# Patient Record
Sex: FEMALE | Race: WHITE | Employment: OTHER
[De-identification: names, ages, dates, MRNs, and addresses within clinical notes are randomized per-mention and may not be internally consistent; named-entity substitution may affect disease eponyms.]

---

## 2017-01-10 ENCOUNTER — TELEPHONE (OUTPATIENT)
Dept: PODIATRY | Facility: CLINIC | Age: 72
End: 2017-01-10

## 2017-01-10 DIAGNOSIS — M79.671 PAIN IN BOTH FEET: ICD-10-CM

## 2017-01-10 DIAGNOSIS — M79.672 PAIN IN BOTH FEET: ICD-10-CM

## 2017-01-10 DIAGNOSIS — M19.072 PRIMARY OSTEOARTHRITIS OF LEFT FOOT: ICD-10-CM

## 2017-01-10 DIAGNOSIS — M77.8 ENTHESOPATHY OF FOOT: ICD-10-CM

## 2017-01-10 DIAGNOSIS — M72.2 PLANTAR FASCIITIS, LEFT: Primary | ICD-10-CM

## 2017-04-27 ENCOUNTER — HOSPITAL ENCOUNTER (OUTPATIENT)
Dept: WOMENS IMAGING | Age: 72
Discharge: HOME | End: 2017-04-27
Payer: MEDICARE

## 2017-04-27 DIAGNOSIS — Z12.31 VISIT FOR SCREENING MAMMOGRAM: ICD-10-CM

## 2017-04-27 PROCEDURE — G0202 SCR MAMMO BI INCL CAD: HCPCS

## 2017-09-07 PROBLEM — E11.9 TYPE 2 DIABETES MELLITUS WITHOUT COMPLICATION, WITH LONG-TERM CURRENT USE OF INSULIN (HCC): Chronic | Status: ACTIVE | Noted: 2017-09-07

## 2017-09-07 PROBLEM — Z79.4 TYPE 2 DIABETES MELLITUS WITHOUT COMPLICATION, WITH LONG-TERM CURRENT USE OF INSULIN (HCC): Chronic | Status: ACTIVE | Noted: 2017-09-07

## 2017-09-09 ENCOUNTER — HOSPITAL ENCOUNTER (EMERGENCY)
Age: 72
Discharge: HOME | End: 2017-09-09
Attending: EMERGENCY MEDICINE
Payer: MEDICARE

## 2017-09-09 VITALS
WEIGHT: 144 LBS | SYSTOLIC BLOOD PRESSURE: 153 MMHG | RESPIRATION RATE: 16 BRPM | OXYGEN SATURATION: 100 % | TEMPERATURE: 97.7 F | DIASTOLIC BLOOD PRESSURE: 60 MMHG | HEIGHT: 63 IN | HEART RATE: 72 BPM | BODY MASS INDEX: 25.52 KG/M2

## 2017-09-09 DIAGNOSIS — R21 RASH AND OTHER NONSPECIFIC SKIN ERUPTION: Primary | ICD-10-CM

## 2017-09-09 PROCEDURE — 6360000002 HC RX W HCPCS: Performed by: EMERGENCY MEDICINE

## 2017-09-09 PROCEDURE — 6370000000 HC RX 637 (ALT 250 FOR IP): Performed by: EMERGENCY MEDICINE

## 2017-09-09 PROCEDURE — 99282 EMERGENCY DEPT VISIT SF MDM: CPT

## 2017-09-09 RX ORDER — FAMOTIDINE 20 MG/1
20 TABLET, FILM COATED ORAL ONCE
Status: COMPLETED | OUTPATIENT
Start: 2017-09-09 | End: 2017-09-09

## 2017-09-09 RX ORDER — HYDROXYZINE HYDROCHLORIDE 25 MG/1
25 TABLET, FILM COATED ORAL ONCE
Status: COMPLETED | OUTPATIENT
Start: 2017-09-09 | End: 2017-09-09

## 2017-09-09 RX ORDER — DEXAMETHASONE 4 MG/1
10 TABLET ORAL EVERY 12 HOURS SCHEDULED
Status: DISCONTINUED | OUTPATIENT
Start: 2017-09-09 | End: 2017-09-09 | Stop reason: HOSPADM

## 2017-09-09 RX ORDER — PREDNISONE 10 MG/1
TABLET ORAL
Qty: 12 TABLET | Refills: 0 | Status: SHIPPED | OUTPATIENT
Start: 2017-09-09 | End: 2017-09-19

## 2017-09-09 RX ORDER — HYDROXYZINE HYDROCHLORIDE 25 MG/1
25 TABLET, FILM COATED ORAL 3 TIMES DAILY PRN
Qty: 9 TABLET | Refills: 0 | Status: SHIPPED | OUTPATIENT
Start: 2017-09-09 | End: 2017-09-12

## 2017-09-09 RX ADMIN — FAMOTIDINE 20 MG: 20 TABLET ORAL at 03:57

## 2017-09-09 RX ADMIN — DEXAMETHASONE 10 MG: 4 TABLET ORAL at 03:57

## 2017-09-09 RX ADMIN — HYDROXYZINE HYDROCHLORIDE 25 MG: 25 TABLET, FILM COATED ORAL at 03:57

## 2018-04-30 ENCOUNTER — HOSPITAL ENCOUNTER (OUTPATIENT)
Dept: WOMENS IMAGING | Age: 73
Discharge: HOME | End: 2018-05-02
Payer: MEDICARE

## 2018-04-30 DIAGNOSIS — Z12.31 VISIT FOR SCREENING MAMMOGRAM: ICD-10-CM

## 2018-04-30 PROCEDURE — 77063 BREAST TOMOSYNTHESIS BI: CPT

## 2018-12-05 ENCOUNTER — TELEPHONE (OUTPATIENT)
Dept: PRIMARY CARE CLINIC | Age: 73
End: 2018-12-05

## 2018-12-06 LAB
AVERAGE GLUCOSE: 137
HBA1C MFR BLD: 6.4 %

## 2019-03-05 ENCOUNTER — OFFICE VISIT (OUTPATIENT)
Dept: PRIMARY CARE CLINIC | Age: 74
End: 2019-03-05
Payer: MEDICARE

## 2019-03-05 VITALS
DIASTOLIC BLOOD PRESSURE: 70 MMHG | OXYGEN SATURATION: 98 % | WEIGHT: 137.4 LBS | HEART RATE: 61 BPM | BODY MASS INDEX: 24.34 KG/M2 | SYSTOLIC BLOOD PRESSURE: 144 MMHG

## 2019-03-05 DIAGNOSIS — R51.9 ACUTE NONINTRACTABLE HEADACHE, UNSPECIFIED HEADACHE TYPE: ICD-10-CM

## 2019-03-05 DIAGNOSIS — J01.10 ACUTE NON-RECURRENT FRONTAL SINUSITIS: Primary | ICD-10-CM

## 2019-03-05 PROCEDURE — 99214 OFFICE O/P EST MOD 30 MIN: CPT | Performed by: PHYSICIAN ASSISTANT

## 2019-03-05 RX ORDER — FLUTICASONE PROPIONATE 50 MCG
2 SPRAY, SUSPENSION (ML) NASAL DAILY
Qty: 1 BOTTLE | Refills: 0 | Status: SHIPPED | OUTPATIENT
Start: 2019-03-05 | End: 2019-06-03

## 2019-03-05 RX ORDER — AMOXICILLIN 500 MG/1
500 CAPSULE ORAL 3 TIMES DAILY
Qty: 21 CAPSULE | Refills: 0 | Status: SHIPPED | OUTPATIENT
Start: 2019-03-05 | End: 2019-06-03 | Stop reason: ALTCHOICE

## 2019-03-05 ASSESSMENT — ENCOUNTER SYMPTOMS
SWOLLEN GLANDS: 0
SINUS PRESSURE: 1
BLURRED VISION: 0
PHOTOPHOBIA: 0
NAUSEA: 0
VISUAL CHANGE: 0
SORE THROAT: 0
RHINORRHEA: 1
VOMITING: 0
COUGH: 0

## 2019-03-05 ASSESSMENT — PATIENT HEALTH QUESTIONNAIRE - PHQ9
2. FEELING DOWN, DEPRESSED OR HOPELESS: 0
SUM OF ALL RESPONSES TO PHQ QUESTIONS 1-9: 0
1. LITTLE INTEREST OR PLEASURE IN DOING THINGS: 0
SUM OF ALL RESPONSES TO PHQ QUESTIONS 1-9: 0
SUM OF ALL RESPONSES TO PHQ9 QUESTIONS 1 & 2: 0

## 2019-03-21 ENCOUNTER — TELEPHONE (OUTPATIENT)
Dept: PRIMARY CARE CLINIC | Age: 74
End: 2019-03-21

## 2019-03-21 RX ORDER — VALSARTAN 80 MG/1
80 TABLET ORAL DAILY
Qty: 90 TABLET | Refills: 3 | Status: SHIPPED | OUTPATIENT
Start: 2019-03-21 | End: 2019-06-03 | Stop reason: ALTCHOICE

## 2019-05-02 ENCOUNTER — HOSPITAL ENCOUNTER (OUTPATIENT)
Dept: WOMENS IMAGING | Age: 74
Discharge: HOME | End: 2019-05-04
Payer: MEDICARE

## 2019-05-02 DIAGNOSIS — Z12.39 BREAST SCREENING: ICD-10-CM

## 2019-05-02 PROCEDURE — 77067 SCR MAMMO BI INCL CAD: CPT

## 2019-06-03 ENCOUNTER — OFFICE VISIT (OUTPATIENT)
Dept: PRIMARY CARE CLINIC | Age: 74
End: 2019-06-03
Payer: MEDICARE

## 2019-06-03 VITALS
HEART RATE: 59 BPM | WEIGHT: 135.8 LBS | DIASTOLIC BLOOD PRESSURE: 62 MMHG | OXYGEN SATURATION: 99 % | SYSTOLIC BLOOD PRESSURE: 128 MMHG | HEIGHT: 62 IN | BODY MASS INDEX: 24.99 KG/M2

## 2019-06-03 DIAGNOSIS — Z00.00 MEDICARE ANNUAL WELLNESS VISIT, SUBSEQUENT: ICD-10-CM

## 2019-06-03 DIAGNOSIS — Z00.00 ROUTINE GENERAL MEDICAL EXAMINATION AT A HEALTH CARE FACILITY: Primary | ICD-10-CM

## 2019-06-03 PROCEDURE — G0438 PPPS, INITIAL VISIT: HCPCS | Performed by: FAMILY MEDICINE

## 2019-06-03 ASSESSMENT — LIFESTYLE VARIABLES: HOW OFTEN DO YOU HAVE A DRINK CONTAINING ALCOHOL: 0

## 2019-06-03 ASSESSMENT — PATIENT HEALTH QUESTIONNAIRE - PHQ9
SUM OF ALL RESPONSES TO PHQ QUESTIONS 1-9: 0
2. FEELING DOWN, DEPRESSED OR HOPELESS: 0
SUM OF ALL RESPONSES TO PHQ QUESTIONS 1-9: 0
1. LITTLE INTEREST OR PLEASURE IN DOING THINGS: 0
SUM OF ALL RESPONSES TO PHQ9 QUESTIONS 1 & 2: 0

## 2019-06-03 NOTE — PATIENT INSTRUCTIONS
Check with endocrinology about last bone density test and if due yet   Personalized Preventive Plan for Georgian Roof - 6/3/2019  Medicare offers a range of preventive health benefits. Some of the tests and screenings are paid in full while other may be subject to a deductible, co-insurance, and/or copay. Some of these benefits include a comprehensive review of your medical history including lifestyle, illnesses that may run in your family, and various assessments and screenings as appropriate. After reviewing your medical record and screening and assessments performed today your provider may have ordered immunizations, labs, imaging, and/or referrals for you. A list of these orders (if applicable) as well as your Preventive Care list are included within your After Visit Summary for your review. Other Preventive Recommendations:    · A preventive eye exam performed by an eye specialist is recommended every 1-2 years to screen for glaucoma; cataracts, macular degeneration, and other eye disorders. · A preventive dental visit is recommended every 6 months. · Try to get at least 150 minutes of exercise per week or 10,000 steps per day on a pedometer . · Order or download the FREE \"Exercise & Physical Activity: Your Everyday Guide\" from [employer]. Call 5-370.603.9010 or search [employer] online. · You need 2935-6342 mg of calcium and 1817-4614 IU of vitamin D per day. It is possible to meet your calcium requirement with diet alone, but a vitamin D supplement is usually necessary to meet this goal.  · When exposed to the sun, use a sunscreen that protects against both UVA and UVB radiation with an SPF of 30 or greater. Reapply every 2 to 3 hours or after sweating, drying off with a towel, or swimming. · Always wear a seat belt when traveling in a car. Always wear a helmet when riding a bicycle or motorcycle.

## 2019-06-03 NOTE — PROGRESS NOTES
Medicare Annual Wellness Visit  Name: Isaiah Parisi Date: 6/3/2019   MRN: [id number] Sex: Female   Age: 76 y.o. Ethnicity: Non-/Non    : 1945 Race: Delmi Bowles is here for Welcome To Medicare Visit (Pt here for medicare wellness. )    Screenings for behavioral, psychosocial and functional/safety risks, and cognitive dysfunction are all negative except as indicated below. These results, as well as other patient data from the [address] form, are documented in Flowsheets linked to this Encounter. No Known Allergies    Prior to Visit Medications    Medication Sig Taking?  Authorizing Provider   insulin glargine (TOUJEO SOLOSTAR) 300 UNIT/ML injection pen Inject 13 Units into the skin every morning  Yes Historical Provider, MD RICE UF III MINI PEN NEEDLES 31G X 5 MM MISC  Yes Historical Provider, MD   pravastatin (PRAVACHOL) 40 MG tablet Take 1 tablet by mouth daily Yes Joselyn Ceballos PA-C   aspirin 81 MG chewable tablet Take 81 mg by mouth daily Yes Suzie Davis MD   losartan (COZAAR) 50 MG tablet Take 50 mg by mouth daily Yes Suzie Davis MD   Multiple Vitamins TABS Take 1 tablet by mouth daily Yes Suzie Davis MD   insulin lispro (HUMALOG) 100 UNIT/ML injection cartridge Inject into the skin 3 times daily (before meals) Takes her insulin according to scale Yes Suzie Davis MD       Past Medical History:   Diagnosis Date    Cramp of limb     Frequent bowel movements     History of chest pain     History of pneumonia     History of restless legs syndrome     History of shortness of breath     History of synovitis     Plantar fasciitis, left     Trochanteric bursitis      Past Surgical History:   Procedure Laterality Date    CHOLECYSTECTOMY      COLONOSCOPY      HYSTERECTOMY, VAGINAL      left both ovaries in       Family History   Problem Relation Age of Onset    Heart Disease Mother     Heart Disease Brother     Diabetes Maternal Aunt     Diabetes Maternal Uncle     Diabetes Maternal Grandmother        CareTeam (Including outside providers/suppliers regularly involved in providing care):   Patient Care Team:  Nellie Foote MD as PCP - General  Nellie Foote MD as PCP [facility]    Wt Readings from Last 3 Encounters:   06/03/19 135 lb 12.8 oz (61.6 kg)   03/05/19 137 lb 6.4 oz (62.3 kg)   11/14/18 137 lb 6.4 oz (62.3 kg)     Vitals:    06/03/19 1341   BP: 128/62   Pulse: 59   SpO2: 99%   Weight: 135 lb 12.8 oz (61.6 kg)   Height: 5' 2\" (1.575 m)     Body mass index is 24.84 kg/m². Based upon direct observation of the patient, evaluation of cognition reveals recent and remote memory intact. Physical Exam   Constitutional: She is oriented to person, place, and time. She appears well-developed and well-nourished. No distress. HENT:   Head: Normocephalic and atraumatic. Mouth/Throat: Oropharynx is clear and moist.   Eyes: Pupils are equal, round, and reactive to light. Conjunctivae are normal. Right eye exhibits no discharge. Left eye exhibits no discharge. No scleral icterus. Neck: No tracheal deviation present. No thyromegaly present. Cardiovascular: Normal rate, regular rhythm and normal heart sounds. No carotid bruits   Pulmonary/Chest: Effort normal and breath sounds normal. No respiratory distress. She has no wheezes. Musculoskeletal: She exhibits no edema. Lymphadenopathy:     She has no cervical adenopathy. Neurological: She is alert and oriented to person, place, and time. Skin: Skin is warm. No rash noted. Psychiatric: She has a normal mood and affect. Her behavior is normal. Thought content normal.   Nursing note and vitals reviewed. Patient's complete Health Risk Assessment and screening values have been reviewed and are found in Flowsheets.  The following problems were reviewed today and where indicated follow up appointments were made and/or referrals ordered. Positive Risk Factor Screenings with Interventions:     No Positive Risk Factors identified today. Personalized Preventive Plan   Current Health Maintenance Status  Immunization History   Administered Date(s) Administered    Influenza Whole 10/20/2015    Influenza, High Dose (Fluzone 65 yrs and older) 10/01/2016    Influenza, Triv, inactivated, subunit, adjuvanted, IM (Fluad 65 yrs and older) 10/19/2017, 09/20/2018    Pneumococcal 13-valent Conjugate ([id number]) 12/13/2016    Pneumococcal Polysaccharide ([id number]) 06/19/2018    Zoster Live (Zostavax) 09/17/2015    Zoster Subunit (Shingrix) 09/07/2018, 11/09/2018        Health Maintenance   Topic Date Due    DTaP/Tdap/Td vaccine (1 - Tdap) 04/06/1964    DEXA (modify frequency per FRAX score)  04/06/2010    Diabetic microalbuminuria test  03/21/2016    Lipid screen  03/21/2016    Potassium monitoring  03/07/2017    Creatinine monitoring  03/07/2017    Diabetic foot exam  12/06/2019    A1C test (Diabetic or Prediabetic)  12/06/2019    Diabetic retinal exam  12/06/2019    Breast cancer screen  05/02/2021    Colon cancer screen colonoscopy  08/15/2021    Flu vaccine  Completed    Shingles Vaccine  Completed    Pneumococcal 65+ years Vaccine  Completed    Hepatitis C screen  Completed     Recommendations for Preventive Services Due: see orders and patient instructions/AVS.  .   Recommended screening schedule for the next 5-10 years is provided to the patient in written form: see Patient Instructions/AVS.

## 2019-06-05 LAB
ALBUMIN SERPL-MCNC: NORMAL G/DL
ALP BLD-CCNC: NORMAL U/L
ALT SERPL-CCNC: NORMAL U/L
AST SERPL-CCNC: NORMAL U/L
AVERAGE GLUCOSE: 146
BILIRUB SERPL-MCNC: NORMAL MG/DL (ref 0.1–1.4)
BUN BLDV-MCNC: NORMAL MG/DL
CALCIUM SERPL-MCNC: NORMAL MG/DL
CHLORIDE BLD-SCNC: NORMAL MMOL/L
CHOLESTEROL, TOTAL: 150 MG/DL
CHOLESTEROL/HDL RATIO: 1.9
CO2: NORMAL MMOL/L
CREAT SERPL-MCNC: NORMAL MG/DL
CREATININE, URINE: 151.6
GLUCOSE FASTING: 89 MG/DL
HBA1C MFR BLD: 6.5 %
HDLC SERPL-MCNC: 77 MG/DL (ref 35–70)
LDL CHOLESTEROL CALCULATED: 55 MG/DL (ref 0–160)
MICROALBUMIN/CREAT 24H UR: 20.7 MG/G{CREAT}
MICROALBUMIN/CREAT UR-RTO: 13.7
POTASSIUM SERPL-SCNC: NORMAL MMOL/L
SODIUM BLD-SCNC: NORMAL MMOL/L
TOTAL PROTEIN: NORMAL G/DL (ref 6.4–8.2)
TRIGL SERPL-MCNC: 86 MG/DL
VLDLC SERPL CALC-MCNC: 17 MG/DL

## 2019-07-03 PROBLEM — Z00.00 MEDICARE ANNUAL WELLNESS VISIT, SUBSEQUENT: Status: RESOLVED | Noted: 2019-06-03 | Resolved: 2019-07-03

## 2019-09-17 ENCOUNTER — TELEPHONE (OUTPATIENT)
Dept: PRIMARY CARE CLINIC | Age: 74
End: 2019-09-17

## 2019-09-19 ENCOUNTER — OFFICE VISIT (OUTPATIENT)
Dept: PRIMARY CARE CLINIC | Age: 74
End: 2019-09-19
Payer: MEDICARE

## 2019-09-19 VITALS
HEART RATE: 59 BPM | SYSTOLIC BLOOD PRESSURE: 122 MMHG | WEIGHT: 134 LBS | DIASTOLIC BLOOD PRESSURE: 60 MMHG | HEIGHT: 62 IN | OXYGEN SATURATION: 95 % | RESPIRATION RATE: 15 BRPM | BODY MASS INDEX: 24.66 KG/M2

## 2019-09-19 DIAGNOSIS — L23.7 POISON IVY DERMATITIS: ICD-10-CM

## 2019-09-19 PROCEDURE — 96372 THER/PROPH/DIAG INJ SC/IM: CPT | Performed by: PHYSICIAN ASSISTANT

## 2019-09-19 PROCEDURE — 99213 OFFICE O/P EST LOW 20 MIN: CPT | Performed by: PHYSICIAN ASSISTANT

## 2019-09-19 RX ORDER — METHYLPREDNISOLONE ACETATE 80 MG/ML
80 INJECTION, SUSPENSION INTRA-ARTICULAR; INTRALESIONAL; INTRAMUSCULAR; SOFT TISSUE ONCE
Status: COMPLETED | OUTPATIENT
Start: 2019-09-19 | End: 2019-09-19

## 2019-09-19 RX ADMIN — METHYLPREDNISOLONE ACETATE 80 MG: 80 INJECTION, SUSPENSION INTRA-ARTICULAR; INTRALESIONAL; INTRAMUSCULAR; SOFT TISSUE at 12:13

## 2019-09-19 ASSESSMENT — ENCOUNTER SYMPTOMS
COLOR CHANGE: 0
RESPIRATORY NEGATIVE: 1
EYES NEGATIVE: 1
ABDOMINAL PAIN: 0

## 2019-10-07 ENCOUNTER — TELEPHONE (OUTPATIENT)
Dept: PRIMARY CARE CLINIC | Age: 74
End: 2019-10-07

## 2019-10-07 DIAGNOSIS — H93.19 TINNITUS, UNSPECIFIED LATERALITY: Primary | ICD-10-CM

## 2020-03-09 ENCOUNTER — HOSPITAL ENCOUNTER (OUTPATIENT)
Dept: GENERAL RADIOLOGY | Age: 75
Discharge: HOME | End: 2020-03-11
Payer: MEDICARE

## 2020-03-09 ENCOUNTER — OFFICE VISIT (OUTPATIENT)
Dept: PRIMARY CARE CLINIC | Age: 75
End: 2020-03-09
Payer: MEDICARE

## 2020-03-09 ENCOUNTER — HOSPITAL ENCOUNTER (OUTPATIENT)
Age: 75
Discharge: HOME | End: 2020-03-11
Payer: MEDICARE

## 2020-03-09 ENCOUNTER — NURSE TRIAGE (OUTPATIENT)
Dept: OTHER | Facility: CLINIC | Age: 75
End: 2020-03-09

## 2020-03-09 VITALS
SYSTOLIC BLOOD PRESSURE: 140 MMHG | DIASTOLIC BLOOD PRESSURE: 60 MMHG | WEIGHT: 133.2 LBS | HEART RATE: 68 BPM | BODY MASS INDEX: 24.36 KG/M2 | OXYGEN SATURATION: 96 %

## 2020-03-09 PROCEDURE — 71046 X-RAY EXAM CHEST 2 VIEWS: CPT

## 2020-03-09 PROCEDURE — 99213 OFFICE O/P EST LOW 20 MIN: CPT | Performed by: FAMILY MEDICINE

## 2020-03-09 RX ORDER — BENZONATATE 100 MG/1
100 CAPSULE ORAL 3 TIMES DAILY PRN
Qty: 30 CAPSULE | Refills: 0 | Status: SHIPPED | OUTPATIENT
Start: 2020-03-09 | End: 2020-03-19

## 2020-03-09 RX ORDER — AZITHROMYCIN 250 MG/1
TABLET, FILM COATED ORAL
Qty: 1 PACKET | Refills: 0 | Status: SHIPPED | OUTPATIENT
Start: 2020-03-09 | End: 2020-03-13 | Stop reason: SDUPTHER

## 2020-03-09 ASSESSMENT — ENCOUNTER SYMPTOMS
ABDOMINAL PAIN: 0
WHEEZING: 0
EYE REDNESS: 0
DIARRHEA: 1
COUGH: 1
NAUSEA: 0
RHINORRHEA: 1
SORE THROAT: 0
VOMITING: 0
SHORTNESS OF BREATH: 0
EYE DISCHARGE: 0

## 2020-03-09 NOTE — TELEPHONE ENCOUNTER
Reason for Disposition   Coughed up > 1 tablespoon (15 ml) blood (Exception: blood-tinged sputum)    Protocols used: COUGH-ADULT-OH    Patient called [facility]) to schedule appointment, with red flag complaint, transferred to RN access for triage. Reports having productive cough for 8-10 days. States sputum is white and bloody at times. Patient informed of disposition. Care advice as documented. Instructed patient to call back with worsening symptoms. Soft transfer to pre-service center to schedule appointment as recommended. Please do not respond to the triage nurse through this encounter. Any subsequent communication should be directly with the patient.

## 2020-03-09 NOTE — PROGRESS NOTES
(ZITHROMAX) 250 MG tablet Take 2 tabs the first day then1 days daily for 4 days 1 packet 0    benzonatate (TESSALON PERLES) 100 MG capsule Take 1 capsule by mouth 3 times daily as needed for Cough 30 capsule 0    insulin glargine (TOUJEO SOLOSTAR) 300 UNIT/ML injection pen Inject 13 Units into the skin every morning       B-D UF III MINI PEN NEEDLES 31G X 5 MM MISC       pravastatin (PRAVACHOL) 40 MG tablet Take 1 tablet by mouth daily 30 tablet 0    aspirin 81 MG chewable tablet Take 81 mg by mouth daily      losartan (COZAAR) 50 MG tablet Take 50 mg by mouth daily      Multiple Vitamins TABS Take 1 tablet by mouth daily      insulin lispro (HUMALOG) 100 UNIT/ML injection cartridge Inject into the skin 3 times daily (before meals) Takes her insulin according to scale       No current facility-administered medications for this visit. No Known Allergies    Health Maintenance   Topic Date Due    Hepatitis B vaccine (1 of 3 - Risk 3-dose series) 04/06/1964    DTaP/Tdap/Td vaccine (1 - Tdap) 04/06/1964    Annual Wellness Visit (AWV)  06/03/2020    Diabetic foot exam  06/05/2020    A1C test (Diabetic or Prediabetic)  06/05/2020    Diabetic microalbuminuria test  06/05/2020    Lipid screen  06/05/2020    Potassium monitoring  06/05/2020    Creatinine monitoring  06/05/2020    Diabetic retinal exam  06/20/2020    Breast cancer screen  05/02/2021    Colon cancer screen colonoscopy  08/15/2021    DEXA (modify frequency per FRAX score)  Completed    Flu vaccine  Completed    Shingles Vaccine  Completed    Pneumococcal 65+ years Vaccine  Completed    Hepatitis C screen  Completed    Hepatitis A vaccine  Aged Out    Hib vaccine  Aged Out    Meningococcal (ACWY) vaccine  Aged Out       Subjective:      Review of Systems   Constitutional: Negative for chills, fatigue and fever. HENT: Positive for rhinorrhea. Negative for ear pain, postnasal drip and sore throat.     Eyes: Negative for discharge and redness. Respiratory: Positive for cough. Negative for shortness of breath and wheezing. Cardiovascular: Negative for chest pain and palpitations. Gastrointestinal: Positive for diarrhea (once). Negative for abdominal pain, nausea and vomiting. Genitourinary: Negative for dysuria and frequency. Musculoskeletal: Negative for arthralgias and myalgias. Neurological: Positive for light-headedness (sometimes). Negative for dizziness and headaches. Psychiatric/Behavioral: Positive for sleep disturbance (a little better last night). Objective:     BP (!) 140/60   Pulse 68   Wt 133 lb 3.2 oz (60.4 kg)   SpO2 96%   BMI 24.36 kg/m²   Physical Exam  Vitals signs and nursing note reviewed. Constitutional:       General: She is not in acute distress. Appearance: She is well-developed. HENT:      Head: Normocephalic and atraumatic. Eyes:      General: No scleral icterus. Right eye: No discharge. Left eye: No discharge. Conjunctiva/sclera: Conjunctivae normal.      Pupils: Pupils are equal, round, and reactive to light. Neck:      Thyroid: No thyromegaly. Trachea: No tracheal deviation. Cardiovascular:      Rate and Rhythm: Normal rate and regular rhythm. Heart sounds: Normal heart sounds. Comments: No carotid bruits  Pulmonary:      Effort: Pulmonary effort is normal. No respiratory distress. Breath sounds: Normal breath sounds. No wheezing. Lymphadenopathy:      Cervical: No cervical adenopathy. Skin:     General: Skin is warm. Findings: No rash. Neurological:      Mental Status: She is alert and oriented to person, place, and time. Psychiatric:         Behavior: Behavior normal.         Thought Content: Thought content normal.         Assessment:       Diagnosis Orders   1. Proliferative diabetic retinopathy associated with type 1 diabetes mellitus, macular edema presence unspecified, unspecified laterality ([facility] [address]     2.  Cough  XR CHEST STANDARD (2 VW)   3. Cough with hemoptysis  XR CHEST STANDARD (2 VW)        Plan:    follows with eye doctor and endocrine  Return if symptoms worsen or fail to improve. Orders Placed This Encounter   Procedures    XR CHEST STANDARD (2 VW)     Standing Status:   Future     Standing Expiration Date:   3/9/2021     Orders Placed This Encounter   Medications    azithromycin (ZITHROMAX) 250 MG tablet     Sig: Take 2 tabs the first day then1 days daily for 4 days     Dispense:  1 packet     Refill:  0    benzonatate (TESSALON PERLES) 100 MG capsule     Sig: Take 1 capsule by mouth 3 times daily as needed for Cough     Dispense:  30 capsule     Refill:  0       Patient given educationalmaterials - see patient instructions. Discussed use, benefit, and side effectsof prescribed medications. All patient questions answered. Pt voiced understanding. Reviewed health maintenance. Instructed to continue current medications, diet andexercise. Patient agreed with treatment plan. Follow up as directed.      Electronicallysigned by Lorna Jeronimo MD on 3/9/2020 at 2:13 PM

## 2020-03-13 ENCOUNTER — TELEPHONE (OUTPATIENT)
Dept: PRIMARY CARE CLINIC | Age: 75
End: 2020-03-13

## 2020-03-13 RX ORDER — AZITHROMYCIN 250 MG/1
TABLET, FILM COATED ORAL
Qty: 1 PACKET | Refills: 0 | Status: SHIPPED | OUTPATIENT
Start: 2020-03-13 | End: 2020-06-05 | Stop reason: SDUPTHER

## 2020-03-18 NOTE — TELEPHONE ENCOUNTER
Probably a viral bronchitis then so just needs to ride it out. Take Mucinex DM and saline nasal spray.   If she gets really SOB then go to ER

## 2020-04-03 ENCOUNTER — APPOINTMENT (OUTPATIENT)
Dept: GENERAL RADIOLOGY | Age: 75
End: 2020-04-03
Payer: MEDICARE

## 2020-04-03 ENCOUNTER — HOSPITAL ENCOUNTER (EMERGENCY)
Age: 75
Discharge: HOME | End: 2020-04-03
Attending: EMERGENCY MEDICINE
Payer: MEDICARE

## 2020-04-03 ENCOUNTER — TELEPHONE (OUTPATIENT)
Dept: PRIMARY CARE CLINIC | Age: 75
End: 2020-04-03

## 2020-04-03 VITALS
OXYGEN SATURATION: 100 % | RESPIRATION RATE: 16 BRPM | TEMPERATURE: 97.8 F | DIASTOLIC BLOOD PRESSURE: 62 MMHG | BODY MASS INDEX: 23 KG/M2 | HEIGHT: 62 IN | WEIGHT: 125 LBS | HEART RATE: 68 BPM | SYSTOLIC BLOOD PRESSURE: 157 MMHG

## 2020-04-03 LAB
TROPONIN INTERP: NORMAL
TROPONIN T: NORMAL NG/ML
TROPONIN, HIGH SENSITIVITY: 10 NG/L (ref 0–14)

## 2020-04-03 PROCEDURE — 36415 COLL VENOUS BLD VENIPUNCTURE: CPT

## 2020-04-03 PROCEDURE — 99285 EMERGENCY DEPT VISIT HI MDM: CPT

## 2020-04-03 PROCEDURE — 6370000000 HC RX 637 (ALT 250 FOR IP): Performed by: EMERGENCY MEDICINE

## 2020-04-03 PROCEDURE — 71045 X-RAY EXAM CHEST 1 VIEW: CPT

## 2020-04-03 PROCEDURE — 84484 ASSAY OF TROPONIN QUANT: CPT

## 2020-04-03 PROCEDURE — 93005 ELECTROCARDIOGRAM TRACING: CPT | Performed by: EMERGENCY MEDICINE

## 2020-04-03 RX ORDER — DICYCLOMINE HYDROCHLORIDE 10 MG/1
10 CAPSULE ORAL
Qty: 20 CAPSULE | Refills: 0 | Status: SHIPPED | OUTPATIENT
Start: 2020-04-03 | End: 2020-04-09 | Stop reason: SDUPTHER

## 2020-04-03 RX ORDER — FAMOTIDINE 20 MG/1
20 TABLET, FILM COATED ORAL ONCE
Status: COMPLETED | OUTPATIENT
Start: 2020-04-03 | End: 2020-04-03

## 2020-04-03 RX ORDER — FAMOTIDINE 20 MG/1
20 TABLET, FILM COATED ORAL 2 TIMES DAILY
Qty: 14 TABLET | Refills: 0 | Status: SHIPPED | OUTPATIENT
Start: 2020-04-03 | End: 2020-04-09 | Stop reason: SDUPTHER

## 2020-04-03 RX ORDER — DICYCLOMINE HYDROCHLORIDE 10 MG/1
10 CAPSULE ORAL ONCE
Status: COMPLETED | OUTPATIENT
Start: 2020-04-03 | End: 2020-04-03

## 2020-04-03 RX ADMIN — FAMOTIDINE 20 MG: 20 TABLET ORAL at 15:26

## 2020-04-03 RX ADMIN — DICYCLOMINE HYDROCHLORIDE 10 MG: 10 CAPSULE ORAL at 15:26

## 2020-04-03 ASSESSMENT — PAIN SCALES - GENERAL: PAINLEVEL_OUTOF10: 5

## 2020-04-03 ASSESSMENT — HEART SCORE: ECG: 0

## 2020-04-03 NOTE — ED PROVIDER NOTES
capsule     Refill:  0    famotidine (PEPCID) 20 MG tablet     Sig: Take 1 tablet by mouth 2 times daily for 7 days     Dispense:  14 tablet     Refill:  0     CONSULTS:  None    FINAL IMPRESSION      1.  Atypical chest pain          DISPOSITION/PLAN   DISPOSITION        PATIENT REFERRED TO:  Lindsay Eli MD  [address], [address]  [place] [facility] [address]  [phone]    In 2 days      DISCHARGE MEDICATIONS:  New Prescriptions    DICYCLOMINE (BENTYL) 10 MG CAPSULE    Take 1 capsule by mouth 4 times daily (before meals and nightly) for 5 days    FAMOTIDINE (PEPCID) 20 MG TABLET    Take 1 tablet by mouth 2 times daily for 7 days     Thang De Oliveira MD  Attending Emergency Physician                    Abhilash Hernandez MD  04/03/20 [id number]

## 2020-04-03 NOTE — TELEPHONE ENCOUNTER
I called patient. She never went to ER. I advised that she goes. Patient understood and stated she would.

## 2020-04-04 ENCOUNTER — CARE COORDINATION (OUTPATIENT)
Dept: CARE COORDINATION | Age: 75
End: 2020-04-04

## 2020-04-05 LAB
EKG ATRIAL RATE: 65 BPM
EKG P AXIS: 86 DEGREES
EKG P-R INTERVAL: 174 MS
EKG Q-T INTERVAL: 414 MS
EKG QRS DURATION: 78 MS
EKG QTC CALCULATION (BAZETT): 430 MS
EKG R AXIS: 67 DEGREES
EKG T AXIS: 76 DEGREES
EKG VENTRICULAR RATE: 65 BPM

## 2020-04-05 PROCEDURE — 93010 ELECTROCARDIOGRAM REPORT: CPT | Performed by: INTERNAL MEDICINE

## 2020-04-09 RX ORDER — FAMOTIDINE 20 MG/1
20 TABLET, FILM COATED ORAL 2 TIMES DAILY
Qty: 14 TABLET | Refills: 0 | Status: SHIPPED | OUTPATIENT
Start: 2020-04-09 | End: 2020-06-19 | Stop reason: ALTCHOICE

## 2020-04-09 RX ORDER — DICYCLOMINE HYDROCHLORIDE 10 MG/1
10 CAPSULE ORAL
Qty: 20 CAPSULE | Refills: 0 | Status: SHIPPED | OUTPATIENT
Start: 2020-04-09 | End: 2020-06-19 | Stop reason: ALTCHOICE

## 2020-04-09 NOTE — TELEPHONE ENCOUNTER
Last seen 3/9/20  Hospital ER prescribed these for her, went to ER on 4/3/20 for chest pain, she will call back to schedule f/u if she is not feeling better         Please approve or deny

## 2020-04-10 ENCOUNTER — OFFICE VISIT (OUTPATIENT)
Dept: PRIMARY CARE CLINIC | Age: 75
End: 2020-04-10
Payer: MEDICARE

## 2020-04-10 VITALS
TEMPERATURE: 97.9 F | HEART RATE: 76 BPM | SYSTOLIC BLOOD PRESSURE: 128 MMHG | DIASTOLIC BLOOD PRESSURE: 70 MMHG | OXYGEN SATURATION: 98 % | BODY MASS INDEX: 23.12 KG/M2 | WEIGHT: 126.4 LBS

## 2020-04-10 PROBLEM — J30.9 ALLERGIC RHINITIS: Status: ACTIVE | Noted: 2020-04-10

## 2020-04-10 LAB — HBA1C MFR BLD: 7.1 %

## 2020-04-10 PROCEDURE — 99213 OFFICE O/P EST LOW 20 MIN: CPT | Performed by: FAMILY MEDICINE

## 2020-04-10 PROCEDURE — 83036 HEMOGLOBIN GLYCOSYLATED A1C: CPT | Performed by: FAMILY MEDICINE

## 2020-04-10 PROCEDURE — 3051F HG A1C>EQUAL 7.0%<8.0%: CPT | Performed by: FAMILY MEDICINE

## 2020-04-10 RX ORDER — LORATADINE 10 MG/1
10 TABLET ORAL DAILY
Qty: 10 TABLET | Refills: 0 | COMMUNITY
Start: 2020-04-10 | End: 2020-04-22

## 2020-04-10 RX ORDER — GUAIFENESIN 600 MG/1
600 TABLET, EXTENDED RELEASE ORAL 2 TIMES DAILY
Qty: 20 TABLET | Refills: 0 | COMMUNITY
Start: 2020-04-10 | End: 2020-04-20

## 2020-04-10 ASSESSMENT — ENCOUNTER SYMPTOMS
CHEST TIGHTNESS: 1
RHINORRHEA: 0
SORE THROAT: 0
SINUS PAIN: 0
SHORTNESS OF BREATH: 0
VOMITING: 0
DIARRHEA: 0
EYE PAIN: 0
SINUS PRESSURE: 0
TROUBLE SWALLOWING: 0
EYE ITCHING: 1
COUGH: 0
NAUSEA: 0

## 2020-04-10 NOTE — PROGRESS NOTES
[address] [place] [address] PRIMARY CARE  [address] [facility] [address]  Dept: [phone]    Sherley Brasher is a 76 y.o. female who presents today for her medical conditions/complaintsas noted below. Chief Complaint   Patient presents with    Chest Congestion     Patient complains of chest congestion x 1 month. No cough, fever or other sx    Follow-Up from Hospital     Patient went to Er on 4/3/20       HPI:     HPI-chest congestion comes and goes- bothering her again this morning. Was having chest tightness and pain so was sent to ER to make sure not her heart. That comes and goes. Has phlegm and coughs that up at times, but not really having a cough. Has taken Mucinex DM generic- when it first started, but doesn't remember if it helped as it was a month ago. Just had telephone visit with abigail, but was unable to go in. Needs HbA1c.       LDL Cholesterol (mg/dL)   Date Value   03/21/2015 57     LDL Calculated (mg/dL)   Date Value   06/05/2019 55       (goal LDL is <100)   AST (U/L)   Date Value   03/21/2015 23     ALT (U/L)   Date Value   03/21/2015 16     BUN (mg/dL)   Date Value   02/25/2016 17     BP Readings from Last 3 Encounters:   04/10/20 128/70   04/03/20 (!) 157/62   03/09/20 (!) 140/60          (goal 120/80)    Past Medical History:   Diagnosis Date    Cramp of limb     Frequent bowel movements     History of chest pain     History of pneumonia     History of restless legs syndrome     History of shortness of breath     History of synovitis     Plantar fasciitis, left     Trochanteric bursitis       Past Surgical History:   Procedure Laterality Date    CHOLECYSTECTOMY      COLONOSCOPY      HYSTERECTOMY, VAGINAL      left both ovaries in       Family History   Problem Relation Age of Onset    Heart Disease Mother     Heart Disease Brother     Diabetes Maternal Aunt     Diabetes Maternal Uncle     Diabetes Maternal Grandmother        Social frequency per FRAX score)  Completed    Flu vaccine  Completed    Shingles Vaccine  Completed    Pneumococcal 65+ years Vaccine  Completed    Hepatitis C screen  Completed    Hepatitis A vaccine  Aged Out    Hib vaccine  Aged Out    Meningococcal (ACWY) vaccine  Aged Out       Subjective:      Review of Systems   Constitutional: Negative for chills (although feels cold- that is normal for her) and fever. HENT: Positive for congestion (chest). Negative for ear pain, postnasal drip, rhinorrhea, sinus pressure, sinus pain, sneezing, sore throat and trouble swallowing. Eyes: Positive for itching. Negative for pain. Respiratory: Positive for chest tightness. Negative for cough and shortness of breath. Cardiovascular: Negative for palpitations. Gastrointestinal: Negative for diarrhea, nausea and vomiting. Skin: Negative for rash. Neurological: Positive for headaches (minimal). Negative for dizziness and light-headedness. Objective:     /70   Pulse 76   Temp 97.9 °F (36.6 °C)   Wt 126 lb 6.4 oz (57.3 kg)   SpO2 98%   BMI 23.12 kg/m²   Physical Exam  Vitals signs and nursing note reviewed. Constitutional:       General: She is not in acute distress. Appearance: She is well-developed. HENT:      Head: Normocephalic and atraumatic. Eyes:      General: No scleral icterus. Right eye: No discharge. Left eye: No discharge. Conjunctiva/sclera: Conjunctivae normal.      Pupils: Pupils are equal, round, and reactive to light. Neck:      Thyroid: No thyromegaly. Trachea: No tracheal deviation. Cardiovascular:      Rate and Rhythm: Normal rate and regular rhythm. Heart sounds: Normal heart sounds. Comments: No carotid bruits  Pulmonary:      Effort: Pulmonary effort is normal. No respiratory distress. Breath sounds: Normal breath sounds. No wheezing. Lymphadenopathy:      Cervical: No cervical adenopathy. Skin:     General: Skin is warm.

## 2020-04-13 ENCOUNTER — TELEPHONE (OUTPATIENT)
Dept: PRIMARY CARE CLINIC | Age: 75
End: 2020-04-13

## 2020-04-13 RX ORDER — ALBUTEROL SULFATE 90 UG/1
2 AEROSOL, METERED RESPIRATORY (INHALATION) EVERY 6 HOURS PRN
Qty: 1 INHALER | Refills: 3 | Status: SHIPPED | OUTPATIENT
Start: 2020-04-13 | End: 2020-04-22

## 2020-04-17 ENCOUNTER — CARE COORDINATION (OUTPATIENT)
Dept: CARE COORDINATION | Age: 75
End: 2020-04-17

## 2020-04-22 ENCOUNTER — OFFICE VISIT (OUTPATIENT)
Dept: PRIMARY CARE CLINIC | Age: 75
End: 2020-04-22
Payer: MEDICARE

## 2020-04-22 ENCOUNTER — HOSPITAL ENCOUNTER (OUTPATIENT)
Age: 75
Discharge: HOME | End: 2020-04-22
Payer: MEDICARE

## 2020-04-22 ENCOUNTER — HOSPITAL ENCOUNTER (OUTPATIENT)
Age: 75
Discharge: HOME | End: 2020-04-24
Payer: MEDICARE

## 2020-04-22 ENCOUNTER — HOSPITAL ENCOUNTER (OUTPATIENT)
Dept: GENERAL RADIOLOGY | Age: 75
Discharge: HOME | End: 2020-04-24
Payer: MEDICARE

## 2020-04-22 VITALS
OXYGEN SATURATION: 94 % | SYSTOLIC BLOOD PRESSURE: 132 MMHG | BODY MASS INDEX: 23.55 KG/M2 | DIASTOLIC BLOOD PRESSURE: 68 MMHG | HEIGHT: 62 IN | WEIGHT: 128 LBS | HEART RATE: 68 BPM | TEMPERATURE: 98 F

## 2020-04-22 LAB
ABSOLUTE EOS #: 0 K/UL (ref 0–0.4)
ABSOLUTE IMMATURE GRANULOCYTE: ABNORMAL K/UL (ref 0–0.3)
ABSOLUTE LYMPH #: 1.1 K/UL (ref 1–4.8)
ABSOLUTE MONO #: 0.3 K/UL (ref 0.1–1.3)
ANION GAP SERPL CALCULATED.3IONS-SCNC: 10 MMOL/L (ref 9–17)
BASOPHILS # BLD: 1 % (ref 0–2)
BASOPHILS ABSOLUTE: 0 K/UL (ref 0–0.2)
BUN BLDV-MCNC: 17 MG/DL (ref 8–23)
BUN/CREAT BLD: ABNORMAL (ref 9–20)
CALCIUM SERPL-MCNC: 10 MG/DL (ref 8.6–10.4)
CHLORIDE BLD-SCNC: 108 MMOL/L (ref 98–107)
CO2: 26 MMOL/L (ref 20–31)
CREAT SERPL-MCNC: 1 MG/DL (ref 0.5–0.9)
D-DIMER QUANTITATIVE: 0.57 MG/L FEU (ref 0–0.59)
DIFFERENTIAL TYPE: ABNORMAL
EOSINOPHILS RELATIVE PERCENT: 1 % (ref 0–4)
GFR AFRICAN AMERICAN: >60 ML/MIN
GFR NON-AFRICAN AMERICAN: 54 ML/MIN
GFR SERPL CREATININE-BSD FRML MDRD: ABNORMAL ML/MIN/{1.73_M2}
GFR SERPL CREATININE-BSD FRML MDRD: ABNORMAL ML/MIN/{1.73_M2}
GLUCOSE BLD-MCNC: 152 MG/DL (ref 70–99)
HCT VFR BLD CALC: 40.9 % (ref 36–46)
HEMOGLOBIN: 13.7 G/DL (ref 12–16)
IMMATURE GRANULOCYTES: ABNORMAL %
LYMPHOCYTES # BLD: 24 % (ref 24–44)
MCH RBC QN AUTO: 30.8 PG (ref 26–34)
MCHC RBC AUTO-ENTMCNC: 33.5 G/DL (ref 31–37)
MCV RBC AUTO: 92 FL (ref 80–100)
MONOCYTES # BLD: 6 % (ref 1–7)
NRBC AUTOMATED: ABNORMAL PER 100 WBC
PDW BLD-RTO: 13.8 % (ref 11.5–14.9)
PLATELET # BLD: 184 K/UL (ref 150–450)
PLATELET ESTIMATE: ABNORMAL
PMV BLD AUTO: 7 FL (ref 6–12)
POTASSIUM SERPL-SCNC: 4.8 MMOL/L (ref 3.7–5.3)
RBC # BLD: 4.44 M/UL (ref 4–5.2)
RBC # BLD: ABNORMAL 10*6/UL
SEG NEUTROPHILS: 68 % (ref 36–66)
SEGMENTED NEUTROPHILS ABSOLUTE COUNT: 3 K/UL (ref 1.3–9.1)
SODIUM BLD-SCNC: 144 MMOL/L (ref 135–144)
WBC # BLD: 4.4 K/UL (ref 3.5–11)
WBC # BLD: ABNORMAL 10*3/UL

## 2020-04-22 PROCEDURE — 99213 OFFICE O/P EST LOW 20 MIN: CPT | Performed by: FAMILY MEDICINE

## 2020-04-22 PROCEDURE — 36415 COLL VENOUS BLD VENIPUNCTURE: CPT

## 2020-04-22 PROCEDURE — 71046 X-RAY EXAM CHEST 2 VIEWS: CPT

## 2020-04-22 PROCEDURE — 85025 COMPLETE CBC W/AUTO DIFF WBC: CPT

## 2020-04-22 PROCEDURE — 86738 MYCOPLASMA ANTIBODY: CPT

## 2020-04-22 PROCEDURE — 80048 BASIC METABOLIC PNL TOTAL CA: CPT

## 2020-04-22 PROCEDURE — 85379 FIBRIN DEGRADATION QUANT: CPT

## 2020-04-22 RX ORDER — LEVOFLOXACIN 500 MG/1
500 TABLET, FILM COATED ORAL DAILY
Qty: 7 TABLET | Refills: 0 | Status: SHIPPED | OUTPATIENT
Start: 2020-04-22 | End: 2020-04-29

## 2020-04-22 RX ORDER — LEVALBUTEROL TARTRATE 45 UG/1
1-2 AEROSOL, METERED ORAL EVERY 4 HOURS PRN
Qty: 1 INHALER | Refills: 3 | Status: SHIPPED | OUTPATIENT
Start: 2020-04-22 | End: 2020-04-25 | Stop reason: SDUPTHER

## 2020-04-22 ASSESSMENT — ENCOUNTER SYMPTOMS
ABDOMINAL PAIN: 0
COUGH: 0
NAUSEA: 0
DIARRHEA: 0
VOMITING: 0
SHORTNESS OF BREATH: 1
SORE THROAT: 0
EYE REDNESS: 0
EYE DISCHARGE: 0
WHEEZING: 0
RHINORRHEA: 0

## 2020-04-22 NOTE — PROGRESS NOTES
Current Outpatient Medications   Medication Sig Dispense Refill    levalbuterol (XOPENEX HFA) 45 MCG/ACT inhaler Inhale 1-2 puffs into the lungs every 4 hours as needed for Wheezing 1 Inhaler 3    levoFLOXacin (LEVAQUIN) 500 MG tablet Take 1 tablet by mouth daily for 7 days 7 tablet 0    azithromycin (ZITHROMAX) 250 MG tablet Take 2 tabs the first day then1 days daily for 4 days 1 packet 0    insulin glargine (TOUJEO SOLOSTAR) 300 UNIT/ML injection pen Inject 13 Units into the skin every morning       B-D UF III MINI PEN NEEDLES 31G X 5 MM MISC       pravastatin (PRAVACHOL) 40 MG tablet Take 1 tablet by mouth daily 30 tablet 0    aspirin 81 MG chewable tablet Take 81 mg by mouth daily      losartan (COZAAR) 50 MG tablet Take 50 mg by mouth daily      Multiple Vitamins TABS Take 1 tablet by mouth daily      insulin lispro (HUMALOG) 100 UNIT/ML injection cartridge Inject into the skin 3 times daily (before meals) Takes her insulin according to scale      dicyclomine (BENTYL) 10 MG capsule Take 1 capsule by mouth 4 times daily (before meals and nightly) for 5 days (Patient not taking: Reported on 4/10/2020) 20 capsule 0    famotidine (PEPCID) 20 MG tablet Take 1 tablet by mouth 2 times daily for 7 days 14 tablet 0     No current facility-administered medications for this visit.       No Known Allergies    Health Maintenance   Topic Date Due    DTaP/Tdap/Td vaccine (1 - Tdap) 04/06/1964    Annual Wellness Visit (AWV)  06/03/2020    Diabetic foot exam  06/05/2020    Lipid screen  06/05/2020    Potassium monitoring  06/05/2020    Creatinine monitoring  06/05/2020    Diabetic retinal exam  06/20/2020    A1C test (Diabetic or Prediabetic)  04/10/2021    Colon cancer screen colonoscopy  08/15/2021    DEXA (modify frequency per FRAX score)  Completed    Flu vaccine  Completed    Shingles Vaccine  Completed    Pneumococcal 65+ years Vaccine  Completed    Hepatitis C screen  Completed    Hepatitis A content normal.         Assessment:       Diagnosis Orders   1. Bronchitis  CBC With Auto Differential    D-Dimer, Quantitative    Mycoplasma Pneumoniae Antibody, IgM    Basic Metabolic Panel    XR CHEST STANDARD (2 VW)        Plan:    Blood work including d-dimer and mycoplasma IgM titers ordered. Levaquin for 7 days. Change albuterol to Xopenex. repeat chest x-ray. D-dimer elevated, will need CTA chest    Return if symptoms worsen or fail to improve. Orders Placed This Encounter   Procedures    XR CHEST STANDARD (2 VW)     Standing Status:   Future     Standing Expiration Date:   4/22/2021     Order Specific Question:   Reason for exam:     Answer:   shortness of breath    CBC With Auto Differential     Standing Status:   Future     Standing Expiration Date:   4/22/2021    D-Dimer, Quantitative     Standing Status:   Future     Standing Expiration Date:   4/22/2021    Mycoplasma Pneumoniae Antibody, IgM     Standing Status:   Future     Standing Expiration Date:   4/22/2021    Basic Metabolic Panel     Standing Status:   Future     Standing Expiration Date:   4/22/2021     Orders Placed This Encounter   Medications    levalbuterol (XOPENEX HFA) 45 MCG/ACT inhaler     Sig: Inhale 1-2 puffs into the lungs every 4 hours as needed for Wheezing     Dispense:  1 Inhaler     Refill:  3    levoFLOXacin (LEVAQUIN) 500 MG tablet     Sig: Take 1 tablet by mouth daily for 7 days     Dispense:  7 tablet     Refill:  0       Patient given educationalmaterials - see patient instructions. Discussed use, benefit, and side effectsof prescribed medications. All patient questions answered. Pt voiced understanding. Reviewed health maintenance. Instructed to continue current medications, diet andexercise. Patient agreed with treatment plan. Follow up as directed.      Electronicallysigned by Arben Yang MD on 4/22/2020 at 1:18 PM

## 2020-04-24 ENCOUNTER — TELEPHONE (OUTPATIENT)
Dept: PRIMARY CARE CLINIC | Age: 75
End: 2020-04-24

## 2020-04-24 LAB — MYCOPLASMA PNEUMONIAE IGM: 1.11

## 2020-04-24 RX ORDER — LEVALBUTEROL INHALATION SOLUTION 1.25 MG/3ML
1.25 SOLUTION RESPIRATORY (INHALATION) EVERY 4 HOURS PRN
Qty: 75 ML | Refills: 1 | Status: SHIPPED | OUTPATIENT
Start: 2020-04-24 | End: 2020-04-24 | Stop reason: SDUPTHER

## 2020-04-24 RX ORDER — LEVALBUTEROL INHALATION SOLUTION 1.25 MG/3ML
1.25 SOLUTION RESPIRATORY (INHALATION) EVERY 4 HOURS PRN
Qty: 75 ML | Refills: 1 | Status: SHIPPED | OUTPATIENT
Start: 2020-04-24 | End: 2020-06-19

## 2020-04-24 NOTE — TELEPHONE ENCOUNTER
Pt called stating on 4/13/20 albuterol inhaler was sent to pharmacy for her and on 4/22/20 she was seen in office with Dr. Jarred Noonan for sx's not improving. He advised her to stop the albuterol inhaler and he sent in [address] Pharm advised her Xopenex is not covered by insurance. I called pharm to see what is going on as we have not received a PA or any info from them, he stated xopenex is not even on her formulary list to use so a PA wouldn't even be able to be done. They simply advise to use the albuterol inhaler per her list. She did  albuterol on 4/13/20. She is not getting any better. She states she now feels weak with other sx's still going on.        Please advise       Pharm- [facility] [address]

## 2020-04-28 ENCOUNTER — OFFICE VISIT (OUTPATIENT)
Dept: PRIMARY CARE CLINIC | Age: 75
End: 2020-04-28
Payer: MEDICARE

## 2020-04-28 VITALS
WEIGHT: 126.8 LBS | TEMPERATURE: 97.8 F | SYSTOLIC BLOOD PRESSURE: 118 MMHG | DIASTOLIC BLOOD PRESSURE: 60 MMHG | HEART RATE: 68 BPM | OXYGEN SATURATION: 98 % | BODY MASS INDEX: 23.16 KG/M2

## 2020-04-28 PROBLEM — J15.7 PNEUMONIA OF RIGHT MIDDLE LOBE DUE TO MYCOPLASMA PNEUMONIAE: Status: ACTIVE | Noted: 2020-04-28

## 2020-04-28 PROCEDURE — 99213 OFFICE O/P EST LOW 20 MIN: CPT | Performed by: FAMILY MEDICINE

## 2020-04-28 ASSESSMENT — ENCOUNTER SYMPTOMS
VOMITING: 0
WHEEZING: 0
DIARRHEA: 0
SHORTNESS OF BREATH: 1
COUGH: 0
CONSTIPATION: 1
CHEST TIGHTNESS: 1
NAUSEA: 0

## 2020-04-28 NOTE — PROGRESS NOTES
NEEDLES 31G X 5 MM MISC       pravastatin (PRAVACHOL) 40 MG tablet Take 1 tablet by mouth daily 30 tablet 0    aspirin 81 MG chewable tablet Take 81 mg by mouth daily      losartan (COZAAR) 50 MG tablet Take 50 mg by mouth daily      Multiple Vitamins TABS Take 1 tablet by mouth daily      insulin lispro (HUMALOG) 100 UNIT/ML injection cartridge Inject into the skin 3 times daily (before meals) Takes her insulin according to scale      levalbuterol (XOPENEX HFA) 45 MCG/ACT inhaler Inhale 1-2 puffs into the lungs every 4 hours as needed for Wheezing or Shortness of Breath (Patient not taking: Reported on 4/28/2020) 1 Inhaler 3    levalbuterol (XOPENEX) 1.25 MG/3ML nebulizer solution Take 3 mLs by nebulization every 4 hours as needed for Wheezing (Patient not taking: Reported on 4/28/2020) 75 mL 1    levoFLOXacin (LEVAQUIN) 500 MG tablet Take 1 tablet by mouth daily for 7 days (Patient not taking: Reported on 4/28/2020) 7 tablet 0    dicyclomine (BENTYL) 10 MG capsule Take 1 capsule by mouth 4 times daily (before meals and nightly) for 5 days (Patient not taking: Reported on 4/10/2020) 20 capsule 0    famotidine (PEPCID) 20 MG tablet Take 1 tablet by mouth 2 times daily for 7 days (Patient not taking: Reported on 4/28/2020) 14 tablet 0    azithromycin (ZITHROMAX) 250 MG tablet Take 2 tabs the first day then1 days daily for 4 days (Patient not taking: Reported on 4/28/2020) 1 packet 0     No current facility-administered medications for this visit.       No Known Allergies    Health Maintenance   Topic Date Due    DTaP/Tdap/Td vaccine (1 - Tdap) 04/06/1964    Annual Wellness Visit (AWV)  06/03/2020    Diabetic foot exam  06/05/2020    Lipid screen  06/05/2020    Diabetic retinal exam  06/20/2020    A1C test (Diabetic or Prediabetic)  04/10/2021    Potassium monitoring  04/22/2021    Creatinine monitoring  04/22/2021    Colon cancer screen colonoscopy  08/15/2021    DEXA (modify frequency per [facility] Assessment:       Diagnosis Orders   1. Pneumonia of right middle lobe due to Mycoplasma pneumoniae          Plan:    Use inhaler if needed for SOB. Finish abx and use prune juice or stool softener for constipation. Return if symptoms worsen or fail to improve. No orders of the defined types were placed in this encounter. No orders of the defined types were placed in this encounter. Patient given educationalmaterials - see patient instructions. Discussed use, benefit, and side effectsof prescribed medications. All patient questions answered. Pt voiced understanding. Reviewed health maintenance. Instructed to continue current medications, diet andexercise. Patient agreed with treatment plan. Follow up as directed.      Electronicallysigned by Ngoc Kessler MD on 4/28/2020 at 2:41 PM

## 2020-05-14 ENCOUNTER — HOSPITAL ENCOUNTER (OUTPATIENT)
Age: 75
Discharge: HOME | End: 2020-05-14
Payer: MEDICARE

## 2020-05-14 ENCOUNTER — OFFICE VISIT (OUTPATIENT)
Dept: PRIMARY CARE CLINIC | Age: 75
End: 2020-05-14
Payer: MEDICARE

## 2020-05-14 ENCOUNTER — HOSPITAL ENCOUNTER (OUTPATIENT)
Age: 75
Setting detail: SPECIMEN
Discharge: HOME | End: 2020-05-14
Payer: MEDICARE

## 2020-05-14 ENCOUNTER — OFFICE VISIT (OUTPATIENT)
Dept: PRIMARY CARE CLINIC | Age: 75
End: 2020-05-14

## 2020-05-14 VITALS
DIASTOLIC BLOOD PRESSURE: 76 MMHG | OXYGEN SATURATION: 98 % | TEMPERATURE: 98 F | HEART RATE: 66 BPM | BODY MASS INDEX: 22.65 KG/M2 | WEIGHT: 124 LBS | SYSTOLIC BLOOD PRESSURE: 138 MMHG

## 2020-05-14 LAB
ABSOLUTE EOS #: 0 K/UL (ref 0–0.4)
ABSOLUTE IMMATURE GRANULOCYTE: ABNORMAL K/UL (ref 0–0.3)
ABSOLUTE LYMPH #: 1 K/UL (ref 1–4.8)
ABSOLUTE MONO #: 0.3 K/UL (ref 0.1–1.3)
ANION GAP SERPL CALCULATED.3IONS-SCNC: 12 MMOL/L (ref 9–17)
BASOPHILS # BLD: 1 % (ref 0–2)
BASOPHILS ABSOLUTE: 0 K/UL (ref 0–0.2)
BUN BLDV-MCNC: 18 MG/DL (ref 8–23)
BUN/CREAT BLD: ABNORMAL (ref 9–20)
CALCIUM SERPL-MCNC: 9.9 MG/DL (ref 8.6–10.4)
CHLORIDE BLD-SCNC: 105 MMOL/L (ref 98–107)
CO2: 25 MMOL/L (ref 20–31)
CREAT SERPL-MCNC: 0.85 MG/DL (ref 0.5–0.9)
DIFFERENTIAL TYPE: ABNORMAL
EOSINOPHILS RELATIVE PERCENT: 1 % (ref 0–4)
GFR AFRICAN AMERICAN: >60 ML/MIN
GFR NON-AFRICAN AMERICAN: >60 ML/MIN
GFR SERPL CREATININE-BSD FRML MDRD: ABNORMAL ML/MIN/{1.73_M2}
GFR SERPL CREATININE-BSD FRML MDRD: ABNORMAL ML/MIN/{1.73_M2}
GLUCOSE BLD-MCNC: 145 MG/DL (ref 70–99)
HCT VFR BLD CALC: 41.8 % (ref 36–46)
HEMOGLOBIN: 14 G/DL (ref 12–16)
IMMATURE GRANULOCYTES: ABNORMAL %
LYMPHOCYTES # BLD: 26 % (ref 24–44)
MCH RBC QN AUTO: 31 PG (ref 26–34)
MCHC RBC AUTO-ENTMCNC: 33.6 G/DL (ref 31–37)
MCV RBC AUTO: 92.3 FL (ref 80–100)
MONOCYTES # BLD: 8 % (ref 1–7)
NRBC AUTOMATED: ABNORMAL PER 100 WBC
PDW BLD-RTO: 14.1 % (ref 11.5–14.9)
PLATELET # BLD: 161 K/UL (ref 150–450)
PLATELET ESTIMATE: ABNORMAL
PMV BLD AUTO: 7.5 FL (ref 6–12)
POTASSIUM SERPL-SCNC: 3.9 MMOL/L (ref 3.7–5.3)
RBC # BLD: 4.52 M/UL (ref 4–5.2)
RBC # BLD: ABNORMAL 10*6/UL
SEG NEUTROPHILS: 64 % (ref 36–66)
SEGMENTED NEUTROPHILS ABSOLUTE COUNT: 2.4 K/UL (ref 1.3–9.1)
SODIUM BLD-SCNC: 142 MMOL/L (ref 135–144)
WBC # BLD: 3.8 K/UL (ref 3.5–11)
WBC # BLD: ABNORMAL 10*3/UL

## 2020-05-14 PROCEDURE — 80048 BASIC METABOLIC PNL TOTAL CA: CPT

## 2020-05-14 PROCEDURE — 85025 COMPLETE CBC W/AUTO DIFF WBC: CPT

## 2020-05-14 PROCEDURE — 99214 OFFICE O/P EST MOD 30 MIN: CPT | Performed by: PHYSICIAN ASSISTANT

## 2020-05-14 PROCEDURE — 36415 COLL VENOUS BLD VENIPUNCTURE: CPT

## 2020-05-14 ASSESSMENT — ENCOUNTER SYMPTOMS
VOMITING: 0
EYE ITCHING: 1
SHORTNESS OF BREATH: 1
CHOKING: 0
WHEEZING: 0
TROUBLE SWALLOWING: 0
SORE THROAT: 0
SINUS PRESSURE: 0
SINUS PAIN: 0
NAUSEA: 0
RHINORRHEA: 1
DIARRHEA: 1
CHEST TIGHTNESS: 0

## 2020-05-14 NOTE — PROGRESS NOTES
on 4/10/2020) 20 capsule 0    famotidine (PEPCID) 20 MG tablet Take 1 tablet by mouth 2 times daily for 7 days (Patient not taking: Reported on 4/28/2020) 14 tablet 0    azithromycin (ZITHROMAX) 250 MG tablet Take 2 tabs the first day then1 days daily for 4 days (Patient not taking: Reported on 4/28/2020) 1 packet 0     No current facility-administered medications for this visit. No Known Allergies    Subjective:      Review of Systems   Constitutional: Positive for fatigue. Negative for chills, diaphoresis and fever. HENT: Positive for ear pain, rhinorrhea and sneezing. Negative for congestion, sinus pressure, sinus pain, sore throat and trouble swallowing. Eyes: Positive for itching. Respiratory: Positive for shortness of breath. Negative for choking, chest tightness and wheezing. Cardiovascular: Positive for palpitations. Negative for chest pain and leg swelling. Gastrointestinal: Positive for diarrhea (for 1 day). Negative for nausea and vomiting. Allergic/Immunologic: Negative for environmental allergies and immunocompromised state. Neurological: Positive for weakness and headaches (frontally). Objective:     /76   Pulse 66   Temp 98 °F (36.7 °C)   Wt 124 lb (56.2 kg)   SpO2 98%   BMI 22.65 kg/m²   Physical Exam  Vitals signs and nursing note reviewed. Constitutional:       Appearance: Normal appearance. HENT:      Right Ear: Tympanic membrane, ear canal and external ear normal.      Left Ear: Tympanic membrane, ear canal and external ear normal.      Nose: Nose normal.      Mouth/Throat:      Pharynx: No posterior oropharyngeal erythema. Eyes:      General:         Right eye: No discharge. Left eye: No discharge. Conjunctiva/sclera: Conjunctivae normal.   Cardiovascular:      Rate and Rhythm: Normal rate and regular rhythm. Heart sounds: Normal heart sounds.    Pulmonary:      Effort: Pulmonary effort is normal.      Breath sounds: Normal breath sounds. No wheezing, rhonchi or rales. Musculoskeletal:      Right lower leg: No edema. Left lower leg: No edema. Skin:     Comments: Looks tired and worried   Neurological:      Mental Status: She is alert and oriented to person, place, and time. Assessment:       Diagnosis Orders   1. Pneumonia due to Mycoplasma pneumoniae, unspecified laterality, unspecified part of lung     2. Shortness of breath  CBC Auto Differential    Basic Metabolic Panel    UEGHO-15 Ambulatory   3. Weakness  CBC Auto Differential    Basic Metabolic Panel    OQZLM-50 Ambulatory        Plan:    D/W Dr. Jay Quintanilla   BW and Covid 19 test ordered. Continue to hydrate well and eat at least twice a day. Return if symptoms worsen or fail to improve. Orders Placed This Encounter   Procedures    CBC Auto Differential     Standing Status:   Future     Standing Expiration Date:   5/14/2021    Basic Metabolic Panel     Standing Status:   Future     Standing Expiration Date:   5/14/2021    Covid-19 Ambulatory     Standing Status:   Future     Standing Expiration Date:   6/14/2020     Scheduling Instructions:      Saline media preferred given current shortage of viral transport media but both acceptable     No orders of the defined types were placed in this encounter.           Electronically signed by Dennie Schmid, PA-Con 5/14/2020 at 3:18 PM

## 2020-05-14 NOTE — PROGRESS NOTES
Pt presented to clinic with order for COVID-19 testing from PCP. Testing completed. Pt informed results will be back in 3-5 days and will need to obtain results from PCP.

## 2020-05-17 LAB — SARS-COV-2, NAA: NOT DETECTED

## 2020-05-18 ENCOUNTER — TELEPHONE (OUTPATIENT)
Dept: PRIMARY CARE CLINIC | Age: 75
End: 2020-05-18

## 2020-05-18 NOTE — TELEPHONE ENCOUNTER
Is her cough better?   We could check her labs but may just take a long time to recover from the pneumonia

## 2020-05-19 ENCOUNTER — HOSPITAL ENCOUNTER (OUTPATIENT)
Age: 75
Discharge: HOME | End: 2020-05-19
Payer: MEDICARE

## 2020-05-19 LAB
ABSOLUTE EOS #: 0 K/UL (ref 0–0.4)
ABSOLUTE IMMATURE GRANULOCYTE: ABNORMAL K/UL (ref 0–0.3)
ABSOLUTE LYMPH #: 0.8 K/UL (ref 1–4.8)
ABSOLUTE MONO #: 0.2 K/UL (ref 0.1–1.3)
ALBUMIN SERPL-MCNC: 4.3 G/DL (ref 3.5–5.2)
ALBUMIN/GLOBULIN RATIO: ABNORMAL (ref 1–2.5)
ALP BLD-CCNC: 43 U/L (ref 35–104)
ALT SERPL-CCNC: 16 U/L (ref 5–33)
ANION GAP SERPL CALCULATED.3IONS-SCNC: 13 MMOL/L (ref 9–17)
AST SERPL-CCNC: 21 U/L
BASOPHILS # BLD: 1 % (ref 0–2)
BASOPHILS ABSOLUTE: 0 K/UL (ref 0–0.2)
BILIRUB SERPL-MCNC: 0.61 MG/DL (ref 0.3–1.2)
BUN BLDV-MCNC: 20 MG/DL (ref 8–23)
BUN/CREAT BLD: ABNORMAL (ref 9–20)
CALCIUM SERPL-MCNC: 9.8 MG/DL (ref 8.6–10.4)
CHLORIDE BLD-SCNC: 105 MMOL/L (ref 98–107)
CO2: 25 MMOL/L (ref 20–31)
CREAT SERPL-MCNC: 0.97 MG/DL (ref 0.5–0.9)
DIFFERENTIAL TYPE: ABNORMAL
EOSINOPHILS RELATIVE PERCENT: 1 % (ref 0–4)
FERRITIN: 165 UG/L (ref 13–150)
FOLATE: >20 NG/ML
GFR AFRICAN AMERICAN: >60 ML/MIN
GFR NON-AFRICAN AMERICAN: 56 ML/MIN
GFR SERPL CREATININE-BSD FRML MDRD: ABNORMAL ML/MIN/{1.73_M2}
GFR SERPL CREATININE-BSD FRML MDRD: ABNORMAL ML/MIN/{1.73_M2}
GLUCOSE BLD-MCNC: 185 MG/DL (ref 70–99)
HCT VFR BLD CALC: 44.3 % (ref 36–46)
HEMOGLOBIN: 14.8 G/DL (ref 12–16)
IMMATURE GRANULOCYTES: ABNORMAL %
LYMPHOCYTES # BLD: 21 % (ref 24–44)
MAGNESIUM: 1.8 MG/DL (ref 1.6–2.6)
MCH RBC QN AUTO: 31.2 PG (ref 26–34)
MCHC RBC AUTO-ENTMCNC: 33.3 G/DL (ref 31–37)
MCV RBC AUTO: 93.7 FL (ref 80–100)
MONOCYTES # BLD: 5 % (ref 1–7)
NRBC AUTOMATED: ABNORMAL PER 100 WBC
PDW BLD-RTO: 14.2 % (ref 11.5–14.9)
PLATELET # BLD: 156 K/UL (ref 150–450)
PLATELET ESTIMATE: ABNORMAL
PMV BLD AUTO: 7.5 FL (ref 6–12)
POTASSIUM SERPL-SCNC: 3.9 MMOL/L (ref 3.7–5.3)
RBC # BLD: 4.73 M/UL (ref 4–5.2)
RBC # BLD: ABNORMAL 10*6/UL
SEG NEUTROPHILS: 72 % (ref 36–66)
SEGMENTED NEUTROPHILS ABSOLUTE COUNT: 2.8 K/UL (ref 1.3–9.1)
SODIUM BLD-SCNC: 143 MMOL/L (ref 135–144)
TOTAL PROTEIN: 7.3 G/DL (ref 6.4–8.3)
TSH SERPL DL<=0.05 MIU/L-ACNC: 1.63 MIU/L (ref 0.3–5)
VITAMIN B-12: 1294 PG/ML (ref 232–1245)
WBC # BLD: 3.8 K/UL (ref 3.5–11)
WBC # BLD: ABNORMAL 10*3/UL

## 2020-05-19 PROCEDURE — 82746 ASSAY OF FOLIC ACID SERUM: CPT

## 2020-05-19 PROCEDURE — 82728 ASSAY OF FERRITIN: CPT

## 2020-05-19 PROCEDURE — 80053 COMPREHEN METABOLIC PANEL: CPT

## 2020-05-19 PROCEDURE — 36415 COLL VENOUS BLD VENIPUNCTURE: CPT

## 2020-05-19 PROCEDURE — 85025 COMPLETE CBC W/AUTO DIFF WBC: CPT

## 2020-05-19 PROCEDURE — 83735 ASSAY OF MAGNESIUM: CPT

## 2020-05-19 PROCEDURE — 82607 VITAMIN B-12: CPT

## 2020-05-19 PROCEDURE — 84443 ASSAY THYROID STIM HORMONE: CPT

## 2020-06-04 ENCOUNTER — HOSPITAL ENCOUNTER (OUTPATIENT)
Dept: GENERAL RADIOLOGY | Age: 75
Discharge: HOME | End: 2020-06-06
Payer: MEDICARE

## 2020-06-04 ENCOUNTER — HOSPITAL ENCOUNTER (OUTPATIENT)
Age: 75
Discharge: HOME | End: 2020-06-06
Payer: MEDICARE

## 2020-06-04 ENCOUNTER — TELEPHONE (OUTPATIENT)
Dept: PRIMARY CARE CLINIC | Age: 75
End: 2020-06-04

## 2020-06-04 PROCEDURE — 71046 X-RAY EXAM CHEST 2 VIEWS: CPT

## 2020-06-04 NOTE — TELEPHONE ENCOUNTER
Patient called with c/o weakness and chest tightness again. Pt states she was feeling better and now she is starting to feel worse. Pt states she just feels very fatigued again. Per Dr. Cora Rick - repeat a chest xray. Pt notified- ordered entered. Pt states she will go to [facility].

## 2020-06-05 ENCOUNTER — OFFICE VISIT (OUTPATIENT)
Dept: PRIMARY CARE CLINIC | Age: 75
End: 2020-06-05
Payer: MEDICARE

## 2020-06-05 VITALS
HEART RATE: 61 BPM | SYSTOLIC BLOOD PRESSURE: 128 MMHG | WEIGHT: 132.8 LBS | TEMPERATURE: 97.9 F | OXYGEN SATURATION: 98 % | BODY MASS INDEX: 24.26 KG/M2 | DIASTOLIC BLOOD PRESSURE: 60 MMHG

## 2020-06-05 LAB
BILIRUBIN, POC: NORMAL
BLOOD URINE, POC: NORMAL
CLARITY, POC: CLEAR
COLOR, POC: YELLOW
GLUCOSE URINE, POC: NORMAL
KETONES, POC: NORMAL
LEUKOCYTE EST, POC: NORMAL
NITRITE, POC: NORMAL
PH, POC: 5.5
PROTEIN, POC: NORMAL
SPECIFIC GRAVITY, POC: 1.02
UROBILINOGEN, POC: NORMAL

## 2020-06-05 PROCEDURE — 81003 URINALYSIS AUTO W/O SCOPE: CPT | Performed by: FAMILY MEDICINE

## 2020-06-05 PROCEDURE — 99213 OFFICE O/P EST LOW 20 MIN: CPT | Performed by: FAMILY MEDICINE

## 2020-06-05 RX ORDER — AZITHROMYCIN 250 MG/1
TABLET, FILM COATED ORAL
Qty: 1 PACKET | Refills: 0 | Status: SHIPPED | OUTPATIENT
Start: 2020-06-05 | End: 2020-06-19 | Stop reason: ALTCHOICE

## 2020-06-05 ASSESSMENT — ENCOUNTER SYMPTOMS
SORE THROAT: 0
WHEEZING: 0
COUGH: 0
DIARRHEA: 0
EYE DISCHARGE: 0
RHINORRHEA: 0
CONSTIPATION: 1
NAUSEA: 0
VOMITING: 0
EYE REDNESS: 0
SHORTNESS OF BREATH: 1
ABDOMINAL PAIN: 0

## 2020-06-05 NOTE — PROGRESS NOTES
 Smokeless tobacco: Never Used   Substance Use Topics    Alcohol use: No     Alcohol/week: 0.0 standard drinks      Current Outpatient Medications   Medication Sig Dispense Refill    azithromycin (ZITHROMAX) 250 MG tablet Take 2 tabs the first day then1 days daily for 4 days 1 packet 0    insulin glargine (TOUJEO SOLOSTAR) 300 UNIT/ML injection pen Inject 13 Units into the skin every morning       B-D UF III MINI PEN NEEDLES 31G X 5 MM MISC       pravastatin (PRAVACHOL) 40 MG tablet Take 1 tablet by mouth daily 30 tablet 0    aspirin 81 MG chewable tablet Take 81 mg by mouth daily      losartan (COZAAR) 50 MG tablet Take 50 mg by mouth daily      Multiple Vitamins TABS Take 1 tablet by mouth daily      insulin lispro (HUMALOG) 100 UNIT/ML injection cartridge Inject into the skin 3 times daily (before meals) Takes her insulin according to scale      levalbuterol (XOPENEX HFA) 45 MCG/ACT inhaler Inhale 1-2 puffs into the lungs every 4 hours as needed for Wheezing or Shortness of Breath 1 Inhaler 3    levalbuterol (XOPENEX) 1.25 MG/3ML nebulizer solution Take 3 mLs by nebulization every 4 hours as needed for Wheezing 75 mL 1    dicyclomine (BENTYL) 10 MG capsule Take 1 capsule by mouth 4 times daily (before meals and nightly) for 5 days (Patient not taking: Reported on 4/10/2020) 20 capsule 0    famotidine (PEPCID) 20 MG tablet Take 1 tablet by mouth 2 times daily for 7 days (Patient not taking: Reported on 4/28/2020) 14 tablet 0     No current facility-administered medications for this visit.       No Known Allergies    Health Maintenance   Topic Date Due    DTaP/Tdap/Td vaccine (1 - Tdap) 04/06/1964    Annual Wellness Visit (AWV)  05/29/2019    Diabetic foot exam  06/05/2020    Lipid screen  06/05/2020    Diabetic retinal exam  06/20/2020    A1C test (Diabetic or Prediabetic)  04/10/2021    Potassium monitoring  05/19/2021    Creatinine monitoring  05/19/2021    Colon cancer screen colonoscopy 08/15/2021    DEXA (modify frequency per FRAX score)  Completed    Flu vaccine  Completed    Shingles Vaccine  Completed    Pneumococcal 65+ years Vaccine  Completed    Hepatitis C screen  Completed    Hepatitis A vaccine  Aged Out    Hib vaccine  Aged Out    Meningococcal (ACWY) vaccine  Aged Out       Subjective:      Review of Systems   Constitutional: Positive for appetite change and chills. Negative for fever. HENT: Positive for congestion (a little phlegm at night before bed). Negative for ear pain, rhinorrhea and sore throat. Eyes: Negative for discharge and redness. Respiratory: Positive for shortness of breath. Negative for cough and wheezing. Cardiovascular: Positive for chest pain. Negative for palpitations. Gastrointestinal: Positive for constipation. Negative for abdominal pain, diarrhea, nausea and vomiting. Genitourinary: Negative for dysuria and frequency. Musculoskeletal: Negative for arthralgias and myalgias. Neurological: Positive for weakness and light-headedness (occas loss of balance). Negative for dizziness, tremors and headaches. Psychiatric/Behavioral: Positive for sleep disturbance. Objective:     /60   Pulse 61   Temp 97.9 °F (36.6 °C)   Wt 132 lb 12.8 oz (60.2 kg)   SpO2 98%   BMI 24.26 kg/m²   Physical Exam  Vitals signs and nursing note reviewed. Constitutional:       General: She is not in acute distress. Appearance: She is well-developed. HENT:      Head: Normocephalic and atraumatic. Eyes:      General: No scleral icterus. Right eye: No discharge. Left eye: No discharge. Conjunctiva/sclera: Conjunctivae normal.      Pupils: Pupils are equal, round, and reactive to light. Neck:      Thyroid: No thyromegaly. Trachea: No tracheal deviation. Cardiovascular:      Rate and Rhythm: Normal rate and regular rhythm. Heart sounds: Normal heart sounds.       Comments: No carotid bruits  Pulmonary:

## 2020-06-09 ENCOUNTER — TELEPHONE (OUTPATIENT)
Dept: PRIMARY CARE CLINIC | Age: 75
End: 2020-06-09

## 2020-06-09 NOTE — TELEPHONE ENCOUNTER
Pt called back stating that she called her ins co and told she can go see Dr. Donnie Barnhart. Okay to fax referral with dx: pneumonia?

## 2020-06-15 ENCOUNTER — HOSPITAL ENCOUNTER (EMERGENCY)
Age: 75
Discharge: HOME | End: 2020-06-15
Attending: EMERGENCY MEDICINE
Payer: MEDICARE

## 2020-06-15 ENCOUNTER — APPOINTMENT (OUTPATIENT)
Dept: GENERAL RADIOLOGY | Age: 75
End: 2020-06-15
Payer: MEDICARE

## 2020-06-15 VITALS
SYSTOLIC BLOOD PRESSURE: 139 MMHG | OXYGEN SATURATION: 100 % | RESPIRATION RATE: 16 BRPM | HEIGHT: 63 IN | HEART RATE: 59 BPM | DIASTOLIC BLOOD PRESSURE: 58 MMHG | WEIGHT: 120 LBS | TEMPERATURE: 98.8 F | BODY MASS INDEX: 21.26 KG/M2

## 2020-06-15 LAB
ABSOLUTE EOS #: 0 K/UL (ref 0–0.4)
ABSOLUTE IMMATURE GRANULOCYTE: ABNORMAL K/UL (ref 0–0.3)
ABSOLUTE LYMPH #: 0.8 K/UL (ref 1–4.8)
ABSOLUTE MONO #: 0.3 K/UL (ref 0.1–1.3)
ALBUMIN SERPL-MCNC: 3.8 G/DL (ref 3.5–5.2)
ALBUMIN/GLOBULIN RATIO: ABNORMAL (ref 1–2.5)
ALP BLD-CCNC: 41 U/L (ref 35–104)
ALT SERPL-CCNC: 16 U/L (ref 5–33)
ANION GAP SERPL CALCULATED.3IONS-SCNC: 14 MMOL/L (ref 9–17)
AST SERPL-CCNC: 19 U/L
BASOPHILS # BLD: 1 % (ref 0–2)
BASOPHILS ABSOLUTE: 0 K/UL (ref 0–0.2)
BILIRUB SERPL-MCNC: 0.51 MG/DL (ref 0.3–1.2)
BUN BLDV-MCNC: 17 MG/DL (ref 8–23)
BUN/CREAT BLD: ABNORMAL (ref 9–20)
CALCIUM SERPL-MCNC: 9.4 MG/DL (ref 8.6–10.4)
CHLORIDE BLD-SCNC: 103 MMOL/L (ref 98–107)
CO2: 21 MMOL/L (ref 20–31)
CREAT SERPL-MCNC: 0.84 MG/DL (ref 0.5–0.9)
DIFFERENTIAL TYPE: ABNORMAL
EOSINOPHILS RELATIVE PERCENT: 1 % (ref 0–4)
GFR AFRICAN AMERICAN: >60 ML/MIN
GFR NON-AFRICAN AMERICAN: >60 ML/MIN
GFR SERPL CREATININE-BSD FRML MDRD: ABNORMAL ML/MIN/{1.73_M2}
GFR SERPL CREATININE-BSD FRML MDRD: ABNORMAL ML/MIN/{1.73_M2}
GLUCOSE BLD-MCNC: 167 MG/DL (ref 70–99)
HCT VFR BLD CALC: 38.8 % (ref 36–46)
HEMOGLOBIN: 13.4 G/DL (ref 12–16)
IMMATURE GRANULOCYTES: ABNORMAL %
LYMPHOCYTES # BLD: 21 % (ref 24–44)
MCH RBC QN AUTO: 32.1 PG (ref 26–34)
MCHC RBC AUTO-ENTMCNC: 34.5 G/DL (ref 31–37)
MCV RBC AUTO: 93 FL (ref 80–100)
MONOCYTES # BLD: 7 % (ref 1–7)
NRBC AUTOMATED: ABNORMAL PER 100 WBC
PDW BLD-RTO: 13.6 % (ref 11.5–14.9)
PLATELET # BLD: 145 K/UL (ref 150–450)
PLATELET ESTIMATE: ABNORMAL
PMV BLD AUTO: 7.8 FL (ref 6–12)
POTASSIUM SERPL-SCNC: 3.9 MMOL/L (ref 3.7–5.3)
RBC # BLD: 4.17 M/UL (ref 4–5.2)
RBC # BLD: ABNORMAL 10*6/UL
SEG NEUTROPHILS: 70 % (ref 36–66)
SEGMENTED NEUTROPHILS ABSOLUTE COUNT: 2.8 K/UL (ref 1.3–9.1)
SODIUM BLD-SCNC: 138 MMOL/L (ref 135–144)
TOTAL PROTEIN: 6.1 G/DL (ref 6.4–8.3)
TROPONIN INTERP: NORMAL
TROPONIN INTERP: NORMAL
TROPONIN T: NORMAL NG/ML
TROPONIN T: NORMAL NG/ML
TROPONIN, HIGH SENSITIVITY: 11 NG/L (ref 0–14)
TROPONIN, HIGH SENSITIVITY: 12 NG/L (ref 0–14)
WBC # BLD: 3.9 K/UL (ref 3.5–11)
WBC # BLD: ABNORMAL 10*3/UL

## 2020-06-15 PROCEDURE — 80053 COMPREHEN METABOLIC PANEL: CPT

## 2020-06-15 PROCEDURE — 84484 ASSAY OF TROPONIN QUANT: CPT

## 2020-06-15 PROCEDURE — 36415 COLL VENOUS BLD VENIPUNCTURE: CPT

## 2020-06-15 PROCEDURE — 99285 EMERGENCY DEPT VISIT HI MDM: CPT

## 2020-06-15 PROCEDURE — 71045 X-RAY EXAM CHEST 1 VIEW: CPT

## 2020-06-15 PROCEDURE — 85025 COMPLETE CBC W/AUTO DIFF WBC: CPT

## 2020-06-15 PROCEDURE — 93005 ELECTROCARDIOGRAM TRACING: CPT | Performed by: EMERGENCY MEDICINE

## 2020-06-15 ASSESSMENT — ENCOUNTER SYMPTOMS
BACK PAIN: 0
ABDOMINAL PAIN: 0
DIARRHEA: 0
GASTROINTESTINAL NEGATIVE: 1
SHORTNESS OF BREATH: 1
NAUSEA: 0
EYES NEGATIVE: 1
VOMITING: 0

## 2020-06-15 ASSESSMENT — PAIN SCALES - GENERAL: PAINLEVEL_OUTOF10: 5

## 2020-06-15 ASSESSMENT — PAIN DESCRIPTION - LOCATION: LOCATION: CHEST

## 2020-06-15 ASSESSMENT — PAIN DESCRIPTION - PAIN TYPE: TYPE: ACUTE PAIN;CHRONIC PAIN

## 2020-06-15 ASSESSMENT — PAIN DESCRIPTION - DESCRIPTORS: DESCRIPTORS: TIGHTNESS

## 2020-06-15 NOTE — ED PROVIDER NOTES
EMERGENCY DEPARTMENT ENCOUNTER    Pt Name: Bryce Mathew  MRN: [id number]  [place] 1945  Date of evaluation: 6/15/20  CHIEF COMPLAINT       Chief Complaint   Patient presents with    Chest Pain    Shortness of Breath     HISTORY OF PRESENT ILLNESS   The pt presents for evaluation of chest pain and shortness of breath. Ongoing for months. Unchanged today. Pt states that she has it constantly everyday. She has been to see her pcp many times and had multiple workups without answers. Pt denies any cough or fever. No travel, trauma, leg pain/swelling, surgery, history of dvt/pe, estrogens or other risk factors. The history is provided by the patient. Chest Pain   Pain location:  Substernal area  Pain quality: aching    Pain severity:  Mild  Onset quality:  Gradual  Timing:  Constant  Progression:  Unchanged  Chronicity:  Chronic  Relieved by:  Nothing  Worsened by:  Nothing  Ineffective treatments:  None tried  Associated symptoms: shortness of breath    Associated symptoms: no abdominal pain, no back pain, no fever, no headache, no nausea and no vomiting        REVIEW OF SYSTEMS     Review of Systems   Constitutional: Negative. Negative for chills and fever. HENT: Negative. Negative for congestion. Eyes: Negative. Respiratory: Positive for shortness of breath. Cardiovascular: Positive for chest pain. Gastrointestinal: Negative. Negative for abdominal pain, diarrhea, nausea and vomiting. Genitourinary: Negative. Musculoskeletal: Negative. Negative for back pain. Skin: Negative. Negative for rash. Neurological: Negative. Negative for headaches. All other systems reviewed and are negative.     PASTMEDICAL HISTORY     Past Medical History:   Diagnosis Date    Cramp of limb     Frequent bowel movements     History of chest pain     History of pneumonia     History of restless legs syndrome     History of shortness of breath     History of synovitis     Plantar fasciitis, left     Trochanteric bursitis      Past Problem List  Patient Active Problem List   Diagnosis Code    Abnormal mammogram R92.8    Abnormal ultrasound of breast R92.8    Bursitis M71.9    Cerumen impaction H61.20    Contact dermatitis L25.9    De Quervain's tenosynovitis M65.4    Diabetic retinopathy ([facility] [address] E11.319    Edema R60.9    Headache R51    Hyperlipidemia E78.5    Labyrinthitis H83.09    Lateral epicondylitis M77.10    Limb pain M79.609    Lump or mass in breast N63.0    Muscle pain, cervical M54.2    Nocturnal leg cramps G47.62    Osteoporosis M81.0    Pain in joint, hand M25.549    Pain of right shoulder joint on movement M25.511    Scabies B86    Sciatica M54.30    Plantar fasciitis, left M72.2    Enthesopathy of foot M77.9    Type 2 diabetes mellitus without complication, with long-term current use of insulin ([facility]) E11.9, Z79.4    Poison ivy dermatitis L23.7    Allergic rhinitis J30.9    Pneumonia of right middle lobe due to Mycoplasma pneumoniae J15.7     SURGICAL HISTORY       Past Surgical History:   Procedure Laterality Date    CHOLECYSTECTOMY      COLONOSCOPY      HYSTERECTOMY, VAGINAL      left both ovaries in     CURRENT MEDICATIONS       Discharge Medication List as of 6/15/2020  2:59 PM      CONTINUE these medications which have NOT CHANGED    Details   azithromycin (ZITHROMAX) 250 MG tablet Take 2 tabs the first day then1 days daily for 4 days, Disp-1 packet, R-0Normal      levalbuterol (XOPENEX HFA) 45 MCG/ACT inhaler Inhale 1-2 puffs into the lungs every 4 hours as needed for Wheezing or Shortness of Breath, Disp-1 Inhaler, R-3Normal      levalbuterol (XOPENEX) 1.25 MG/3ML nebulizer solution Take 3 mLs by nebulization every 4 hours as needed for Wheezing, Disp-75 mL, R-1Normal      dicyclomine (BENTYL) 10 MG capsule Take 1 capsule by mouth 4 times daily (before meals and nightly) for 5 days, Disp-20 capsule, R-0Normal      famotidine (PEPCID) 20 MG tablet Take 1 tablet by mouth 2 times daily for 7 days, Disp-14 tablet, R-0Normal      insulin glargine (TOUJEO SOLOSTAR) 300 UNIT/ML injection pen Inject 13 Units into the skin every morning Historical Med      B-D UF III MINI PEN NEEDLES 31G X 5 MM MISC Starting 7/3/2016, Until Discontinued, DENISSE, Historical Med      pravastatin (PRAVACHOL) 40 MG tablet Take 1 tablet by mouth daily, Disp-30 tablet, R-0      aspirin 81 MG chewable tablet Take 81 mg by mouth daily      losartan (COZAAR) 50 MG tablet Take 50 mg by mouth daily      Multiple Vitamins TABS Take 1 tablet by mouth daily      insulin lispro (HUMALOG) 100 UNIT/ML injection cartridge Inject into the skin 3 times daily (before meals) Takes her insulin according to scale           ALLERGIES     has No Known Allergies. FAMILY HISTORY     She indicated that the status of her mother is unknown. She indicated that the status of her brother is unknown. She indicated that the status of her maternal grandmother is unknown. She indicated that the status of her maternal aunt is unknown. She indicated that the status of her maternal uncle is unknown. SOCIAL HISTORY       Social History     Tobacco Use    Smoking status: Never Smoker    Smokeless tobacco: Never Used   Substance Use Topics    Alcohol use: No     Alcohol/week: 0.0 standard drinks    Drug use: No     PHYSICAL EXAM     INITIAL VITALS: BP (!) 139/58   Pulse 59   Temp 98.8 °F (37.1 °C) (Oral)   Resp 16   Ht 5' 3\" (1.6 m)   Wt 120 lb (54.4 kg)   SpO2 100%   BMI 21.26 kg/m²    Physical Exam  Vitals signs and nursing note reviewed. Constitutional:       Appearance: Normal appearance. HENT:      Head: Normocephalic and atraumatic. Nose: No congestion. Mouth/Throat:      Mouth: Mucous membranes are moist.   Eyes:      Conjunctiva/sclera: Conjunctivae normal.   Neck:      Musculoskeletal: Normal range of motion and neck supple.    Cardiovascular:      Rate and Rhythm: Normal rate and

## 2020-06-15 NOTE — ED NOTES
Mode of arrival (squad #, walk in, police, etc) : walk in         Chief complaint(s): Chest pain, SOB         Arrival Note (brief scenario, treatment PTA, etc). : patient states she has had chest pain and SOB since that last time she was diagnosed with pneumonia. Patient denies her symptoms worsening. Patient states she has been seen by her PCP for her symptoms and test results have been negative. Patient is alert and oriented x4.         C= \"Have you ever felt that you should Cut down on your drinking? \"  No  A= \"Have people Annoyed you by criticizing your drinking? \"  No  G= \"Have you ever felt bad or Guilty about your drinking? \"  No  E= \"Have you ever had a drink as an Eye-opener first thing in the morning to steady your nerves or to help a hangover? \"  No      Deferred []      Reason for deferring: N/A    *If yes to two or more: probable alcohol abuse. Jonnie Rivas RN  06/15/20 [id number]

## 2020-06-16 ENCOUNTER — CARE COORDINATION (OUTPATIENT)
Dept: CARE COORDINATION | Age: 75
End: 2020-06-16

## 2020-06-16 LAB
EKG ATRIAL RATE: 59 BPM
EKG P AXIS: 79 DEGREES
EKG P-R INTERVAL: 170 MS
EKG Q-T INTERVAL: 422 MS
EKG QRS DURATION: 80 MS
EKG QTC CALCULATION (BAZETT): 417 MS
EKG R AXIS: 65 DEGREES
EKG T AXIS: 72 DEGREES
EKG VENTRICULAR RATE: 59 BPM

## 2020-06-16 PROCEDURE — 93010 ELECTROCARDIOGRAM REPORT: CPT | Performed by: INTERNAL MEDICINE

## 2020-06-16 NOTE — CARE COORDINATION
Patient contacted regarding recent visit for viral symptoms. This Dain Eduardo contacted the patient by telephone to perform post discharge call. Verified name and  with patient as identifiers. Provided introduction to self, and reason for call due to viral symptoms of infection and/or exposure to COVID-19. Patient presented to emergency department/flu clinic with complaints of viral symptoms/exposure to COVID. Patient reports symptoms are improving. Due to no new or worsening symptoms the RN CTN/ACM was not notified for escalation. Discussed exposure protocols and quarantine with CDC Guidelines What To Do If You Are Sick    Patient was given an opportunity for questions and concerns. Stay home except to get medical care    Separate yourself from other people and animals in your home    Call ahead before visiting your doctor    Wear a facemask    Cover your coughs and sneezes    Clean your hands often    Avoid sharing personal household items    Clean all high-touch surfaces everyday    Monitor your symptoms  Seek prompt medical attention if your illness is worsening (e.g., difficulty breathing). Before seeking care, call your healthcare provider and tell them that you have, or are being evaluated for, COVID-19. Put on a facemask before you enter the facility. These steps will help the healthcare provider's office to keep other people in the office or waiting room from getting infected or exposed. Ask your healthcare provider to call the local or [facility] health department. Persons who are placed under If you have a medical emergency and need to call 911, notify the dispatch personnel that you have, or are being evaluated for COVID-19. If possible, put on a facemask before emergency medical services arrive. The patient agrees to contact the [facility] exposure line [phone], local health department [place] [facility]: ([phone]) and PCP office for questions related to their healthcare.

## 2020-06-19 ENCOUNTER — OFFICE VISIT (OUTPATIENT)
Dept: FAMILY MEDICINE CLINIC | Age: 75
End: 2020-06-19
Payer: MEDICARE

## 2020-06-19 VITALS
OXYGEN SATURATION: 96 % | TEMPERATURE: 98 F | BODY MASS INDEX: 22.47 KG/M2 | HEART RATE: 60 BPM | WEIGHT: 119 LBS | RESPIRATION RATE: 16 BRPM | HEIGHT: 61 IN | DIASTOLIC BLOOD PRESSURE: 54 MMHG | SYSTOLIC BLOOD PRESSURE: 126 MMHG

## 2020-06-19 PROBLEM — J15.7 PNEUMONIA OF RIGHT MIDDLE LOBE DUE TO MYCOPLASMA PNEUMONIAE: Status: RESOLVED | Noted: 2020-04-28 | Resolved: 2020-06-19

## 2020-06-19 PROBLEM — L23.7 POISON IVY DERMATITIS: Status: RESOLVED | Noted: 2019-09-19 | Resolved: 2020-06-19

## 2020-06-19 PROCEDURE — 99205 OFFICE O/P NEW HI 60 MIN: CPT | Performed by: FAMILY MEDICINE

## 2020-06-19 PROCEDURE — 3051F HG A1C>EQUAL 7.0%<8.0%: CPT | Performed by: FAMILY MEDICINE

## 2020-06-19 RX ORDER — INSULIN LISPRO 100 [IU]/ML
INJECTION, SOLUTION INTRAVENOUS; SUBCUTANEOUS 3 TIMES DAILY
COMMUNITY

## 2020-06-19 ASSESSMENT — ENCOUNTER SYMPTOMS
NAUSEA: 0
RHINORRHEA: 0
DIARRHEA: 0
BACK PAIN: 0
SHORTNESS OF BREATH: 1
CONSTIPATION: 0
ABDOMINAL PAIN: 0
COUGH: 0
ABDOMINAL DISTENTION: 0
VOMITING: 0
CHEST TIGHTNESS: 0
SORE THROAT: 0

## 2020-06-19 NOTE — PROGRESS NOTES
Billie Diop MD  [address] [place] [address] [facility]  [phone]  [place] , [address]  [address] [address]  Dept: [phone]  Dept Fax: [phone]     Patient ID: Lyla Meyer is a 76 y.o. female. HPI    Lyla Meyer is a 76 y.o. female who presents to the office today for a first visit and to establish a relationship with a new primary care physician. Today, the patient complains of CP and SOB for several months. She was treated for pneumonia back in March with ATB's. She states the CP has been since her pneumonia. She was seen in the ER 2 days ago with the same complaints and the work up in the ER was negative. Pt denies any N/V/D/C or abdominal pain. Pt denies any fever or chills. Pt has not felt well these past 3 months and her appetite has been decreased and she has lost 20# over these past 3 months. The patient's past medical, surgical, social, and family history as well as her current medications and allergies were reviewed as documented in today's encounter. Current Outpatient Medications on File Prior to Visit   Medication Sig Dispense Refill    insulin lispro, 1 Unit Dial, (HUMALOG KWIKPEN) 100 UNIT/ML SOPN Inject into the skin 3 times daily PER SLIDING SCALE      insulin glargine (TOUJEO SOLOSTAR) 300 UNIT/ML injection pen Inject 11 Units into the skin every morning       pravastatin (PRAVACHOL) 40 MG tablet Take 1 tablet by mouth daily 30 tablet 0    aspirin 81 MG chewable tablet Take 81 mg by mouth daily      losartan (COZAAR) 50 MG tablet Take 50 mg by mouth daily      Multiple Vitamins TABS Take 1 tablet by mouth daily       No current facility-administered medications on file prior to visit. Subjective:     Review of Systems   Constitutional: Positive for fatigue and unexpected weight change (weight loss). Negative for appetite change and fever. HENT: Negative for congestion, ear pain, rhinorrhea and sore throat. Respiratory: Positive for shortness of breath. Negative for cough and chest tightness. Cardiovascular: Positive for chest pain. Negative for palpitations. Gastrointestinal: Negative for abdominal distention, abdominal pain, constipation, diarrhea, nausea and vomiting. Genitourinary: Negative for difficulty urinating and dysuria. Musculoskeletal: Negative for arthralgias, back pain and myalgias. Skin: Negative for rash. Neurological: Negative for dizziness, weakness, light-headedness and headaches. Hematological: Negative for adenopathy. Psychiatric/Behavioral: Negative for behavioral problems and sleep disturbance. The patient is not nervous/anxious. Objective:     Physical Exam  Vitals signs reviewed. Constitutional:       General: She is not in acute distress. Appearance: She is well-developed. HENT:      Head: Normocephalic and atraumatic. Right Ear: External ear normal.      Left Ear: External ear normal.      Nose: Nose normal.      Mouth/Throat:      Pharynx: No oropharyngeal exudate. Eyes:      Conjunctiva/sclera: Conjunctivae normal.      Pupils: Pupils are equal, round, and reactive to light. Neck:      Musculoskeletal: Normal range of motion. Cardiovascular:      Rate and Rhythm: Normal rate and regular rhythm. Heart sounds: Normal heart sounds. No murmur. Pulmonary:      Effort: Pulmonary effort is normal. No respiratory distress. Breath sounds: Normal breath sounds. No wheezing. Chest:      Chest wall: No tenderness. Abdominal:      General: Bowel sounds are normal. There is no distension. Palpations: Abdomen is soft. There is no mass. Tenderness: There is no abdominal tenderness. Musculoskeletal: Normal range of motion. General: No tenderness. Lymphadenopathy:      Cervical: No cervical adenopathy. Skin:     Findings: No rash. Neurological:      Mental Status: She is alert and oriented to person, place, and time.

## 2020-06-19 NOTE — PROGRESS NOTES
Visit Information    Have you changed or started any medications since your last visit including any over-the-counter medicines, vitamins, or herbal medicines? no   Have you stopped taking any of your medications? Is so, why? -  no  Are you having any side effects from any of your medications? - no    Have you seen any other physician or provider since your last visit?  no   Have you had any other diagnostic tests since your last visit?  no   Have you been seen in the emergency room and/or had an admission in a hospital since we last saw you?  yes - [facility]   Have you had your routine dental cleaning in the past 6 months?  yes -      Do you have an active MyChart account? If no, what is the barrier? Yes    Patient Care Team:  Cheryl Tarango MD as PCP - General (Family Medicine)  Prashanth Fitch MD as PCP - [facility]  Dank Daugherty as  (Care Coordinator)  Ten Estes as Consulting Physician (Endocrinology)    Medical History Review  Past Medical, Family, and Social History reviewed and does not contribute to the patient presenting condition    Health Maintenance   Topic Date Due    Annual Wellness Visit (AWV)  05/29/2019    Lipid screen  06/05/2020    Diabetic retinal exam  06/20/2020    DTaP/Tdap/Td vaccine (1 - Tdap) 07/10/2020 (Originally 4[birth year]6/1964)    Diabetic foot exam  06/19/2021 (Originally 6/5/2020)    A1C test (Diabetic or Prediabetic)  04/10/2021    Potassium monitoring  06/15/2021    Creatinine monitoring  06/15/2021    Colon cancer screen colonoscopy  08/15/2021    DEXA (modify frequency per FRAX score)  Completed    Flu vaccine  Completed    Shingles Vaccine  Completed    Pneumococcal 65+ years Vaccine  Completed    Hepatitis C screen  Completed    Hepatitis A vaccine  Aged Out    Hib vaccine  Aged Out    Meningococcal (ACWY) vaccine  Aged Out     Patient/Caregiver verbalize understanding of medications.   Yes

## 2020-06-22 ENCOUNTER — HOSPITAL ENCOUNTER (OUTPATIENT)
Age: 75
Discharge: HOME | End: 2020-06-22
Payer: MEDICARE

## 2020-06-22 LAB
ALBUMIN SERPL-MCNC: 3.8 G/DL (ref 3.5–5.2)
ALBUMIN/GLOBULIN RATIO: ABNORMAL (ref 1–2.5)
ALP BLD-CCNC: 40 U/L (ref 35–104)
ALT SERPL-CCNC: 11 U/L (ref 5–33)
ANION GAP SERPL CALCULATED.3IONS-SCNC: 11 MMOL/L (ref 9–17)
AST SERPL-CCNC: 15 U/L
BILIRUB SERPL-MCNC: 0.53 MG/DL (ref 0.3–1.2)
BUN BLDV-MCNC: 22 MG/DL (ref 8–23)
BUN/CREAT BLD: ABNORMAL (ref 9–20)
CALCIUM SERPL-MCNC: 9.5 MG/DL (ref 8.6–10.4)
CHLORIDE BLD-SCNC: 103 MMOL/L (ref 98–107)
CHOLESTEROL/HDL RATIO: 2
CHOLESTEROL: 143 MG/DL
CO2: 25 MMOL/L (ref 20–31)
CREAT SERPL-MCNC: 0.94 MG/DL (ref 0.5–0.9)
ESTIMATED AVERAGE GLUCOSE: 148 MG/DL
GFR AFRICAN AMERICAN: >60 ML/MIN
GFR NON-AFRICAN AMERICAN: 58 ML/MIN
GFR SERPL CREATININE-BSD FRML MDRD: ABNORMAL ML/MIN/{1.73_M2}
GFR SERPL CREATININE-BSD FRML MDRD: ABNORMAL ML/MIN/{1.73_M2}
GLUCOSE BLD-MCNC: 254 MG/DL (ref 70–99)
HBA1C MFR BLD: 6.8 % (ref 4–6)
HCT VFR BLD CALC: 42.7 % (ref 36–46)
HDLC SERPL-MCNC: 72 MG/DL
HEMOGLOBIN: 14.4 G/DL (ref 12–16)
LDL CHOLESTEROL: 51 MG/DL (ref 0–130)
MCH RBC QN AUTO: 31.1 PG (ref 26–34)
MCHC RBC AUTO-ENTMCNC: 33.6 G/DL (ref 31–37)
MCV RBC AUTO: 92.5 FL (ref 80–100)
NRBC AUTOMATED: NORMAL PER 100 WBC
PDW BLD-RTO: 13.5 % (ref 11.5–14.9)
PLATELET # BLD: 158 K/UL (ref 150–450)
PMV BLD AUTO: 8.2 FL (ref 6–12)
POTASSIUM SERPL-SCNC: 4.5 MMOL/L (ref 3.7–5.3)
RBC # BLD: 4.62 M/UL (ref 4–5.2)
SODIUM BLD-SCNC: 139 MMOL/L (ref 135–144)
THYROXINE, FREE: 1.33 NG/DL (ref 0.93–1.7)
TOTAL PROTEIN: 6.4 G/DL (ref 6.4–8.3)
TRIGL SERPL-MCNC: 98 MG/DL
TSH SERPL DL<=0.05 MIU/L-ACNC: 1.91 MIU/L (ref 0.3–5)
VITAMIN B-12: 908 PG/ML (ref 232–1245)
VITAMIN D 25-HYDROXY: 37.1 NG/ML (ref 30–100)
VLDLC SERPL CALC-MCNC: NORMAL MG/DL (ref 1–30)
WBC # BLD: 4.1 K/UL (ref 3.5–11)

## 2020-06-22 PROCEDURE — 84443 ASSAY THYROID STIM HORMONE: CPT

## 2020-06-22 PROCEDURE — 80053 COMPREHEN METABOLIC PANEL: CPT

## 2020-06-22 PROCEDURE — 82306 VITAMIN D 25 HYDROXY: CPT

## 2020-06-22 PROCEDURE — 83036 HEMOGLOBIN GLYCOSYLATED A1C: CPT

## 2020-06-22 PROCEDURE — 82607 VITAMIN B-12: CPT

## 2020-06-22 PROCEDURE — 80061 LIPID PANEL: CPT

## 2020-06-22 PROCEDURE — 84439 ASSAY OF FREE THYROXINE: CPT

## 2020-06-22 PROCEDURE — 85027 COMPLETE CBC AUTOMATED: CPT

## 2020-06-22 PROCEDURE — 36415 COLL VENOUS BLD VENIPUNCTURE: CPT

## 2020-06-26 ENCOUNTER — HOSPITAL ENCOUNTER (OUTPATIENT)
Dept: NUCLEAR MEDICINE | Age: 75
Discharge: HOME | End: 2020-06-28
Payer: MEDICARE

## 2020-06-26 ENCOUNTER — HOSPITAL ENCOUNTER (OUTPATIENT)
Dept: NON INVASIVE DIAGNOSTICS | Age: 75
Discharge: HOME | End: 2020-06-26
Payer: MEDICARE

## 2020-06-26 VITALS
HEIGHT: 63 IN | BODY MASS INDEX: 20.73 KG/M2 | OXYGEN SATURATION: 98 % | DIASTOLIC BLOOD PRESSURE: 66 MMHG | WEIGHT: 117 LBS | HEART RATE: 63 BPM | RESPIRATION RATE: 16 BRPM | SYSTOLIC BLOOD PRESSURE: 144 MMHG

## 2020-06-26 LAB
LV EF: 63 %
LV EF: 70 %
LVEF MODALITY: NORMAL
LVEF MODALITY: NORMAL

## 2020-06-26 PROCEDURE — 3430000000 HC RX DIAGNOSTIC RADIOPHARMACEUTICAL: Performed by: FAMILY MEDICINE

## 2020-06-26 PROCEDURE — 78452 HT MUSCLE IMAGE SPECT MULT: CPT

## 2020-06-26 PROCEDURE — 93306 TTE W/DOPPLER COMPLETE: CPT

## 2020-06-26 PROCEDURE — A9500 TC99M SESTAMIBI: HCPCS | Performed by: FAMILY MEDICINE

## 2020-06-26 PROCEDURE — 93017 CV STRESS TEST TRACING ONLY: CPT

## 2020-06-26 PROCEDURE — 2580000003 HC RX 258: Performed by: FAMILY MEDICINE

## 2020-06-26 PROCEDURE — 6360000002 HC RX W HCPCS: Performed by: FAMILY MEDICINE

## 2020-06-26 RX ORDER — NITROGLYCERIN 0.4 MG/1
0.4 TABLET SUBLINGUAL EVERY 5 MIN PRN
Status: ACTIVE | OUTPATIENT
Start: 2020-06-26 | End: 2020-06-26

## 2020-06-26 RX ORDER — SODIUM CHLORIDE 9 MG/ML
500 INJECTION, SOLUTION INTRAVENOUS CONTINUOUS PRN
Status: ACTIVE | OUTPATIENT
Start: 2020-06-26 | End: 2020-06-26

## 2020-06-26 RX ORDER — ATROPINE SULFATE 0.1 MG/ML
0.5 INJECTION INTRAVENOUS EVERY 5 MIN PRN
Status: ACTIVE | OUTPATIENT
Start: 2020-06-26 | End: 2020-06-26

## 2020-06-26 RX ORDER — AMINOPHYLLINE DIHYDRATE 25 MG/ML
50 INJECTION, SOLUTION INTRAVENOUS PRN
Status: ACTIVE | OUTPATIENT
Start: 2020-06-26 | End: 2020-06-26

## 2020-06-26 RX ORDER — METOPROLOL TARTRATE 5 MG/5ML
5 INJECTION INTRAVENOUS EVERY 5 MIN PRN
Status: ACTIVE | OUTPATIENT
Start: 2020-06-26 | End: 2020-06-26

## 2020-06-26 RX ORDER — SODIUM CHLORIDE 0.9 % (FLUSH) 0.9 %
10 SYRINGE (ML) INJECTION PRN
Status: ACTIVE | OUTPATIENT
Start: 2020-06-26 | End: 2020-06-26

## 2020-06-26 RX ORDER — ALBUTEROL SULFATE 90 UG/1
2 AEROSOL, METERED RESPIRATORY (INHALATION) PRN
Status: ACTIVE | OUTPATIENT
Start: 2020-06-26 | End: 2020-06-26

## 2020-06-26 RX ORDER — SODIUM CHLORIDE 0.9 % (FLUSH) 0.9 %
10 SYRINGE (ML) INJECTION PRN
Status: DISCONTINUED | OUTPATIENT
Start: 2020-06-26 | End: 2020-06-29 | Stop reason: HOSPADM

## 2020-06-26 RX ADMIN — TETRAKIS(2-METHOXYISOBUTYLISOCYANIDE)COPPER(I) TETRAFLUOROBORATE 12.4 MILLICURIE: 1 INJECTION, POWDER, LYOPHILIZED, FOR SOLUTION INTRAVENOUS at 07:30

## 2020-06-26 RX ADMIN — REGADENOSON 0.4 MG: 0.08 INJECTION, SOLUTION INTRAVENOUS at 09:08

## 2020-06-26 RX ADMIN — Medication 10 ML: at 09:08

## 2020-06-26 RX ADMIN — TETRAKIS(2-METHOXYISOBUTYLISOCYANIDE)COPPER(I) TETRAFLUOROBORATE 35.1 MILLICURIE: 1 INJECTION, POWDER, LYOPHILIZED, FOR SOLUTION INTRAVENOUS at 09:09

## 2020-06-26 RX ADMIN — Medication 10 ML: at 09:01

## 2020-06-26 RX ADMIN — Medication 10 ML: at 07:30

## 2020-06-26 ASSESSMENT — PAIN SCALES - GENERAL: PAINLEVEL_OUTOF10: 0

## 2020-06-26 NOTE — PROGRESS NOTES
LEXISCAN COMPLETED, PATIENT STATES SHE FEELS WEAK AND HAS SHORTNESS OF BREATH, /52, HR 86, COFFEE GIVEN AFTER ONE MINUTE.

## 2020-06-29 NOTE — PROCEDURES
[address] [facility]                    [address] [facility]. [address]                              CARDIAC STRESS TEST    PATIENT NAME: Jimbo Nash                    :        1945  MED REC NO:   [id number]                              ROOM:  ACCOUNT NO:   [de-identified]                           ADMIT DATE: 2020  PROVIDER:     Autumn Rodriguez MD    DATE OF STUDY:  2020    TEST TYPE: LEXISCAN CARDIOLYTE STRESS TEST  INDICATION: SHORTNESS OF BREATH, CHEST PAIN  REFERRING PHYSICIAN: Guera Parkinson    RESTING HEART RATE: 63 BEATS PER MINUTE  RESTING BLOOD PRESSURE: 144/66    MEDICATION(S) GIVEN: 0.4MG IV LEXISCAN  REASON FOR TERMINATION: MEDICATION INFUSION COMPLETE    RESTING EKG: NORMAL  STRESS HEART RESPONSE: NORMAL RESPONSE  BLOOD PRESSURE RESPONSE: APPROPRIATE  STRESS EKGs: NORMAL  ISCHEMIC EKG CHANGES: NONE    EKG IMPRESSION: ELECTROCARDIOGRAPHICALLY NEGATIVE LEXISCAN STRESS TEST. RADIOISOTOPE RESULTS TO FOLLOW FROM THE DEPARTMENT OF NUCLEAR MEDICINE.     Hemalatha Prieto MD    D: 2020 12:47:20       T: 2020 13:31:10     /FERNANDA  Job#: [id number]     Doc#: Unknown    CC:    (Retain this field even if not dictated or not decipherable)

## 2020-06-30 ENCOUNTER — CARE COORDINATION (OUTPATIENT)
Dept: CARE COORDINATION | Age: 75
End: 2020-06-30

## 2020-07-09 ENCOUNTER — TELEPHONE (OUTPATIENT)
Dept: FAMILY MEDICINE CLINIC | Age: 75
End: 2020-07-09

## 2020-07-09 NOTE — TELEPHONE ENCOUNTER
I am going to put in a referral to Cardiology for the ongoing chest pain and slightly abnormal stress test. Does she have a family member who sees a cardiologist or does she have a preference?

## 2020-07-20 ENCOUNTER — TELEPHONE (OUTPATIENT)
Dept: FAMILY MEDICINE CLINIC | Age: 75
End: 2020-07-20

## 2020-08-04 ENCOUNTER — OFFICE VISIT (OUTPATIENT)
Dept: FAMILY MEDICINE CLINIC | Age: 75
End: 2020-08-04
Payer: MEDICARE

## 2020-08-04 VITALS
OXYGEN SATURATION: 97 % | WEIGHT: 120 LBS | RESPIRATION RATE: 16 BRPM | TEMPERATURE: 98.4 F | BODY MASS INDEX: 21.26 KG/M2 | DIASTOLIC BLOOD PRESSURE: 54 MMHG | SYSTOLIC BLOOD PRESSURE: 102 MMHG | HEART RATE: 60 BPM

## 2020-08-04 PROCEDURE — 99214 OFFICE O/P EST MOD 30 MIN: CPT | Performed by: FAMILY MEDICINE

## 2020-08-04 RX ORDER — GABAPENTIN 100 MG/1
1 CAPSULE ORAL NIGHTLY
COMMUNITY
Start: 2020-07-30 | End: 2022-05-11

## 2020-08-04 ASSESSMENT — ENCOUNTER SYMPTOMS
RHINORRHEA: 0
VOMITING: 0
CHEST TIGHTNESS: 0
DIARRHEA: 0
BACK PAIN: 0
NAUSEA: 0
ABDOMINAL DISTENTION: 0
ABDOMINAL PAIN: 0
CONSTIPATION: 1
SORE THROAT: 0
SHORTNESS OF BREATH: 1
COUGH: 0

## 2020-08-04 NOTE — PROGRESS NOTES
HYPERTENSION visit     BP Readings from Last 3 Encounters:   06/26/20 (!) 144/66   06/19/20 (!) 126/54   06/15/20 (!) 139/58       LDL Calculated (mg/dL)   Date Value   06/05/2019 55     LDL Cholesterol (mg/dL)   Date Value   06/22/2020 51     HDL (mg/dL)   Date Value   06/22/2020 72     BUN (mg/dL)   Date Value   06/22/2020 22     CREATININE (mg/dL)   Date Value   06/22/2020 0.94 (H)     Glucose (mg/dL)   Date Value   06/22/2020 254 (H)              Have you changed or started any medications since your last visit including any over-the-counter medicines, vitamins, or herbal medicines? no   Have you stopped taking any of your medications? Is so, why? -  no  Are you having any side effects from any of your medications? - no  How often do you miss doses of your medication? rare      Have you seen any other physician or provider since your last visit? yes - Dr. Shady Krishnan   Have you had any other diagnostic tests since your last visit? yes - labs, echo, stress test   Have you been seen in the emergency room and/or had an admission in a hospital since we last saw you?  no   Have you had your routine dental cleaning in the past 6 months?  yes -      Do you have an active MyChart account? If no, what is the barrier?   Yes    Patient Care Team:  Isak Cobos MD as PCP - General (Family Medicine)  Isak Cobos MD as PCP - [facility]  Otoniel Child as Consulting Physician (Endocrinology)  Chang Huynh MD as Consulting Physician (Ophthalmology)    Medical History Review  Past Medical, Family, and Social History reviewed and does contribute to the patient presenting condition    Health Maintenance   Topic Date Due    DTaP/Tdap/Td vaccine (1 - Tdap) 04/06/1964    Annual Wellness Visit (AWV)  05/29/2019    Diabetic retinal exam  06/20/2020    Diabetic foot exam  06/19/2021 (Originally 6/5/2020)    Flu vaccine (1) 09/01/2020    A1C test (Diabetic or Prediabetic)  06/22/2021    Lipid screen 06/22/2021    Potassium monitoring  06/22/2021    Creatinine monitoring  06/22/2021    Colon cancer screen colonoscopy  08/15/2021    DEXA (modify frequency per FRAX score)  Completed    Shingles Vaccine  Completed    Pneumococcal 65+ years Vaccine  Completed    Hepatitis C screen  Completed    Hepatitis A vaccine  Aged Out    Hib vaccine  Aged Out    Meningococcal (ACWY) vaccine  Aged Out   Patient/Caregiver verbalize understanding of medications.   Yes

## 2020-08-04 NOTE — PROGRESS NOTES
Billie Cerda MD    [facility]  [address] [address], [address]  [address] [address]  Dept: [phone]  Dept Fax: [phone]     Patient ID: Nida Downs is a 76 y.o. female. HPI    Pt here today for f/u on chronic medical problems DM, HLD, Vit D Def, ,go over labs and/or diagnostic studies, and medication refills. Pt denies any fever or chills  Pt denies any N/V/D/C or abdominal pain. Pt still is c/o intermittent CP and SOB. She did see Dr. Bertha Curiel for the slightly abnormal stress test and he did recommend a cardiac cath, but she has not heard from them to schedule. Her constipation has improved and she has gained a couple of pounds. She did see Dr. Raysa Shin and he made no changes to her meds. Otherwise pt doing well on current tx and no other concerns today. The patient's past medical, surgical, social, and family history as well as his current medications and allergies were reviewed as documented in today's encounter. Current Outpatient Medications on File Prior to Visit   Medication Sig Dispense Refill    gabapentin (NEURONTIN) 100 MG capsule Take 1 capsule by mouth daily.  insulin lispro, 1 Unit Dial, (HUMALOG KWIKPEN) 100 UNIT/ML SOPN Inject into the skin 3 times daily PER SLIDING SCALE      insulin glargine (TOUJEO SOLOSTAR) 300 UNIT/ML injection pen Inject 11 Units into the skin every morning       pravastatin (PRAVACHOL) 40 MG tablet Take 1 tablet by mouth daily 30 tablet 0    aspirin 81 MG chewable tablet Take 81 mg by mouth daily      losartan (COZAAR) 50 MG tablet Take 50 mg by mouth daily      Multiple Vitamins TABS Take 1 tablet by mouth daily       No current facility-administered medications on file prior to visit. Subjective:     Review of Systems   Constitutional: Positive for fatigue and unexpected weight change (weight loss - stable). Negative for appetite change and fever.    HENT: Negative for congestion, ear pain, rhinorrhea and sore throat. Respiratory: Positive for shortness of breath. Negative for cough and chest tightness. Cardiovascular: Positive for chest pain. Negative for palpitations. Gastrointestinal: Positive for constipation (improved). Negative for abdominal distention, abdominal pain, diarrhea, nausea and vomiting. Genitourinary: Negative for difficulty urinating and dysuria. Musculoskeletal: Negative for arthralgias, back pain and myalgias. Skin: Negative for rash. Neurological: Negative for dizziness, weakness, light-headedness and headaches. Hematological: Negative for adenopathy. Psychiatric/Behavioral: Negative for behavioral problems and sleep disturbance. The patient is not nervous/anxious. Objective:     Physical Exam  Vitals signs reviewed. Constitutional:       General: She is not in acute distress. Appearance: She is well-developed. HENT:      Head: Normocephalic and atraumatic. Right Ear: External ear normal.      Left Ear: External ear normal.      Nose: Nose normal.      Mouth/Throat:      Pharynx: No oropharyngeal exudate. Eyes:      Conjunctiva/sclera: Conjunctivae normal.      Pupils: Pupils are equal, round, and reactive to light. Neck:      Musculoskeletal: Normal range of motion. Cardiovascular:      Rate and Rhythm: Normal rate and regular rhythm. Heart sounds: Normal heart sounds. No murmur. Pulmonary:      Effort: Pulmonary effort is normal. No respiratory distress. Breath sounds: Normal breath sounds. No wheezing. Chest:      Chest wall: No tenderness. Abdominal:      General: Bowel sounds are normal. There is no distension. Palpations: Abdomen is soft. There is no mass. Tenderness: There is no abdominal tenderness. Musculoskeletal: Normal range of motion. General: No tenderness. Lymphadenopathy:      Cervical: No cervical adenopathy. Skin:     Findings: No rash.    Neurological: Mental Status: She is alert and oriented to person, place, and time. Deep Tendon Reflexes: Reflexes are normal and symmetric. Psychiatric:         Behavior: Behavior normal.     Labs, stress test, and ECHO reviewed with pt today. Assessment:      Diagnosis Orders   1. Type 2 diabetes mellitus without complication, with long-term current use of insulin ([address]     2. Pure hypercholesterolemia     3. Vitamin D deficiency     4. Constipation, unspecified constipation type      improved   5. Weight loss      stable   6. Fatigue, unspecified type     7. Abnormal stress test      septal hypokinesis   8. Chest pain, unspecified type           Plan: Will cont with current treatment as pt is currently stable on current treatment    Continue with ADA diet, current diabetic meds, and monitoring BS's as instructed and will cont to follow with Dr. Almita Espinal with daily foot exams and yearly eye exams    Will cont with Cozaar for renal protection    Will cont with low fat/chol diet and Pravachol as ordered    With her ongoing CP and SOB, slightly abnormal stress test showing septal hypokinesis, and strong family history of HD, I do recommend she go through with the cardiac cath Dr. Danielle Palumbo recommended. We will call his office to have them call her to schedule and I also told the pt to call and also get scheduled    Will cont with monitor her weight, but she is up 4# in a month    Her BP is slightly decreased today, but she is asymptomatic. Will cont with the Cozaar as prescribed and stay well hydrated and will monitor. Rest of systems unchanged, continue current treatments. Medications, labs, diagnostic studies, consultations and follow-up as documented in this encounter. Rest of systems unchanged, continue current treatments    Billie Garland MD

## 2020-08-14 ENCOUNTER — HOSPITAL ENCOUNTER (OUTPATIENT)
Dept: CARDIAC CATH/INVASIVE PROCEDURES | Age: 75
Discharge: HOME | End: 2020-08-14
Payer: MEDICARE

## 2020-08-14 VITALS
DIASTOLIC BLOOD PRESSURE: 50 MMHG | TEMPERATURE: 97.7 F | BODY MASS INDEX: 21.26 KG/M2 | HEIGHT: 63 IN | OXYGEN SATURATION: 99 % | WEIGHT: 120 LBS | RESPIRATION RATE: 19 BRPM | SYSTOLIC BLOOD PRESSURE: 119 MMHG | HEART RATE: 77 BPM

## 2020-08-14 LAB
GFR NON-AFRICAN AMERICAN: 52 ML/MIN
GFR SERPL CREATININE-BSD FRML MDRD: >60 ML/MIN
GFR SERPL CREATININE-BSD FRML MDRD: ABNORMAL ML/MIN/{1.73_M2}
GLUCOSE BLD-MCNC: 242 MG/DL (ref 65–105)
GLUCOSE BLD-MCNC: 285 MG/DL (ref 74–100)
PLATELET # BLD: 143 K/UL (ref 138–453)
POC CHLORIDE: 102 MMOL/L (ref 98–107)
POC CREATININE: 1.03 MG/DL (ref 0.51–1.19)
POC HEMATOCRIT: 39 % (ref 36–46)
POC HEMOGLOBIN: 13.1 G/DL (ref 12–16)
POC POTASSIUM: 4.9 MMOL/L (ref 3.5–4.5)
POC SODIUM: 136 MMOL/L (ref 138–146)

## 2020-08-14 PROCEDURE — 85049 AUTOMATED PLATELET COUNT: CPT

## 2020-08-14 PROCEDURE — 82565 ASSAY OF CREATININE: CPT

## 2020-08-14 PROCEDURE — 6360000002 HC RX W HCPCS

## 2020-08-14 PROCEDURE — C9600 PERC DRUG-EL COR STENT SING: HCPCS | Performed by: INTERNAL MEDICINE

## 2020-08-14 PROCEDURE — 6360000004 HC RX CONTRAST MEDICATION

## 2020-08-14 PROCEDURE — C1874 STENT, COATED/COV W/DEL SYS: HCPCS

## 2020-08-14 PROCEDURE — 2500000003 HC RX 250 WO HCPCS

## 2020-08-14 PROCEDURE — 84132 ASSAY OF SERUM POTASSIUM: CPT

## 2020-08-14 PROCEDURE — C1769 GUIDE WIRE: HCPCS

## 2020-08-14 PROCEDURE — C1887 CATHETER, GUIDING: HCPCS

## 2020-08-14 PROCEDURE — 6370000000 HC RX 637 (ALT 250 FOR IP)

## 2020-08-14 PROCEDURE — 7100000001 HC PACU RECOVERY - ADDTL 15 MIN

## 2020-08-14 PROCEDURE — C1725 CATH, TRANSLUMIN NON-LASER: HCPCS

## 2020-08-14 PROCEDURE — 82947 ASSAY GLUCOSE BLOOD QUANT: CPT

## 2020-08-14 PROCEDURE — 82435 ASSAY OF BLOOD CHLORIDE: CPT

## 2020-08-14 PROCEDURE — 2709999900 HC NON-CHARGEABLE SUPPLY

## 2020-08-14 PROCEDURE — 93458 L HRT ARTERY/VENTRICLE ANGIO: CPT | Performed by: INTERNAL MEDICINE

## 2020-08-14 PROCEDURE — C1894 INTRO/SHEATH, NON-LASER: HCPCS

## 2020-08-14 PROCEDURE — 7100000000 HC PACU RECOVERY - FIRST 15 MIN

## 2020-08-14 PROCEDURE — 84295 ASSAY OF SERUM SODIUM: CPT

## 2020-08-14 PROCEDURE — 85014 HEMATOCRIT: CPT

## 2020-08-14 RX ORDER — ATORVASTATIN CALCIUM 40 MG/1
40 TABLET, FILM COATED ORAL DAILY
Qty: 90 TABLET | Refills: 1 | Status: SHIPPED | OUTPATIENT
Start: 2020-08-14 | End: 2022-08-18

## 2020-08-14 RX ORDER — SODIUM CHLORIDE 9 MG/ML
INJECTION, SOLUTION INTRAVENOUS CONTINUOUS
Status: DISCONTINUED | OUTPATIENT
Start: 2020-08-14 | End: 2020-08-15 | Stop reason: HOSPADM

## 2020-08-14 RX ORDER — CLOPIDOGREL BISULFATE 75 MG/1
75 TABLET ORAL DAILY
Status: DISCONTINUED | OUTPATIENT
Start: 2020-08-15 | End: 2020-08-15 | Stop reason: HOSPADM

## 2020-08-14 RX ORDER — SODIUM CHLORIDE 0.9 % (FLUSH) 0.9 %
10 SYRINGE (ML) INJECTION PRN
Status: DISCONTINUED | OUTPATIENT
Start: 2020-08-14 | End: 2020-08-15 | Stop reason: HOSPADM

## 2020-08-14 RX ORDER — ACETAMINOPHEN 325 MG/1
650 TABLET ORAL EVERY 4 HOURS PRN
Status: DISCONTINUED | OUTPATIENT
Start: 2020-08-14 | End: 2020-08-15 | Stop reason: HOSPADM

## 2020-08-14 RX ORDER — METOPROLOL SUCCINATE 25 MG/1
25 TABLET, EXTENDED RELEASE ORAL DAILY
Status: DISCONTINUED | OUTPATIENT
Start: 2020-08-14 | End: 2020-08-15 | Stop reason: HOSPADM

## 2020-08-14 RX ORDER — SODIUM CHLORIDE 0.9 % (FLUSH) 0.9 %
10 SYRINGE (ML) INJECTION EVERY 12 HOURS SCHEDULED
Status: DISCONTINUED | OUTPATIENT
Start: 2020-08-14 | End: 2020-08-15 | Stop reason: HOSPADM

## 2020-08-14 RX ORDER — METOPROLOL SUCCINATE 25 MG/1
25 TABLET, EXTENDED RELEASE ORAL DAILY
Qty: 90 TABLET | Refills: 1 | Status: SHIPPED | OUTPATIENT
Start: 2020-08-14

## 2020-08-14 RX ORDER — ATORVASTATIN CALCIUM 40 MG/1
40 TABLET, FILM COATED ORAL NIGHTLY
Status: DISCONTINUED | OUTPATIENT
Start: 2020-08-14 | End: 2020-08-15 | Stop reason: HOSPADM

## 2020-08-14 RX ORDER — CLOPIDOGREL BISULFATE 75 MG/1
75 TABLET ORAL DAILY
Qty: 30 TABLET | Refills: 5 | Status: SHIPPED | OUTPATIENT
Start: 2020-08-14

## 2020-08-14 RX ADMIN — SODIUM CHLORIDE: 9 INJECTION, SOLUTION INTRAVENOUS at 09:19

## 2020-08-14 NOTE — PROGRESS NOTES
[x] DISCHARGE INSTRUCTIONS GIVEN WITH 2 WEEK APPT. MADE AND . DOCUMENTED patient to make own appointment  [x] STENT/PCI GUIDELINES GIVEN    [x] ASA  PRESCRIBED POST PROCEDURE    [x] WAS A BETA BLOCKER ORDERED IF YES WAS SCRIPT GIVEN TO PT.    [] IF EF < 45 % WAS AN ACE INHIBITOR ORDERED    [x] WAS PLAVIX,EFFIENT,BRILINTA, ORDERED IF YES WAS SCRIPT GIVEN TO       PT,AND INSTRUCTIONS ON IMPORTANCE OF MED    [] DOES PT. NEED 30 & 90 DAY SCRIPTS denies    [] CONCERNS ON PURCHASE OF ANTIPLATELETS IF YES. SEE PROCEDURE ON      PROCESS FOR PT.S TO OBTAIN THESE MEDS denies    [x] IS PT. ON STATINS IF NO WERE STATINS ORDERED AND SCRIPT GIVEN    [x] WERE MEDS RECONCILED, WAS [employer] INFORMATION ON MEDS GIVEN     TO PT.    [x] PRINT DISCHARGE MED LIST CHECK AVS FOR CURRENT MEDS    [x] WAS STENT CARD GIVEN TO PT.           Report given to [place] RN

## 2020-08-14 NOTE — PLAN OF CARE
Problem: Anxiety:  Goal: Level of anxiety will decrease  Description: Level of anxiety will decrease  8/14/2020 1544 by Alex Torres RN  Outcome: Completed  8/14/2020 0846 by Alex Torres, RN  Outcome: Met This Shift     Problem: KNOWLEDGE DEFICIT  Goal: Patient/S.O. demonstrates understanding of disease process, treatment plan, medications, and discharge instructions.   Outcome: Completed

## 2020-08-14 NOTE — OP NOTE
[facility]    CARDIAC CATHETERIZATION    Date:   8/14/2020  Patient name:  Rocío Jones  Date of admission:  8/14/2020  8:26 AM  MRN:   [id number]  YOB: 1945    Operators:  Primary:  Margaux Syed MD (Attending Physician)    Assistant/CV fellow: Linda Sawyer MD      Procedure performed:   1. Left Heart Catheterization  2. LV Gram  3. Coronary Angiography  4. PCI to proximal RCA    Pre Procedure Conscious Sedation Data:  ASA Class:    [] I [x] II [] III [] IV    Mallampati Class:  [] I [x] II [] III [] IV    Indication:  [] STEMI      [x] + Stress test  [] ACS      [] + EKG Changes  [] Non Q MI       [] Significant Risk Factors  [x] Recurrent Angina             [] Diabetes Mellitus    [] New LBBB      [] Other.  [] CHF / Low EF changes     [] Abnormal CTA / Ca Score      Procedure:  Access:  [] Femoral  [x] Radial  artery       [x] Right  [] Left    Procedure: After informed consent was obtained with explanation of the risks and benefits, patient was brought to the cath lab. The access area was prepped and draped in sterile fashion. 1% lidocaine was used for local block. The artery was cannulated with 6  Fr sheath with brisk arterial blood return. The side port was frequently flushed and aspirated with normal saline. Estimated Blood Loss:  [x] Minimal < 25 cc [] Moderate 25-50 cc  [] >50 cc    Findings:  LMCA: Normal 0% stenosis. LAD: Mild irregularities 20-30%. Slow flow noted without any significant stenosis suggestive of Microvascular dysfunction  LCx: Mild irregularities 10-20%. RCA: Proximal 75% stenosis reduced to 0% with PTCA/DELIA RPDA/RPL are patent with mild disease   Lesion on Prox RCA: Proximal subsection. 75% stenosis reduced to 0%. Pre  procedure JAYDON III flow was noted. Post Procedure JAYDON III flow was present. Good  runoff was present. The lesion was diagnosed as Moderate Risk (B). Devices used:  High Torque BMW Wire 190cm. Number of passes: 1.  Trek  Balloon 2.5mm x 12mm. 2 inflation(s) to a max pressure of: 13 felecia. Xience  Bianca 3.25 x 12 DELIA. 1 inflation(s) to a max pressure of: 12 felecia. Coronary Tree: Dominance: Right  The LV gram was performed in the ESTRADA 30 position. LVEF:  60%. Conclusions:  1. Single vessel CAD  2. High grade stenosis in Proximal RCA s/p successful PCI with DELIA (3.25 X 12 mm)  3. Normal LV systolic function. Recommendations:  1. Post-cath protocol  2. Continue optimal medical therapy  3. Risk factor modification  4. DAPT (ASA & Plavix)    ____________________________________________________________________    History and Risk Factors    [x] Hypertension     [] Family history of CAD  [x] Hyperlipidemia     [] Cerebrovascular Disease   [] Prior MI       [] Peripheral Vascular disease   [] Prior PCI              [x] Diabetes Mellitus    [] Left Main PCI. [] Currently on Dialysis. [] Prior CABG. [] Currently smoker. [] Cardiac Arrest outside of healthcare facility. [] Yes    [x] No        Witnessed     [] Yes   [] No     Arrest after arrival of EMS  [] Yes   [] No     [] Cardiac Arrest at other Facility. [] Yes   [x] No    Pre-Procedure Information. Heart Failure       [] Yes    [x] No        Class  [] I      [] II  [] III    [] IV. New Diagnosis    [] Yes  [] No    HF Type      [] Systolic   [] Diastolic          [] Unknown. Diagnostic Test:   EKG       [x] Normal   [] Abnormal    New antiarrhythmia medications:    [] Yes   [] No   New onset atrial fibrillation / Flutter     [] Yes   [] No   ECG Abnormalities:      [] V. Fib   [] Jamee V. Tach           [] NS V. T   [] New LBBB           [] T.  Inv  []  ST dev > 0.5 mm         [] PVC's freq  [] PVC's infrequent    Stress Test Performed:      [x] Yes    [] No     Type:     [] Stress Echo   [x] Exercise Stress Test (no imaging)      [] Stress Nuclear  [] Stress Imaging     Results   [] Negative   [x] Positive        [] Indeterminate  [] Unavailable     If Positive/

## 2020-08-14 NOTE — PROGRESS NOTES
Patient admitted, consent signed and questions answered. Patient ready for procedure. Call light to reach with side rails up 2 of 2. Bilat groin hairs clipped. Family at bedside with patient. History and physical completed.

## 2020-08-14 NOTE — PROGRESS NOTES
Pre call made. Informed of when to arrive where to enter hospital and mask and visitor policy. Reviewed meds, history and NPO  Status. ASA PS 2: Mild systemic disease ASA PS 1: A normal healthy patient

## 2020-08-17 ENCOUNTER — HOSPITAL ENCOUNTER (EMERGENCY)
Age: 75
Discharge: HOME | End: 2020-08-17
Attending: EMERGENCY MEDICINE
Payer: MEDICARE

## 2020-08-17 ENCOUNTER — APPOINTMENT (OUTPATIENT)
Dept: GENERAL RADIOLOGY | Age: 75
End: 2020-08-17
Payer: MEDICARE

## 2020-08-17 VITALS
TEMPERATURE: 97.9 F | RESPIRATION RATE: 17 BRPM | WEIGHT: 120 LBS | HEART RATE: 52 BPM | OXYGEN SATURATION: 100 % | HEIGHT: 63 IN | SYSTOLIC BLOOD PRESSURE: 141 MMHG | BODY MASS INDEX: 21.26 KG/M2 | DIASTOLIC BLOOD PRESSURE: 57 MMHG

## 2020-08-17 LAB
ABSOLUTE EOS #: 0 K/UL (ref 0–0.4)
ABSOLUTE IMMATURE GRANULOCYTE: ABNORMAL K/UL (ref 0–0.3)
ABSOLUTE LYMPH #: 0.9 K/UL (ref 1–4.8)
ABSOLUTE MONO #: 0.3 K/UL (ref 0.1–1.3)
ANION GAP SERPL CALCULATED.3IONS-SCNC: 10 MMOL/L (ref 9–17)
BASOPHILS # BLD: 1 % (ref 0–2)
BASOPHILS ABSOLUTE: 0 K/UL (ref 0–0.2)
BUN BLDV-MCNC: 15 MG/DL (ref 8–23)
BUN/CREAT BLD: ABNORMAL (ref 9–20)
CALCIUM SERPL-MCNC: 9.5 MG/DL (ref 8.6–10.4)
CHLORIDE BLD-SCNC: 102 MMOL/L (ref 98–107)
CO2: 25 MMOL/L (ref 20–31)
CREAT SERPL-MCNC: 1.03 MG/DL (ref 0.5–0.9)
DIFFERENTIAL TYPE: ABNORMAL
EOSINOPHILS RELATIVE PERCENT: 1 % (ref 0–4)
GFR AFRICAN AMERICAN: >60 ML/MIN
GFR NON-AFRICAN AMERICAN: 52 ML/MIN
GFR SERPL CREATININE-BSD FRML MDRD: ABNORMAL ML/MIN/{1.73_M2}
GFR SERPL CREATININE-BSD FRML MDRD: ABNORMAL ML/MIN/{1.73_M2}
GLUCOSE BLD-MCNC: 221 MG/DL (ref 70–99)
HCT VFR BLD CALC: 39 % (ref 36–46)
HEMOGLOBIN: 13.5 G/DL (ref 12–16)
IMMATURE GRANULOCYTES: ABNORMAL %
LYMPHOCYTES # BLD: 22 % (ref 24–44)
MCH RBC QN AUTO: 32.5 PG (ref 26–34)
MCHC RBC AUTO-ENTMCNC: 34.6 G/DL (ref 31–37)
MCV RBC AUTO: 93.9 FL (ref 80–100)
MONOCYTES # BLD: 7 % (ref 1–7)
NRBC AUTOMATED: ABNORMAL PER 100 WBC
PDW BLD-RTO: 13.3 % (ref 11.5–14.9)
PLATELET # BLD: 170 K/UL (ref 150–450)
PLATELET ESTIMATE: ABNORMAL
PMV BLD AUTO: 8 FL (ref 6–12)
POTASSIUM SERPL-SCNC: 4.2 MMOL/L (ref 3.7–5.3)
RBC # BLD: 4.15 M/UL (ref 4–5.2)
RBC # BLD: ABNORMAL 10*6/UL
SEG NEUTROPHILS: 69 % (ref 36–66)
SEGMENTED NEUTROPHILS ABSOLUTE COUNT: 2.8 K/UL (ref 1.3–9.1)
SODIUM BLD-SCNC: 137 MMOL/L (ref 135–144)
TROPONIN INTERP: ABNORMAL
TROPONIN INTERP: ABNORMAL
TROPONIN T: ABNORMAL NG/ML
TROPONIN T: ABNORMAL NG/ML
TROPONIN, HIGH SENSITIVITY: 18 NG/L (ref 0–14)
TROPONIN, HIGH SENSITIVITY: 18 NG/L (ref 0–14)
WBC # BLD: 4.1 K/UL (ref 3.5–11)
WBC # BLD: ABNORMAL 10*3/UL

## 2020-08-17 PROCEDURE — 84484 ASSAY OF TROPONIN QUANT: CPT

## 2020-08-17 PROCEDURE — 80048 BASIC METABOLIC PNL TOTAL CA: CPT

## 2020-08-17 PROCEDURE — 71046 X-RAY EXAM CHEST 2 VIEWS: CPT

## 2020-08-17 PROCEDURE — 93005 ELECTROCARDIOGRAM TRACING: CPT | Performed by: EMERGENCY MEDICINE

## 2020-08-17 PROCEDURE — 36415 COLL VENOUS BLD VENIPUNCTURE: CPT

## 2020-08-17 PROCEDURE — 99285 EMERGENCY DEPT VISIT HI MDM: CPT

## 2020-08-17 PROCEDURE — 85025 COMPLETE CBC W/AUTO DIFF WBC: CPT

## 2020-08-17 ASSESSMENT — PAIN DESCRIPTION - DESCRIPTORS: DESCRIPTORS: PRESSURE

## 2020-08-17 ASSESSMENT — PAIN SCALES - GENERAL: PAINLEVEL_OUTOF10: 4

## 2020-08-17 ASSESSMENT — ENCOUNTER SYMPTOMS
SHORTNESS OF BREATH: 0
CONSTIPATION: 0
COUGH: 0
COLOR CHANGE: 0
NAUSEA: 0
BLOOD IN STOOL: 0
ABDOMINAL PAIN: 0
SORE THROAT: 0
BACK PAIN: 0
VOMITING: 0
TROUBLE SWALLOWING: 0
DIARRHEA: 0

## 2020-08-17 ASSESSMENT — PAIN DESCRIPTION - LOCATION: LOCATION: CHEST

## 2020-08-17 ASSESSMENT — PAIN DESCRIPTION - PAIN TYPE: TYPE: ACUTE PAIN

## 2020-08-17 ASSESSMENT — PAIN DESCRIPTION - FREQUENCY: FREQUENCY: CONTINUOUS

## 2020-08-17 NOTE — ED PROVIDER NOTES
History:   Diagnosis Date    Abnormal cardiovascular stress test 2020    no ischemia / infarction   there is septal hypokinesis   /  EF 70%    CAD (coronary artery disease)     History of pneumonia 2020    HTN (hypertension)     Hyperlipidemia     Type 2 diabetes mellitus without complication ([facility] [address]          SURGICAL HISTORY      has a past surgical history that includes Cholecystectomy; Colonoscopy; Hysterectomy, vaginal; and Coronary angioplasty with stent (2020). CURRENT MEDICATIONS       Discharge Medication List as of 2020  4:32 PM      CONTINUE these medications which have NOT CHANGED    Details   atorvastatin (LIPITOR) 40 MG tablet Take 1 tablet by mouth daily, Disp-90 tablet,R-1Normal      clopidogrel (PLAVIX) 75 MG tablet Take 1 tablet by mouth daily, Disp-30 tablet,R-5Normal      metoprolol succinate (TOPROL XL) 25 MG extended release tablet Take 1 tablet by mouth daily, Disp-90 tablet,R-1Normal      gabapentin (NEURONTIN) 100 MG capsule Take 1 capsule by mouth daily. Historical Med      insulin lispro, 1 Unit Dial, (HUMALOG KWIKPEN) 100 UNIT/ML SOPN Inject into the skin 3 times daily PER SLIDING SCALEHistorical Med      insulin glargine (TOUJEO SOLOSTAR) 300 UNIT/ML injection pen Inject 11 Units into the skin every morning Historical Med      aspirin 81 MG chewable tablet Take 81 mg by mouth daily      losartan (COZAAR) 50 MG tablet Take 50 mg by mouth daily      Multiple Vitamins TABS Take 1 tablet by mouth daily             ALLERGIES     has No Known Allergies. FAMILY HISTORY     She indicated that her mother is . She indicated that the status of her brother is unknown. She indicated that the status of her maternal grandmother is unknown. She indicated that the status of her maternal aunt is unknown.  She indicated that the status of her maternal uncle is unknown.     family history includes Diabetes in her maternal aunt, maternal grandmother, maternal uncle, and mother; Esophageal Cancer in her brother; Heart Disease in her brother and mother. SOCIAL HISTORY      reports that she has never smoked. She has never used smokeless tobacco. She reports that she does not drink alcohol or use drugs. PHYSICAL EXAM     INITIAL VITALS:  height is 5' 3\" (1.6 m) and weight is 120 lb (54.4 kg). Her oral temperature is 97.9 °F (36.6 °C). Her blood pressure is 141/57 (abnormal) and her pulse is 52. Her respiration is 17 and oxygen saturation is 100%. Physical Exam  Vitals signs and nursing note reviewed. Constitutional:       General: She is not in acute distress. HENT:      Head: Normocephalic and atraumatic. Eyes:      Conjunctiva/sclera: Conjunctivae normal.      Pupils: Pupils are equal, round, and reactive to light. Neck:      Musculoskeletal: Neck supple. Cardiovascular:      Rate and Rhythm: Normal rate and regular rhythm. Heart sounds: Normal heart sounds. No murmur. Pulmonary:      Effort: Pulmonary effort is normal. No respiratory distress. Breath sounds: Normal breath sounds. Abdominal:      General: Bowel sounds are normal. There is no distension. Palpations: Abdomen is soft. Tenderness: There is no abdominal tenderness. Musculoskeletal:         General: No tenderness. Lymphadenopathy:      Cervical: No cervical adenopathy. Skin:     General: Skin is warm and dry. Findings: No rash. Neurological:      Mental Status: She is alert and oriented to person, place, and time. Psychiatric:         Judgment: Judgment normal.           DIFFERENTIAL DIAGNOSIS/MDM:   66-year-old female presents 3 days status post heart cath and stent placement with continued chest pain. She is afebrile, nontoxic, normal vital signs. No acute distress. We will get cardiac work-up.   Differential includes ACS, stable versus unstable angina, ventricular aneurysm, PE, pneumonia, dissection, pneumothorax        DIAGNOSTIC RESULTS     EKG: All EKG's are interpreted by the Emergency Department Physician who either signs or Co-signs this chart in the absence of a cardiologist.    EKG Interpretation    Interpreted by emergency department physician at 1:56 PM EDT. Rhythm: sinus bradycardia  Rate: 54  Axis: normal  Ectopy: none  Conduction: normal  ST Segments: no acute change  T Waves: no acute change  Q Waves: none    EKG  Impression: non-specific EKG and sinus bradycardia      RADIOLOGY:   I directly visualized the following  images and reviewed the radiologist interpretations:  XR CHEST (2 VW)   Final Result   No acute process. ED BEDSIDE ULTRASOUND:      LABS:  Labs Reviewed   TROPONIN - Abnormal; Notable for the following components:       Result Value    Troponin, High Sensitivity 18 (*)     All other components within normal limits   TROPONIN - Abnormal; Notable for the following components:    Troponin, High Sensitivity 18 (*)     All other components within normal limits   CBC WITH AUTO DIFFERENTIAL - Abnormal; Notable for the following components:    Seg Neutrophils 69 (*)     Lymphocytes 22 (*)     Absolute Lymph # 0.90 (*)     All other components within normal limits   BASIC METABOLIC PANEL - Abnormal; Notable for the following components:    Glucose 221 (*)     CREATININE 1.03 (*)     GFR Non- 52 (*)     All other components within normal limits         EMERGENCY DEPARTMENT COURSE:   Vitals:    Vitals:    08/17/20 1400 08/17/20 1500 08/17/20 1630 08/17/20 1645   BP: (!) 142/57 (!) 134/52 138/81 (!) 141/57   Pulse:  51 51 52   Resp:  15 19 17   Temp:       TempSrc:       SpO2: 99% 100% 100% 100%   Weight:       Height:         6:18 PM EDT  Work-up was unremarkable here. 2 cardiac enzymes are negative. EKG shows no signs of ischemia. Patient still with mild chest pain.   I do not think this is anything acute since she has had these symptoms ever since her procedure and her symptoms have actually improved considerably. I doubt ACS, stent thrombosis, ventricular aneurysm. Strongly advise close follow-up with her PCP and her cardiologist.  She has follow-up scheduled in 2 weeks. I advised her to call the office and see if she can have her appointment moved up. Advised her to return at any time if her symptoms change or worsen at all. She is agreeable plan will be discharged home today. CRITICALCARE:      CONSULTS:  None      PROCEDURES:      FINAL IMPRESSION      1.  Chest pain, unspecified type            DISPOSITION/PLAN   DISPOSITION Decision To Discharge 08/17/2020 04:32:36 PM        PATIENT REFERRED TO:  Fabricio Mcgrath MD  [facility] [employer] [facility] Cherie [phone]    Schedule an appointment as soon as possible for a visit       [facility] ED  [facility] [address] [place] [id number] [phone]    As needed, If symptoms worsen      DISCHARGE MEDICATIONS:  Discharge Medication List as of 8/17/2020  4:32 PM          (Please note that portions ofthis note were completed with a voice recognition program.  Efforts were made to edit the dictations but occasionally words are mis-transcribed.)    Nellie Carbajal DO  Attending Emergency Physician          Nellie Carbajal DO  08/17/20 [id number]

## 2020-08-18 LAB
EKG ATRIAL RATE: 54 BPM
EKG P AXIS: 78 DEGREES
EKG P-R INTERVAL: 188 MS
EKG Q-T INTERVAL: 416 MS
EKG QRS DURATION: 78 MS
EKG QTC CALCULATION (BAZETT): 394 MS
EKG R AXIS: 69 DEGREES
EKG T AXIS: 71 DEGREES
EKG VENTRICULAR RATE: 54 BPM

## 2020-08-18 PROCEDURE — 93010 ELECTROCARDIOGRAM REPORT: CPT | Performed by: INTERNAL MEDICINE

## 2020-09-18 ENCOUNTER — NURSE TRIAGE (OUTPATIENT)
Dept: OTHER | Facility: CLINIC | Age: 75
End: 2020-09-18

## 2020-09-30 ENCOUNTER — OFFICE VISIT (OUTPATIENT)
Dept: PODIATRY | Age: 75
End: 2020-09-30
Payer: MEDICARE

## 2020-09-30 VITALS — WEIGHT: 120 LBS | BODY MASS INDEX: 21.26 KG/M2 | TEMPERATURE: 97.2 F | HEIGHT: 63 IN

## 2020-09-30 PROCEDURE — 99203 OFFICE O/P NEW LOW 30 MIN: CPT | Performed by: PODIATRIST

## 2020-09-30 RX ORDER — PEN NEEDLE, DIABETIC 31 GX5/16"
NEEDLE, DISPOSABLE MISCELLANEOUS
COMMUNITY
Start: 2020-09-09

## 2020-09-30 NOTE — PROGRESS NOTES
[address] [place] [address] PODIATRY  [address] [facility] [address]  Dept: [phone]    NEW PATIENT PROGRESS NOTE  Date of patient's visit: 9/30/2020  Patient's Name:  Nydia Butler YOB: 1945            Patient Care Team:  Rudi Rizo MD as PCP - General (Family Medicine)  Rudi R[facility]CP [facility]  Al Del Angel as Consulting Physician (Endocrinology)  Noe Gerber MD as Consulting Physician (Ophthalmology)        Chief Complaint   Patient presents with    New Patient    Nail Problem         HPI:   Nydia Butler is a 76 y.o. female who presents to the office today complaining of possible fungus left great toe and some discoloration. Symptoms began 2 month(s) ago. Patient relates pain is Absent . Pain is rated 0 out of 10 and is described as none. Treatments prior to today's visit include: pt states her dr told her to apply vicks vapor rub to her toenail. She states it does not seem to help. Currently denies F/C/N/V. Pt's primary care physician is Rudi Rizo MD last seen august 4 2020. Patient states she sometime has pain with her left foot while walking. She states it has been present for 1 year. She states she attended physical therapy but she feels it did not help. No Known Allergies    Past Medical History:   Diagnosis Date    Abnormal cardiovascular stress test 06/2020    no ischemia / infarction   there is septal hypokinesis   /  EF 70%    CAD (coronary artery disease)     History of pneumonia 03/2020    HTN (hypertension)     Hyperlipidemia     Type 2 diabetes mellitus without complication ([facility] [address]        Prior to Admission medications    Medication Sig Start Date End Date Taking?  Authorizing Provider   B-D UF III MINI PEN NEEDLES 31G X 5 MM MISC  9/9/20  Yes Historical Provider, MD   atorvastatin (LIPITOR) 40 MG tablet Take 1 tablet by mouth daily 8/14/20  Yes Priscila Zamora MD   clopidogrel (PLAVIX) 75 MG tablet Take 1 tablet by mouth daily 8/14/20  Yes Maria Luisa Cheatham MD   metoprolol succinate (TOPROL XL) 25 MG extended release tablet Take 1 tablet by mouth daily 8/14/20  Yes Maria Luisa Cheatham MD   gabapentin (NEURONTIN) 100 MG capsule Take 1 capsule by mouth daily. 7/30/20  Yes Historical Provider, MD   insulin lispro, 1 Unit Dial, (HUMALOG KWIKPEN) 100 UNIT/ML SOPN Inject into the skin 3 times daily PER SLIDING SCALE   Yes Historical Provider, MD   insulin glargine (TOUJEO SOLOSTAR) 300 UNIT/ML injection pen Inject 11 Units into the skin every morning    Yes Historical Provider, MD   aspirin 81 MG chewable tablet Take 81 mg by mouth daily   Yes Vianey Colon MD   losartan (COZAAR) 50 MG tablet Take 50 mg by mouth daily   Yes Vianey Colon MD   Multiple Vitamins TABS Take 1 tablet by mouth daily   Yes Vianey Colon MD       Past Surgical History:   Procedure Laterality Date    CHOLECYSTECTOMY      COLONOSCOPY      CORONARY ANGIOPLASTY WITH STENT PLACEMENT  08/14/2020    High grade stenosis in Proximal RCA s/p successful PCI with DELIA (3.25 X 12 mm)    HYSTERECTOMY, VAGINAL      left both ovaries in       Family History   Problem Relation Age of Onset    Heart Disease Mother     Diabetes Mother     Heart Disease Brother     Esophageal Cancer Brother     Diabetes Maternal Aunt     Diabetes Maternal Uncle     Diabetes Maternal Grandmother        Social History     Tobacco Use    Smoking status: Never Smoker    Smokeless tobacco: Never Used   Substance Use Topics    Alcohol use: No     Alcohol/week: 0.0 standard drinks       Review of Systems    Review of Systems:   History obtained from chart review and the patient  General ROS: negative for - chills, fatigue, fever, night sweats or weight gain  Constitutional: Negative for chills, diaphoresis, fatigue, fever and unexpected weight change. Musculoskeletal: Positive for arthralgias, gait problem and joint swelling.   Neurological ROS: negative for - behavioral changes, confusion, headaches or seizures. Negative for weakness and numbness. Dermatological ROS: negative for - mole changes, rash  Cardiovascular: Negative for leg swelling. Gastrointestinal: Negative for constipation, diarrhea, nausea and vomiting. Lower Extremity Physical Examination:   Vitals: There were no vitals filed for this visit. General: AAO x 3 in NAD. Dermatologic Exam:  Skin lesion/ulceration Absent . Skin No rashes or nodules noted. .   Skin is thin, with flaky sloughing skin as well as decreased hair growth to the lower leg  Small red hemosiderin deposits seen dorsal foot   Musculoskeletal:     1st MPJ ROM decreased, Bilateral.  Muscle strength 5/5, Bilateral.  Pain present upon palpation of toenails 1-5, Bilateral. decreased medial longitudinal arch, Bilateral.  Ankle ROM decreased,Bilateral.    Dorsally contracted digits present digits 2, Bilateral.     Vascular: DP pulses 1/4 bilateral.  PT pulses 0/4 bilateral.   CFT <5 seconds, Bilateral.  Hair growth absent to the level of the digits, Bilateral.  Edema present, Bilateral.  Varicosities absent, Bilateral. Erythema absent, Bilateral    Neurological: Sensation diminshed to light touch to level of digits, Bilateral.  Protective sensation intact 6/10 sites via 5.07/10g Lyons-Sherwin Monofilament, Bilateral.  negative Tinel's, Bilateral.  negative Valleix sign, Bilateral.      Integument: Warm, dry, supple, Bilateral.  Open lesion absent, Bilateral.  Interdigital maceration absent to web spaces 4, Bilateral.  Nails 1-5 left and 1-5 right thickened > 3.0 mm, dystrophic and crumbly, discolored with subungual debris. Fissures absent, Bilateral.     Radiographs:  3 views left foot: no fracture or dislocation seen. Slight djd of the midfoot    Asessment: Patient is a 76 y.o. female with:    Diagnosis Orders   1.  Left foot pain  XR FOOT LEFT (MIN 3 VIEWS)    VL LOWER EXTREMITY ARTERIAL SEGMENTAL PRESSURES W PPG    ciclopirox (PENLAC) 8 % solution   2. Intermittent claudication (HCC)  VL LOWER EXTREMITY ARTERIAL SEGMENTAL PRESSURES W PPG   3. Dermatophytosis of nail  VL LOWER EXTREMITY ARTERIAL SEGMENTAL PRESSURES W PPG    ciclopirox (PENLAC) 8 % solution   4. Type II diabetes mellitus with peripheral circulatory disorder (HCC)  VL LOWER EXTREMITY ARTERIAL SEGMENTAL PRESSURES W PPG    ciclopirox (PENLAC) 8 % solution   5. PVD (peripheral vascular disease) (HCC)  VL LOWER EXTREMITY ARTERIAL SEGMENTAL PRESSURES W PPG    ciclopirox (PENLAC) 8 % solution         Plan: Patient examined and evaluated. Current condition and treatment options discussed in detail. Discussed conservative and surgical options with the patient. Advised pt to her ocnditon. rx provided for penlac to help cure the fungal reaction to her toes. Left foot x rays show the above finidngs. I am ordering non invasive vasuclar studies the patients lower legs are extremely cold and discolored with cyanosis. The patient has decreased hair growth to the lower extremites with a delayed capillary refill time. This test is necessary to see if vascular surgery involvement is necessary and would relieve patient of their painful symptoms. .  Verbal and written instructions given to patient. Contact office with any questions/problems/concerns. RTC in 4week(s).     9/30/2020    Electronically signed by Dorota Presley DPM on 9/30/2020 at 10:34 AM  9/30/2020

## 2020-10-07 ENCOUNTER — HOSPITAL ENCOUNTER (OUTPATIENT)
Dept: VASCULAR LAB | Age: 75
Discharge: HOME | End: 2020-10-07
Payer: MEDICARE

## 2020-10-07 PROCEDURE — 93923 UPR/LXTR ART STDY 3+ LVLS: CPT

## 2020-11-02 ENCOUNTER — HOSPITAL ENCOUNTER (OUTPATIENT)
Dept: WOMENS IMAGING | Age: 75
Discharge: HOME | End: 2020-11-04
Payer: MEDICARE

## 2020-11-02 PROCEDURE — 77063 BREAST TOMOSYNTHESIS BI: CPT

## 2020-11-24 ENCOUNTER — OFFICE VISIT (OUTPATIENT)
Dept: FAMILY MEDICINE CLINIC | Age: 75
End: 2020-11-24
Payer: MEDICARE

## 2020-11-24 VITALS
TEMPERATURE: 97.5 F | DIASTOLIC BLOOD PRESSURE: 68 MMHG | WEIGHT: 123 LBS | BODY MASS INDEX: 22.63 KG/M2 | HEIGHT: 62 IN | HEART RATE: 68 BPM | SYSTOLIC BLOOD PRESSURE: 130 MMHG | OXYGEN SATURATION: 96 % | RESPIRATION RATE: 16 BRPM

## 2020-11-24 PROCEDURE — 99214 OFFICE O/P EST MOD 30 MIN: CPT | Performed by: FAMILY MEDICINE

## 2020-11-24 PROCEDURE — G0439 PPPS, SUBSEQ VISIT: HCPCS | Performed by: FAMILY MEDICINE

## 2020-11-24 RX ORDER — ISOSORBIDE MONONITRATE 30 MG/1
30 TABLET, EXTENDED RELEASE ORAL DAILY
Qty: 90 TABLET | Refills: 3 | Status: SHIPPED | OUTPATIENT
Start: 2020-11-24 | End: 2021-10-27

## 2020-11-24 ASSESSMENT — PATIENT HEALTH QUESTIONNAIRE - PHQ9
1. LITTLE INTEREST OR PLEASURE IN DOING THINGS: 0
2. FEELING DOWN, DEPRESSED OR HOPELESS: 0
SUM OF ALL RESPONSES TO PHQ QUESTIONS 1-9: 0
SUM OF ALL RESPONSES TO PHQ9 QUESTIONS 1 & 2: 0
SUM OF ALL RESPONSES TO PHQ QUESTIONS 1-9: 0
SUM OF ALL RESPONSES TO PHQ QUESTIONS 1-9: 0

## 2020-11-24 ASSESSMENT — LIFESTYLE VARIABLES: HOW OFTEN DO YOU HAVE A DRINK CONTAINING ALCOHOL: 0

## 2020-11-24 NOTE — PROGRESS NOTES
Medicare Annual Wellness Visit  Name: Shyann Chance Date: 2020   MRN: [id number] Sex: Female   Age: 76 y.o. Ethnicity: Non-/Non    : 1945 Race: Mynor Bracket is here for Medicare AWV    Pt did have a DELIA in the RCA,b ut she is still having chest pains on and off. No SOB. Pt denies any N/V/D or abdominal pain, but does c/o occasional constipation and blood in her stools. Her BS's have been elevated and she does follow with Dr. Jw Page and her last HGBA1C was 6.4% and he didn't make any changes to her Insulin. Otherwise pt doing well on current tx and no other concerns today. The patient's past medical, surgical, social, and family history as well as his current medications and allergies were reviewed as documented in today's encounter. Screenings for behavioral, psychosocial and functional/safety risks, and cognitive dysfunction are all negative except as indicated below. These results, as well as other patient data from the [address] form, are documented in Flowsheets linked to this Encounter. No Known Allergies      Prior to Visit Medications    Medication Sig Taking? Authorizing Provider   isosorbide mononitrate (IMDUR) 30 MG extended release tablet Take 1 tablet by mouth daily Yes Daiana Chambers MD   B-D UF III MINI PEN NEEDLES 31G X 5 MM MISC  Yes Historical Provider, MD   ciclopirox (PENLAC) 8 % solution Apply topically nightly. Remove once weekly with alcohol or nail polish remover. Yes Moncho Brewster, DPM   atorvastatin (LIPITOR) 40 MG tablet Take 1 tablet by mouth daily Yes Milady Harkins MD   clopidogrel (PLAVIX) 75 MG tablet Take 1 tablet by mouth daily Yes Milady Harkins MD   metoprolol succinate (TOPROL XL) 25 MG extended release tablet Take 1 tablet by mouth daily Yes Milady Harkins MD   gabapentin (NEURONTIN) 100 MG capsule Take 1 capsule by mouth daily.  Yes Historical Provider, MD   insulin lispro, 1 Unit Dial, (HUMALOG KWIKPEN) 100 UNIT/ML SOPN Inject into the skin 3 times daily PER SLIDING SCALE Yes Historical Provider, MD   insulin glargine (TOUJEO SOLOSTAR) 300 UNIT/ML injection pen Inject 11 Units into the skin every morning  Yes Historical Provider, MD   aspirin 81 MG chewable tablet Take 81 mg by mouth daily Yes Ashley Mendiola MD   losartan (COZAAR) 50 MG tablet Take 50 mg by mouth daily Yes Ashley Mendiola MD   Multiple Vitamins TABS Take 1 tablet by mouth daily Yes Ashley Mendiola MD         Past Medical History:   Diagnosis Date    Abnormal cardiovascular stress test 06/2020    no ischemia / infarction   there is septal hypokinesis   /  EF 70%    CAD (coronary artery disease)     History of pneumonia 03/2020    HTN (hypertension)     Hyperlipidemia     Type 2 diabetes mellitus without complication [facility])        Past Surgical History:   Procedure Laterality Date    CHOLECYSTECTOMY      COLONOSCOPY      CORONARY ANGIOPLASTY WITH STENT PLACEMENT  08/14/2020    High grade stenosis in Proximal RCA s/p successful PCI with DELIA (3.25 X 12 mm)    HYSTERECTOMY, VAGINAL      left both ovaries in         Family History   Problem Relation Age of Onset    Heart Disease Mother     Diabetes Mother     Heart Disease Brother     Esophageal Cancer Brother     Diabetes Maternal Aunt     Diabetes Maternal Uncle     Diabetes Maternal Grandmother        CareTeam (Including outside providers/suppliers regularly involved in providing care):   Patient Care Team:  Odalys Larkin MD as PCP - General (Family Medicine)  Odalys Larkin MD as PCP - [facility] Provider  Giselle Keith as Consulting Physician (Endocrinology)  Farrah Valdez MD as Consulting Physician (Ophthalmology)  Baron Queen DO as Consulting Physician (Cardiology)  David Arellano DPM as Consulting Physician (Podiatry)    Wt Readings from Last 3 Encounters:   11/24/20 123 lb (55.8 kg)   09/30/20 120 lb (54.4 kg)   08/17/20 120 lb (54.4 kg)     Vitals:    11/24/20 0724   BP: 130/68   Pulse: 68   Resp: 16   Temp: 97.5 °F (36.4 °C)   SpO2: 96%   Weight: 123 lb (55.8 kg)   Height: 5' 2\" (1.575 m)     Body mass index is 22.5 kg/m². Based upon direct observation of the patient, evaluation of cognition reveals recent and remote memory intact. General Appearance: alert and oriented to person, place and time, well-developed and well-nourished, in no acute distress  Pulmonary/Chest: clear to auscultation bilaterally- no wheezes, rales or rhonchi, normal air movement, no respiratory distress  Cardiovascular: normal rate, regular rhythm, no murmurs and no carotid bruits  Abdomen: soft, non-tender, non-distended, normal bowel sounds, no masses or organomegaly  Extremities: no edema and Fransisco's sign negative bilaterally    Patient's complete Health Risk Assessment and screening values have been reviewed and are found in Flowsheets. The following problems were reviewed today and where indicated follow up appointments were made and/or referrals ordered. Positive Risk Factor Screenings with Interventions:       General Health and ACP:  General  In general, how would you say your health is?: Very Good  In the past 7 days, have you experienced any of the following?  New or Increased Pain, New or Increased Fatigue, Loneliness, Social Isolation, Stress or Anger?: None of These  Do you get the social and emotional support that you need?: Yes  Do you have a Living Will?: Yes  Advance Directives     Power of  Living Will ACP-Advance Directive ACP-Power of     Not on File Not on File Filed [address] Interventions:  · cont with a healthy diet and activity    Health Habits/Nutrition:  Health Habits/Nutrition  Do you exercise for at least 20 minutes 2-3 times per week?: (!) No  Have you lost any weight without trying in the past 3 months?: No  Do you eat fewer than 2 meals per day?: No  Have you seen a dentist within the past year?: Yes  Body mass index: 22.49  Health Habits/Nutrition Interventions:  · Inadequate physical activity:  patient agrees to increase physical activity as follows: increase walking    Personalized Preventive Plan   Current Health Maintenance Status  Immunization History   Administered Date(s) Administered    Influenza Whole 10/20/2015    Influenza, High Dose (Fluzone 65 yrs and older) 10/01/2016    Influenza, Triv, inactivated, subunit, adjuvanted, IM (Fluad 65 yrs and older) 10/19/2017, 09/20/2018, 10/08/2019    Pneumococcal Conjugate 13-valent (Margette Murcia) 12/13/2016, 10/08/2019    Pneumococcal Polysaccharide ([id number]) 06/19/2018, 10/15/2020    Zoster Live (Zostavax) 09/17/2015    Zoster Recombinant (Shingrix) 09/07/2018, 11/09/2018        Health Maintenance   Topic Date Due    Annual Wellness Visit (AWV)  05/29/2019    Diabetic foot exam  06/19/2021 (Originally 6/5/2020)    Diabetic retinal exam  08/14/2021 (Originally 6/20/2020)    A1C test (Diabetic or Prediabetic)  06/22/2021    Lipid screen  06/22/2021    Colon cancer screen colonoscopy  08/15/2021    Potassium monitoring  08/17/2021    Creatinine monitoring  08/17/2021    DTaP/Tdap/Td vaccine (2 - Td) 08/25/2030    DEXA (modify frequency per FRAX score)  Completed    Flu vaccine  Completed    Shingles Vaccine  Completed    Pneumococcal 65+ years Vaccine  Completed    Hepatitis C screen  Completed    Hepatitis A vaccine  Aged Out    Hib vaccine  Aged Out    Meningococcal (ACWY) vaccine  Aged Out     Recommendations for [place] Due: see orders and patient instructions/AVS.  . Recommended screening schedule for the next 5-10 years is provided to the patient in written form: see Patient Juan Antonio Hollingsworth was seen today for medicare awv.     Diagnoses and all orders for this visit:    Routine general medical examination at a health care facility    Coronary artery disease of native heart with stable angina

## 2020-11-24 NOTE — PATIENT INSTRUCTIONS
Personalized Preventive Plan for Phyllis Davis - 11/24/2020  Medicare offers a range of preventive health benefits. Some of the tests and screenings are paid in full while other may be subject to a deductible, co-insurance, and/or copay. Some of these benefits include a comprehensive review of your medical history including lifestyle, illnesses that may run in your family, and various assessments and screenings as appropriate. After reviewing your medical record and screening and assessments performed today your provider may have ordered immunizations, labs, imaging, and/or referrals for you. A list of these orders (if applicable) as well as your Preventive Care list are included within your After Visit Summary for your review. Other Preventive Recommendations:    · A preventive eye exam performed by an eye specialist is recommended every 1-2 years to screen for glaucoma; cataracts, macular degeneration, and other eye disorders. · A preventive dental visit is recommended every 6 months. · Try to get at least 150 minutes of exercise per week or 10,000 steps per day on a pedometer . · Order or download the FREE \"Exercise & Physical Activity: Your Everyday Guide\" from [employer]. Call 7-863.804.2827 or search [employer] online. · You need 7836-1698 mg of calcium and 8345-1809 IU of vitamin D per day. It is possible to meet your calcium requirement with diet alone, but a vitamin D supplement is usually necessary to meet this goal.  · When exposed to the sun, use a sunscreen that protects against both UVA and UVB radiation with an SPF of 30 or greater. Reapply every 2 to 3 hours or after sweating, drying off with a towel, or swimming. · Always wear a seat belt when traveling in a car. Always wear a helmet when riding a bicycle or motorcycle. Heart-Healthy Diet   Sodium, Fat, and Cholesterol Controlled Diet       What Is a Heart Healthy Diet?    A good cholesterol, this type of cholesterol actually carries cholesterol away from your arteries and may, therefore, help lower your risk of having a heart attack. You want this level to be high (ideally greater than 60). It is a risk to have a level less than 40. You can raise this good cholesterol by eating olive oil, canola oil, avocados, or nuts. Exercise raises this level, too. Fat    Fat is calorie dense and packs a lot of calories into a small amount of food. Even though fats should be limited due to their high calorie content, not all fats are bad. In fact, some fats are quite healthful. Fat can be broken down into four main types. The good-for-you fats are:   Monounsaturated fat  found in oils such as olive and canola, avocados, and nuts and natural nut butters; can decrease cholesterol levels, while keeping levels of HDL cholesterol high   Polyunsaturated fat  found in oils such as safflower, sunflower, soybean, corn, and sesame; can decrease total cholesterol and LDL cholesterol   Omega-3 fatty acids  particularly those found in fatty fish (such as salmon, trout, tuna, mackerel, herring, and sardines); can decrease risk of arrhythmias, decrease triglyceride levels, and slightly lower blood pressure   The fats that you want to limit are:   Saturated fat  found in animal products, many fast foods, and a few vegetables; increases total blood cholesterol, including LDL levels   Animal fats that are saturated include: butter, lard, whole-milk dairy products, meat fat, and poultry skin   Vegetable fats that are saturated include: hydrogenated shortening, palm oil, coconut oil, cocoa butter   Hydrogenated or trans fat  found in margarine and vegetable shortening, most shelf stable snack foods, and fried foods; increases LDL and decreases HDL     It is generally recommended that you limit your total fat for the day to less than 30% of your total calories.  If you follow an 1800-calorie heart healthy diet, for week) Tofu Nuts or seeds (unsalted, dry-roasted), low-sodium peanut butter Dried peas, beans, and lentils   Any smoked, cured, salted, or canned meat, fish, or poultry (including mcclure, chipped beef, cold cuts, hot dogs, sausages, sardines, and anchovies) Poultry skins Breaded and/or fried fish or meats Canned peas, beans, and lentils Salted nuts   Fats and Oils   Olive oil and canola oil Low-sodium, low-fat salad dressings and mayonnaise   Butter, margarine, coconut and palm oils, mcclure fat   Snacks, Sweets, and Condiments   Low-sodium or unsalted versions of broths, soups, soy sauce, and condiments Pepper, herbs, and spices; vinegar, lemon, or lime juice Low-fat frozen desserts (yogurt, sherbet, fruit bars) Sugar, cocoa powder, honey, syrup, jam, and preserves Low-fat, trans-fat free cookies, cakes, and pies Rafi and animal crackers, fig bars, denise snaps   High-fat desserts Broth, soups, gravies, and sauces, made from instant mixes or other high-sodium ingredients Salted snack foods Canned olives Meat tenderizers, seasoning salt, and most flavored vinegars   Beverages   Low-sodium carbonated beverages Tea and coffee in moderation Soy milk   Commercially softened water   Suggestions   Make whole grains, fruits, and vegetables the base of your diet. Choose heart-healthy fats such as canola, olive, and flaxseed oil, and foods high in heart-healthy fats, such as nuts, seeds, soybeans, tofu, and fish. Eat fish at least twice per week; the fish highest in omega-3 fatty acids and lowest in mercury include salmon, herring, mackerel, sardines, and canned chunk light tuna. If you eat fish less than twice per week or have high triglycerides, talk to your doctor about taking fish oil supplements. Read food labels.    For products low in fat and cholesterol, look for fat free, low-fat, cholesterol free, saturated fat free, and trans fat freeAlso scan the Nutrition Facts Label, which lists saturated fat, trans fat, and cholesterol amounts. For products low in sodium, look for sodium free, very low sodium, low sodium, no added salt, and unsalted   Skip the salt when cooking or at the table; if food needs more flavor, get creative and try out different herbs and spices. Garlic and onion also add substantial flavor to foods. Trim any visible fat off meat and poultry before cooking, and drain the fat off after dee. Use cooking methods that require little or no added fat, such as grilling, boiling, baking, poaching, broiling, roasting, steaming, stir-frying, and sauting. Avoid fast food and convenience food. They tend to be high in saturated and trans fat and have a lot of added salt. Talk to a registered dietitian for individualized diet advice. Last Reviewed: March 2011 Darlin Baum MS, MPH, RD   Updated: 3/29/2011   ·     High-Fiber Diet     What Is Fiber? Dietary fiber is a form of carbohydrate found in plants that cannot be digested by humans. All plants contain fiber, including fruits, vegetables, grains, and legumes. Fiber is often classified into two categories: soluble and insoluble. Soluble fiber draws water into the bowel and can help slow digestion. Examples of foods that are high in soluble fiber include oatmeal, oat bran, barley, legumes (eg, beans and peas), apples, and strawberries. Insoluble fiber speeds digestion and can add bulk to the stool. Examples of foods that are high in insoluble fiber include whole-wheat products, wheat bran, cauliflower, green beans, and potatoes. Why Follow a High-Fiber Diet? A high-fiber diet is often recommended to prevent and treat constipation , hemorrhoids , diverticulitis , and irritable bowel syndrome . Eating a high-fiber diet can also help improve your cholesterol levels, lower your risk of coronary heart disease , reduce your risk of type 2 diabetes , and lower your weight.  For people with type 1 or 2 diabetes, a high-fiber diet can also help stabilize blood sugar levels. How Much Fiber Should I Eat? A high-fiber diet should contain  20-35 grams  of fiber a day. This is actually the amount recommended for the general adult population; however, most Americans eat only 15 grams of fiber per day. Digestion of Fiber   Eating a higher fiber diet than usual can take some getting used to by your body's digestive system. To avoid the side effects of sudden increases in dietary fiber (eg, gas, cramping, bloating, and diarrhea), increase fiber gradually and be sure to drink plenty of fluids every day. Tips for Increasing Fiber Intake   Whenever possible, choose whole grains over refined grains (eg, brown rice instead of white rice, whole-wheat bread instead of white bread). Include a variety of grains in your diet, such as wheat, rye, barley, oats, quinoa, and bulgur. Eat more vegetarian-based meals. Here are some ideas: black bean burgers, eggplant lasagna, and veggie tofu stir-dooley. Choose high-fiber snacks, such as fruits, popcorn, whole-grain crackers, and nuts. Make whole-grain cereal or whole-grain toast part of your daily breakfast regime. When eating out, whether ordering a sandwich or dinner, ask for extra vegetables. When baking, replace part of the white flour with whole-wheat flour. Whole-wheat flour is particularly easy to incorporate into a recipe. High-Fiber Diet Eating Guide   Food Category   Foods Recommended   Notes   Grains   Whole-grain breads, muffins, bagels, or jennifer bread Rye bread Whole-wheat crackers or crisp breads Whole-grain or bran cereals Oatmeal, oat bran, or grits Wheat germ Whole-wheat pasta and brown rice   Read the ingredients list on food labels. Look for products that list \"whole\" as the first ingredient (eg, whole-wheat, whole oats). Choose cereals with at least 2 grams of fiber per serving.    Vegetables   All vegetables, especially asparagus, bean sprouts, broccoli, [place] sprouts, cabbage, memory. Challenge Your Brain   Regularly challenging your mind may help keeps it in top shape. Good mental exercises include:   Crossword puzzlesUse a dictionary if you need it; you will learn more that way. Brainteasers Try some! Crafts, such as wood working and sewing   Hobbies, such as gardening and building model airplanes   SocializingVisit old friends or join groups to meet new ones. Reading   Learning a new language   Taking a class, whether it be art history or veronica chi   TravelingExperience the food, history, and culture of your destination   Learning to use a computer   Going to museums, the theater, or thought-provoking movies   Changing things in your daily life, such as reversing your pattern in the grocery store or brushing your teeth using your nondominant hand   Use Memory Aids   There is no need to remember every detail on your own. These memory aids can help:   Calendars and day planners   Electronic organizers to store all sorts of helpful informationThese devices can \"beep\" to remind you of appointments. A book of days to record birthdays, anniversaries, and other occasions that occur on the same date every year   Detailed \"to-do\" lists and strategically placed sticky notes   Quick \"study\" sessionsBefore a gathering, review who will be there so their names will be fresh in your mind. Establish routinesFor example, keep your keys, wallet, and umbrella in the same place all the time or take medicine with your 8:00 AM glass of juice   Live a Healthy Life   Many actions that will keep your body strong will do the same for your mind. For example:   Talk to Your Doctor About Herbs and Supplements    Malnutrition and vitamin deficiencies can impair your mental function. For example, vitamin B12 deficiency can cause a range of symptoms, including confusion. But, what if your nutritional needs are being met? Can herbs and supplements still offer a benefit?  Researchers have investigated a range of natural remedies, such as ginkgo , ginseng , and the supplement phosphatidylserine (PS). So far, though, the evidence is inconsistent as to whether these products can improve memory or thinking. If you are interested in taking herbs and supplements, talk to your doctor first because they may interact with other medicines that you are taking. Exercise Regularly    Among the many benefits of regular exercise are increased blood flow to the brain and decreased risk of certain diseases that can interfere with memory function. One study found that even moderate exercise has a beneficial effect. Examples of \"moderate\" exercise include:   Playing 18 holes of golf once a week, without a cart   Playing tennis twice a week   Walking one mile per day   Manage Stress    It can be tough to remember what is important when your mind is cluttered. Make time for relaxation. Choose activities that calm you down, and make it routine. Manage Chronic Conditions    Side effects of high blood pressure , diabetes, and heart disease can interfere with mental function. Many of the lifestyle steps discussed here can help manage these conditions. Strive to eat a healthy diet, exercise regularly, get stress under control, and follow your doctor's advice for your condition. Minimize Medications    Talk to your doctor about the medicines that you take. Some may be unnecessary. Also, healthy lifestyle habits may lower the need for certain drugs. Last Reviewed: April 2010 Cr Sena MD   Updated: 4/13/2010   ·     [address] [place] [address]       As we get older, changes in balance, gait, strength, vision, hearing, and cognition make even the most youthful senior more prone to accidents. Falls are one of the leading health risks for older people.  This increased risk of falling is related to:   Aging process (eg, decreased muscle strength, slowed reflexes)   Higher incidence of chronic health problems (eg, arthritis, diabetes) that may limit mobility, agility or sensory awareness   Side effects of medicine (eg, dizziness, blurred vision)especially medicines like prescription pain medicines and drugs used to treat mental health conditions   Depending on the brittleness of your bones, the consequences of a fall can be serious and long lasting. Home Life   Research by the Association of Aging [facility]) shows that some home accidents among older adults can be prevented by making simple lifestyle changes and basic modifications and repairs to the home environment. Here are some lifestyle changes that experts recommend:   Have your hearing and vision checked regularly. Be sure to wear prescription glasses that are right for you. Speak to your doctor or pharmacist about the possible side effects of your medicines. A number of medicines can cause dizziness. If you have problems with sleep, talk to your doctor. Limit your intake of alcohol. If necessary, use a cane or walker to help maintain your balance. Wear supportive, rubber-soled shoes, even at home. If you live in a region that gets wintry weather, you may want to put special cleats on your shoes to prevent you from slipping on the snow and ice. Exercise regularly to help maintain muscle tone, agility, and balance. Always hold the banister when going up or down stairs. Also, use  bars when getting in or out of the bath or shower, or using the toilet. To avoid dizziness, get up slowly from a lying down position. Sit up first, dangling your legs for a minute or two before rising to a standing position. Overall Home Safety Check   According to the Consumer Product Safety Commision's \"Older Consumer Home Safety Checklist,\" it is important to check for potential hazards in each room. And remember, proper lighting is an essential factor in home safety. If you cannot see clearly, you are more likely to fall.    Important questions to ask yourself include:   Are lamp, electric, extension, and telephone cords placed out of the flow of traffic and maintained in good condition? Have frayed cords been replaced? Are all small rugs and runners slip resistant? If not, you can secure them to the floor with a special double-sided carpet tape. Are smoke detectors properly locatedone on every floor of your home and one outside of every sleeping area? Are they in good working order? Are batteries replaced at least once a year? Do you have a well-maintained carbon monoxide detector outside every sleeping are in your home? Does your furniture layout leave plenty of space to maneuver between and around chairs, tables, beds, and sofas? Are hallways, stairs and passages between rooms well lit? Can you reach a lamp without getting out of bed? Are floor surfaces well maintained? Shag rugs, high-pile carpeting, tile floors, and polished wood floors can be particularly slippery. Stairs should always have handrails and be carpeted or fitted with a non-skid tread. Is your telephone easily reachable. Is the cord safely tucked away? Room by Room   According to the Association of Aging, bathrooms and shahid are the two most potentially hazardous rooms in your home. In the Kitchen    Be sure your stove is in proper working order and always make sure burners and the oven are off before you go out or go to sleep. Keep pots on the back burners, turn handles away from the front of the stove, and keep stove clean and free of grease build-up. Kitchen ventilation systems and range exhausts should be working properly. Keep flammable objects such as towels and pot holders away from the cooking area except when in use. Make sure kitchen curtains are tied back. Move cords and appliances away from the sink and hot surfaces. If extension cords are needed, install wiring guides so they do not hang over the sink, range, or working areas.     Look for coffee pots, kettles and toaster ovens with automatic shut-offs. Keep a mop handy in the kitchen so you can wipe up spills instantly. You should also have a small fire extinguisher. Arrange your kitchen with frequently used items on lower shelves to avoid the need to stand on a stepstool to reach them. Make sure countertops are well-lit to avoid injuries while cutting and preparing food. In the Bathroom    Use a non-slip mat or decals in the tub and shower, since wet, soapy tile or porcelain surfaces are extremely slippery. Make sure bathroom rugs are non-skid or tape them firmly to the floor. Bathtubs should have at least one, preferably two, grab bars, firmly attached to structural supports in the wall. (Do not use built-in soap holders or glass shower doors as grab bars.)    Tub seats fitted with non-slip material on the legs allow you to wash sitting down. For people with limited mobility, bathtub transfer benches allow you to slide safely into the tub. Raised toilet seats and toilet safety rails are helpful for those with knee or hip problems. In the [facility]    Make sure you use a nightlight and that the area around your bed is clear of potential obstacles. Be careful with electric blankets and never go to sleep with a heating pad, which can cause serious burns even if on a low setting. Use fire-resistant mattress covers and pillows, and NEVER smoke in bed. Keep a phone next to the bed that is programmed to dial 911 at the push of a button. If you have a chronic condition, you may want to sign on with an automatic call-in service. Typically the system includes a small pendant that connects directly to an emergency medical voice-response system. You should also make arrangements to stay in contact with someonefriend, neighbor, family memberon a regular schedule.    Fire Prevention   According to the [employer]. (Smoke Alarms for Every) [address] [place] [address], senior citizens are one of the two highest risk groups for death and serious injuries due to residential fires. When cooking, wear short-sleeved items, never a bulky long-sleeved robe. The [facility]'s Safety Checklist for Older Consumers emphasizes the importance of checking basements, garages, workshops and storage areas for fire hazards, such as volatile liquids, piles of old rags or clothing and overloaded circuits. Never smoke in bed or when lying down on a couch or recliner chair. Small portable electric or kerosene heaters are responsible for many home fires and should be used cautiously if at all. If you do use one, be sure to keep them away from flammable materials. In case of fire, make sure you have a pre-established emergency exit plan. Have a professional check your fireplace and other fuel-burning appliances yearly. [employer]   Baby boomers entering the resendez years will continue to see the development of new products to help older adults live safely and independently in spite of age-related changes. Making Life More Livable  , by Samreen Jaramillo, lists over 1,000 products for \"living well in the mature years,\" such as bathing and mobility aids, household security devices, ergonomically designed knives and peelers, and faucet valves and knobs for temperature control. Medical supply stores and organizations are good sources of information about products that improve your quality of life and insure your safety. Last Reviewed: November 2009 Tiffani Diaz MD   Updated: 3/7/2011     ·        Advance Care Planning: Care Instructions  Your Care Instructions     It can be hard to live with an illness that cannot be cured. But if your health is getting worse, you may want to make decisions about end-of-life care. Planning for the end of your life does not mean that you are giving up. It is a way to make sure that your wishes are met. Clearly stating your wishes can make it easier for your loved ones.  Making plans while you are still able may also ease [employer]. Care instructions adapted under license by [facility] ([facility]). If you have questions about a medical condition or this instruction, always ask your healthcare professional. [place] [address] any warranty or liability for your use of this information.     ·

## 2020-12-02 ENCOUNTER — OFFICE VISIT (OUTPATIENT)
Dept: PODIATRY | Age: 75
End: 2020-12-02
Payer: MEDICARE

## 2020-12-02 VITALS — BODY MASS INDEX: 22.63 KG/M2 | HEIGHT: 62 IN | TEMPERATURE: 97.5 F | WEIGHT: 123 LBS

## 2020-12-02 PROCEDURE — 11721 DEBRIDE NAIL 6 OR MORE: CPT | Performed by: PODIATRIST

## 2020-12-02 PROCEDURE — 99213 OFFICE O/P EST LOW 20 MIN: CPT | Performed by: PODIATRIST

## 2020-12-02 NOTE — PROGRESS NOTES
30 [facility] [id number] [address]  Dept: [phone]    DIABETIC PROGRESS NOTE  Date of patient's visit: 12/2/2020  Patient's Name:  Princess Ernst YOB: 1945            Patient Care Team:  Cookie Dubin, MD as PCP - General (Family Medicine)  Cookie Dubin, MD as PCP - [facility]  Lennox Mancilla as Consulting Physician (Endocrinology)  Hakeem Christinason MD as Consulting Physician (Ophthalmology)  Joe Jesus DO as Consulting Physician (Cardiology)  Tracey Juarez DPM as Consulting Physician (Podiatry)          Chief Complaint   Patient presents with    Foot Pain     Pain for a few months       Subjective:   Princess Ernst comes to clinic for Foot Pain (Pain for a few months)    she is a diabetic and states that she has new pain to the midfoot. .  Pt currently has complaint of thickened, elongated nails that they cannot manage by themselves. Pt's primary care physician is Cookie Dubin, MD last seen November 24 2020   Pt's last blood sugar was 115 . Pt has a new complaint of pain to the right and left foot. States they have been walking on her feet more. Relates to changing shoes but it has not helped       Lab Results   Component Value Date    LABA1C 6.8 (H) 06/22/2020      Complains of numbness in the feet bilat.   Past Medical History:   Diagnosis Date    Abnormal cardiovascular stress test 06/2020    no ischemia / infarction   there is septal hypokinesis   /  EF 70%    CAD (coronary artery disease)     History of pneumonia 03/2020    HTN (hypertension)     Hyperlipidemia     Type 2 diabetes mellitus without complication (HCC)        No Known Allergies  Current Outpatient Medications on File Prior to Visit   Medication Sig Dispense Refill    isosorbide mononitrate (IMDUR) 30 MG extended release tablet Take 1 tablet by mouth daily 90 tablet 3    B-D UF III MINI PEN NEEDLES 31G X 5 MM MISC       ciclopirox (PENLAC) 8 % solution Apply topically nightly. Remove once weekly with alcohol or nail polish remover. 1 Bottle 3    atorvastatin (LIPITOR) 40 MG tablet Take 1 tablet by mouth daily 90 tablet 1    clopidogrel (PLAVIX) 75 MG tablet Take 1 tablet by mouth daily 30 tablet 5    metoprolol succinate (TOPROL XL) 25 MG extended release tablet Take 1 tablet by mouth daily 90 tablet 1    gabapentin (NEURONTIN) 100 MG capsule Take 1 capsule by mouth daily.  insulin lispro, 1 Unit Dial, (HUMALOG KWIKPEN) 100 UNIT/ML SOPN Inject into the skin 3 times daily PER SLIDING SCALE      insulin glargine (TOUJEO SOLOSTAR) 300 UNIT/ML injection pen Inject 11 Units into the skin every morning       aspirin 81 MG chewable tablet Take 81 mg by mouth daily      losartan (COZAAR) 50 MG tablet Take 50 mg by mouth daily      Multiple Vitamins TABS Take 1 tablet by mouth daily       No current facility-administered medications on file prior to visit. Review of Systems    Review of Systems:   History obtained from chart review and the patient  General ROS: negative for - chills, fatigue, fever, night sweats or weight gain  Constitutional: Negative for chills, diaphoresis, fatigue, fever and unexpected weight change. Musculoskeletal: Positive for arthralgias, gait problem and joint swelling. Neurological ROS: negative for - behavioral changes, confusion, headaches or seizures. Negative for weakness and numbness. Dermatological ROS: negative for - mole changes, rash  Cardiovascular: Negative for leg swelling. Gastrointestinal: Negative for constipation, diarrhea, nausea and vomiting. Objective:  Dermatologic Exam:  Skin lesion/ulceration Absent . Skin No rashes or nodules noted. .   Skin is thin, with flaky sloughing skin as well as decreased hair growth to the lower leg  Small red hemosiderin deposits seen dorsal foot   Musculoskeletal:         Degenerative joint disease to the right and left midfoot with crepitus on ROM    1st MPJ ROM decreased, Bilateral.  Muscle strength 5/5, Bilateral.  Pain present upon palpation of toenails 1-5, Bilateral. decreased medial longitudinal arch, Bilateral.  Ankle ROM decreased,Bilateral.    Dorsally contracted digits present digits 2, Bilateral.     Vascular: DP pulses 1/4 bilateral.  PT pulses 0/4 bilateral.   CFT <5 seconds, Bilateral.  Hair growth absent to the level of the digits, Bilateral.  Edema present, Bilateral.  Varicosities absent, Bilateral. Erythema absent, Bilateral    Neurological: Sensation diminshed to light touch to level of digits, Bilateral.  Protective sensation intact 6/10 sites via 5.07/10g [place] Monofilament, Bilateral.  negative Tinel's, Bilateral.  negative Valleix sign, Bilateral.      Integument: Warm, dry, supple, Bilateral.  Open lesion absent, Bilateral.  Interdigital maceration absent to web spaces 4, Bilateral.  Nails 1-5 left and 1-5 right thickened > 3.0 mm, dystrophic and crumbly, discolored with subungual debris. Fissures absent, Bilateral.   General: AAO x 3 in NAD.     Derm  Toenail Description  Sites of Onychomycosis Involvement (Check affected area)  [x] [x] [x] [x] [x] [x] [x] [x] [x] [x]  5 4 3 2 1 1 2 3 4 5                          Right                                        Left    Thickness  [x] [x] [x] [x] [x] [x] [x] [x] [x] [x]  5 4 3 2 1 1 2 3 4 5                         Right                                        Left    Dystrophic Changes   [x] [x] [x] [x] [x] [x] [x] [x] [x] [x]  5 4 3 2 1 1 2 3 4 5                         Right                                        Left    Color   [x] [x] [x] [x] [x] [x] [x] [x] [x] [x]  5 4 3 2 1 1 2 3 4 5                          Right                                        Left    Incurvation/Ingrowin   [] [] [] [] [] [] [] [] [] []  5 4 3 2 1 1 2 3 4 5                         Right                                        Left    Inflammation/Pain [x] [x] [x] [x] [x] [x] [x] [x] [x] [x]  5 4 3 2 1 1 2 3 4 5                         Right                                        Left        Visual inspection:  Deformity: hammertoe deformity akosua feet  amputation: absent  Skin lesions: absent  Edema: right- 2+ pitting edema, left- 2+ pitting edema    Sensory exam:  Monofilament sensation: abnormal - 6/10 via SW 5.07/10g monofilament to the plantar foot bilateral feet    Pulses: abnormal - 1/4 dorsalis pedis pulse and 1/4 Posterior tibial pulse,   Pinprick: Impaired  Proprioception: Impaired  Vibration (128 Hz): Impaired       DM with PVD       [x]Yes    []No      Assessment:  76 y.o. female with:   Diagnosis Orders   1. Type II diabetes mellitus with peripheral circulatory disorder ([facility])  [address]65 - DE DEBRIDEMENT OF NAILS, 6 OR MORE    HM DIABETES FOOT EXAM   2. Dermatophytosis of nail  31121 - DE DEBRIDEMENT OF NAILS, 6 OR MORE    HM DIABETES FOOT EXAM   3. PVD (peripheral vascular disease) ([facility])  88517 - DE DEBRIDEMENT OF NAILS, 6 OR MORE    HM DIABETES FOOT EXAM   4. Pain in both feet  21678 - DE DEBRIDEMENT OF NAILS, 6 OR MORE    HM DIABETES FOOT EXAM   5. Degenerative joint disease of both ankles and feet             Q7   []Yes    []No                Q8   [x]Yes    []No                     Q9   []Yes    []No    Plan:   Pt was evaluated and examined. Patient was given personalized discharge instructions. For the new DJD  X rays reviewed labs reviewed  Recommend OTC anti inflammatories. RICE therapy if pain worsens after activity   Recommend proper shoe gear with supportive archs. Nails 1-10 were debrided sharply in length and thickness with a nipper and , without incident. Pt will follow up in 9 weeks or sooner if any problems arise. Diagnosis was discussed with the pt and all of their questions were answered in detail. Proper foot hygiene and care was discussed with the pt.  Informed patient on proper diabetic foot care and importance of tight glycemic control. Patient to check feet daily and contact the office with any questions/problems/concerns. Other comorbidity noted and will be managed by PCP.   12/2/2020    Electronically signed by Gemini Campos DPM on 12/2/2020 at 11:29 AM  12/2/2020

## 2021-02-24 ENCOUNTER — HOSPITAL ENCOUNTER (OUTPATIENT)
Age: 76
Discharge: HOME | End: 2021-02-24
Payer: MEDICARE

## 2021-02-24 DIAGNOSIS — I25.118 CORONARY ARTERY DISEASE OF NATIVE HEART WITH STABLE ANGINA PECTORIS, UNSPECIFIED VESSEL OR LESION TYPE (HCC): ICD-10-CM

## 2021-02-24 DIAGNOSIS — Z79.4 TYPE 2 DIABETES MELLITUS WITHOUT COMPLICATION, WITH LONG-TERM CURRENT USE OF INSULIN (HCC): ICD-10-CM

## 2021-02-24 DIAGNOSIS — E78.00 PURE HYPERCHOLESTEROLEMIA: ICD-10-CM

## 2021-02-24 DIAGNOSIS — E11.9 TYPE 2 DIABETES MELLITUS WITHOUT COMPLICATION, WITH LONG-TERM CURRENT USE OF INSULIN (HCC): ICD-10-CM

## 2021-02-24 LAB
ALBUMIN SERPL-MCNC: 4.4 G/DL (ref 3.5–5.2)
ALBUMIN SERPL-MCNC: 4.4 G/DL (ref 3.5–5.2)
ALBUMIN/GLOBULIN RATIO: ABNORMAL (ref 1–2.5)
ALBUMIN/GLOBULIN RATIO: ABNORMAL (ref 1–2.5)
ALP BLD-CCNC: 66 U/L (ref 35–104)
ALP BLD-CCNC: 66 U/L (ref 35–104)
ALT SERPL-CCNC: 37 U/L (ref 5–33)
ALT SERPL-CCNC: 37 U/L (ref 5–33)
ANION GAP SERPL CALCULATED.3IONS-SCNC: 10 MMOL/L (ref 9–17)
ANION GAP SERPL CALCULATED.3IONS-SCNC: 10 MMOL/L (ref 9–17)
AST SERPL-CCNC: 34 U/L
AST SERPL-CCNC: 34 U/L
BILIRUB SERPL-MCNC: 0.73 MG/DL (ref 0.3–1.2)
BILIRUB SERPL-MCNC: 0.73 MG/DL (ref 0.3–1.2)
BUN BLDV-MCNC: 21 MG/DL (ref 8–23)
BUN BLDV-MCNC: 21 MG/DL (ref 8–23)
BUN/CREAT BLD: ABNORMAL (ref 9–20)
BUN/CREAT BLD: ABNORMAL (ref 9–20)
CALCIUM SERPL-MCNC: 9.9 MG/DL (ref 8.6–10.4)
CALCIUM SERPL-MCNC: 9.9 MG/DL (ref 8.6–10.4)
CHLORIDE BLD-SCNC: 104 MMOL/L (ref 98–107)
CHLORIDE BLD-SCNC: 104 MMOL/L (ref 98–107)
CHOLESTEROL/HDL RATIO: 1.8
CHOLESTEROL/HDL RATIO: 1.8
CHOLESTEROL: 137 MG/DL
CHOLESTEROL: 137 MG/DL
CO2: 26 MMOL/L (ref 20–31)
CO2: 26 MMOL/L (ref 20–31)
CREAT SERPL-MCNC: 1.01 MG/DL (ref 0.5–0.9)
CREAT SERPL-MCNC: 1.01 MG/DL (ref 0.5–0.9)
CREATININE URINE: 107.6 MG/DL (ref 28–217)
GFR AFRICAN AMERICAN: >60 ML/MIN
GFR AFRICAN AMERICAN: >60 ML/MIN
GFR NON-AFRICAN AMERICAN: 53 ML/MIN
GFR NON-AFRICAN AMERICAN: 53 ML/MIN
GFR SERPL CREATININE-BSD FRML MDRD: ABNORMAL ML/MIN/{1.73_M2}
GLUCOSE BLD-MCNC: 224 MG/DL (ref 70–99)
GLUCOSE BLD-MCNC: 224 MG/DL (ref 70–99)
HCT VFR BLD CALC: 40.7 % (ref 36–46)
HDLC SERPL-MCNC: 77 MG/DL
HDLC SERPL-MCNC: 77 MG/DL
HEMOGLOBIN: 13.7 G/DL (ref 12–16)
LDL CHOLESTEROL: 38 MG/DL (ref 0–130)
LDL CHOLESTEROL: 38 MG/DL (ref 0–130)
MCH RBC QN AUTO: 30.7 PG (ref 26–34)
MCHC RBC AUTO-ENTMCNC: 33.7 G/DL (ref 31–37)
MCV RBC AUTO: 91.1 FL (ref 80–100)
MICROALBUMIN/CREAT 24H UR: 53 MG/L
MICROALBUMIN/CREAT UR-RTO: 49 MCG/MG CREAT
NRBC AUTOMATED: NORMAL PER 100 WBC
PDW BLD-RTO: 13.7 % (ref 11.5–14.9)
PLATELET # BLD: 170 K/UL (ref 150–450)
PMV BLD AUTO: 7.6 FL (ref 6–12)
POTASSIUM SERPL-SCNC: 4.6 MMOL/L (ref 3.7–5.3)
POTASSIUM SERPL-SCNC: 4.6 MMOL/L (ref 3.7–5.3)
RBC # BLD: 4.47 M/UL (ref 4–5.2)
SODIUM BLD-SCNC: 140 MMOL/L (ref 135–144)
SODIUM BLD-SCNC: 140 MMOL/L (ref 135–144)
T3 FREE: 2.59 PG/ML (ref 2.02–4.43)
THYROXINE, FREE: 1.38 NG/DL (ref 0.93–1.7)
TOTAL CK: 56 U/L (ref 26–192)
TOTAL PROTEIN: 7.2 G/DL (ref 6.4–8.3)
TOTAL PROTEIN: 7.2 G/DL (ref 6.4–8.3)
TRIGL SERPL-MCNC: 111 MG/DL
TRIGL SERPL-MCNC: 111 MG/DL
TSH SERPL DL<=0.05 MIU/L-ACNC: 2.53 MIU/L (ref 0.3–5)
VLDLC SERPL CALC-MCNC: NORMAL MG/DL (ref 1–30)
VLDLC SERPL CALC-MCNC: NORMAL MG/DL (ref 1–30)
WBC # BLD: 4.7 K/UL (ref 3.5–11)

## 2021-02-24 PROCEDURE — 82043 UR ALBUMIN QUANTITATIVE: CPT

## 2021-02-24 PROCEDURE — 85027 COMPLETE CBC AUTOMATED: CPT

## 2021-02-24 PROCEDURE — 84481 FREE ASSAY (FT-3): CPT

## 2021-02-24 PROCEDURE — 80061 LIPID PANEL: CPT

## 2021-02-24 PROCEDURE — 82550 ASSAY OF CK (CPK): CPT

## 2021-02-24 PROCEDURE — 84439 ASSAY OF FREE THYROXINE: CPT

## 2021-02-24 PROCEDURE — 84443 ASSAY THYROID STIM HORMONE: CPT

## 2021-02-24 PROCEDURE — 80053 COMPREHEN METABOLIC PANEL: CPT

## 2021-02-24 PROCEDURE — 82570 ASSAY OF URINE CREATININE: CPT

## 2021-02-24 PROCEDURE — 36415 COLL VENOUS BLD VENIPUNCTURE: CPT

## 2021-03-03 ENCOUNTER — OFFICE VISIT (OUTPATIENT)
Dept: PODIATRY | Age: 76
End: 2021-03-03
Payer: MEDICARE

## 2021-03-03 VITALS — WEIGHT: 123 LBS | TEMPERATURE: 97.3 F | BODY MASS INDEX: 21.79 KG/M2 | HEIGHT: 63 IN

## 2021-03-03 DIAGNOSIS — M79.671 PAIN IN BOTH FEET: ICD-10-CM

## 2021-03-03 DIAGNOSIS — L85.3 XEROSIS OF SKIN: ICD-10-CM

## 2021-03-03 DIAGNOSIS — B35.1 DERMATOPHYTOSIS OF NAIL: ICD-10-CM

## 2021-03-03 DIAGNOSIS — E11.51 TYPE II DIABETES MELLITUS WITH PERIPHERAL CIRCULATORY DISORDER (HCC): Primary | ICD-10-CM

## 2021-03-03 DIAGNOSIS — M79.672 PAIN IN BOTH FEET: ICD-10-CM

## 2021-03-03 DIAGNOSIS — I73.9 PVD (PERIPHERAL VASCULAR DISEASE) (HCC): ICD-10-CM

## 2021-03-03 PROCEDURE — 99213 OFFICE O/P EST LOW 20 MIN: CPT | Performed by: PODIATRIST

## 2021-03-03 PROCEDURE — 11721 DEBRIDE NAIL 6 OR MORE: CPT | Performed by: PODIATRIST

## 2021-03-03 NOTE — PROGRESS NOTES
30 [facility] [address] [address]  Dept: [phone]    DIABETIC PROGRESS NOTE  Date of patient's visit: 3/3/2021  Patient's Name:  Josh Block YOB: 1945            Patient Care Team:  Sang Porter MD as PCP - General (Family Medicine)  Sang Porter MD as PCP - [facility]  Lynnann Buerger as Consulting Physician (Endocrinology)  Rosalva Cheema MD as Consulting Physician (Ophthalmology)  Kirk Vargas DO as Consulting Physician (Cardiology)  Emily Laboy DPM as Consulting Physician (Podiatry)          Chief Complaint   Patient presents with    Diabetes    Peripheral Neuropathy    Foot Pain       Subjective:   Josh Block comes to clinic for Diabetes, Peripheral Neuropathy, and Foot Pain    she is a diabetic and states that her feet are dry and cracking . Pt currently has complaint of thickened, elongated nails that they cannot manage by themselves. Pt's primary care physician is Sang Porter MD last seen November 24 2020   Pt's last blood sugar was 112 . Pt has a new complaint of dry scaly skin to the bottom of both of the feet. Pt states they have tried OTC cream but it isnt applied regularly. Pt has tried changing shoes but it has not helped the pain          Lab Results   Component Value Date    LABA1C 6.8 (H) 06/22/2020      Complains of numbness in the feet bilat.   Past Medical History:   Diagnosis Date    Abnormal cardiovascular stress test 06/2020    no ischemia / infarction   there is septal hypokinesis   /  EF 70%    CAD (coronary artery disease)     History of pneumonia 03/2020    HTN (hypertension)     Hyperlipidemia     Type 2 diabetes mellitus without complication (HCC)        No Known Allergies  Current Outpatient Medications on File Prior to Visit   Medication Sig Dispense Refill    isosorbide mononitrate (IMDUR) 30 MG extended release tablet Take 1 tablet by mouth daily 90 tablet 3    B-D UF III MINI PEN NEEDLES 31G X 5 MM MISC       ciclopirox (PENLAC) 8 % solution Apply topically nightly. Remove once weekly with alcohol or nail polish remover. 1 Bottle 3    atorvastatin (LIPITOR) 40 MG tablet Take 1 tablet by mouth daily 90 tablet 1    clopidogrel (PLAVIX) 75 MG tablet Take 1 tablet by mouth daily 30 tablet 5    metoprolol succinate (TOPROL XL) 25 MG extended release tablet Take 1 tablet by mouth daily 90 tablet 1    gabapentin (NEURONTIN) 100 MG capsule Take 1 capsule by mouth daily.  insulin lispro, 1 Unit Dial, (HUMALOG KWIKPEN) 100 UNIT/ML SOPN Inject into the skin 3 times daily PER SLIDING SCALE      insulin glargine (TOUJEO SOLOSTAR) 300 UNIT/ML injection pen Inject 11 Units into the skin every morning       aspirin 81 MG chewable tablet Take 81 mg by mouth daily      losartan (COZAAR) 50 MG tablet Take 50 mg by mouth daily      Multiple Vitamins TABS Take 1 tablet by mouth daily       No current facility-administered medications on file prior to visit. Review of Systems    Review of Systems:   History obtained from chart review and the patient  General ROS: negative for - chills, fatigue, fever, night sweats or weight gain  Constitutional: Negative for chills, diaphoresis, fatigue, fever and unexpected weight change. Musculoskeletal: Positive for arthralgias, gait problem and joint swelling. Neurological ROS: negative for - behavioral changes, confusion, headaches or seizures. Negative for weakness and numbness. Dermatological ROS: negative for - mole changes, rash  Cardiovascular: Negative for leg swelling. Gastrointestinal: Negative for constipation, diarrhea, nausea and vomiting. Objective:  Dermatologic Exam:   Dry scaly skin noted to the plantar surface of the right and left foot.   Small superficial fissuring of the skin noted to the plantar heel bilateral    .   Skin is thin, with flaky sloughing skin as well as decreased hair growth to the lower leg  Small red hemosiderin deposits seen dorsal foot   Musculoskeletal:     1st MPJ ROM decreased, Bilateral.  Muscle strength 5/5, Bilateral.  Pain present upon palpation of toenails 1-5, Bilateral. decreased medial longitudinal arch, Bilateral.  Ankle ROM decreased,Bilateral.    Dorsally contracted digits present digits 2, Bilateral.     Vascular: DP pulses 1/4 bilateral.  PT pulses 0/4 bilateral.   CFT <5 seconds, Bilateral.  Hair growth absent to the level of the digits, Bilateral.  Edema present, Bilateral.  Varicosities absent, Bilateral. Erythema absent, Bilateral    Neurological: Sensation diminshed to light touch to level of digits, Bilateral.  Protective sensation intact 6/10 sites via 5.07/10g [place] Monofilament, Bilateral.  negative Tinel's, Bilateral.  negative Valleix sign, Bilateral.      Integument: Warm, dry, supple, Bilateral.  Open lesion absent, Bilateral.  Interdigital maceration absent to web spaces 4, Bilateral.  Nails 1-5 left and 1-5 right thickened > 3.0 mm, dystrophic and crumbly, discolored with subungual debris. Fissures absent, Bilateral.   General: AAO x 3 in NAD.     Derm  Toenail Description  Sites of Onychomycosis Involvement (Check affected area)  [x] [x] [x] [x] [x] [x] [x] [x] [x] [x]  5 4 3 2 1 1 2 3 4 5                          Right                                        Left    Thickness  [x] [x] [x] [x] [x] [x] [x] [x] [x] [x]  5 4 3 2 1 1 2 3 4 5                         Right                                        Left    Dystrophic Changes   [x] [x] [x] [x] [x] [x] [x] [x] [x] [x]  5 4 3 2 1 1 2 3 4 5                         Right                                        Left    Color   [x] [x] [x] [x] [x] [x] [x] [x] [x] [x]  5 4 3 2 1 1 2 3 4 5                          Right                                        Left    Incurvation/Ingrowin   [] [] [] [] [] [] [] [] [] []  5 4 3 2 1 1 2 3 4 5                         Right Left    Inflammation/Pain   [x] [x] [x] [x] [x] [x] [x] [x] [x] [x]  5 4 3 2 1 1 2 3 4 5                         Right                                        Left        Visual inspection:  Deformity: hammertoe deformity akosua feet  amputation: absent  Skin lesions: present - as above  Edema: right- 2+ pitting edema, left- 2+ pitting edema    Sensory exam:  Monofilament sensation: abnormal - 6/10 via SW 5.07/10g monofilament to the plantar foot bilateral feet    Pulses: abnormal - 1/4 dorsalis pedis pulse and 1/4 Posterior tibial pulse,   Pinprick: Impaired  Proprioception: Impaired  Vibration (128 Hz): Impaired       DM with PVD       [x]Yes    []No      Assessment:  76 y.o. female with:   Diagnosis Orders   1. Type II diabetes mellitus with peripheral circulatory disorder (HCC)  65871 - ID DEBRIDEMENT OF NAILS, 6 OR MORE    HM DIABETES FOOT EXAM   2. Dermatophytosis of nail  33335 - ID DEBRIDEMENT OF NAILS, 6 OR MORE    HM DIABETES FOOT EXAM   3. PVD (peripheral vascular disease) ([facility])  52147 - ID DEBRIDEMENT OF NAILS, 6 OR MORE    HM DIABETES FOOT EXAM   4. Pain in both feet  60280 - ID DEBRIDEMENT OF NAILS, 6 OR MORE    HM DIABETES FOOT EXAM   5. Xerosis of skin  HM DIABETES FOOT EXAM           Q7   []Yes    []No                Q8   [x]Yes    []No                     Q9   []Yes    []No    Plan:   Pt was evaluated and examined. Patient was given personalized discharge instructions. For patients xerosis of skin pt to apply OTC foot cream or Aquaphor to the area to be applied daily. Pt to use a pummuce stone to the plantar surface of the feet weekly      Nails 1-10 were debrided sharply in length and thickness with a nipper and , without incident. Pt will follow up in 9 weeks or sooner if any problems arise. Diagnosis was discussed with the pt and all of their questions were answered in detail. Proper foot hygiene and care was discussed with the pt.  Informed patient on proper diabetic foot care and importance of tight glycemic control. Patient to check feet daily and contact the office with any questions/problems/concerns. Other comorbidity noted and will be managed by PCP. All labs were reviewed and all imagining including the above findings were reviewed prior to the patients arrival and with the patient today. Previous patient encounter was reviewed. Encounters from the patients other medical providers were reviewed and noted. Time was spent educating the patient and their families/caregivers on proper care of the feet and ankles. All the above diagnosis were addressed at todays visit and all questions were answered.   A total of 25 minutes was spent with this patients encounter    3/3/2021    Electronically signed by Shannen Veloz DPM on 3/3/2021 at 9:43 AM  3/3/2021

## 2021-03-04 ENCOUNTER — OFFICE VISIT (OUTPATIENT)
Dept: FAMILY MEDICINE CLINIC | Age: 76
End: 2021-03-04
Payer: MEDICARE

## 2021-03-04 VITALS
DIASTOLIC BLOOD PRESSURE: 66 MMHG | RESPIRATION RATE: 16 BRPM | BODY MASS INDEX: 22.14 KG/M2 | WEIGHT: 125 LBS | OXYGEN SATURATION: 98 % | HEART RATE: 56 BPM | SYSTOLIC BLOOD PRESSURE: 136 MMHG

## 2021-03-04 DIAGNOSIS — I25.118 CORONARY ARTERY DISEASE OF NATIVE HEART WITH STABLE ANGINA PECTORIS, UNSPECIFIED VESSEL OR LESION TYPE (HCC): ICD-10-CM

## 2021-03-04 DIAGNOSIS — E78.00 PURE HYPERCHOLESTEROLEMIA: ICD-10-CM

## 2021-03-04 DIAGNOSIS — R74.8 ELEVATED LIVER ENZYMES: ICD-10-CM

## 2021-03-04 DIAGNOSIS — E11.9 TYPE 2 DIABETES MELLITUS WITHOUT COMPLICATION, WITH LONG-TERM CURRENT USE OF INSULIN (HCC): Primary | Chronic | ICD-10-CM

## 2021-03-04 DIAGNOSIS — G62.9 PERIPHERAL POLYNEUROPATHY: ICD-10-CM

## 2021-03-04 DIAGNOSIS — E55.9 VITAMIN D DEFICIENCY: ICD-10-CM

## 2021-03-04 DIAGNOSIS — Z79.4 TYPE 2 DIABETES MELLITUS WITHOUT COMPLICATION, WITH LONG-TERM CURRENT USE OF INSULIN (HCC): Primary | Chronic | ICD-10-CM

## 2021-03-04 PROCEDURE — 99214 OFFICE O/P EST MOD 30 MIN: CPT | Performed by: FAMILY MEDICINE

## 2021-03-04 RX ORDER — AMLODIPINE BESYLATE 5 MG/1
5 TABLET ORAL DAILY
COMMUNITY
End: 2021-03-09 | Stop reason: SDUPTHER

## 2021-03-04 ASSESSMENT — ENCOUNTER SYMPTOMS
SHORTNESS OF BREATH: 1
ABDOMINAL PAIN: 0
COUGH: 0
BACK PAIN: 0
CONSTIPATION: 0
NAUSEA: 0
CHEST TIGHTNESS: 0
RHINORRHEA: 0
VOMITING: 0
ABDOMINAL DISTENTION: 0
SORE THROAT: 0
DIARRHEA: 0

## 2021-03-04 ASSESSMENT — PATIENT HEALTH QUESTIONNAIRE - PHQ9
SUM OF ALL RESPONSES TO PHQ QUESTIONS 1-9: 0
SUM OF ALL RESPONSES TO PHQ QUESTIONS 1-9: 0
SUM OF ALL RESPONSES TO PHQ9 QUESTIONS 1 & 2: 0
SUM OF ALL RESPONSES TO PHQ QUESTIONS 1-9: 0
1. LITTLE INTEREST OR PLEASURE IN DOING THINGS: 0
2. FEELING DOWN, DEPRESSED OR HOPELESS: 0

## 2021-03-04 NOTE — PROGRESS NOTES
HYPERTENSION visit     BP Readings from Last 3 Encounters:   11/24/20 130/68   08/17/20 (!) 141/57   08/14/20 (!) 119/50       LDL Calculated (mg/dL)   Date Value   06/05/2019 55     LDL Cholesterol (mg/dL)   Date Value   02/24/2021 38     HDL (mg/dL)   Date Value   02/24/2021 77     BUN (mg/dL)   Date Value   02/24/2021 21     CREATININE (mg/dL)   Date Value   02/24/2021 1.01 (H)     Glucose (mg/dL)   Date Value   02/24/2021 224 (H)              Have you changed or started any medications since your last visit including any over-the-counter medicines, vitamins, or herbal medicines? no   Have you stopped taking any of your medications? Is so, why? -  no  Are you having any side effects from any of your medications? - no  How often do you miss doses of your medication? rare      Have you seen any other physician or provider since your last visit? yes - Dr. Renetta Pierce    Have you had any other diagnostic tests since your last visit? yes - labs   Have you been seen in the emergency room and/or had an admission in a hospital since we last saw you?  no   Have you had your routine dental cleaning in the past 6 months?  yes -      Do you have an active MyChart account? If no, what is the barrier?   Yes    Patient Care Team:  Maria Antonia Vasquez MD as PCP - General (Family Medicine)  Maria Antonia Vasquez MD as PCP - [facility] Provider  Ayah Rabago as Consulting Physician (Endocrinology)  Rajendra Martinez MD as Consulting Physician (Ophthalmology)  Louise Hays DO as Consulting Physician (Cardiology)  Baylee Villareal DPM as Consulting Physician (Podiatry)    Medical History Review  Past Medical, Family, and Social History reviewed and does contribute to the patient presenting condition    Health Maintenance   Topic Date Due    Diabetic retinal exam  08/14/2021 (Originally 6/20/2020)    COVID-19 Vaccine (2 of 2 - Betha Spikes series) 03/12/2021    A1C test (Diabetic or Prediabetic)  06/22/2021    Colon cancer screen colonoscopy 08/15/2021    Annual Wellness Visit (AWV)  11/25/2021    Diabetic foot exam  12/03/2021    Lipid screen  02/24/2022    Potassium monitoring  02/24/2022    Creatinine monitoring  02/24/2022    DTaP/Tdap/Td vaccine (2 - Td) 08/25/2030    DEXA (modify frequency per FRAX score)  Completed    Flu vaccine  Completed    Shingles Vaccine  Completed    Pneumococcal 65+ years Vaccine  Completed    Hepatitis C screen  Completed    Hepatitis A vaccine  Aged Out    Hib vaccine  Aged Out    Meningococcal (ACWY) vaccine  Aged Out     Patient/Caregiver verbalize understanding of medications.   Yes

## 2021-03-04 NOTE — PROGRESS NOTES
Billie Marin MD    [address] [place] [address]  [id number]  [address] [address]  Dept: [phone]  Dept Fax: [phone]     Patient ID: Bertha Garrett is a 76 y.o. female. HPI    Established patient here today for f/u on chronic medical problems, go over labs and/or diagnostic studies, and medication refills. Pt denies any fever or chills. Pt denies any N/V/D/C or abdominal pain. Pt has been c/o CP and SOB and is scheduled for a cardiac cath next week. Her BS's are running 120-130's and does follow with Dr. Deven Duarte for her DM. Otherwise pt doing well on current tx and no other concerns today. The patient's past medical, surgical, social, and family history as well as his current medications and allergies were reviewed as documented in today's encounter. My previous office notes, labs and diagnostic studies were reviewed prior to and during encounter. Other notes reviewed prior to and during encounter include: Cardiology    Current Outpatient Medications on File Prior to Visit   Medication Sig Dispense Refill    amLODIPine (NORVASC) 5 MG tablet Take 5 mg by mouth daily      isosorbide mononitrate (IMDUR) 30 MG extended release tablet Take 1 tablet by mouth daily 90 tablet 3    B-D UF III MINI PEN NEEDLES 31G X 5 MM MISC       ciclopirox (PENLAC) 8 % solution Apply topically nightly. Remove once weekly with alcohol or nail polish remover. 1 Bottle 3    atorvastatin (LIPITOR) 40 MG tablet Take 1 tablet by mouth daily 90 tablet 1    clopidogrel (PLAVIX) 75 MG tablet Take 1 tablet by mouth daily 30 tablet 5    metoprolol succinate (TOPROL XL) 25 MG extended release tablet Take 1 tablet by mouth daily 90 tablet 1    gabapentin (NEURONTIN) 100 MG capsule Take 1 capsule by mouth daily.       insulin lispro, 1 Unit Dial, (HUMALOG KWIKPEN) 100 UNIT/ML SOPN Inject into the skin 3 times daily PER SLIDING SCALE      insulin glargine (TOUJEO SOLOSTAR) 300 UNIT/ML injection pen Inject 11 Units into the skin every morning       aspirin 81 MG chewable tablet Take 81 mg by mouth daily      losartan (COZAAR) 50 MG tablet Take 50 mg by mouth daily      Multiple Vitamins TABS Take 1 tablet by mouth daily       No current facility-administered medications on file prior to visit. Subjective:     Review of Systems   Constitutional: Negative for appetite change, fatigue and fever. HENT: Negative for congestion, ear pain, rhinorrhea and sore throat. Respiratory: Positive for shortness of breath. Negative for cough and chest tightness. Cardiovascular: Positive for chest pain. Negative for palpitations. Gastrointestinal: Negative for abdominal distention, abdominal pain, constipation, diarrhea, nausea and vomiting. Genitourinary: Negative for difficulty urinating and dysuria. Musculoskeletal: Negative for arthralgias, back pain and myalgias. Skin: Negative for rash. Neurological: Negative for dizziness, weakness, light-headedness and headaches. Hematological: Negative for adenopathy. Psychiatric/Behavioral: Negative for behavioral problems and sleep disturbance. The patient is not nervous/anxious. Objective:     Physical Exam  Vitals signs reviewed. Constitutional:       General: She is not in acute distress. Appearance: She is well-developed. HENT:      Head: Normocephalic and atraumatic. Right Ear: External ear normal.      Left Ear: External ear normal.      Nose: Nose normal.      Mouth/Throat:      Pharynx: No oropharyngeal exudate. Eyes:      Conjunctiva/sclera: Conjunctivae normal.      Pupils: Pupils are equal, round, and reactive to light. Neck:      Musculoskeletal: Normal range of motion. Cardiovascular:      Rate and Rhythm: Normal rate and regular rhythm. Heart sounds: Normal heart sounds. No murmur. Pulmonary:      Effort: Pulmonary effort is normal. No respiratory distress. Breath sounds: Normal breath sounds. No wheezing. Chest:      Chest wall: No tenderness. Abdominal:      General: Bowel sounds are normal. There is no distension. Palpations: Abdomen is soft. There is no mass. Tenderness: There is no abdominal tenderness. Musculoskeletal: Normal range of motion. General: No tenderness. Lymphadenopathy:      Cervical: No cervical adenopathy. Skin:     Findings: No rash. Neurological:      Mental Status: She is alert and oriented to person, place, and time. Deep Tendon Reflexes: Reflexes are normal and symmetric. Psychiatric:         Behavior: Behavior normal.         Assessment:      Diagnosis Orders   1. Type 2 diabetes mellitus without complication, with long-term current use of insulin ([facility])  CBC    Comprehensive Metabolic Panel   2. Coronary artery disease of native heart with stable angina pectoris, unspecified vessel or lesion type [facility])  Comprehensive Metabolic Panel   3. Pure hypercholesterolemia     4. Vitamin D deficiency  Vitamin D 25 Hydroxy   5. Peripheral polyneuropathy  Comprehensive Metabolic Panel   6. Elevated liver enzymes  Comprehensive Metabolic Panel         Plan:     Orders Placed This Encounter   Procedures    CBC     Standing Status:   Future     Standing Expiration Date:   3/4/2022    Comprehensive Metabolic Panel     Standing Status:   Future     Standing Expiration Date:   3/4/2022    Vitamin D 25 Hydroxy     Standing Status:   Future     Standing Expiration Date:   3/4/2022      Continue with ADA diet, current diabetic meds, and monitoring BS's as instructed and will cont to follow with Dr. Vini Laird with daily foot exams and yearly eye exams    Will cont with Cozaar for renal protection    Will cont with low fat/chol diet and Lipitor as ordered    Will follow with Cardiology and she is scheduled for a cardiac cath next week    Rest of systems unchanged, continue current treatments.     Medications, labs, diagnostic studies, consultations and follow-up as documented in this encounter. Rest of systems unchanged, continue current treatments    On this date March 4, 2021,  I have spent greater than 50% of visit reviewing previous notes, test results and face to face with the patient discussing the diagnoses, importance of compliance with the treatment plan, counseling, coordinating care as well as documenting on the day of the visit. Billie العراقي MD

## 2021-03-09 ENCOUNTER — HOSPITAL ENCOUNTER (OUTPATIENT)
Dept: CARDIAC CATH/INVASIVE PROCEDURES | Age: 76
Discharge: HOME | End: 2021-03-09
Payer: MEDICARE

## 2021-03-09 VITALS
SYSTOLIC BLOOD PRESSURE: 114 MMHG | HEIGHT: 63 IN | DIASTOLIC BLOOD PRESSURE: 50 MMHG | WEIGHT: 126 LBS | BODY MASS INDEX: 22.32 KG/M2 | RESPIRATION RATE: 12 BRPM | OXYGEN SATURATION: 100 % | TEMPERATURE: 98.1 F | HEART RATE: 50 BPM

## 2021-03-09 PROCEDURE — C1894 INTRO/SHEATH, NON-LASER: HCPCS

## 2021-03-09 PROCEDURE — 6360000002 HC RX W HCPCS

## 2021-03-09 PROCEDURE — 2500000003 HC RX 250 WO HCPCS

## 2021-03-09 PROCEDURE — C1760 CLOSURE DEV, VASC: HCPCS

## 2021-03-09 PROCEDURE — C1769 GUIDE WIRE: HCPCS

## 2021-03-09 PROCEDURE — 7100000000 HC PACU RECOVERY - FIRST 15 MIN

## 2021-03-09 PROCEDURE — 7100000001 HC PACU RECOVERY - ADDTL 15 MIN

## 2021-03-09 PROCEDURE — 93458 L HRT ARTERY/VENTRICLE ANGIO: CPT | Performed by: INTERNAL MEDICINE

## 2021-03-09 PROCEDURE — 2709999900 HC NON-CHARGEABLE SUPPLY

## 2021-03-09 PROCEDURE — 6360000004 HC RX CONTRAST MEDICATION

## 2021-03-09 RX ORDER — SODIUM CHLORIDE 9 MG/ML
INJECTION, SOLUTION INTRAVENOUS CONTINUOUS
Status: DISCONTINUED | OUTPATIENT
Start: 2021-03-09 | End: 2021-03-09

## 2021-03-09 RX ORDER — SODIUM CHLORIDE 0.9 % (FLUSH) 0.9 %
10 SYRINGE (ML) INJECTION PRN
Status: DISCONTINUED | OUTPATIENT
Start: 2021-03-09 | End: 2021-03-10 | Stop reason: HOSPADM

## 2021-03-09 RX ORDER — AMLODIPINE BESYLATE 5 MG/1
5 TABLET ORAL NIGHTLY
Status: DISCONTINUED | OUTPATIENT
Start: 2021-03-09 | End: 2021-03-10 | Stop reason: HOSPADM

## 2021-03-09 RX ORDER — SODIUM CHLORIDE 0.9 % (FLUSH) 0.9 %
10 SYRINGE (ML) INJECTION EVERY 12 HOURS SCHEDULED
Status: DISCONTINUED | OUTPATIENT
Start: 2021-03-09 | End: 2021-03-10 | Stop reason: HOSPADM

## 2021-03-09 RX ORDER — SODIUM CHLORIDE 0.9 % (FLUSH) 0.9 %
10 SYRINGE (ML) INJECTION PRN
Status: DISCONTINUED | OUTPATIENT
Start: 2021-03-09 | End: 2021-03-09 | Stop reason: SDUPTHER

## 2021-03-09 RX ORDER — ACETAMINOPHEN 325 MG/1
650 TABLET ORAL EVERY 4 HOURS PRN
Status: DISCONTINUED | OUTPATIENT
Start: 2021-03-09 | End: 2021-03-10 | Stop reason: HOSPADM

## 2021-03-09 RX ORDER — DIMENHYDRINATE 50 MG
1 TABLET ORAL DAILY
COMMUNITY

## 2021-03-09 RX ORDER — SODIUM CHLORIDE 9 MG/ML
INJECTION, SOLUTION INTRAVENOUS CONTINUOUS
Status: DISCONTINUED | OUTPATIENT
Start: 2021-03-09 | End: 2021-03-10 | Stop reason: HOSPADM

## 2021-03-09 RX ORDER — SODIUM CHLORIDE 0.9 % (FLUSH) 0.9 %
10 SYRINGE (ML) INJECTION EVERY 12 HOURS SCHEDULED
Status: DISCONTINUED | OUTPATIENT
Start: 2021-03-09 | End: 2021-03-09 | Stop reason: SDUPTHER

## 2021-03-09 RX ORDER — AMLODIPINE BESYLATE 5 MG/1
5 TABLET ORAL NIGHTLY
Qty: 30 TABLET | Refills: 2 | Status: SHIPPED | OUTPATIENT
Start: 2021-03-09 | End: 2022-02-03 | Stop reason: ALTCHOICE

## 2021-03-09 RX ADMIN — SODIUM CHLORIDE: 9 INJECTION, SOLUTION INTRAVENOUS at 08:21

## 2021-03-09 NOTE — PROGRESS NOTES
Received post procedure to  to room  3. Assessment obtained. Restrictions reviewed with patient. Post procedure pathway initiated. Right groin site soft , band aid dry and intact. No hematoma noted. Family at side. Patient without complaints. Head of bed flat with right leg straight. Taking H2O well.

## 2021-03-09 NOTE — H&P
[facility]  Pre- Procedure History and Physical/Update          Patient Name:  Michal Ganser  MRN:    [id number]  YOB: 1945  Date of evaluation:  3/9/2021       Please refer to the consult note / H&P completed by Dr. Sydnie Wells on 2/24/2021 in the medical record and note that:       [x] I have examined the patient and reviewed the H&P/Consult and there are no changes to be made to the assessment or plan. [] I have examined the patient and reviewed the H&P/Consult and have noted the following changes:        Past Medical History:   Diagnosis Date    Abnormal cardiovascular stress test 06/2020    no ischemia / infarction   there is septal hypokinesis   /  EF 70%    CAD (coronary artery disease)     Chest pressure     Fatigue     History of pneumonia 03/2020    HTN (hypertension)     Hyperlipidemia     Positive cardiac stress test     SOB (shortness of breath)     Type 2 diabetes mellitus without complication ([facility] [address]        Past Surgical History:   Procedure Laterality Date    APPENDECTOMY      CARDIAC CATHETERIZATION  03/09/2021    CHOLECYSTECTOMY      COLONOSCOPY      CORONARY ANGIOPLASTY WITH STENT PLACEMENT  08/14/2020    High grade stenosis in Proximal RCA s/p successful PCI with DELIA (3.25 X 12 mm)    HYSTERECTOMY      HYSTERECTOMY, VAGINAL      left both ovaries in        reports that she has never smoked. She has never used smokeless tobacco. She reports that she does not drink alcohol or use drugs. Prior to Admission medications    Medication Sig Start Date End Date Taking? Authorizing Provider   Coenzyme Q10 (CO Q-10) 100 MG CAPS Take 1 capsule by mouth daily   Yes Historical Provider, MD   amLODIPine (NORVASC) 5 MG tablet Take 5 mg by mouth daily   Yes Historical Provider, MD   isosorbide mononitrate (IMDUR) 30 MG extended release tablet Take 1 tablet by mouth daily 11/24/20  Yes Chano Clinton MD   ciclopirox (PENLAC) 8 % solution Apply topically nightly. pulsation Normal  · The carotid upstroke is normal in amplitude and contour without delay or bruit  · Peripheral pulses are symmetrical and full  Abdomen:  · No masses or tenderness  · Bowel sounds present  Extremities:  ·  No Cyanosis or Clubbing  ·  Lower extremity edema: No  · Skin: Warm and dry  Neurological:  · Alert and oriented. · Moves all extremities well  · No abnormalities of mood, affect, memory, mentation, or behavior are noted      Active Problems:    * No active hospital problems. *  Resolved Problems:    * No resolved hospital problems. *    Pre Procedure Conscious Sedation Data:    ASA Class:    [] I [x] II [] III [] IV    Mallampati Class:  [] I [x] II [] III [] IV  . Assessment:  1. Chest pain concerning for unstable angina   2. HTN  3. DM  4. CAD - cath 8/2020 - PTCA/DELIA to RCA. Non obstructive disease otherwise  5. Hyperlipidemia      Plan:  1. Proceed with planned coronary angiography +/- PCI . 2. Further orders to follow. Risk, benefits and alternatives of cardiac catheterization +/- PCI were discussed in detail. Risk of bleeding, requiring blood transfusion, vascular complication requiring surgery, renal insufficieny with need of dialysis, CVA, MI, death and anesthesia complications including intubation were discussed. Patient agrees to proceed and verbalizes understanding. Rosalva Bundy MD  Fellow, [address] [facility]      I performed a history and physical examination of the patient and discussed management with the resident. I reviewed the residents note and agree with the documented findings and plan of care. Any areas of disagreement are noted on the chart. I was personally present for the key portions of any procedures. I have documented in the chart those procedures where I was not present during the key portions.  I have personally evaluated this patient and have completed at least one if not all key elements of the E/M

## 2021-03-09 NOTE — PROGRESS NOTES
Patient admitted, consent signed, all questions answered. Pt ready for procedure. Bed in low position, call light to reach with side rails up 2 of 2. Bilateral groin hair clipped.  at bedside with patient.

## 2021-03-09 NOTE — OP NOTE
[facility]        Date:   3/9/2021  Patient name:  Flory Vega  Date of admission:  3/9/2021  7:28 AM  MRN:   [id number]  YOB: 1945    CARDIAC CATHETERIZATION    Operators:  NNSIIVM:WINSOME Ortiz MD    Procedure performed:       [x] Left Heart Catheterization. [] Graft Angiography.  [] Left Ventriculography. [] Right Heart Catheterization. [x] Coronary Angiography. [] Aortic Valve Studies. [] PCI:      [] Other:         Pre Procedure Conscious Sedation Data:    ASA Class:    [] I [x] II [] III [] IV    Mallampati Class:  [] I [x] II [] III [] IV      Indication:  [] STEMI      [] + Stress test  [] ACS      [] + EKG Changes  [] Non Q MI       [x] Significant Risk Factors  [x] Recurrent Angina             [] Diabetes Mellitus    [] New LBBB      [] Uncontrolled HTN. [] CHF / Low EF changes     [] Abnormal CTA / Ca Score  [] Other:     Procedure:  Access:  [x] Femoral  [] Radial  artery       [x] Right  [] Left    Procedure: After informed consent was obtained with explanation of the risks and benefits, patient was brought to the cath lab. The right and left groin were prepped and draped in sterile fashion. 1% lidocaine was used for local block. The  right femoral artery was cannulated with 6  Fr sheath with brisk arterial blood return. The side port was frequently flushed and aspirated with normal saline. Findings:  LMCA: Normal 0% stenosis.     LAD: has distal 40% focal stenosis. The D1 is very small with ostial 40% stenosis. The D2 is very small and is normal. Distal/apical narrowingimproved with IC NTG.     LCx: has mild calcification with proximal 20-30% stenosis. The OM1 has  minimal luminal irregularities.     RCA: has patent proximal stent followed by mid 40% stenosis.      Coronary Tree      Dominance: Right      The LV gram was not performed.   Estimated blood loss: 10 ml    Conclusions:  Patent proximal RCA stent, otherwise mild coronary artery disease. Normal LV systolic function. Recommendations:  1. Medical therapy as needed. 2. Risk factor modification. 3. Routine Post Diagnostic Cath orders. 4. Add norvasc 5mg po QHS. Patient is already on Imdur 30mg po qday. History and Risk Factors    [x] Hypertension     [] Family history of CAD  [x] Hyperlipidemia     [] Cerebrovascular Disease   [] Prior MI       [] Peripheral Vascular disease   [] Prior PCI              [] Diabetes Mellitus    [] Left Main PCI. [] Currently on Dialysis. [] Prior CABG. [] Currently smoker. [] Cardiac Arrest outside of healthcare facility. [] Yes    [x] No        Witnessed     [] Yes   [] No     Arrest after arrival of EMS  [] Yes   [] No     [] Cardiac Arrest at other Facility. [] Yes   [x] No    Pre-Procedure Information. Heart Failure       [] Yes    [] No        Class  [] I      [] II  [] III    [] IV. New Diagnosis    [] Yes  [] No    HF Type      [] Systolic   [] Diastolic          [] Unknown. Diagnostic Test:   EKG       [x] Normal   [] Abnormal    New antiarrhythmia medications:    [] Yes   [] No   New onset atrial fibrillation / Flutter     [] Yes   [] No   ECG Abnormalities:      [] V. Fib   [] Jamee V. Tach           [] NS V. T   [] New LBBB           [] T. Inv  []  ST dev > 0.5 mm         [] PVC's freq  [] PVC's infrequent    Stress Test Performed:      [x] Yes    [] No     Type:     [] Stress Echo   [] Exercise Stress Test (no imaging)      [] Stress Nuclear  [x] Stress Imaging     Results   [] Negative   [] Positive        [] Indeterminate  [] Unavailable     If Positive/ Risk / Extent of Ischemia:       [x] Low  [] Intermediate         [] High  [] Unavailable      Cardiac CTA Performed:     [] Yes    [x] No      Results   [] CAD   [] Non obstructive CAD      [] No CAD   [] Uncertain      [] Unknown   [] Structural Disease.      Pre Procedure Medications:   [x] Yes    [] No         [x] ASA   [x] Beta Blockers      [] Nitrate   [] Ca Channel Blockers      [] Ranolazine   [x] Statin       [] Plavix/Others antiplatelets      Electronically signed on 3/9/2021 at 9:07 AM by:    Eduardo Coleman MD  Fellow, [address] I was present during entire procedure and performed all critical elements of the procedure.     Kenan Roque MD

## 2021-03-23 ENCOUNTER — HOSPITAL ENCOUNTER (OUTPATIENT)
Age: 76
Setting detail: SPECIMEN
Discharge: HOME | End: 2021-03-23
Payer: MEDICARE

## 2021-03-23 LAB
ANION GAP SERPL CALCULATED.3IONS-SCNC: 12 MMOL/L (ref 9–17)
BUN BLDV-MCNC: 21 MG/DL (ref 8–23)
BUN/CREAT BLD: ABNORMAL (ref 9–20)
CALCIUM SERPL-MCNC: 9.2 MG/DL (ref 8.6–10.4)
CHLORIDE BLD-SCNC: 105 MMOL/L (ref 98–107)
CO2: 26 MMOL/L (ref 20–31)
CREAT SERPL-MCNC: 0.98 MG/DL (ref 0.5–0.9)
GFR AFRICAN AMERICAN: >60 ML/MIN
GFR NON-AFRICAN AMERICAN: 55 ML/MIN
GFR SERPL CREATININE-BSD FRML MDRD: ABNORMAL ML/MIN/{1.73_M2}
GFR SERPL CREATININE-BSD FRML MDRD: ABNORMAL ML/MIN/{1.73_M2}
GLUCOSE BLD-MCNC: 189 MG/DL (ref 70–99)
POTASSIUM SERPL-SCNC: 4.8 MMOL/L (ref 3.7–5.3)
SODIUM BLD-SCNC: 143 MMOL/L (ref 135–144)

## 2021-04-21 ENCOUNTER — HOSPITAL ENCOUNTER (EMERGENCY)
Age: 76
Discharge: HOME | End: 2021-04-21
Attending: EMERGENCY MEDICINE
Payer: MEDICARE

## 2021-04-21 VITALS
DIASTOLIC BLOOD PRESSURE: 50 MMHG | SYSTOLIC BLOOD PRESSURE: 144 MMHG | WEIGHT: 125 LBS | HEIGHT: 63 IN | HEART RATE: 64 BPM | OXYGEN SATURATION: 99 % | RESPIRATION RATE: 16 BRPM | TEMPERATURE: 97.7 F | BODY MASS INDEX: 22.15 KG/M2

## 2021-04-21 DIAGNOSIS — S40.812A ABRASION OF SKIN OF LEFT UPPER ARM: Primary | ICD-10-CM

## 2021-04-21 LAB
ABSOLUTE BANDS #: 0.03 K/UL (ref 0–1)
ABSOLUTE EOS #: 0.03 K/UL (ref 0–0.4)
ABSOLUTE IMMATURE GRANULOCYTE: ABNORMAL K/UL (ref 0–0.3)
ABSOLUTE LYMPH #: 0.58 K/UL (ref 1–4.8)
ABSOLUTE MONO #: 0.2 K/UL (ref 0.1–1.3)
ANION GAP SERPL CALCULATED.3IONS-SCNC: 12 MMOL/L (ref 9–17)
BANDS: 1 % (ref 0–10)
BASOPHILS # BLD: 0 % (ref 0–2)
BASOPHILS ABSOLUTE: 0 K/UL (ref 0–0.2)
BUN BLDV-MCNC: 26 MG/DL (ref 8–23)
BUN/CREAT BLD: ABNORMAL (ref 9–20)
CALCIUM SERPL-MCNC: 9.5 MG/DL (ref 8.6–10.4)
CHLORIDE BLD-SCNC: 108 MMOL/L (ref 98–107)
CO2: 24 MMOL/L (ref 20–31)
CREAT SERPL-MCNC: 0.9 MG/DL (ref 0.5–0.9)
DIFFERENTIAL TYPE: ABNORMAL
EOSINOPHILS RELATIVE PERCENT: 1 % (ref 0–4)
GFR AFRICAN AMERICAN: >60 ML/MIN
GFR NON-AFRICAN AMERICAN: >60 ML/MIN
GFR SERPL CREATININE-BSD FRML MDRD: ABNORMAL ML/MIN/{1.73_M2}
GFR SERPL CREATININE-BSD FRML MDRD: ABNORMAL ML/MIN/{1.73_M2}
GLUCOSE BLD-MCNC: 188 MG/DL (ref 70–99)
HCT VFR BLD CALC: 37.9 % (ref 36–46)
HEMOGLOBIN: 12.5 G/DL (ref 12–16)
IMMATURE GRANULOCYTES: ABNORMAL %
INR BLD: 0.9
LYMPHOCYTES # BLD: 17 % (ref 24–44)
MCH RBC QN AUTO: 30.2 PG (ref 26–34)
MCHC RBC AUTO-ENTMCNC: 32.9 G/DL (ref 31–37)
MCV RBC AUTO: 91.8 FL (ref 80–100)
MONOCYTES # BLD: 6 % (ref 1–7)
MORPHOLOGY: NORMAL
NRBC AUTOMATED: ABNORMAL PER 100 WBC
PARTIAL THROMBOPLASTIN TIME: 29.3 SEC (ref 24–36)
PDW BLD-RTO: 13.7 % (ref 11.5–14.9)
PLATELET # BLD: 169 K/UL (ref 150–450)
PLATELET ESTIMATE: ABNORMAL
PMV BLD AUTO: 7.5 FL (ref 6–12)
POTASSIUM SERPL-SCNC: 4.2 MMOL/L (ref 3.7–5.3)
PROTHROMBIN TIME: 12.7 SEC (ref 11.8–14.6)
RBC # BLD: 4.13 M/UL (ref 4–5.2)
RBC # BLD: ABNORMAL 10*6/UL
SEG NEUTROPHILS: 75 % (ref 36–66)
SEGMENTED NEUTROPHILS ABSOLUTE COUNT: 2.56 K/UL (ref 1.3–9.1)
SODIUM BLD-SCNC: 144 MMOL/L (ref 135–144)
WBC # BLD: 3.4 K/UL (ref 3.5–11)
WBC # BLD: ABNORMAL 10*3/UL

## 2021-04-21 PROCEDURE — 85730 THROMBOPLASTIN TIME PARTIAL: CPT

## 2021-04-21 PROCEDURE — 99283 EMERGENCY DEPT VISIT LOW MDM: CPT

## 2021-04-21 PROCEDURE — 36415 COLL VENOUS BLD VENIPUNCTURE: CPT

## 2021-04-21 PROCEDURE — 85025 COMPLETE CBC W/AUTO DIFF WBC: CPT

## 2021-04-21 PROCEDURE — 85610 PROTHROMBIN TIME: CPT

## 2021-04-21 PROCEDURE — 80048 BASIC METABOLIC PNL TOTAL CA: CPT

## 2021-04-21 RX ORDER — THROMB-CAL-CELL-DRESSING,HEMOS 3" X 3"
1 PADS, MEDICATED (EA) TOPICAL PRN
Status: DISCONTINUED | OUTPATIENT
Start: 2021-04-21 | End: 2021-04-21 | Stop reason: HOSPADM

## 2021-04-21 ASSESSMENT — ENCOUNTER SYMPTOMS
ABDOMINAL PAIN: 0
SHORTNESS OF BREATH: 0
EYE PAIN: 0
COLOR CHANGE: 0
BACK PAIN: 0

## 2021-04-21 NOTE — ED NOTES
Discharge instructions reviewed with patient with all questions asked and answered. Patient transferred off ED unit without incident.       Rebecca Orr RN  04/21/21 [id number]

## 2021-04-21 NOTE — ED PROVIDER NOTES
EMERGENCY DEPARTMENT ENCOUNTER    Pt Name: Tae Herrmann  MRN: [id number]  [place] 1945  Date of evaluation: 4/21/21  CHIEF COMPLAINT       Chief Complaint   Patient presents with    Wound Check     bleeding from arm     HISTORY OF PRESENT ILLNESS   67 y/o female presents for complaint of bleeding from her glucose monitoring site. Patient reports that her glucose monitor fell off her right arm, states she went to exchange it and put on her left arm, states that she developed bleeding from that area, states that she took it off and put it back on the right arm, states that she been having persistent oozing from the site since then. Patient reports that she does take Plavix and aspirin, denies any other blood thinner use, denies any other complaints, denies any other injuries, denies any chest pain, shortness of breath nausea vomiting dizziness or lightheadedness. The history is provided by the patient. REVIEW OF SYSTEMS     Review of Systems   Constitutional: Negative for fever. HENT: Negative for congestion and ear pain. Eyes: Negative for pain. Respiratory: Negative for shortness of breath. Cardiovascular: Negative for chest pain, palpitations and leg swelling. Gastrointestinal: Negative for abdominal pain. Genitourinary: Negative for dysuria and flank pain. Musculoskeletal: Negative for back pain. Skin: Positive for wound. Negative for color change. Neurological: Negative for numbness and headaches. Psychiatric/Behavioral: Negative for confusion. All other systems reviewed and are negative.     PASTMEDICAL HISTORY     Past Medical History:   Diagnosis Date    Abnormal cardiovascular stress test 06/2020    no ischemia / infarction   there is septal hypokinesis   /  EF 70%    CAD (coronary artery disease)     Chest pressure     Fatigue     History of pneumonia 03/2020    HTN (hypertension)     Hyperlipidemia     Positive cardiac stress test     SOB (shortness of breath)     Type 2 diabetes mellitus without complication [facility])      Past Problem List  Patient Active Problem List   Diagnosis Code    Diabetic retinopathy ([facility] E11.319    Hyperlipidemia E78.5    Osteoporosis M81.0    Type 2 diabetes mellitus without complication, with long-term current use of insulin ([facility] E11.9, Z79.4    Allergic rhinitis J30.9    Coronary artery disease of native heart with stable angina pectoris ([facility] I25.118    Vitamin D deficiency E55.9    Peripheral polyneuropathy G62.9    Elevated liver enzymes R74.8     SURGICAL HISTORY       Past Surgical History:   Procedure Laterality Date    APPENDECTOMY      CARDIAC CATHETERIZATION  03/09/2021    CHOLECYSTECTOMY      COLONOSCOPY      CORONARY ANGIOPLASTY WITH STENT PLACEMENT  08/14/2020    High grade stenosis in Proximal RCA s/p successful PCI with DELIA (3.25 X 12 mm)    HYSTERECTOMY      HYSTERECTOMY, VAGINAL      left both ovaries in     CURRENT MEDICATIONS       Previous Medications    AMLODIPINE (NORVASC) 5 MG TABLET    Take 1 tablet by mouth nightly    ASPIRIN 81 MG CHEWABLE TABLET    Take 81 mg by mouth daily    ATORVASTATIN (LIPITOR) 40 MG TABLET    Take 1 tablet by mouth daily    B-D UF III MINI PEN NEEDLES 31G X 5 MM MISC        CICLOPIROX (PENLAC) 8 % SOLUTION    Apply topically nightly. Remove once weekly with alcohol or nail polish remover. CLOPIDOGREL (PLAVIX) 75 MG TABLET    Take 1 tablet by mouth daily    COENZYME Q10 (CO Q-10) 100 MG CAPS    Take 1 capsule by mouth daily    GABAPENTIN (NEURONTIN) 100 MG CAPSULE    Take 1 capsule by mouth daily.     INSULIN GLARGINE (TOUJEO SOLOSTAR) 300 UNIT/ML INJECTION PEN    Inject 11 Units into the skin every morning     INSULIN LISPRO, 1 UNIT DIAL, (HUMALOG KWIKPEN) 100 UNIT/ML SOPN    Inject into the skin 3 times daily PER SLIDING SCALE    ISOSORBIDE MONONITRATE (IMDUR) 30 MG EXTENDED RELEASE TABLET    Take 1 tablet by mouth daily    LOSARTAN (COZAAR) 50 MG TABLET    Take 50 mg by mouth daily    METOPROLOL SUCCINATE (TOPROL XL) 25 MG EXTENDED RELEASE TABLET    Take 1 tablet by mouth daily    MULTIPLE VITAMINS TABS    Take 1 tablet by mouth daily     ALLERGIES     has No Known Allergies. FAMILY HISTORY     She indicated that her mother is . She indicated that the status of her brother is unknown. She indicated that the status of her maternal grandmother is unknown. She indicated that the status of her maternal aunt is unknown. She indicated that the status of her maternal uncle is unknown. SOCIAL HISTORY       Social History     Tobacco Use    Smoking status: Never Smoker    Smokeless tobacco: Never Used   Substance Use Topics    Alcohol use: No     Alcohol/week: 0.0 standard drinks    Drug use: No     PHYSICAL EXAM     INITIAL VITALS: BP (!) 144/50   Pulse 64   Temp 97.7 °F (36.5 °C) (Oral)   Resp 16   Ht 5' 3\" (1.6 m)   Wt 125 lb (56.7 kg)   SpO2 99%   BMI 22.14 kg/m²    Physical Exam  Vitals signs and nursing note reviewed. Constitutional:       General: She is not in acute distress. Appearance: Normal appearance. She is not toxic-appearing. HENT:      Head: Normocephalic and atraumatic. Nose: Nose normal.      Mouth/Throat:      Mouth: Mucous membranes are moist.      Pharynx: Oropharynx is clear. Eyes:      Extraocular Movements: Extraocular movements intact. Conjunctiva/sclera: Conjunctivae normal.   Neck:      Musculoskeletal: Normal range of motion. Cardiovascular:      Rate and Rhythm: Normal rate and regular rhythm. Pulses: Normal pulses. Heart sounds: Normal heart sounds. Pulmonary:      Effort: Pulmonary effort is normal.      Breath sounds: Normal breath sounds. Abdominal:      General: Bowel sounds are normal. There is no distension. Palpations: Abdomen is soft. Tenderness: There is no abdominal tenderness. Musculoskeletal: Normal range of motion. Skin:     General: Skin is warm and dry.       Capillary Refill: Capillary refill takes less than 2 seconds. Neurological:      General: No focal deficit present. Mental Status: She is alert. Psychiatric:         Mood and Affect: Mood normal.         MEDICAL DECISION MAKIN-year-old female presents for complaint of bleeding from her glucose monitoring site. Patient reports that her glucose monitor fell off of her right arm and she tried to put on her left arm, states that she was due to go back off her left arm she been having bleeding from the site since then. Initial exam patient does have a small superficial scrape to the posterior left upper arm, no pulsatile bleeding, oozing from the site noted, patient does take Plavix, will check basic blood work to assess coags and platelet count, pressure was held on the site, Dermabond was used to close site, bleeding was resolved after Dermabond application, thrombin pad was also ordered if needed    She did have wrap placed on the arm during triage, patient reports that she did develop discoloration arm, patient was noted to have petechiae on her arm likely secondary to the wrap being tight on the arm, discussed with patient to continue to observe it, should resolve on its own in a few days    Labs reviewed and unremarkable    Patient was reevaluated bleeding has stopped, discussed with patient that the Dermabond will come off in a few days, discussed concern continue supportive care, discussed need for follow-up with her PCP and return precautions, patient voices understanding is comfortable with discharge home    Patient/Guardian was informed of their diagnosis and told to follow up with PCP  in 1-3 days. Patient demonstrates understanding and agreement with the plan. They were given the opportunity to ask questions and those questions were answered to the best of our ability with the available information. Patient/Guardian told to return to the ED for any new, worsening, changing or persistent symptoms. This dictation was prepared using [employer] voice recognition software. CRITICAL CARE:       PROCEDURES:    Procedures    DIAGNOSTIC RESULTS   EKG:All EKG's are interpreted by the Emergency Department Physician who either signs or Co-signs this chart in the absence of a cardiologist.        RADIOLOGY:All plain film, CT, MRI, and formal ultrasound images (except ED bedside ultrasound) are read by the radiologist, see reports below, unless otherwisenoted in MDM or here. No orders to display     LABS: All lab results were reviewed by myself, and all abnormals are listed below. Labs Reviewed   CBC WITH AUTO DIFFERENTIAL - Abnormal; Notable for the following components:       Result Value    WBC 3.4 (*)     Seg Neutrophils 75 (*)     Lymphocytes 17 (*)     Absolute Lymph # 0.58 (*)     All other components within normal limits   BASIC METABOLIC PANEL W/ REFLEX TO MG FOR LOW K - Abnormal; Notable for the following components:    Glucose 188 (*)     BUN 26 (*)     Chloride 108 (*)     All other components within normal limits   APTT   PROTIME-INR       EMERGENCY DEPARTMENTCOURSE:         Vitals:    Vitals:    04/21/21 1610   BP: (!) 144/50   Pulse: 64   Resp: 16   Temp: 97.7 °F (36.5 °C)   TempSrc: Oral   SpO2: 99%   Weight: 125 lb (56.7 kg)   Height: 5' 3\" (1.6 m)       The patient was given the following medications while in the emergency department:  Orders Placed This Encounter   Medications    Thrombi-Pad 3\"X3\" PADS 1 each     CONSULTS:  None    FINAL IMPRESSION      1.  Abrasion of skin of left upper arm          DISPOSITION/PLAN   DISPOSITION Decision To Discharge 04/21/2021 05:31:28 PM      PATIENT REFERRED TO:  Suresh Preciado MD  [facility] [phone]    Schedule an appointment as soon as possible for a visit       [facility] ED  [place] [id number]  [address] [place] [address]  [phone]    As needed, If symptoms worsen    DISCHARGE MEDICATIONS:  New Prescriptions    No medications on file     Lori Doty DO  Attending Emergency Physician                  Lori Doty DO  04/21/21 [id number]

## 2021-06-03 ENCOUNTER — OFFICE VISIT (OUTPATIENT)
Dept: FAMILY MEDICINE CLINIC | Age: 76
End: 2021-06-03
Payer: MEDICARE

## 2021-06-03 VITALS
OXYGEN SATURATION: 98 % | SYSTOLIC BLOOD PRESSURE: 124 MMHG | DIASTOLIC BLOOD PRESSURE: 62 MMHG | HEART RATE: 52 BPM | RESPIRATION RATE: 16 BRPM | BODY MASS INDEX: 22.14 KG/M2 | WEIGHT: 125 LBS

## 2021-06-03 DIAGNOSIS — E11.9 TYPE 2 DIABETES MELLITUS WITHOUT COMPLICATION, WITH LONG-TERM CURRENT USE OF INSULIN (HCC): ICD-10-CM

## 2021-06-03 DIAGNOSIS — E78.00 PURE HYPERCHOLESTEROLEMIA: Primary | ICD-10-CM

## 2021-06-03 DIAGNOSIS — Z79.4 TYPE 2 DIABETES MELLITUS WITHOUT COMPLICATION, WITH LONG-TERM CURRENT USE OF INSULIN (HCC): ICD-10-CM

## 2021-06-03 DIAGNOSIS — I10 ESSENTIAL HYPERTENSION: ICD-10-CM

## 2021-06-03 DIAGNOSIS — E55.9 VITAMIN D DEFICIENCY: ICD-10-CM

## 2021-06-03 DIAGNOSIS — E11.3599 PROLIFERATIVE DIABETIC RETINOPATHY ASSOCIATED WITH TYPE 2 DIABETES MELLITUS, MACULAR EDEMA PRESENCE UNSPECIFIED, UNSPECIFIED LATERALITY (HCC): ICD-10-CM

## 2021-06-03 DIAGNOSIS — G62.9 PERIPHERAL POLYNEUROPATHY: ICD-10-CM

## 2021-06-03 DIAGNOSIS — I25.118 CORONARY ARTERY DISEASE OF NATIVE HEART WITH STABLE ANGINA PECTORIS, UNSPECIFIED VESSEL OR LESION TYPE (HCC): ICD-10-CM

## 2021-06-03 DIAGNOSIS — R74.8 ELEVATED LIVER ENZYMES: ICD-10-CM

## 2021-06-03 PROCEDURE — 99214 OFFICE O/P EST MOD 30 MIN: CPT | Performed by: FAMILY MEDICINE

## 2021-06-03 SDOH — ECONOMIC STABILITY: FOOD INSECURITY: WITHIN THE PAST 12 MONTHS, THE FOOD YOU BOUGHT JUST DIDN'T LAST AND YOU DIDN'T HAVE MONEY TO GET MORE.: NEVER TRUE

## 2021-06-03 SDOH — ECONOMIC STABILITY: FOOD INSECURITY: WITHIN THE PAST 12 MONTHS, YOU WORRIED THAT YOUR FOOD WOULD RUN OUT BEFORE YOU GOT MONEY TO BUY MORE.: NEVER TRUE

## 2021-06-03 ASSESSMENT — ENCOUNTER SYMPTOMS
BACK PAIN: 0
VOMITING: 0
CHEST TIGHTNESS: 0
CONSTIPATION: 0
DIARRHEA: 0
SORE THROAT: 0
ABDOMINAL DISTENTION: 0
SHORTNESS OF BREATH: 0
COUGH: 0
RHINORRHEA: 0
NAUSEA: 0
ABDOMINAL PAIN: 0

## 2021-06-03 ASSESSMENT — SOCIAL DETERMINANTS OF HEALTH (SDOH): HOW HARD IS IT FOR YOU TO PAY FOR THE VERY BASICS LIKE FOOD, HOUSING, MEDICAL CARE, AND HEATING?: NOT HARD AT ALL

## 2021-06-03 NOTE — PROGRESS NOTES
Billie Valdez MD    [address] [place] [address]  [id number], [place]  [address] [address]  Dept: [phone]  Dept Fax: [phone]     Patient ID: Sonja Benites is a 68 y.o. female. HPI    Established patient here today for f/u on chronic medical problems, go over labs and/or diagnostic studies, and medication refills. Pt denies any fever or chills. Pt today denies any HA, chest pain, or SOB. Pt denies any N/V/D/C or abdominal pain. Pt does follow with Dr. Nicolette Monteiro for her DM and her last HGBA1C was 6.6%. Her BS's are running 120-140's. She did have a recent cardiac cath and showed normal coronaries. She does get intermittent chest pain. Otherwise pt doing well on current tx and no other concerns today. The patient's past medical, surgical, social, and family history as well as his current medications and allergies were reviewed as documented in today's encounter. My previous office notes, labs and diagnostic studies were reviewed prior to and during encounter. Current Outpatient Medications on File Prior to Visit   Medication Sig Dispense Refill    Coenzyme Q10 (CO Q-10) 100 MG CAPS Take 1 capsule by mouth daily      amLODIPine (NORVASC) 5 MG tablet Take 1 tablet by mouth nightly 30 tablet 2    isosorbide mononitrate (IMDUR) 30 MG extended release tablet Take 1 tablet by mouth daily 90 tablet 3    B-D UF III MINI PEN NEEDLES 31G X 5 MM MISC       ciclopirox (PENLAC) 8 % solution Apply topically nightly. Remove once weekly with alcohol or nail polish remover. 1 Bottle 3    atorvastatin (LIPITOR) 40 MG tablet Take 1 tablet by mouth daily 90 tablet 1    clopidogrel (PLAVIX) 75 MG tablet Take 1 tablet by mouth daily 30 tablet 5    metoprolol succinate (TOPROL XL) 25 MG extended release tablet Take 1 tablet by mouth daily 90 tablet 1    gabapentin (NEURONTIN) 100 MG capsule Take 1 capsule by mouth daily.       insulin lispro, 1 Unit Effort: Pulmonary effort is normal. No respiratory distress. Breath sounds: Normal breath sounds. No wheezing. Chest:      Chest wall: No tenderness. Abdominal:      General: Bowel sounds are normal. There is no distension. Palpations: Abdomen is soft. There is no mass. Tenderness: There is no abdominal tenderness. Musculoskeletal:         General: No tenderness. Normal range of motion. Cervical back: Normal range of motion. Lymphadenopathy:      Cervical: No cervical adenopathy. Skin:     Findings: No rash. Neurological:      Mental Status: She is alert and oriented to person, place, and time. Deep Tendon Reflexes: Reflexes are normal and symmetric. Psychiatric:         Behavior: Behavior normal.         Assessment:      Diagnosis Orders   1. Pure hypercholesterolemia  Lipid Panel   2. Type 2 diabetes mellitus without complication, with long-term current use of insulin ([facility])  CBC    Comprehensive Metabolic Panel   3. Coronary artery disease of native heart with stable angina pectoris, unspecified vessel or lesion type [facility])  Comprehensive Metabolic Panel    Lipid Panel   4. Vitamin D deficiency  Vitamin D 25 Hydroxy   5. Elevated liver enzymes  Comprehensive Metabolic Panel   6. Peripheral polyneuropathy  Comprehensive Metabolic Panel   7. Proliferative diabetic retinopathy associated with type 2 diabetes mellitus, macular edema presence unspecified, unspecified laterality ([facility]     8.  Essential hypertension           Plan:     Orders Placed This Encounter   Procedures    CBC     Standing Status:   Future     Standing Expiration Date:   6/3/2022    Comprehensive Metabolic Panel     Standing Status:   Future     Standing Expiration Date:   6/3/2022    Lipid Panel     Standing Status:   Future     Standing Expiration Date:   6/3/2022     Order Specific Question:   Is Patient Fasting?/# of Hours     Answer:   8-10 Hours    Vitamin D 25 Hydroxy     Standing Status:   Future Standing Expiration Date:   6/3/2022        Will cont with current treatment as pt is currently stable on current treatment    Continue with ADA diet, current diabetic meds, and monitoring BS's as instructed    Cont with daily foot exams and yearly eye exams    Will cont with Cozaar for renal protection    Will cont with low fat/chol diet and Lipitor as ordered    Will cont with the Norvasc, Cozaar, Toprol, and Imdur as prescribed for BP control    Will cont with the Gabapentin as prescribed for her DPN    Will cont to follow with Cardiology as instructed    Will cont to follow with Opthlamalogy as instructed    Rest of systems unchanged, continue current treatments. Medications, labs, diagnostic studies, consultations and follow-up as documented in this encounter. Rest of systems unchanged, continue current treatments    On this date Justine 3, 2021,  I have spent greater than 50% of visit reviewing previous notes, test results and face to face with the patient discussing the diagnoses, importance of compliance with the treatment plan, counseling, coordinating care as well as documenting on the day of the visit. Billie Vee MD

## 2021-06-09 ENCOUNTER — OFFICE VISIT (OUTPATIENT)
Dept: PODIATRY | Age: 76
End: 2021-06-09
Payer: MEDICARE

## 2021-06-09 VITALS — WEIGHT: 125 LBS | HEIGHT: 63 IN | BODY MASS INDEX: 22.15 KG/M2

## 2021-06-09 DIAGNOSIS — I73.9 PVD (PERIPHERAL VASCULAR DISEASE) (HCC): ICD-10-CM

## 2021-06-09 DIAGNOSIS — M79.671 PAIN IN BOTH FEET: ICD-10-CM

## 2021-06-09 DIAGNOSIS — M79.672 PAIN IN BOTH FEET: ICD-10-CM

## 2021-06-09 DIAGNOSIS — B35.1 DERMATOPHYTOSIS OF NAIL: ICD-10-CM

## 2021-06-09 DIAGNOSIS — E11.51 TYPE II DIABETES MELLITUS WITH PERIPHERAL CIRCULATORY DISORDER (HCC): Primary | ICD-10-CM

## 2021-06-09 PROCEDURE — 11721 DEBRIDE NAIL 6 OR MORE: CPT | Performed by: PODIATRIST

## 2021-06-09 NOTE — PROGRESS NOTES
[address] [facility] [address] [id number] [address]  Dept: [phone]    DIABETIC PROGRESS NOTE  Date of patient's visit: 6/9/2021  Patient's Name:  Farhat Santana YOB: 1945            Patient Care Team:  Hoang Rendon MD as PCP - General (Family Medicine)  Hoang Rendon MD as PCP - [facility] Empaneled Provider  Benjamin Mckeon as Consulting Physician (Endocrinology)  Ming Esteban MD as Consulting Physician (Ophthalmology)  Santiago Waldron DO as Consulting Physician (Cardiology)  Leonarda Alvarado DPM as Consulting Physician (Podiatry)          Chief Complaint   Patient presents with    Diabetes    Nail Problem    Peripheral Neuropathy       Subjective:   Farhat Santana comes to clinic for Diabetes, Nail Problem, and Peripheral Neuropathy    she is a diabetic and states that she uses lotion to her feet. Pt currently has complaint of thickened, elongated nails that they cannot manage by themselves. Pt's primary care physician is Hoang Rendon MD last seen Justine 3 2021   Pt's last blood sugar was 119 . Pt has a new complaint of none today . Lab Results   Component Value Date    LABA1C 6.8 (H) 06/22/2020      Complains of numbness in the feet bilat.   Past Medical History:   Diagnosis Date    Abnormal cardiovascular stress test 06/2020    no ischemia / infarction   there is septal hypokinesis   /  EF 70%    CAD (coronary artery disease)     Chest pressure     Fatigue     History of pneumonia 03/2020    HTN (hypertension)     Hyperlipidemia     Positive cardiac stress test     SOB (shortness of breath)     Type 2 diabetes mellitus without complication (HCC)        No Known Allergies  Current Outpatient Medications on File Prior to Visit   Medication Sig Dispense Refill    Coenzyme Q10 (CO Q-10) 100 MG CAPS Take 1 capsule by mouth daily      amLODIPine (NORVASC) 5 MG tablet Take 1 tablet by mouth nightly 30 tablet 2    isosorbide mononitrate (IMDUR) 30 MG extended release tablet Take 1 tablet by mouth daily 90 tablet 3    B-D UF III MINI PEN NEEDLES 31G X 5 MM MISC       ciclopirox (PENLAC) 8 % solution Apply topically nightly. Remove once weekly with alcohol or nail polish remover. 1 Bottle 3    atorvastatin (LIPITOR) 40 MG tablet Take 1 tablet by mouth daily 90 tablet 1    clopidogrel (PLAVIX) 75 MG tablet Take 1 tablet by mouth daily 30 tablet 5    metoprolol succinate (TOPROL XL) 25 MG extended release tablet Take 1 tablet by mouth daily 90 tablet 1    gabapentin (NEURONTIN) 100 MG capsule Take 1 capsule by mouth daily.  insulin lispro, 1 Unit Dial, (HUMALOG KWIKPEN) 100 UNIT/ML SOPN Inject into the skin 3 times daily PER SLIDING SCALE      insulin glargine (TOUJEO SOLOSTAR) 300 UNIT/ML injection pen Inject 11 Units into the skin every morning       aspirin 81 MG chewable tablet Take 81 mg by mouth daily      losartan (COZAAR) 50 MG tablet Take 50 mg by mouth daily      Multiple Vitamins TABS Take 1 tablet by mouth daily       No current facility-administered medications on file prior to visit. Review of Systems    Review of Systems:   History obtained from chart review and the patient  General ROS: negative for - chills, fatigue, fever, night sweats or weight gain  Constitutional: Negative for chills, diaphoresis, fatigue, fever and unexpected weight change. Musculoskeletal: Positive for arthralgias, gait problem and joint swelling. Neurological ROS: negative for - behavioral changes, confusion, headaches or seizures. Negative for weakness and numbness. Dermatological ROS: negative for - mole changes, rash  Cardiovascular: Negative for leg swelling. Gastrointestinal: Negative for constipation, diarrhea, nausea and vomiting. Objective:  Dermatologic Exam:  Skin lesion/ulceration Absent . Skin No rashes or nodules noted. .   Skin is thin, with flaky sloughing skin as well as decreased hair growth to the Left    Inflammation/Pain   [x] [x] [x] [x] [x] [x] [x] [x] [x] [x]  5 4 3 2 1 1 2 3 4 5                         Right                                        Left        Visual inspection:  Deformity: hammertoe deformity akosua feet  amputation: absent  Skin lesions: absent  Edema: right- 2+ pitting edema, left- 2+ pitting edema    Sensory exam:  Monofilament sensation: abnormal - 6/10 via SW 5.07/10g monofilament to the plantar foot bilateral feet    Pulses: abnormal - 1/4 dorsalis pedis pulse and 1/4 Posterior tibial pulse,   Pinprick: Impaired  Proprioception: Impaired  Vibration (128 Hz): Impaired       DM with PVD       [x]Yes    []No      Assessment:  68 y.o. female with:   Diagnosis Orders   1. Type II diabetes mellitus with peripheral circulatory disorder (HCC)  36622 - [place] DEBRIDEMENT OF NAILS, 6 OR MORE    HM DIABETES FOOT EXAM   2. Dermatophytosis of nail  11446 - UT DEBRIDEMENT OF NAILS, 6 OR MORE    HM DIABETES FOOT EXAM   3. PVD (peripheral vascular disease) ([facility])  72817 - UT DEBRIDEMENT OF NAILS, 6 OR MORE    HM DIABETES FOOT EXAM   4. Pain in both feet  71346 - UT DEBRIDEMENT OF NAILS, 6 OR MORE    HM DIABETES FOOT EXAM             Q7   []Yes    []No                Q8   [x]Yes    []No                     Q9   []Yes    []No    Plan:   Pt was evaluated and examined. Patient was given personalized discharge instructions. Nails 1-10 were debrided sharply in length and thickness with a nipper and , without incident. Pt will follow up in 9 weeks or sooner if any problems arise. Diagnosis was discussed with the pt and all of their questions were answered in detail. Proper foot hygiene and care was discussed with the pt. Informed patient on proper diabetic foot care and importance of tight glycemic control. Patient to check feet daily and contact the office with any questions/problems/concerns.    Other comorbidity noted and will be managed by PCP.  6/9/2021    Electronically signed by Rosa Nava Gonzalo Cutting on 6/9/2021 at 9:06 AM  6/9/2021

## 2021-08-11 ENCOUNTER — OFFICE VISIT (OUTPATIENT)
Dept: PODIATRY | Age: 76
End: 2021-08-11
Payer: MEDICARE

## 2021-08-11 VITALS — BODY MASS INDEX: 22.15 KG/M2 | WEIGHT: 125 LBS | HEIGHT: 63 IN

## 2021-08-11 DIAGNOSIS — M79.671 PAIN IN BOTH FEET: ICD-10-CM

## 2021-08-11 DIAGNOSIS — B35.1 DERMATOPHYTOSIS OF NAIL: ICD-10-CM

## 2021-08-11 DIAGNOSIS — I73.9 PVD (PERIPHERAL VASCULAR DISEASE) (HCC): ICD-10-CM

## 2021-08-11 DIAGNOSIS — M79.672 PAIN IN BOTH FEET: ICD-10-CM

## 2021-08-11 DIAGNOSIS — E11.51 TYPE II DIABETES MELLITUS WITH PERIPHERAL CIRCULATORY DISORDER (HCC): Primary | ICD-10-CM

## 2021-08-11 PROCEDURE — 11721 DEBRIDE NAIL 6 OR MORE: CPT | Performed by: PODIATRIST

## 2021-08-11 NOTE — PROGRESS NOTES
[address] [facility] [address] [id number] [address]  Dept: [phone]    DIABETIC PROGRESS NOTE  Date of patient's visit: 8/11/2021  Patient's Name:  Everardo Kaur YOB: 1945            Patient Care Team:  Barb Garay MD as PCP - General (Family Medicine)  Barb Garay MD as PCP - [facility]  Timmy Thomas as Consulting Physician (Endocrinology)  Donny Payne MD as Consulting Physician (Ophthalmology)  Nikita Blankenship DO as Consulting Physician (Cardiology)  Martha Hnesley DPM as Consulting Physician (Podiatry)          Chief Complaint   Patient presents with    Diabetes    Nail Problem    Peripheral Neuropathy       Subjective:   Everardo Kaur comes to clinic for Diabetes, Nail Problem, and Peripheral Neuropathy    she is a diabetic and states that she checks her feet daily . Pt currently has complaint of thickened, elongated nails that they cannot manage by themselves. Pt's primary care physician is Barb Garay MD last seen Justine 3 2021   Pt's last blood sugar was 199 . Pt has a new complaint of none today . Lab Results   Component Value Date    LABA1C 6.8 (H) 06/22/2020      Complains of numbness in the feet bilat.   Past Medical History:   Diagnosis Date    Abnormal cardiovascular stress test 06/2020    no ischemia / infarction   there is septal hypokinesis   /  EF 70%    CAD (coronary artery disease)     Chest pressure     Fatigue     History of pneumonia 03/2020    HTN (hypertension)     Hyperlipidemia     Positive cardiac stress test     SOB (shortness of breath)     Type 2 diabetes mellitus without complication (HCC)        No Known Allergies  Current Outpatient Medications on File Prior to Visit   Medication Sig Dispense Refill    Coenzyme Q10 (CO Q-10) 100 MG CAPS Take 1 capsule by mouth daily      amLODIPine (NORVASC) 5 MG tablet Take 1 tablet by mouth nightly 30 tablet 2    isosorbide mononitrate (IMDUR) 30 MG extended release tablet Take 1 tablet by mouth daily 90 tablet 3    B-D UF III MINI PEN NEEDLES 31G X 5 MM MISC       ciclopirox (PENLAC) 8 % solution Apply topically nightly. Remove once weekly with alcohol or nail polish remover. 1 Bottle 3    atorvastatin (LIPITOR) 40 MG tablet Take 1 tablet by mouth daily 90 tablet 1    clopidogrel (PLAVIX) 75 MG tablet Take 1 tablet by mouth daily 30 tablet 5    metoprolol succinate (TOPROL XL) 25 MG extended release tablet Take 1 tablet by mouth daily 90 tablet 1    gabapentin (NEURONTIN) 100 MG capsule Take 1 capsule by mouth daily.  insulin lispro, 1 Unit Dial, (HUMALOG KWIKPEN) 100 UNIT/ML SOPN Inject into the skin 3 times daily PER SLIDING SCALE      insulin glargine (TOUJEO SOLOSTAR) 300 UNIT/ML injection pen Inject 11 Units into the skin every morning       aspirin 81 MG chewable tablet Take 81 mg by mouth daily      losartan (COZAAR) 50 MG tablet Take 50 mg by mouth daily      Multiple Vitamins TABS Take 1 tablet by mouth daily       No current facility-administered medications on file prior to visit. Review of Systems    Review of Systems:   History obtained from chart review and the patient  General ROS: negative for - chills, fatigue, fever, night sweats or weight gain  Constitutional: Negative for chills, diaphoresis, fatigue, fever and unexpected weight change. Musculoskeletal: Positive for arthralgias, gait problem and joint swelling. Neurological ROS: negative for - behavioral changes, confusion, headaches or seizures. Negative for weakness and numbness. Dermatological ROS: negative for - mole changes, rash  Cardiovascular: Negative for leg swelling. Gastrointestinal: Negative for constipation, diarrhea, nausea and vomiting. Objective:  Dermatologic Exam:  Skin lesion/ulceration Absent . Skin No rashes or nodules noted. .   Skin is thin, with flaky sloughing skin as well as decreased hair growth to the lower leg  Small red hemosiderin deposits seen dorsal foot   Musculoskeletal:     1st MPJ ROM decreased, Bilateral.  Muscle strength 5/5, Bilateral.  Pain present upon palpation of toenails 1-5, Bilateral. decreased medial longitudinal arch, Bilateral.  Ankle ROM decreased,Bilateral.    Dorsally contracted digits present digits 2, Bilateral.     Vascular: DP pulses 1/4 bilateral.  PT pulses 0/4 bilateral.   CFT <5 seconds, Bilateral.  Hair growth absent to the level of the digits, Bilateral.  Edema present, Bilateral.  Varicosities absent, Bilateral. Erythema absent, Bilateral    Neurological: Sensation diminshed to light touch to level of digits, Bilateral.  Protective sensation intact 6/10 sites via 5.07/10g [place] Monofilament, Bilateral.  negative Tinel's, Bilateral.  negative Valleix sign, Bilateral.      Integument: Warm, dry, supple, Bilateral.  Open lesion absent, Bilateral.  Interdigital maceration absent to web spaces 4, Bilateral.  Nails 1-5 left and 1-5 right thickened > 3.0 mm, dystrophic and crumbly, discolored with subungual debris. Fissures absent, Bilateral.   General: AAO x 3 in NAD.     Derm  Toenail Description  Sites of Onychomycosis Involvement (Check affected area)  [x] [x] [x] [x] [x] [x] [x] [x] [x] [x]  5 4 3 2 1 1 2 3 4 5                          Right                                        Left    Thickness  [x] [x] [x] [x] [x] [x] [x] [x] [x] [x]  5 4 3 2 1 1 2 3 4 5                         Right                                        Left    Dystrophic Changes   [x] [x] [x] [x] [x] [x] [x] [x] [x] [x]  5 4 3 2 1 1 2 3 4 5                         Right                                        Left    Color   [x] [x] [x] [x] [x] [x] [x] [x] [x] [x]  5 4 3 2 1 1 2 3 4 5                          Right                                        Left    Incurvation/Ingrowin   [] [] [] [] [] [] [] [] [] []  5 4 3 2 1 1 2 3 4 5                         Right Left    Inflammation/Pain   [x] [x] [x] [x] [x] [x] [x] [x] [x] [x]  5 4 3 2 1 1 2 3 4 5                         Right                                        Left        Visual inspection:  Deformity: hammertoe deformity akosua feet  amputation: absent  Skin lesions: absent  Edema: right- 2+ pitting edema, left- 2+ pitting edema    Sensory exam:  Monofilament sensation: abnormal - 6/10 via SW 5.07/10g monofilament to the plantar foot bilateral feet    Pulses: abnormal - 1/4 dorsalis pedis pulse and 1/4 Posterior tibial pulse,   Pinprick: Impaired  Proprioception: Impaired  Vibration (128 Hz): Impaired       DM with PVD       [x]Yes    []No      Assessment:  68 y.o. female with:   Diagnosis Orders   1. Type II diabetes mellitus with peripheral circulatory disorder (HCC)  31751 - MT DEBRIDEMENT OF NAILS, 6 OR MORE    HM DIABETES FOOT EXAM   2. Dermatophytosis of nail  71673 - MT DEBRIDEMENT OF NAILS, 6 OR MORE    HM DIABETES FOOT EXAM   3. PVD (peripheral vascular disease) ([facility])  60845 - MT DEBRIDEMENT OF NAILS, 6 OR MORE    HM DIABETES FOOT EXAM   4. Pain in both feet  72968 - MT DEBRIDEMENT OF NAILS, 6 OR MORE    HM DIABETES FOOT EXAM           Q7   []Yes    []No                Q8   [x]Yes    []No                     Q9   []Yes    []No    Plan:   Pt was evaluated and examined. Patient was given personalized discharge instructions. Nails 1-10 were debrided sharply in length and thickness with a nipper and , without incident. Pt will follow up in 9 weeks or sooner if any problems arise. Diagnosis was discussed with the pt and all of their questions were answered in detail. Proper foot hygiene and care was discussed with the pt. Informed patient on proper diabetic foot care and importance of tight glycemic control. Patient to check feet daily and contact the office with any questions/problems/concerns.    Other comorbidity noted and will be managed by PCP.  8/11/2021    Electronically signed by John Cardona Kizzy Syed on 8/11/2021 at 8:54 AM  8/11/2021

## 2021-08-16 ENCOUNTER — TELEPHONE (OUTPATIENT)
Dept: FAMILY MEDICINE CLINIC | Age: 76
End: 2021-08-16

## 2021-08-16 ENCOUNTER — OFFICE VISIT (OUTPATIENT)
Dept: FAMILY MEDICINE CLINIC | Age: 76
End: 2021-08-16
Payer: MEDICARE

## 2021-08-16 VITALS
WEIGHT: 125 LBS | DIASTOLIC BLOOD PRESSURE: 86 MMHG | SYSTOLIC BLOOD PRESSURE: 128 MMHG | BODY MASS INDEX: 22.14 KG/M2 | RESPIRATION RATE: 16 BRPM | HEART RATE: 76 BPM | TEMPERATURE: 97.8 F | OXYGEN SATURATION: 98 %

## 2021-08-16 DIAGNOSIS — L23.7 CONTACT DERMATITIS DUE TO POISON IVY: Primary | ICD-10-CM

## 2021-08-16 PROCEDURE — 96372 THER/PROPH/DIAG INJ SC/IM: CPT | Performed by: FAMILY MEDICINE

## 2021-08-16 PROCEDURE — 99213 OFFICE O/P EST LOW 20 MIN: CPT | Performed by: FAMILY MEDICINE

## 2021-08-16 RX ORDER — TRIAMCINOLONE ACETONIDE 40 MG/ML
60 INJECTION, SUSPENSION INTRA-ARTICULAR; INTRAMUSCULAR ONCE
Status: COMPLETED | OUTPATIENT
Start: 2021-08-16 | End: 2021-08-16

## 2021-08-16 RX ORDER — TRIAMCINOLONE ACETONIDE 5 MG/G
CREAM TOPICAL
Qty: 60 G | Refills: 0 | Status: SHIPPED | OUTPATIENT
Start: 2021-08-16 | End: 2021-08-23

## 2021-08-16 RX ADMIN — TRIAMCINOLONE ACETONIDE 60 MG: 40 INJECTION, SUSPENSION INTRA-ARTICULAR; INTRAMUSCULAR at 08:32

## 2021-08-16 ASSESSMENT — ENCOUNTER SYMPTOMS
RHINORRHEA: 0
SORE THROAT: 0
ABDOMINAL DISTENTION: 0
DIARRHEA: 0
CONSTIPATION: 0
CHEST TIGHTNESS: 0
NAUSEA: 0
SHORTNESS OF BREATH: 0
COUGH: 0
VOMITING: 0
ABDOMINAL PAIN: 0
BACK PAIN: 0

## 2021-08-16 NOTE — TELEPHONE ENCOUNTER
----- Message from Manuel Jacobs sent at 8/16/2021  7:50 AM EDT -----  Subject: Appointment Request    Reason for Call: Urgent Skin Problem    QUESTIONS  Type of Appointment? Established Patient  Reason for appointment request? Available appointments did not meet   patient need  Additional Information for Provider? Can't make it at 8:15, has a rash   that has been there for a week, and it has spread to the leg, please call   to schedule  ---------------------------------------------------------------------------  --------------  CALL BACK INFO  What is the best way for the office to contact you? OK to leave message on   voicemail  Preferred Call Back Phone Number? [phone]  ---------------------------------------------------------------------------  --------------  SCRIPT ANSWERS  Relationship to Patient? Self  Are you having swelling in your throat or face? No  Are you having difficulty breathing? No  Have the symptoms worsened or spread in the last day? Yes  Have you been diagnosed with, awaiting test results for, or told that you   are suspected of having COVID-19 (Coronavirus)? (If patient has tested   negative or was tested as a requirement for work, school, or travel and   not based on symptoms, answer no)? No  Do you currently have flu-like symptoms including fever or chills, cough,   shortness of breath, difficulty breathing, or new loss of taste or smell? No  Have you had close contact with someone with COVID-19 in the last 14 days? No  (Service Expert  click yes below to proceed with [employer] As Usual   Scheduling)?  Yes

## 2021-08-16 NOTE — TELEPHONE ENCOUNTER
----- Message from Justine Standing sent at 8/16/2021  7:50 AM EDT -----  Subject: Appointment Request    Reason for Call: Urgent Skin Problem    QUESTIONS  Type of Appointment? Established Patient  Reason for appointment request? Available appointments did not meet   patient need  Additional Information for Provider? Can't make it at 8:15, has a rash   that has been there for a week, and it has spread to the leg, please call   to schedule  ---------------------------------------------------------------------------  --------------  CALL BACK INFO  What is the best way for the office to contact you? OK to leave message on   voicemail  Preferred Call Back Phone Number? [phone]  ---------------------------------------------------------------------------  --------------  SCRIPT ANSWERS  Relationship to Patient? Self  Are you having swelling in your throat or face? No  Are you having difficulty breathing? No  Have the symptoms worsened or spread in the last day? Yes  Have you been diagnosed with, awaiting test results for, or told that you   are suspected of having COVID-19 (Coronavirus)? (If patient has tested   negative or was tested as a requirement for work, school, or travel and   not based on symptoms, answer no)? No  Do you currently have flu-like symptoms including fever or chills, cough,   shortness of breath, difficulty breathing, or new loss of taste or smell? No  Have you had close contact with someone with COVID-19 in the last 14 days? No  (Service Expert  click yes below to proceed with [employer] As Usual   Scheduling)?  Yes

## 2021-08-16 NOTE — PROGRESS NOTES
Billie Sams MD  [address] [place] [address]  [address] [address]  [address] [address]  Dept: [phone]  Dept Fax: [phone]     Patient ID: Estelle Perez is a 68 y.o. female. HPI    Established patient here today for an acute visit secondary to an itchy rash on her arms and legs. She had been out trimming jose and shrubs over the weekend. She has been using OTC creams with no relief. Pt denies any fever or chills. Pt today denies any HA, chest pain, or SOB. Pt denies any N/V/D/C or abdominal pain. Otherwise pt doing well on current tx and no other concerns today. The patient's past medical, surgical, social, and family history as well as his current medications and allergies were reviewed as documented in today's encounter. My previous office notes, consult notes, labs and diagnostic studies were reviewed prior to and during encounter. Current Outpatient Medications on File Prior to Visit   Medication Sig Dispense Refill    Coenzyme Q10 (CO Q-10) 100 MG CAPS Take 1 capsule by mouth daily      amLODIPine (NORVASC) 5 MG tablet Take 1 tablet by mouth nightly 30 tablet 2    isosorbide mononitrate (IMDUR) 30 MG extended release tablet Take 1 tablet by mouth daily 90 tablet 3    B-D UF III MINI PEN NEEDLES 31G X 5 MM MISC       ciclopirox (PENLAC) 8 % solution Apply topically nightly. Remove once weekly with alcohol or nail polish remover. 1 Bottle 3    atorvastatin (LIPITOR) 40 MG tablet Take 1 tablet by mouth daily 90 tablet 1    clopidogrel (PLAVIX) 75 MG tablet Take 1 tablet by mouth daily 30 tablet 5    metoprolol succinate (TOPROL XL) 25 MG extended release tablet Take 1 tablet by mouth daily 90 tablet 1    gabapentin (NEURONTIN) 100 MG capsule Take 1 capsule by mouth daily.       insulin lispro, 1 Unit Dial, (HUMALOG KWIKPEN) 100 UNIT/ML SOPN Inject into the skin 3 times daily PER SLIDING SCALE      insulin glargine (TOUJEO SOLOSTAR) 300 UNIT/ML injection pen Inject 11 Units into the skin every morning       aspirin 81 MG chewable tablet Take 81 mg by mouth daily      losartan (COZAAR) 50 MG tablet Take 50 mg by mouth daily      Multiple Vitamins TABS Take 1 tablet by mouth daily       No current facility-administered medications on file prior to visit. Subjective:     Review of Systems   Constitutional: Negative for appetite change, fatigue and fever. HENT: Negative for congestion, ear pain, rhinorrhea and sore throat. Respiratory: Negative for cough, chest tightness and shortness of breath. Cardiovascular: Negative for chest pain and palpitations. Gastrointestinal: Negative for abdominal distention, abdominal pain, constipation, diarrhea, nausea and vomiting. Genitourinary: Negative for difficulty urinating and dysuria. Musculoskeletal: Negative for arthralgias, back pain and myalgias. Skin: Positive for rash. Pallor: arms and legs. Neurological: Negative for dizziness, weakness, light-headedness and headaches. Hematological: Negative for adenopathy. Psychiatric/Behavioral: Negative for behavioral problems and sleep disturbance. The patient is not nervous/anxious. Objective:     Physical Exam  Vitals reviewed. Constitutional:       General: She is not in acute distress. Appearance: Normal appearance. She is not ill-appearing. Pulmonary:      Effort: Pulmonary effort is normal. No respiratory distress. Skin:     General: Skin is warm. Comments: Positive erythematous, vesicular rash on her arms and legs   Neurological:      Mental Status: She is alert and oriented to person, place, and time. Psychiatric:         Mood and Affect: Mood normal.         Assessment:      Diagnosis Orders   1.  Contact dermatitis due to poison ivy  triamcinolone acetonide (KENALOG-40) injection 60 mg    triamcinolone (ARISTOCORT) 0.5 % cream       Plan:        Orders Placed This Encounter   Medications  triamcinolone acetonide (KENALOG-40) injection 60 mg    triamcinolone (ARISTOCORT) 0.5 % cream     Sig: Apply topically 2 times daily. Dispense:  60 g     Refill:  0     Will see how she does with the steroid injection and will start the steroid cream as prescribed    Will have her take OTC Claritin and Benadryl prn    Pt instructed to call back if no improvement or worsening of symptoms     Rest of systems unchanged, continue current treatments. Medications, labs, diagnostic studies, consultations and follow-up as documented in this encounter. Rest of systems unchanged, continue current treatments    On this date August 16, 2021,  I have spent greater than 50% of this visit reviewing previous notes, test results and face to face with the patient discussing the diagnoses, importance of compliance with the treatment plan, counseling, coordinating care as well as documenting on the day of the visit. Billie Cheung MD

## 2021-08-20 ENCOUNTER — HOSPITAL ENCOUNTER (OUTPATIENT)
Age: 76
Setting detail: SPECIMEN
Discharge: HOME | End: 2021-08-20
Payer: MEDICARE

## 2021-08-20 DIAGNOSIS — Z79.4 TYPE 2 DIABETES MELLITUS WITHOUT COMPLICATION, WITH LONG-TERM CURRENT USE OF INSULIN (HCC): ICD-10-CM

## 2021-08-20 DIAGNOSIS — I25.118 CORONARY ARTERY DISEASE OF NATIVE HEART WITH STABLE ANGINA PECTORIS, UNSPECIFIED VESSEL OR LESION TYPE (HCC): ICD-10-CM

## 2021-08-20 DIAGNOSIS — G62.9 PERIPHERAL POLYNEUROPATHY: ICD-10-CM

## 2021-08-20 DIAGNOSIS — E11.9 TYPE 2 DIABETES MELLITUS WITHOUT COMPLICATION, WITH LONG-TERM CURRENT USE OF INSULIN (HCC): ICD-10-CM

## 2021-08-20 DIAGNOSIS — E78.00 PURE HYPERCHOLESTEROLEMIA: ICD-10-CM

## 2021-08-20 DIAGNOSIS — R74.8 ELEVATED LIVER ENZYMES: ICD-10-CM

## 2021-08-20 DIAGNOSIS — E55.9 VITAMIN D DEFICIENCY: ICD-10-CM

## 2021-08-20 LAB
ALBUMIN SERPL-MCNC: 4.3 G/DL (ref 3.5–5.2)
ALBUMIN/GLOBULIN RATIO: 1.8 (ref 1–2.5)
ALP BLD-CCNC: 59 U/L (ref 35–104)
ALT SERPL-CCNC: 24 U/L (ref 5–33)
ANION GAP SERPL CALCULATED.3IONS-SCNC: 14 MMOL/L (ref 9–17)
AST SERPL-CCNC: 25 U/L
BILIRUB SERPL-MCNC: 0.56 MG/DL (ref 0.3–1.2)
BUN BLDV-MCNC: 27 MG/DL (ref 8–23)
BUN/CREAT BLD: ABNORMAL (ref 9–20)
CALCIUM SERPL-MCNC: 9.4 MG/DL (ref 8.6–10.4)
CHLORIDE BLD-SCNC: 107 MMOL/L (ref 98–107)
CHOLESTEROL/HDL RATIO: 1.8
CHOLESTEROL: 149 MG/DL
CO2: 21 MMOL/L (ref 20–31)
CREAT SERPL-MCNC: 1.21 MG/DL (ref 0.5–0.9)
GFR AFRICAN AMERICAN: 52 ML/MIN
GFR NON-AFRICAN AMERICAN: 43 ML/MIN
GFR SERPL CREATININE-BSD FRML MDRD: ABNORMAL ML/MIN/{1.73_M2}
GFR SERPL CREATININE-BSD FRML MDRD: ABNORMAL ML/MIN/{1.73_M2}
GLUCOSE BLD-MCNC: 215 MG/DL (ref 70–99)
HCT VFR BLD CALC: 40.4 % (ref 36.3–47.1)
HDLC SERPL-MCNC: 84 MG/DL
HEMOGLOBIN: 12.6 G/DL (ref 11.9–15.1)
LDL CHOLESTEROL: 48 MG/DL (ref 0–130)
MCH RBC QN AUTO: 29.9 PG (ref 25.2–33.5)
MCHC RBC AUTO-ENTMCNC: 31.2 G/DL (ref 28.4–34.8)
MCV RBC AUTO: 96 FL (ref 82.6–102.9)
NRBC AUTOMATED: 0 PER 100 WBC
PDW BLD-RTO: 13.5 % (ref 11.8–14.4)
PLATELET # BLD: 162 K/UL (ref 138–453)
PMV BLD AUTO: 10.4 FL (ref 8.1–13.5)
POTASSIUM SERPL-SCNC: 4.6 MMOL/L (ref 3.7–5.3)
RBC # BLD: 4.21 M/UL (ref 3.95–5.11)
SODIUM BLD-SCNC: 142 MMOL/L (ref 135–144)
TOTAL PROTEIN: 6.7 G/DL (ref 6.4–8.3)
TRIGL SERPL-MCNC: 83 MG/DL
VITAMIN D 25-HYDROXY: 44.8 NG/ML (ref 30–100)
VLDLC SERPL CALC-MCNC: NORMAL MG/DL (ref 1–30)
WBC # BLD: 4.5 K/UL (ref 3.5–11.3)

## 2021-09-03 ENCOUNTER — OFFICE VISIT (OUTPATIENT)
Dept: FAMILY MEDICINE CLINIC | Age: 76
End: 2021-09-03
Payer: MEDICARE

## 2021-09-03 VITALS
HEART RATE: 52 BPM | DIASTOLIC BLOOD PRESSURE: 56 MMHG | WEIGHT: 122 LBS | RESPIRATION RATE: 16 BRPM | OXYGEN SATURATION: 98 % | TEMPERATURE: 97.4 F | BODY MASS INDEX: 21.61 KG/M2 | SYSTOLIC BLOOD PRESSURE: 114 MMHG

## 2021-09-03 DIAGNOSIS — E11.9 TYPE 2 DIABETES MELLITUS WITHOUT COMPLICATION, WITH LONG-TERM CURRENT USE OF INSULIN (HCC): ICD-10-CM

## 2021-09-03 DIAGNOSIS — R74.8 ELEVATED LIVER ENZYMES: ICD-10-CM

## 2021-09-03 DIAGNOSIS — R25.2 LEG CRAMPS: ICD-10-CM

## 2021-09-03 DIAGNOSIS — E78.00 PURE HYPERCHOLESTEROLEMIA: ICD-10-CM

## 2021-09-03 DIAGNOSIS — G62.9 PERIPHERAL POLYNEUROPATHY: ICD-10-CM

## 2021-09-03 DIAGNOSIS — I25.118 CORONARY ARTERY DISEASE OF NATIVE HEART WITH STABLE ANGINA PECTORIS, UNSPECIFIED VESSEL OR LESION TYPE (HCC): ICD-10-CM

## 2021-09-03 DIAGNOSIS — R53.83 FATIGUE, UNSPECIFIED TYPE: ICD-10-CM

## 2021-09-03 DIAGNOSIS — N28.9 RENAL INSUFFICIENCY: ICD-10-CM

## 2021-09-03 DIAGNOSIS — Z79.4 TYPE 2 DIABETES MELLITUS WITHOUT COMPLICATION, WITH LONG-TERM CURRENT USE OF INSULIN (HCC): ICD-10-CM

## 2021-09-03 DIAGNOSIS — E55.9 VITAMIN D DEFICIENCY: ICD-10-CM

## 2021-09-03 DIAGNOSIS — I10 ESSENTIAL HYPERTENSION: Primary | ICD-10-CM

## 2021-09-03 PROCEDURE — 99214 OFFICE O/P EST MOD 30 MIN: CPT | Performed by: FAMILY MEDICINE

## 2021-09-03 RX ORDER — INSULIN GLARGINE 300 U/ML
10 INJECTION, SOLUTION SUBCUTANEOUS NIGHTLY
COMMUNITY

## 2021-09-03 ASSESSMENT — ENCOUNTER SYMPTOMS
NAUSEA: 0
DIARRHEA: 0
VOMITING: 0
CONSTIPATION: 0
ABDOMINAL PAIN: 0
COUGH: 0
RHINORRHEA: 0
SORE THROAT: 0
ABDOMINAL DISTENTION: 0
SHORTNESS OF BREATH: 0
CHEST TIGHTNESS: 0
BACK PAIN: 0

## 2021-09-03 NOTE — PROGRESS NOTES
Billie Fernandez MD    [address] [place] [address] FAMILY [facility]  [id number]  [address] [address]  Dept: [phone]  Dept Fax: [phone]     Patient ID: Tj Alex is a 68 y.o. female. HPI    Established patient here today for f/u on chronic medical problems, go over labs and/or diagnostic studies, and medication refills. Pt denies any fever or chills. Pt today denies any HA, chest pain, or SOB. Pt denies any N/V/D/C or abdominal pain. Pt has been c/o fatigue and leg cramps. Pt also states she does get unsteady at times. BS's outside the office running 200's. Otherwise pt doing well on current tx and no other concerns today. The patient's past medical, surgical, social, and family history as well as his current medications and allergies were reviewed as documented in today's encounter. My previous office notes, consult notes, labs and diagnostic studies were reviewed prior to and during encounter. Current Outpatient Medications on File Prior to Visit   Medication Sig Dispense Refill    Insulin Glargine, 2 Unit Dial, (TOUJEO MAX SOLOSTAR) 300 UNIT/ML SOPN Inject 15 Units into the skin every morning      Coenzyme Q10 (CO Q-10) 100 MG CAPS Take 1 capsule by mouth daily      amLODIPine (NORVASC) 5 MG tablet Take 1 tablet by mouth nightly 30 tablet 2    isosorbide mononitrate (IMDUR) 30 MG extended release tablet Take 1 tablet by mouth daily 90 tablet 3    B-D UF III MINI PEN NEEDLES 31G X 5 MM MISC       ciclopirox (PENLAC) 8 % solution Apply topically nightly. Remove once weekly with alcohol or nail polish remover.  1 Bottle 3    atorvastatin (LIPITOR) 40 MG tablet Take 1 tablet by mouth daily 90 tablet 1    clopidogrel (PLAVIX) 75 MG tablet Take 1 tablet by mouth daily 30 tablet 5    metoprolol succinate (TOPROL XL) 25 MG extended release tablet Take 1 tablet by mouth daily 90 tablet 1    gabapentin (NEURONTIN) 100 MG capsule Take 1 capsule by mouth daily.  insulin lispro, 1 Unit Dial, (HUMALOG KWIKPEN) 100 UNIT/ML SOPN Inject into the skin 3 times daily PER SLIDING SCALE      aspirin 81 MG chewable tablet Take 81 mg by mouth daily      losartan (COZAAR) 50 MG tablet Take 50 mg by mouth daily      Multiple Vitamins TABS Take 1 tablet by mouth daily       No current facility-administered medications on file prior to visit. Subjective:     Review of Systems   Constitutional: Positive for fatigue. Negative for appetite change and fever. HENT: Negative for congestion, ear pain, rhinorrhea and sore throat. Respiratory: Negative for cough, chest tightness and shortness of breath. Cardiovascular: Negative for chest pain and palpitations. Gastrointestinal: Negative for abdominal distention, abdominal pain, constipation, diarrhea, nausea and vomiting. Genitourinary: Negative for difficulty urinating and dysuria. Musculoskeletal: Positive for gait problem (unsteady at times). Negative for arthralgias, back pain and myalgias. Leg cramps   Skin: Negative for rash. Neurological: Negative for dizziness, weakness, light-headedness and headaches. Hematological: Negative for adenopathy. Psychiatric/Behavioral: Negative for behavioral problems and sleep disturbance. The patient is not nervous/anxious. Objective:     Physical Exam  Vitals reviewed. Constitutional:       General: She is not in acute distress. Appearance: She is well-developed. HENT:      Head: Normocephalic and atraumatic. Right Ear: External ear normal.      Left Ear: External ear normal.      Nose: Nose normal.      Mouth/Throat:      Pharynx: No oropharyngeal exudate. Eyes:      Conjunctiva/sclera: Conjunctivae normal.      Pupils: Pupils are equal, round, and reactive to light. Cardiovascular:      Rate and Rhythm: Normal rate and regular rhythm. Heart sounds: Normal heart sounds. No murmur heard.      Pulmonary: Effort: Pulmonary effort is normal. No respiratory distress. Breath sounds: Normal breath sounds. No wheezing. Chest:      Chest wall: No tenderness. Abdominal:      General: Bowel sounds are normal. There is no distension. Palpations: Abdomen is soft. There is no mass. Tenderness: There is no abdominal tenderness. Musculoskeletal:         General: No tenderness. Normal range of motion. Cervical back: Normal range of motion. Lymphadenopathy:      Cervical: No cervical adenopathy. Skin:     Findings: No rash. Neurological:      Mental Status: She is alert and oriented to person, place, and time. Deep Tendon Reflexes: Reflexes are normal and symmetric. Psychiatric:         Behavior: Behavior normal.         Assessment:      Diagnosis Orders   1. Essential hypertension     2. Type 2 diabetes mellitus without complication, with long-term current use of insulin ([facility])  Basic Metabolic Panel   3. Pure hypercholesterolemia     4. Coronary artery disease of native heart with stable angina pectoris, unspecified vessel or lesion type ([facility])     5. Elevated liver enzymes     6. Vitamin D deficiency  Vitamin D 25 Hydroxy   7. Peripheral polyneuropathy     8. Renal insufficiency  Basic Metabolic Panel   9. Fatigue, unspecified type  CBC    TSH without Reflex    T4, Free    Vitamin B12   10.  Leg cramps           Plan:     Orders Placed This Encounter   Procedures    Basic Metabolic Panel     Standing Status:   Future     Standing Expiration Date:   9/3/2022    CBC     Standing Status:   Future     Standing Expiration Date:   9/3/2022    TSH without Reflex     Standing Status:   Future     Standing Expiration Date:   9/3/2022    T4, Free     Standing Status:   Future     Standing Expiration Date:   9/3/2022    Vitamin D 25 Hydroxy     Standing Status:   Future     Standing Expiration Date:   9/3/2022    Vitamin B12     Standing Status:   Future     Standing Expiration Date:   9/3/2022

## 2021-10-13 ENCOUNTER — OFFICE VISIT (OUTPATIENT)
Dept: PODIATRY | Age: 76
End: 2021-10-13
Payer: MEDICARE

## 2021-10-13 VITALS — BODY MASS INDEX: 21.62 KG/M2 | WEIGHT: 122 LBS | HEIGHT: 63 IN

## 2021-10-13 DIAGNOSIS — M79.672 PAIN IN BOTH FEET: ICD-10-CM

## 2021-10-13 DIAGNOSIS — E11.51 TYPE II DIABETES MELLITUS WITH PERIPHERAL CIRCULATORY DISORDER (HCC): Primary | ICD-10-CM

## 2021-10-13 DIAGNOSIS — M79.671 PAIN IN BOTH FEET: ICD-10-CM

## 2021-10-13 DIAGNOSIS — I73.9 PVD (PERIPHERAL VASCULAR DISEASE) (HCC): ICD-10-CM

## 2021-10-13 DIAGNOSIS — B35.1 DERMATOPHYTOSIS OF NAIL: ICD-10-CM

## 2021-10-13 PROCEDURE — 11721 DEBRIDE NAIL 6 OR MORE: CPT | Performed by: PODIATRIST

## 2021-10-13 NOTE — PROGRESS NOTES
[address] [facility] [id number] [address]  Dept: [phone]    DIABETIC PROGRESS NOTE  Date of patient's visit: 10/13/2021  Patient's Name:  Rafael Vega YOB: 1945            Patient Care Team:  Lamberto Melton MD as PCP - General (Family Medicine)  Lamberto Melton MD as PCP - [facility]  Cherelle Yip as Consulting Physician (Endocrinology)  Tiago Pham MD as Consulting Physician (Ophthalmology)  Michelle Ahmadi DO as Consulting Physician (Cardiology)  Francia Morillo DPM as Consulting Physician (Podiatry)          Chief Complaint   Patient presents with    Diabetes     bs 156    Nail Problem    Peripheral Neuropathy       Subjective:   Rafael Vega comes to clinic for Diabetes (bs 156), Nail Problem, and Peripheral Neuropathy    she is a diabetic and states that she checks her feet and uses lotion. Pt currently has complaint of thickened, elongated nails that they cannot manage by themselves. Pt's primary care physician is Lamberto Melton MD last seen September 3 2021   Pt's last blood sugar was 156 . Pt has a new complaint of none today . Lab Results   Component Value Date    LABA1C 6.8 (H) 06/22/2020      Complains of numbness in the feet bilat.   Past Medical History:   Diagnosis Date    Abnormal cardiovascular stress test 06/2020    no ischemia / infarction   there is septal hypokinesis   /  EF 70%    CAD (coronary artery disease)     Chest pressure     Fatigue     History of pneumonia 03/2020    HTN (hypertension)     Hyperlipidemia     Positive cardiac stress test     SOB (shortness of breath)     Type 2 diabetes mellitus without complication (HCC)        No Known Allergies  Current Outpatient Medications on File Prior to Visit   Medication Sig Dispense Refill    Insulin Glargine, 2 Unit Dial, (TOUJEO MAX SOLOSTAR) 300 UNIT/ML SOPN Inject 15 Units into the skin every morning      Coenzyme Q10 (CO Q-10) 100 MG CAPS Take 1 capsule by mouth daily      amLODIPine (NORVASC) 5 MG tablet Take 1 tablet by mouth nightly 30 tablet 2    isosorbide mononitrate (IMDUR) 30 MG extended release tablet Take 1 tablet by mouth daily 90 tablet 3    B-D UF III MINI PEN NEEDLES 31G X 5 MM MISC       ciclopirox (PENLAC) 8 % solution Apply topically nightly. Remove once weekly with alcohol or nail polish remover. 1 Bottle 3    atorvastatin (LIPITOR) 40 MG tablet Take 1 tablet by mouth daily 90 tablet 1    clopidogrel (PLAVIX) 75 MG tablet Take 1 tablet by mouth daily 30 tablet 5    metoprolol succinate (TOPROL XL) 25 MG extended release tablet Take 1 tablet by mouth daily 90 tablet 1    gabapentin (NEURONTIN) 100 MG capsule Take 1 capsule by mouth daily.  insulin lispro, 1 Unit Dial, (HUMALOG KWIKPEN) 100 UNIT/ML SOPN Inject into the skin 3 times daily PER SLIDING SCALE      aspirin 81 MG chewable tablet Take 81 mg by mouth daily      losartan (COZAAR) 50 MG tablet Take 50 mg by mouth daily      Multiple Vitamins TABS Take 1 tablet by mouth daily       No current facility-administered medications on file prior to visit. Review of Systems    Review of Systems:   History obtained from chart review and the patient  General ROS: negative for - chills, fatigue, fever, night sweats or weight gain  Constitutional: Negative for chills, diaphoresis, fatigue, fever and unexpected weight change. Musculoskeletal: Positive for arthralgias, gait problem and joint swelling. Neurological ROS: negative for - behavioral changes, confusion, headaches or seizures. Negative for weakness and numbness. Dermatological ROS: negative for - mole changes, rash  Cardiovascular: Negative for leg swelling. Gastrointestinal: Negative for constipation, diarrhea, nausea and vomiting. Objective:  Dermatologic Exam:  Skin lesion/ulceration Absent . Skin No rashes or nodules noted. .   Skin is thin, with flaky sloughing skin as well as decreased hair growth to the lower leg  Small red hemosiderin deposits seen dorsal foot   Musculoskeletal:     1st MPJ ROM decreased, Bilateral.  Muscle strength 5/5, Bilateral.  Pain present upon palpation of toenails 1-5, Bilateral. decreased medial longitudinal arch, Bilateral.  Ankle ROM decreased,Bilateral.    Dorsally contracted digits present digits 2, Bilateral.     Vascular: DP pulses 1/4 bilateral.  PT pulses 0/4 bilateral.   CFT <5 seconds, Bilateral.  Hair growth absent to the level of the digits, Bilateral.  Edema present, Bilateral.  Varicosities absent, Bilateral. Erythema absent, Bilateral    Neurological: Sensation diminshed to light touch to level of digits, Bilateral.  Protective sensation intact 6/10 sites via 5.07/10g [place] Monofilament, Bilateral.  negative Tinel's, Bilateral.  negative Valleix sign, Bilateral.      Integument: Warm, dry, supple, Bilateral.  Open lesion absent, Bilateral.  Interdigital maceration absent to web spaces 4, Bilateral.  Nails 1-5 left and 1-5 right thickened > 3.0 mm, dystrophic and crumbly, discolored with subungual debris. Fissures absent, Bilateral.   General: AAO x 3 in NAD.     Derm  Toenail Description  Sites of Onychomycosis Involvement (Check affected area)  [x] [x] [x] [x] [x] [x] [x] [x] [x] [x]  5 4 3 2 1 1 2 3 4 5                          Right                                        Left    Thickness  [x] [x] [x] [x] [x] [x] [x] [x] [x] [x]  5 4 3 2 1 1 2 3 4 5                         Right                                        Left    Dystrophic Changes   [x] [x] [x] [x] [x] [x] [x] [x] [x] [x]  5 4 3 2 1 1 2 3 4 5                         Right                                        Left    Color   [x] [x] [x] [x] [x] [x] [x] [x] [x] [x]  5 4 3 2 1 1 2 3 4 5                          Right                                        Left    Incurvation/Ingrowin   [] [] [] [] [] [] [] [] [] []  5 4 3 2 1 1 2 3 4 5 Right                                        Left    Inflammation/Pain   [x] [x] [x] [x] [x] [x] [x] [x] [x] [x]  5 4 3 2 1 1 2 3 4 5                         Right                                        Left        Visual inspection:  Deformity: hammertoe deformity [place] feet  amputation: absent  Skin lesions: absent  Edema: right- 2+ pitting edema, left- 2+ pitting edema    Sensory exam:  Monofilament sensation: abnormal - 6/10 via SW 5.07/10g monofilament to the plantar foot bilateral feet    Pulses: abnormal - 1/4 dorsalis pedis pulse and 0/4 Posterior tibial pulse,   Pinprick: Impaired  Proprioception: Impaired  Vibration (128 Hz): Impaired       DM with PVD       [x]Yes    []No      Assessment:  68 y.o. female with:   Diagnosis Orders   1. Type II diabetes mellitus with peripheral circulatory disorder (HCC)  28162 - CA DEBRIDEMENT OF NAILS, 6 OR MORE    HM DIABETES FOOT EXAM   2. Dermatophytosis of nail  87403 - CA DEBRIDEMENT OF NAILS, 6 OR MORE    HM DIABETES FOOT EXAM   3. PVD (peripheral vascular disease) ([facility])  89568 - CA DEBRIDEMENT OF NAILS, 6 OR MORE    HM DIABETES FOOT EXAM   4. Pain in both feet  37140 - CA DEBRIDEMENT OF NAILS, 6 OR MORE    HM DIABETES FOOT EXAM         Q7   []Yes    []No                Q8   [x]Yes    []No                     Q9   []Yes    []No    Plan:   Pt was evaluated and examined. Patient was given personalized discharge instructions. Nails 1-10 were debrided sharply in length and thickness with a nipper and , without incident. Pt will follow up in 9 weeks or sooner if any problems arise. Diagnosis was discussed with the pt and all of their questions were answered in detail. Proper foot hygiene and care was discussed with the pt. Informed patient on proper diabetic foot care and importance of tight glycemic control. Patient to check feet daily and contact the office with any questions/problems/concerns.    Other comorbidity noted and will be managed by [facility].  10/13/2021    Electronically signed by Sang Mc DPM on 10/13/2021 at 8:54 AM  10/13/2021

## 2021-10-27 RX ORDER — ISOSORBIDE MONONITRATE 30 MG/1
TABLET, EXTENDED RELEASE ORAL
Qty: 90 TABLET | Refills: 3 | Status: SHIPPED | OUTPATIENT
Start: 2021-10-27 | End: 2021-11-29 | Stop reason: DRUGHIGH

## 2021-11-29 ENCOUNTER — OFFICE VISIT (OUTPATIENT)
Dept: FAMILY MEDICINE CLINIC | Age: 76
End: 2021-11-29
Payer: MEDICARE

## 2021-11-29 VITALS
RESPIRATION RATE: 16 BRPM | DIASTOLIC BLOOD PRESSURE: 64 MMHG | HEIGHT: 62 IN | OXYGEN SATURATION: 98 % | BODY MASS INDEX: 22.45 KG/M2 | SYSTOLIC BLOOD PRESSURE: 134 MMHG | WEIGHT: 122 LBS | HEART RATE: 56 BPM | TEMPERATURE: 97.7 F

## 2021-11-29 DIAGNOSIS — I25.118 CORONARY ARTERY DISEASE OF NATIVE HEART WITH STABLE ANGINA PECTORIS, UNSPECIFIED VESSEL OR LESION TYPE (HCC): ICD-10-CM

## 2021-11-29 DIAGNOSIS — Z79.4 TYPE 2 DIABETES MELLITUS WITHOUT COMPLICATION, WITH LONG-TERM CURRENT USE OF INSULIN (HCC): ICD-10-CM

## 2021-11-29 DIAGNOSIS — R74.8 ELEVATED LIVER ENZYMES: ICD-10-CM

## 2021-11-29 DIAGNOSIS — R25.2 LEG CRAMPS: ICD-10-CM

## 2021-11-29 DIAGNOSIS — E78.00 PURE HYPERCHOLESTEROLEMIA: ICD-10-CM

## 2021-11-29 DIAGNOSIS — Z00.00 ROUTINE GENERAL MEDICAL EXAMINATION AT A HEALTH CARE FACILITY: Primary | ICD-10-CM

## 2021-11-29 DIAGNOSIS — I10 PRIMARY HYPERTENSION: ICD-10-CM

## 2021-11-29 DIAGNOSIS — G62.9 PERIPHERAL POLYNEUROPATHY: ICD-10-CM

## 2021-11-29 DIAGNOSIS — R53.83 FATIGUE, UNSPECIFIED TYPE: ICD-10-CM

## 2021-11-29 DIAGNOSIS — G89.29 CHRONIC BILATERAL LOW BACK PAIN WITHOUT SCIATICA: ICD-10-CM

## 2021-11-29 DIAGNOSIS — E11.9 TYPE 2 DIABETES MELLITUS WITHOUT COMPLICATION, WITH LONG-TERM CURRENT USE OF INSULIN (HCC): ICD-10-CM

## 2021-11-29 DIAGNOSIS — M54.50 CHRONIC BILATERAL LOW BACK PAIN WITHOUT SCIATICA: ICD-10-CM

## 2021-11-29 DIAGNOSIS — E55.9 VITAMIN D DEFICIENCY: ICD-10-CM

## 2021-11-29 PROCEDURE — 99214 OFFICE O/P EST MOD 30 MIN: CPT | Performed by: FAMILY MEDICINE

## 2021-11-29 PROCEDURE — G0439 PPPS, SUBSEQ VISIT: HCPCS | Performed by: FAMILY MEDICINE

## 2021-11-29 RX ORDER — ISOSORBIDE MONONITRATE 60 MG/1
120 TABLET, EXTENDED RELEASE ORAL DAILY
COMMUNITY
Start: 2021-11-12 | End: 2022-05-11 | Stop reason: DRUGHIGH

## 2021-11-29 ASSESSMENT — ENCOUNTER SYMPTOMS
ABDOMINAL PAIN: 0
DIARRHEA: 0
BACK PAIN: 0
SORE THROAT: 0
CHEST TIGHTNESS: 0
ABDOMINAL DISTENTION: 0
RHINORRHEA: 0
VOMITING: 0
COUGH: 0
NAUSEA: 0
CONSTIPATION: 0
SHORTNESS OF BREATH: 0

## 2021-11-29 ASSESSMENT — PATIENT HEALTH QUESTIONNAIRE - PHQ9
SUM OF ALL RESPONSES TO PHQ QUESTIONS 1-9: 0
2. FEELING DOWN, DEPRESSED OR HOPELESS: 0
SUM OF ALL RESPONSES TO PHQ9 QUESTIONS 1 & 2: 0
SUM OF ALL RESPONSES TO PHQ QUESTIONS 1-9: 0
1. LITTLE INTEREST OR PLEASURE IN DOING THINGS: 0
SUM OF ALL RESPONSES TO PHQ QUESTIONS 1-9: 0

## 2021-11-29 ASSESSMENT — LIFESTYLE VARIABLES: HOW OFTEN DO YOU HAVE A DRINK CONTAINING ALCOHOL: 0

## 2021-11-29 NOTE — PATIENT INSTRUCTIONS
Personalized Preventive Plan for Carol Kraft - 11/29/2021  Medicare offers a range of preventive health benefits. Some of the tests and screenings are paid in full while other may be subject to a deductible, co-insurance, and/or copay. Some of these benefits include a comprehensive review of your medical history including lifestyle, illnesses that may run in your family, and various assessments and screenings as appropriate. After reviewing your medical record and screening and assessments performed today your provider may have ordered immunizations, labs, imaging, and/or referrals for you. A list of these orders (if applicable) as well as your Preventive Care list are included within your After Visit Summary for your review. Other Preventive Recommendations:    · A preventive eye exam performed by an eye specialist is recommended every 1-2 years to screen for glaucoma; cataracts, macular degeneration, and other eye disorders. · A preventive dental visit is recommended every 6 months. · Try to get at least 150 minutes of exercise per week or 10,000 steps per day on a pedometer . · Order or download the FREE \"Exercise & Physical Activity: Your Everyday Guide\" from [employer]. Call 5-545.839.4057 or search [employer] online. · You need 1495-7704 mg of calcium and 6192-8044 IU of vitamin D per day. It is possible to meet your calcium requirement with diet alone, but a vitamin D supplement is usually necessary to meet this goal.  · When exposed to the sun, use a sunscreen that protects against both UVA and UVB radiation with an SPF of 30 or greater. Reapply every 2 to 3 hours or after sweating, drying off with a towel, or swimming. · Always wear a seat belt when traveling in a car. Always wear a helmet when riding a bicycle or motorcycle. Heart-Healthy Diet   Sodium, Fat, and Cholesterol Controlled Diet       What Is a Heart Healthy Diet?    A heart-healthy diet is one that limits sodium , certain types of fat , and cholesterol . This type of diet is recommended for:   People with any form of cardiovascular disease (eg, coronary heart disease , peripheral vascular disease , previous heart attack , previous stroke )   People with risk factors for cardiovascular disease, such as high blood pressure , high cholesterol , or diabetes   Anyone who wants to lower their risk of developing cardiovascular disease   Sodium    Sodium is a mineral found in many foods. In general, most people consume much more sodium than they need. Diets high in sodium can increase blood pressure and lead to edema (water retention). On a heart-healthy diet, you should consume no more than 2,300 mg (milligrams) of sodium per dayabout the amount in one teaspoon of table salt. The foods highest in sodium include table salt (about 50% sodium), processed foods, convenience foods, and preserved foods. Cholesterol    Cholesterol is a fat-like, waxy substance in your blood. Our bodies make some cholesterol. It is also found in animal products, with the highest amounts in fatty meat, egg yolks, whole milk, cheese, shellfish, and organ meats. On a heart-healthy diet, you should limit your cholesterol intake to less than 200 mg per day. It is normal and important to have some cholesterol in your bloodstream. But too much cholesterol can cause plaque to build up within your arteries, which can eventually lead to a heart attack or stroke. The two types of cholesterol that are most commonly referred to are:   Low-density lipoprotein (LDL) cholesterol  Also known as bad cholesterol, this is the cholesterol that tends to build up along your arteries. Bad cholesterol levels are increased by eating fats that are saturated or hydrogenated. Optimal level of this cholesterol is less than 100. Over 130 starts to get risky for heart disease.    High-density lipoprotein (HDL) cholesterol  Also known as example, this would mean 60 grams of fat or less per day. Saturated fat and trans fat in your diet raises your blood cholesterol the most, much more than dietary cholesterol does. For this reason, on a heart-healthy diet, less than 7% of your calories should come from saturated fat and ideally 0% from trans fat. On an 1800-calorie diet, this translates into less than 14 grams of saturated fat per day, leaving 46 grams of fat to come from mono- and polyunsaturated fats.    Food Choices on a Heart Healthy Diet   Food Category   Foods Recommended   Foods to Avoid   Grains   Breads and rolls without salted tops Most dry and cooked cereals Unsalted crackers and breadsticks Low-sodium or homemade breadcrumbs or stuffing All rice and pastas   Breads, rolls, and crackers with salted tops High-fat baked goods (eg, muffins, donuts, pastries) Quick breads, self-rising flour, and biscuit mixes Regular bread crumbs Instant hot cereals Commercially prepared rice, pasta, or stuffing mixes   Vegetables   Most fresh, frozen, and low-sodium canned vegetables Low-sodium and salt-free vegetable juices Canned vegetables if unsalted or rinsed   Regular canned vegetables and juices, including sauerkraut and pickled vegetables Frozen vegetables with sauces Commercially prepared potato and vegetable mixes   Fruits   Most fresh, frozen, and canned fruits All fruit juices   Fruits processed with salt or sodium   Milk   Nonfat or low-fat (1%) milk Nonfat or low-fat yogurt Cottage cheese, low-fat ricotta, cheeses labeled as low-fat and low-sodium   Whole milk Reduced-fat (2%) milk Malted and chocolate milk Full fat yogurt Most cheeses (unless low-fat and low salt) Buttermilk (no more than 1 cup per week)   Meats and Beans   Lean cuts of fresh or frozen beef, veal, lamb, or pork (look for the word loin) Fresh or frozen poultry without the skin Fresh or frozen fish and some shellfish Egg whites and egg substitutes (Limit whole eggs to three per week) Tofu Nuts or seeds (unsalted, dry-roasted), low-sodium peanut butter Dried peas, beans, and lentils   Any smoked, cured, salted, or canned meat, fish, or poultry (including mcclure, chipped beef, cold cuts, hot dogs, sausages, sardines, and anchovies) Poultry skins Breaded and/or fried fish or meats Canned peas, beans, and lentils Salted nuts   Fats and Oils   Olive oil and canola oil Low-sodium, low-fat salad dressings and mayonnaise   Butter, margarine, coconut and palm oils, mcclure fat   Snacks, Sweets, and Condiments   Low-sodium or unsalted versions of broths, soups, soy sauce, and condiments Pepper, herbs, and spices; vinegar, lemon, or lime juice Low-fat frozen desserts (yogurt, sherbet, fruit bars) Sugar, cocoa powder, honey, syrup, jam, and preserves Low-fat, trans-fat free cookies, cakes, and pies Rafi and animal crackers, fig bars, denise snaps   High-fat desserts Broth, soups, gravies, and sauces, made from instant mixes or other high-sodium ingredients Salted snack foods Canned olives Meat tenderizers, seasoning salt, and most flavored vinegars   Beverages   Low-sodium carbonated beverages Tea and coffee in moderation Soy milk   Commercially softened water   Suggestions   Make whole grains, fruits, and vegetables the base of your diet. Choose heart-healthy fats such as canola, olive, and flaxseed oil, and foods high in heart-healthy fats, such as nuts, seeds, soybeans, tofu, and fish. Eat fish at least twice per week; the fish highest in omega-3 fatty acids and lowest in mercury include salmon, herring, mackerel, sardines, and canned chunk light tuna. If you eat fish less than twice per week or have high triglycerides, talk to your doctor about taking fish oil supplements. Read food labels.    For products low in fat and cholesterol, look for fat free, low-fat, cholesterol free, saturated fat free, and trans fat freeAlso scan the Nutrition Facts Label, which lists saturated fat, trans fat, and cholesterol amounts. For products low in sodium, look for sodium free, very low sodium, low sodium, no added salt, and unsalted   Skip the salt when cooking or at the table; if food needs more flavor, get creative and try out different herbs and spices. Garlic and onion also add substantial flavor to foods. Trim any visible fat off meat and poultry before cooking, and drain the fat off after dee. Use cooking methods that require little or no added fat, such as grilling, boiling, baking, poaching, broiling, roasting, steaming, stir-frying, and sauting. Avoid fast food and convenience food. They tend to be high in saturated and trans fat and have a lot of added salt. Talk to a registered dietitian for individualized diet advice. Last Reviewed: March 2011 Asia Ross MS, MPH, RD   Updated: 3/29/2011   ·     Heart-Healthy Diet   Sodium, Fat, and Cholesterol Controlled Diet       What Is a Heart Healthy Diet? A heart-healthy diet is one that limits sodium , certain types of fat , and cholesterol . This type of diet is recommended for:   People with any form of cardiovascular disease (eg, coronary heart disease , peripheral vascular disease , previous heart attack , previous stroke )   People with risk factors for cardiovascular disease, such as high blood pressure , high cholesterol , or diabetes   Anyone who wants to lower their risk of developing cardiovascular disease   Sodium    Sodium is a mineral found in many foods. In general, most people consume much more sodium than they need. Diets high in sodium can increase blood pressure and lead to edema (water retention). On a heart-healthy diet, you should consume no more than 2,300 mg (milligrams) of sodium per dayabout the amount in one teaspoon of table salt. The foods highest in sodium include table salt (about 50% sodium), processed foods, convenience foods, and preserved foods.    Cholesterol    Cholesterol is a fat-like, waxy substance in your blood. Our bodies make some cholesterol. It is also found in animal products, with the highest amounts in fatty meat, egg yolks, whole milk, cheese, shellfish, and organ meats. On a heart-healthy diet, you should limit your cholesterol intake to less than 200 mg per day. It is normal and important to have some cholesterol in your bloodstream. But too much cholesterol can cause plaque to build up within your arteries, which can eventually lead to a heart attack or stroke. The two types of cholesterol that are most commonly referred to are:   Low-density lipoprotein (LDL) cholesterol  Also known as bad cholesterol, this is the cholesterol that tends to build up along your arteries. Bad cholesterol levels are increased by eating fats that are saturated or hydrogenated. Optimal level of this cholesterol is less than 100. Over 130 starts to get risky for heart disease. High-density lipoprotein (HDL) cholesterol  Also known as good cholesterol, this type of cholesterol actually carries cholesterol away from your arteries and may, therefore, help lower your risk of having a heart attack. You want this level to be high (ideally greater than 60). It is a risk to have a level less than 40. You can raise this good cholesterol by eating olive oil, canola oil, avocados, or nuts. Exercise raises this level, too. Fat    Fat is calorie dense and packs a lot of calories into a small amount of food. Even though fats should be limited due to their high calorie content, not all fats are bad. In fact, some fats are quite healthful. Fat can be broken down into four main types.    The good-for-you fats are:   Monounsaturated fat  found in oils such as olive and canola, avocados, and nuts and natural nut butters; can decrease cholesterol levels, while keeping levels of HDL cholesterol high   Polyunsaturated fat  found in oils such as safflower, sunflower, soybean, corn, and sesame; can decrease total cholesterol and LDL cholesterol   Omega-3 fatty acids  particularly those found in fatty fish (such as salmon, trout, tuna, mackerel, herring, and sardines); can decrease risk of arrhythmias, decrease triglyceride levels, and slightly lower blood pressure   The fats that you want to limit are:   Saturated fat  found in animal products, many fast foods, and a few vegetables; increases total blood cholesterol, including LDL levels   Animal fats that are saturated include: butter, lard, whole-milk dairy products, meat fat, and poultry skin   Vegetable fats that are saturated include: hydrogenated shortening, palm oil, coconut oil, cocoa butter   Hydrogenated or trans fat  found in margarine and vegetable shortening, most shelf stable snack foods, and fried foods; increases LDL and decreases HDL     It is generally recommended that you limit your total fat for the day to less than 30% of your total calories. If you follow an 1800-calorie heart healthy diet, for example, this would mean 60 grams of fat or less per day. Saturated fat and trans fat in your diet raises your blood cholesterol the most, much more than dietary cholesterol does. For this reason, on a heart-healthy diet, less than 7% of your calories should come from saturated fat and ideally 0% from trans fat. On an 1800-calorie diet, this translates into less than 14 grams of saturated fat per day, leaving 46 grams of fat to come from mono- and polyunsaturated fats.    Food Choices on a Heart Healthy Diet   Food Category   Foods Recommended   Foods to Avoid   Grains   Breads and rolls without salted tops Most dry and cooked cereals Unsalted crackers and breadsticks Low-sodium or homemade breadcrumbs or stuffing All rice and pastas   Breads, rolls, and crackers with salted tops High-fat baked goods (eg, muffins, donuts, pastries) Quick breads, self-rising flour, and biscuit mixes Regular bread crumbs Instant hot cereals Commercially prepared rice, pasta, or stuffing mixes Vegetables   Most fresh, frozen, and low-sodium canned vegetables Low-sodium and salt-free vegetable juices Canned vegetables if unsalted or rinsed   Regular canned vegetables and juices, including sauerkraut and pickled vegetables Frozen vegetables with sauces Commercially prepared potato and vegetable mixes   Fruits   Most fresh, frozen, and canned fruits All fruit juices   Fruits processed with salt or sodium   Milk   Nonfat or low-fat (1%) milk Nonfat or low-fat yogurt Cottage cheese, low-fat ricotta, cheeses labeled as low-fat and low-sodium   Whole milk Reduced-fat (2%) milk Malted and chocolate milk Full fat yogurt Most cheeses (unless low-fat and low salt) Buttermilk (no more than 1 cup per week)   Meats and Beans   Lean cuts of fresh or frozen beef, veal, lamb, or pork (look for the word loin) Fresh or frozen poultry without the skin Fresh or frozen fish and some shellfish Egg whites and egg substitutes (Limit whole eggs to three per week) Tofu Nuts or seeds (unsalted, dry-roasted), low-sodium peanut butter Dried peas, beans, and lentils   Any smoked, cured, salted, or canned meat, fish, or poultry (including mcclure, chipped beef, cold cuts, hot dogs, sausages, sardines, and anchovies) Poultry skins Breaded and/or fried fish or meats Canned peas, beans, and lentils Salted nuts   Fats and Oils   Olive oil and canola oil Low-sodium, low-fat salad dressings and mayonnaise   Butter, margarine, coconut and palm oils, mcclure fat   Snacks, Sweets, and Condiments   Low-sodium or unsalted versions of broths, soups, soy sauce, and condiments Pepper, herbs, and spices; vinegar, lemon, or lime juice Low-fat frozen desserts (yogurt, sherbet, fruit bars) Sugar, cocoa powder, honey, syrup, jam, and preserves Low-fat, trans-fat free cookies, cakes, and pies Rafi and animal crackers, fig bars, denise snaps   High-fat desserts Broth, soups, gravies, and sauces, made from instant mixes or other high-sodium ingredients Salted snack foods Canned olives Meat tenderizers, seasoning salt, and most flavored vinegars   Beverages   Low-sodium carbonated beverages Tea and coffee in moderation Soy milk   Commercially softened water   Suggestions   Make whole grains, fruits, and vegetables the base of your diet. Choose heart-healthy fats such as canola, olive, and flaxseed oil, and foods high in heart-healthy fats, such as nuts, seeds, soybeans, tofu, and fish. Eat fish at least twice per week; the fish highest in omega-3 fatty acids and lowest in mercury include salmon, herring, mackerel, sardines, and canned chunk light tuna. If you eat fish less than twice per week or have high triglycerides, talk to your doctor about taking fish oil supplements. Read food labels. For products low in fat and cholesterol, look for fat free, low-fat, cholesterol free, saturated fat free, and trans fat freeAlso scan the Nutrition Facts Label, which lists saturated fat, trans fat, and cholesterol amounts. For products low in sodium, look for sodium free, very low sodium, low sodium, no added salt, and unsalted   Skip the salt when cooking or at the table; if food needs more flavor, get creative and try out different herbs and spices. Garlic and onion also add substantial flavor to foods. Trim any visible fat off meat and poultry before cooking, and drain the fat off after dee. Use cooking methods that require little or no added fat, such as grilling, boiling, baking, poaching, broiling, roasting, steaming, stir-frying, and sauting. Avoid fast food and convenience food. They tend to be high in saturated and trans fat and have a lot of added salt. Talk to a registered dietitian for individualized diet advice.       Last Reviewed: March 2011 Adarsh Ng MS, MPH, RD   Updated: 3/29/2011   ·     Preventing Osteoporosis: After Your Visit  Your Care Instructions  Osteoporosis means the bones are weak and thin enough that they can break easily. The older you are, the more likely you are to get osteoporosis. But with plenty of calcium, vitamin D, and exercise, you can help prevent osteoporosis. The preteen and teen years are a key time for bone building. With the help of calcium, vitamin D, and exercise in those early years and beyond, the bones reach their peak density and strength by age 27. After age 27, your bones naturally start to thin and weaken. The stronger your bones are at around age 27, the lower your risk for osteoporosis. But no matter what your age and risk are, your bones still need calcium, vitamin D, and exercise to stay strong. Also avoid smoking, and limit alcohol. Smoking and heavy alcohol use can make your bones thinner. Talk to your doctor about any special risks you might have, such as having a close relative with osteoporosis or taking a medicine that can weaken bones. Your doctor can tell you the best ways to protect your bones from thinning. Follow-up care is a key part of your treatment and safety. Be sure to make and go to all appointments, and call your doctor if you are having problems. It's also a good idea to know your test results and keep a list of the medicines you take. How can you care for yourself at home? Get enough calcium and vitamin D. The [place] [employer] recommends adults younger than age 46 need 1,000 mg of calcium and 600 IU of vitamin D each day. Women ages 46 to 79 need 1,200 mg of calcium and 600 IU of vitamin D each day. Men ages 46 to 79 need 1,000 mg of calcium and 600 IU of vitamin D each day. Adults 71 and older need 1,200 mg of calcium and 800 IU of vitamin D each day. Eat foods rich in calcium, like yogurt, cheese, milk, and dark green vegetables. Eat foods rich in vitamin D, like eggs, fatty fish, cereal, and fortified milk. Get some sunshine. Your body uses sunshine to make its own vitamin D. The safest time to be out in the sun is before 10 a.m. or after 3 p.m.  Avoid getting sunburned. Sunburn can increase your risk of skin cancer. Talk to your doctor about taking a calcium plus vitamin D supplement. Ask about what type of calcium is right for you, and how much to take at a time. Adults ages 23 to 48 should not get more than 2,500 mg of calcium and 4,000 IU of vitamin D each day, whether it is from supplements and/or food. Adults ages 46 and older should not get more than 2,000 mg of calcium and 4,000 IU of vitamin D each day from supplements and/or food. Get regular bone-building exercise. Weight-bearing and resistance exercises keep bones healthy by working the muscles and bones against gravity. Start out at an exercise level that feels right for you. Add a little at a time until you can do the following:  Do 30 minutes of weight-bearing exercise on most days of the week. Walking, jogging, stair climbing, and dancing are good choices. Do resistance exercises with weights or elastic bands 2 to 3 days a week. Limit alcohol. Drink no more than 1 alcohol drink a day if you are a woman. Drink no more than 2 alcohol drinks a day if you are a man. Do not smoke. Smoking can make bones thin faster. If you need help quitting, talk to your doctor about stop-smoking programs and medicines. These can increase your chances of quitting for good. When should you call for help? Watch closely for changes in your health, and be sure to contact your doctor if:  You need help with a healthy eating plan. You need help with an exercise plan    © 9509-9273 [employer]. Care instructions adapted under license by [facility]. This care instruction is for use with your licensed healthcare professional. If you have questions about a medical condition or this instruction, always ask your healthcare professional. [place] [address] any warranty or liability for your use of this information. Content Version: 9.4.41350;  Last Revised: June 20, 2011              ·     High-Fiber Diet     What Is Fiber? Dietary fiber is a form of carbohydrate found in plants that cannot be digested by humans. All plants contain fiber, including fruits, vegetables, grains, and legumes. Fiber is often classified into two categories: soluble and insoluble. Soluble fiber draws water into the bowel and can help slow digestion. Examples of foods that are high in soluble fiber include oatmeal, oat bran, barley, legumes (eg, beans and peas), apples, and strawberries. Insoluble fiber speeds digestion and can add bulk to the stool. Examples of foods that are high in insoluble fiber include whole-wheat products, wheat bran, cauliflower, green beans, and potatoes. Why Follow a High-Fiber Diet? A high-fiber diet is often recommended to prevent and treat constipation , hemorrhoids , diverticulitis , and irritable bowel syndrome . Eating a high-fiber diet can also help improve your cholesterol levels, lower your risk of coronary heart disease , reduce your risk of type 2 diabetes , and lower your weight. For people with type 1 or 2 diabetes, a high-fiber diet can also help stabilize blood sugar levels. How Much Fiber Should I Eat? A high-fiber diet should contain  20-35 grams  of fiber a day. This is actually the amount recommended for the general adult population; however, most Americans eat only 15 grams of fiber per day. Digestion of Fiber   Eating a higher fiber diet than usual can take some getting used to by your body's digestive system. To avoid the side effects of sudden increases in dietary fiber (eg, gas, cramping, bloating, and diarrhea), increase fiber gradually and be sure to drink plenty of fluids every day. Tips for Increasing Fiber Intake   Whenever possible, choose whole grains over refined grains (eg, brown rice instead of white rice, whole-wheat bread instead of white bread).     Include a variety of grains in your diet, such as wheat, rye, barley, oats, quinoa, and bulgur. Eat more vegetarian-based meals. Here are some ideas: black bean burgers, eggplant lasagna, and veggie tofu stir-dooley. Choose high-fiber snacks, such as fruits, popcorn, whole-grain crackers, and nuts. Make whole-grain cereal or whole-grain toast part of your daily breakfast regime. When eating out, whether ordering a sandwich or dinner, ask for extra vegetables. When baking, replace part of the white flour with whole-wheat flour. Whole-wheat flour is particularly easy to incorporate into a recipe. High-Fiber Diet Eating Guide   Food Category   Foods Recommended   Notes   Grains   Whole-grain breads, muffins, bagels, or jennifer bread Rye bread Whole-wheat crackers or crisp breads Whole-grain or bran cereals Oatmeal, oat bran, or grits Wheat germ Whole-wheat pasta and brown rice   Read the ingredients list on food labels. Look for products that list \"whole\" as the first ingredient (eg, whole-wheat, whole oats). Choose cereals with at least 2 grams of fiber per serving. Vegetables   All vegetables, especially asparagus, bean sprouts, broccoli, Bath sprouts, cabbage, carrots, cauliflower, celery, corn, greens, green beans, green pepper, onions, peas, potatoes (with skin), snow peas, spinach, squash, sweet potatoes, tomatoes, zucchini   For maximum fiber intake, eat the peels of fruits and vegetablesjust be sure to wash them well first.   Fruits   All fruits, especially apples, berries, grapefruits, mangoes, nectarines, oranges, peaches, pears, dried fruits (figs, dates, prunes, raisins)   Choose raw fruits and vegetables over juice, cooked, or cannedraw fruit has more fiber. Dried fruit is also a good source of fiber. Milk   With the exception of yogurt containing inulin (a type of fiber), dairy foods provide little fiber. Add more fiber by topping your yogurt or cottage cheese with fresh fruit, whole grain or bran cereals, nuts, or seeds.    Meats and Beans   All beans need to remember every detail on your own. These memory aids can help:   Calendars and day planners   Electronic organizers to store all sorts of helpful informationThese devices can \"beep\" to remind you of appointments. A book of days to record birthdays, anniversaries, and other occasions that occur on the same date every year   Detailed \"to-do\" lists and strategically placed sticky notes   Quick \"study\" sessionsBefore a gathering, review who will be there so their names will be fresh in your mind. Establish routinesFor example, keep your keys, wallet, and umbrella in the same place all the time or take medicine with your 8:00 AM glass of juice   Live a Healthy Life   Many actions that will keep your body strong will do the same for your mind. For example:   Talk to Your Doctor About Herbs and Supplements    Malnutrition and vitamin deficiencies can impair your mental function. For example, vitamin B12 deficiency can cause a range of symptoms, including confusion. But, what if your nutritional needs are being met? Can herbs and supplements still offer a benefit? Researchers have investigated a range of natural remedies, such as ginkgo , ginseng , and the supplement phosphatidylserine (PS). So far, though, the evidence is inconsistent as to whether these products can improve memory or thinking. If you are interested in taking herbs and supplements, talk to your doctor first because they may interact with other medicines that you are taking. Exercise Regularly    Among the many benefits of regular exercise are increased blood flow to the brain and decreased risk of certain diseases that can interfere with memory function. One study found that even moderate exercise has a beneficial effect.  Examples of \"moderate\" exercise include:   Playing 18 holes of golf once a week, without a cart   Playing tennis twice a week   Walking one mile per day   Manage Stress    It can be tough to remember what is important when your mind is cluttered. Make time for relaxation. Choose activities that calm you down, and make it routine. Manage Chronic Conditions    Side effects of high blood pressure , diabetes, and heart disease can interfere with mental function. Many of the lifestyle steps discussed here can help manage these conditions. Strive to eat a healthy diet, exercise regularly, get stress under control, and follow your doctor's advice for your condition. Minimize Medications    Talk to your doctor about the medicines that you take. Some may be unnecessary. Also, healthy lifestyle habits may lower the need for certain drugs. Last Reviewed: April 2010 Tomy Nowak MD   Updated: 4/13/2010   ·     [address]       As we get older, changes in balance, gait, strength, vision, hearing, and cognition make even the most youthful senior more prone to accidents. Falls are one of the leading health risks for older people. This increased risk of falling is related to:   Aging process (eg, decreased muscle strength, slowed reflexes)   Higher incidence of chronic health problems (eg, arthritis, diabetes) that may limit mobility, agility or sensory awareness   Side effects of medicine (eg, dizziness, blurred vision)especially medicines like prescription pain medicines and drugs used to treat mental health conditions   Depending on the brittleness of your bones, the consequences of a fall can be serious and long lasting. Home Life   Research by the Association of Aging [facility]) shows that some home accidents among older adults can be prevented by making simple lifestyle changes and basic modifications and repairs to the home environment. Here are some lifestyle changes that experts recommend:   Have your hearing and vision checked regularly. Be sure to wear prescription glasses that are right for you. Speak to your doctor or pharmacist about the possible side effects of your medicines.  A number of medicines can cause dizziness. If you have problems with sleep, talk to your doctor. Limit your intake of alcohol. If necessary, use a cane or walker to help maintain your balance. Wear supportive, rubber-soled shoes, even at home. If you live in a region that gets wintry weather, you may want to put special cleats on your shoes to prevent you from slipping on the snow and ice. Exercise regularly to help maintain muscle tone, agility, and balance. Always hold the banister when going up or down stairs. Also, use  bars when getting in or out of the bath or shower, or using the toilet. To avoid dizziness, get up slowly from a lying down position. Sit up first, dangling your legs for a minute or two before rising to a standing position. Overall Home Safety Check   According to the Consumer Product Safety Commision's \"Older Consumer Home Safety Checklist,\" it is important to check for potential hazards in each room. And remember, proper lighting is an essential factor in home safety. If you cannot see clearly, you are more likely to fall. Important questions to ask yourself include:   Are lamp, electric, extension, and telephone cords placed out of the flow of traffic and maintained in good condition? Have frayed cords been replaced? Are all small rugs and runners slip resistant? If not, you can secure them to the floor with a special double-sided carpet tape. Are smoke detectors properly locatedone on every floor of your home and one outside of every sleeping area? Are they in good working order? Are batteries replaced at least once a year? Do you have a well-maintained carbon monoxide detector outside every sleeping are in your home? Does your furniture layout leave plenty of space to maneuver between and around chairs, tables, beds, and sofas? Are hallways, stairs and passages between rooms well lit? Can you reach a lamp without getting out of bed? Are floor surfaces well maintained?  Shag rugs, high-pile down. For people with limited mobility, bathtub transfer benches allow you to slide safely into the tub. Raised toilet seats and toilet safety rails are helpful for those with knee or hip problems. In the [facility]    Make sure you use a nightlight and that the area around your bed is clear of potential obstacles. Be careful with electric blankets and never go to sleep with a heating pad, which can cause serious burns even if on a low setting. Use fire-resistant mattress covers and pillows, and NEVER smoke in bed. Keep a phone next to the bed that is programmed to dial 911 at the push of a button. If you have a chronic condition, you may want to sign on with an automatic call-in service. Typically the system includes a small pendant that connects directly to an emergency medical voice-response system. You should also make arrangements to stay in contact with someonefriend, neighbor, family memberon a regular schedule. Fire Prevention   According to the [employer]. (Smoke Alarms for Every) [address] [place] [address], senior citizens are one of the two highest risk groups for death and serious injuries due to residential fires. When cooking, wear short-sleeved items, never a bulky long-sleeved robe. The [facility]'s Safety Checklist for Older Consumers emphasizes the importance of checking basements, garages, workshops and storage areas for fire hazards, such as volatile liquids, piles of old rags or clothing and overloaded circuits. Never smoke in bed or when lying down on a couch or recliner chair. Small portable electric or kerosene heaters are responsible for many home fires and should be used cautiously if at all. If you do use one, be sure to keep them away from flammable materials. In case of fire, make sure you have a pre-established emergency exit plan. Have a professional check your fireplace and other fuel-burning appliances yearly.     Helping Hands   Baby boomers entering the resendez years will continue to see the development of new products to help older adults live safely and independently in spite of age-related changes. Making Life More Livable  , by [employer], lists over 1,000 products for \"living well in the mature years,\" such as bathing and mobility aids, household security devices, ergonomically designed knives and peelers, and faucet valves and knobs for temperature control. Medical supply stores and organizations are good sources of information about products that improve your quality of life and insure your safety. Last Reviewed: November 2009 Adis Kraft MD   Updated: 3/7/2011     ·        Advance Care Planning: Care Instructions  Your Care Instructions     It can be hard to live with an illness that cannot be cured. But if your health is getting worse, you may want to make decisions about end-of-life care. Planning for the end of your life does not mean that you are giving up. It is a way to make sure that your wishes are met. Clearly stating your wishes can make it easier for your loved ones. Making plans while you are still able may also ease your mind and make your final days less stressful and more meaningful. Follow-up care is a key part of your treatment and safety. Be sure to make and go to all appointments, and call your doctor if you are having problems. It's also a good idea to know your test results and keep a list of the medicines you take. What can you do to plan for the end of life? You can bring these issues up with your doctor. You do not need to wait until your doctor starts the conversation. You might start with, \"What makes life worth living for me is. [place] \" And then follow it with, \"I would not be willing to live with . [place] \" When you complete this sentence it helps your doctor understand your wishes. Talk openly and honestly with your doctor. This is the best way to understand the decisions you will need to make as your health changes.  Know that you can always change your mind. Ask your doctor about commonly used life-support measures. These include tube feedings, breathing machines, and fluids given through a vein (IV). Understanding these treatments will help you decide whether you want them. You may choose to have these life-supporting treatments for a limited time. This allows a trial period to see whether they will help you. You may also decide that you want your doctor to take only certain measures to keep you alive. It may help to think about the big picture, like what makes life worth living for you or what your values and goals are. Talk to your doctor about how long you are likely to live. Your doctor may be able to give you an idea of what usually happens with your specific illness. Think about preparing papers that state your wishes. These papers are called advance directives. If you do this early and review them often, there will not be any confusion about what you want. You can change your instructions at any time. Which papers should you prepare? Advance directives are legal papers that tell doctors how you want to be cared for at the end of your life. You do not need a  to write these papers. Ask your doctor or your state health department for information on how to write your advance directives. They may have the forms for each of these types of papers. Make sure your doctor has a copy of these on file, and give a copy to a family member or close friend. Consider a do-not-resuscitate order (DNR). This order asks that no extra treatments be done if your heart stops or you stop breathing. Extra treatments may include cardiopulmonary resuscitation (CPR), electrical shock to restart your heart, or a machine to breathe for you. If you decide to have a DNR order, ask your doctor to explain and write it. Place the order in your home where everyone can easily see it. Consider a living will.  A living will explains your wishes about life support and other treatments at the end of your life if you become unable to speak for yourself. Living garrett tell doctors to use or not use treatments that would keep you alive. You must have one or two witnesses or a notary present when you sign this form. A living will may be called something else in your state. Consider a medical power of . This form allows you to name a person to make decisions about your care if you are not able to. Most people ask a close friend or family member. Talk to this person about the kinds of treatments you want and those that you do not want. Make sure this person understands your wishes. A medical power of  may be called something else in your state. These legal papers are simple to change. Tell your doctor what you want to change, and ask him or her to make a note in your medical file. Give your family updated copies of the papers. Where can you learn more? Go to https://[employer]roc[employer]ile GoldewCopiny.Sviral. org and sign in to your [email or web address] account. Enter P184 in the Stipple box to learn more about \"Advance Care Planning: Care Instructions. \"     If you do not have an account, please click on the \"Sign Up Now\" link. Current as of: March 17, 2021               Content Version: 13.0  © 2006-2021 Healthwise, [employer]. Care instructions adapted under license by [facility] [place] ([facility]). If you have questions about a medical condition or this instruction, always ask your healthcare professional. [place] [id number] any warranty or liability for your use of this information.     ·

## 2021-11-29 NOTE — PROGRESS NOTES
Billie Helton MD    [address] [place] [address]  [id number]  [address] [address]  Dept: [phone]  Dept Fax: [phone]     Patient ID: Juan Mendoza is a 68 y.o. female. HPI    Established patient here today for f/u on chronic medical problems, go over labs and/or diagnostic studies, and medication refills. Pt denies any fever or chills. Pt today denies any HA, chest pain, or SOB. Pt denies any N/V/D/C or abdominal pain. Pt does c/o leg cramps at night and has been taking the Neurontin. She has also been c/o low back pain and has been going to the chiropractor and does get temporary relief. Otherwise pt doing well on current tx and no other concerns today. The patient's past medical, surgical, social, and family history as well as his current medications and allergies were reviewed as documented in today's encounter. My previous office notes, consult notes, labs and diagnostic studies were reviewed prior to and during encounter. Current Outpatient Medications on File Prior to Visit   Medication Sig Dispense Refill    isosorbide mononitrate (IMDUR) 60 MG extended release tablet Take 1 tablet by mouth daily      Insulin Glargine, 2 Unit Dial, (TOUJEO MAX SOLOSTAR) 300 UNIT/ML SOPN Inject 15 Units into the skin every morning      Coenzyme Q10 (CO Q-10) 100 MG CAPS Take 1 capsule by mouth daily      amLODIPine (NORVASC) 5 MG tablet Take 1 tablet by mouth nightly 30 tablet 2    B-D UF III MINI PEN NEEDLES 31G X 5 MM MISC       ciclopirox (PENLAC) 8 % solution Apply topically nightly. Remove once weekly with alcohol or nail polish remover.  1 Bottle 3    atorvastatin (LIPITOR) 40 MG tablet Take 1 tablet by mouth daily 90 tablet 1    clopidogrel (PLAVIX) 75 MG tablet Take 1 tablet by mouth daily 30 tablet 5    metoprolol succinate (TOPROL XL) 25 MG extended release tablet Take 1 tablet by mouth daily 90 tablet 1    gabapentin (NEURONTIN) 100 MG capsule Take 1 capsule by mouth daily.  insulin lispro, 1 Unit Dial, (HUMALOG KWIKPEN) 100 UNIT/ML SOPN Inject into the skin 3 times daily PER SLIDING SCALE      losartan (COZAAR) 50 MG tablet Take 50 mg by mouth daily      Multiple Vitamins TABS Take 1 tablet by mouth daily       No current facility-administered medications on file prior to visit. Subjective:     Review of Systems   Constitutional: Positive for fatigue. Negative for appetite change and fever. HENT: Negative for congestion, ear pain, rhinorrhea and sore throat. Respiratory: Negative for cough, chest tightness and shortness of breath. Cardiovascular: Negative for chest pain and palpitations. Gastrointestinal: Negative for abdominal distention, abdominal pain, constipation, diarrhea, nausea and vomiting. Genitourinary: Negative for difficulty urinating and dysuria. Musculoskeletal: Negative for arthralgias, back pain and myalgias. Leg cramps   Skin: Negative for rash. Neurological: Negative for dizziness, weakness, light-headedness and headaches. Hematological: Negative for adenopathy. Psychiatric/Behavioral: Negative for behavioral problems and sleep disturbance. The patient is not nervous/anxious. Objective:     Physical Exam  Vitals reviewed. Constitutional:       General: She is not in acute distress. Appearance: She is well-developed. HENT:      Head: Normocephalic and atraumatic. Right Ear: External ear normal.      Left Ear: External ear normal.      Nose: Nose normal.      Mouth/Throat:      Pharynx: No oropharyngeal exudate. Eyes:      Conjunctiva/sclera: Conjunctivae normal.      Pupils: Pupils are equal, round, and reactive to light. Cardiovascular:      Rate and Rhythm: Normal rate and regular rhythm. Heart sounds: Normal heart sounds. No murmur heard. Pulmonary:      Effort: Pulmonary effort is normal. No respiratory distress.       Breath sounds: Normal breath sounds. No wheezing. Chest:      Chest wall: No tenderness. Abdominal:      General: Bowel sounds are normal. There is no distension. Palpations: Abdomen is soft. There is no mass. Tenderness: There is no abdominal tenderness. Musculoskeletal:         General: No tenderness. Normal range of motion. Cervical back: Normal range of motion. Lymphadenopathy:      Cervical: No cervical adenopathy. Skin:     Findings: No rash. Neurological:      Mental Status: She is alert and oriented to person, place, and time. Deep Tendon Reflexes: Reflexes are normal and symmetric. Psychiatric:         Behavior: Behavior normal.         Assessment:      Diagnosis Orders   1. Routine general medical examination at a health care facility     2. Primary hypertension  CBC    Comprehensive Metabolic Panel   3. Pure hypercholesterolemia  Lipid Panel   4. Type 2 diabetes mellitus without complication, with long-term current use of insulin (HCC)  Comprehensive Metabolic Panel   5. Coronary artery disease of native heart with stable angina pectoris, unspecified vessel or lesion type [facility])  Comprehensive Metabolic Panel    Lipid Panel   6. Elevated liver enzymes  Comprehensive Metabolic Panel   7. Vitamin D deficiency  Vitamin D 25 Hydroxy   8. Peripheral polyneuropathy  Comprehensive Metabolic Panel   9. Fatigue, unspecified type  Comprehensive Metabolic Panel    TSH without Reflex    T4, Free    Vitamin D 25 Hydroxy    Vitamin B12   10. Leg cramps     11.  Chronic bilateral low back pain without sciatica           Plan:     Orders Placed This Encounter   Procedures    CBC     Standing Status:   Future     Standing Expiration Date:   11/29/2022    Comprehensive Metabolic Panel     Standing Status:   Future     Standing Expiration Date:   11/29/2022    Lipid Panel     Standing Status:   Future     Standing Expiration Date:   11/29/2022     Order Specific Question:   Is Patient Fasting?/# of Hours Answer:   8-10 Hours    TSH without Reflex     Standing Status:   Future     Standing Expiration Date:   2022    T4, Free     Standing Status:   Future     Standing Expiration Date:   2022    Vitamin D 25 Hydroxy     Standing Status:   Future     Standing Expiration Date:   2022    Vitamin B12     Standing Status:   Future     Standing Expiration Date:   2022        Will cont with current treatment as pt is currently stable on current treatment    Will cont with the Imdur, Norvasc, Toprol, and Cozaar as prescribed for BP control    Will cont with low fat/chol diet and Lipitor as ordered    Continue with ADA diet, current diabetic meds, and monitoring BS's as instructed and will cont to follow with Dr. Ulises Varghese with daily foot exams and yearly eye exams    Will cont with Cozaar for renal protection    Cont to stay well hydrated and will have her increase the Gabapentin 100mg and try 2 pills at night for her leg cramps    Will cont with the chiropractor for her low back pain and will have her try an OTC Salon Pas Patch    Will cont to follow with Cardiology as instructed    Rest of systems unchanged, continue current treatments. Medications, labs, diagnostic studies, consultations and follow-up as documented in this encounter. Rest of systems unchanged, continue current treatments    On this date 2021,  I have spent greater than 50% of visit reviewing previous notes, test results and face to face with the patient discussing the diagnoses, importance of compliance with the treatment plan, counseling, coordinating care as well as documenting on the day of the visit. Billie Madsen MD   Medicare Annual Wellness Visit  Name: Anson Vaca Date: 2021   MRN: [id number] Sex: Female   Age: 68 y.o.  Ethnicity: Non- / Non    : 1945 Race: White (non-)      Eri Montague is here for Medicare AWV    Screenings for behavioral, psychosocial and functional/safety risks, and cognitive dysfunction are all negative except as indicated below. These results, as well as other patient data from the [address] form, are documented in Flowsheets linked to this Encounter. No Known Allergies    Prior to Visit Medications    Medication Sig Taking? Authorizing Provider   isosorbide mononitrate (IMDUR) 60 MG extended release tablet Take 1 tablet by mouth daily Yes Historical Provider, MD   Insulin Glargine, 2 Unit Dial, (TOUJEO MAX SOLOSTAR) 300 UNIT/ML SOPN Inject 15 Units into the skin every morning Yes Historical Provider, MD   Coenzyme Q10 (CO Q-10) 100 MG CAPS Take 1 capsule by mouth daily Yes Historical Provider, MD   amLODIPine (NORVASC) 5 MG tablet Take 1 tablet by mouth nightly Yes Ivan Clarke MD   B-D UF III MINI PEN NEEDLES 31G X 5 MM MISC  Yes Historical Provider, MD   ciclopirox (PENLAC) 8 % solution Apply topically nightly. Remove once weekly with alcohol or nail polish remover. Yes Pepe Henao DPM   atorvastatin (LIPITOR) 40 MG tablet Take 1 tablet by mouth daily Yes Daphney Gamez MD   clopidogrel (PLAVIX) 75 MG tablet Take 1 tablet by mouth daily Yes Daphney Gamez MD   metoprolol succinate (TOPROL XL) 25 MG extended release tablet Take 1 tablet by mouth daily Yes Daphney Gamez MD   gabapentin (NEURONTIN) 100 MG capsule Take 1 capsule by mouth daily.  Yes Historical Provider, MD   insulin lispro, 1 Unit Dial, (HUMALOG KWIKPEN) 100 UNIT/ML SOPN Inject into the skin 3 times daily PER SLIDING SCALE Yes Historical Provider, MD   losartan (COZAAR) 50 MG tablet Take 50 mg by mouth daily Yes Willi Mckinley MD   Multiple Vitamins TABS Take 1 tablet by mouth daily Yes Willi Mckinley MD       Past Medical History:   Diagnosis Date    Abnormal cardiovascular stress test 06/2020    no ischemia / infarction   there is septal hypokinesis   /  EF 70%    CAD (coronary artery disease)     Chest pressure     Fatigue     History of pneumonia 03/2020    HTN (hypertension)     Hyperlipidemia     Positive cardiac stress test     SOB (shortness of breath)     Type 2 diabetes mellitus without complication [facility])        Past Surgical History:   Procedure Laterality Date    APPENDECTOMY      CARDIAC CATHETERIZATION  03/09/2021    CHOLECYSTECTOMY      COLONOSCOPY      CORONARY ANGIOPLASTY WITH STENT PLACEMENT  08/14/2020    High grade stenosis in Proximal RCA s/p successful PCI with DELIA (3.25 X 12 mm)    HYSTERECTOMY      HYSTERECTOMY, VAGINAL      left both ovaries in       Family History   Problem Relation Age of Onset    Heart Disease Mother     Diabetes Mother     Heart Disease Brother     Esophageal Cancer Brother     Diabetes Maternal Aunt     Diabetes Maternal Uncle     Diabetes Maternal Grandmother        CareTeam (Including outside providers/suppliers regularly involved in providing care):   Patient Care Team:  Chiqui Day MD as PCP - General (Family Medicine)  Chiqui Day MD as PCP - [facility]  Mayra Marte as Consulting Physician (Endocrinology)  Savana Rich MD as Consulting Physician (Ophthalmology)  Galo Porter DO as Consulting Physician (Cardiology)  Zofia Schmid DPM as Consulting Physician (Podiatry)    Wt Readings from Last 3 Encounters:   11/29/21 122 lb (55.3 kg)   10/13/21 122 lb (55.3 kg)   09/03/21 122 lb (55.3 kg)     Vitals:    11/29/21 0807   BP: 134/64   Pulse: 56   Resp: 16   Temp: 97.7 °F (36.5 °C)   SpO2: 98%   Weight: 122 lb (55.3 kg)   Height: 5' 1.81\" (1.57 m)     Body mass index is 22.45 kg/m². Based upon direct observation of the patient, evaluation of cognition reveals recent and remote memory intact. Patient's complete Health Risk Assessment and screening values have been reviewed and are found in Flowsheets.  The following problems were reviewed today and where indicated follow up appointments were made and/or referrals COVID-19 Vaccine  Completed    Hepatitis C screen  Completed    Hepatitis A vaccine  Aged Out    Hib vaccine  Aged Out    Meningococcal (ACWY) vaccine  Aged Out     Recommendations for [employer] Due: see orders and patient instructions/AVS.  . Recommended screening schedule for the next 5-10 years is provided to the patient in written form: see Patient Pillo Callaway was seen today for medicare aw.     Diagnoses and all orders for this visit:    Primary hypertension    Pure hypercholesterolemia    Type 2 diabetes mellitus without complication, with long-term current use of insulin ([facility] [address]    Coronary artery disease of native heart with stable angina pectoris, unspecified vessel or lesion type (HCC)    Elevated liver enzymes    Vitamin D deficiency    Peripheral polyneuropathy    Fatigue, unspecified type

## 2021-12-09 ENCOUNTER — HOSPITAL ENCOUNTER (OUTPATIENT)
Dept: WOMENS IMAGING | Age: 76
Discharge: HOME | End: 2021-12-11
Payer: MEDICARE

## 2021-12-09 ENCOUNTER — TELEPHONE (OUTPATIENT)
Dept: FAMILY MEDICINE CLINIC | Age: 76
End: 2021-12-09

## 2021-12-09 DIAGNOSIS — Z12.31 VISIT FOR SCREENING MAMMOGRAM: ICD-10-CM

## 2021-12-09 PROCEDURE — 77063 BREAST TOMOSYNTHESIS BI: CPT

## 2021-12-09 NOTE — TELEPHONE ENCOUNTER
----- Message from Kj Pfeiffer, [address] [place] sent at 12/9/2021 11:58 AM EST -----  Subject: Message to Provider    QUESTIONS  Information for Provider? Pt returned someone's call from the office. We   attempted to reach the office and received a recording to try to call back   later. Please call pt  ---------------------------------------------------------------------------  --------------  CALL BACK INFO  What is the best way for the office to contact you? OK to leave message on   voicemail  Preferred Call Back Phone Number?  [phone]  ---------------------------------------------------------------------------  --------------  SCRIPT ANSWERS  undefined

## 2021-12-15 ENCOUNTER — OFFICE VISIT (OUTPATIENT)
Dept: PODIATRY | Age: 76
End: 2021-12-15
Payer: MEDICARE

## 2021-12-15 VITALS — HEIGHT: 61 IN | BODY MASS INDEX: 23.03 KG/M2 | WEIGHT: 122 LBS

## 2021-12-15 DIAGNOSIS — I73.9 PVD (PERIPHERAL VASCULAR DISEASE) (HCC): ICD-10-CM

## 2021-12-15 DIAGNOSIS — B35.1 DERMATOPHYTOSIS OF NAIL: ICD-10-CM

## 2021-12-15 DIAGNOSIS — E11.51 TYPE II DIABETES MELLITUS WITH PERIPHERAL CIRCULATORY DISORDER (HCC): Primary | ICD-10-CM

## 2021-12-15 DIAGNOSIS — M79.672 PAIN IN BOTH FEET: ICD-10-CM

## 2021-12-15 DIAGNOSIS — M79.671 PAIN IN BOTH FEET: ICD-10-CM

## 2021-12-15 PROCEDURE — 11721 DEBRIDE NAIL 6 OR MORE: CPT | Performed by: PODIATRIST

## 2021-12-15 NOTE — PROGRESS NOTES
[address] [facility] [id number] [address]  Dept: [phone]    DIABETIC PROGRESS NOTE  Date of patient's visit: 12/15/2021  Patient's Name:  Leroy Ballard YOB: 1945            Patient Care Team:  Maggie Woods MD as PCP - General (Family Medicine)  Maggie Woods MD as PCP - [address] [place] [address] Provider  Niecy Huizar as Consulting Physician (Endocrinology)  Noah Cee MD as Consulting Physician (Ophthalmology)  Lucy Jacobs DO as Consulting Physician (Cardiology)  Claudio Sanford DPM as Consulting Physician (Podiatry)          Chief Complaint   Patient presents with    Diabetes    Nail Problem    Peripheral Neuropathy       Subjective:   Leroy Ballard comes to clinic for Diabetes, Nail Problem, and Peripheral Neuropathy    she is a diabetic and states that she checks her feet daily . Pt currently has complaint of thickened, elongated nails that they cannot manage by themselves. Pt's primary care physician is Maggie Woods MD last seen November 29 2021   Pt's last blood sugar was 178 . Pt has a new complaint of none today . Lab Results   Component Value Date    LABA1C 6.8 (H) 06/22/2020      Complains of numbness in the feet bilat.   Past Medical History:   Diagnosis Date    Abnormal cardiovascular stress test 06/2020    no ischemia / infarction   there is septal hypokinesis   /  EF 70%    CAD (coronary artery disease)     Chest pressure     Fatigue     History of pneumonia 03/2020    HTN (hypertension)     Hyperlipidemia     Positive cardiac stress test     SOB (shortness of breath)     Type 2 diabetes mellitus without complication (HCC)        No Known Allergies  Current Outpatient Medications on File Prior to Visit   Medication Sig Dispense Refill    isosorbide mononitrate (IMDUR) 60 MG extended release tablet Take 1 tablet by mouth daily      Insulin Glargine, 2 Unit Dial, (TOUJEO MAX SOLOSTAR) 300 foot   Musculoskeletal:     1st MPJ ROM decreased, Bilateral.  Muscle strength 5/5, Bilateral.  Pain present upon palpation of toenails 1-5, Bilateral. decreased medial longitudinal arch, Bilateral.  Ankle ROM decreased,Bilateral.    Dorsally contracted digits present digits 2, Bilateral.     Vascular: DP pulses 1/4 bilateral.  PT pulses 0/4 bilateral.   CFT <5 seconds, Bilateral.  Hair growth absent to the level of the digits, Bilateral.  Edema present, Bilateral.  Varicosities absent, Bilateral. Erythema absent, Bilateral    Neurological: Sensation diminshed to light touch to level of digits, Bilateral.  Protective sensation intact 6/10 sites via 5.07/10g Galva-Sherwin Monofilament, Bilateral.  negative Tinel's, Bilateral.  negative Valleix sign, Bilateral.      Integument: Warm, dry, supple, Bilateral.  Open lesion absent, Bilateral.  Interdigital maceration absent to web spaces 4, Bilateral.  Nails 1-5 left and 1-5 right thickened > 3.0 mm, dystrophic and crumbly, discolored with subungual debris. Fissures absent, Bilateral.   General: AAO x 3 in NAD.     Derm  Toenail Description  Sites of Onychomycosis Involvement (Check affected area)  [x] [x] [x] [x] [x] [x] [x] [x] [x] [x]  5 4 3 2 1 1 2 3 4 5                          Right                                        Left    Thickness  [x] [x] [x] [x] [x] [x] [x] [x] [x] [x]  5 4 3 2 1 1 2 3 4 5                         Right                                        Left    Dystrophic Changes   [x] [x] [x] [x] [x] [x] [x] [x] [x] [x]  5 4 3 2 1 1 2 3 4 5                         Right                                        Left    Color   [x] [x] [x] [x] [x] [x] [x] [x] [x] [x]  5 4 3 2 1 1 2 3 4 5                          Right                                        Left    Incurvation/Ingrowin   [] [] [] [] [] [] [] [] [] []  5 4 3 2 1 1 2 3 4 5                         Right                                        Left    Inflammation/Pain [x] [x] [x] [x] [x] [x] [x] [x] [x] [x]  5 4 3 2 1 1 2 3 4 5                         Right                                        Left        Visual inspection:  Deformity: hammertoe deformity akosua feet  amputation: absent  Skin lesions: absent  Edema: right- 2+ pitting edema, left- 2+ pitting edema    Sensory exam:  Monofilament sensation: abnormal - 6/10 via SW 5.07/10g monofilament to the plantar foot bilateral feet    Pulses: abnormal - 1/4 dorsalis pedis pulse and 0/4 Posterior tibial pulse,   Pinprick: Impaired  Proprioception: Impaired  Vibration (128 Hz): Impaired       DM with PVD       [x]Yes    []No      Assessment:  68 y.o. female with:   Diagnosis Orders   1. Type II diabetes mellitus with peripheral circulatory disorder ([facility])  [address]70 - [place] DEBRIDEMENT OF NAILS, 6 OR MORE    HM DIABETES FOOT EXAM   2. Dermatophytosis of nail  [address] - [place] DEBRIDEMENT OF NAILS, 6 OR MORE    HM DIABETES FOOT EXAM   3. PVD (peripheral vascular disease) ([facility])  63864 - [place] DEBRIDEMENT OF NAILS, 6 OR MORE    HM DIABETES FOOT EXAM   4. Pain in both feet  [address] - [place] DEBRIDEMENT OF NAILS, 6 OR MORE    HM DIABETES FOOT EXAM            Q7   []Yes    []No                Q8   [x]Yes    []No                     Q9   []Yes    []No    Plan:   Pt was evaluated and examined. Patient was given personalized discharge instructions. Nails 1-10 were debrided sharply in length and thickness with a nipper and , without incident. Pt will follow up in 9 weeks or sooner if any problems arise. Diagnosis was discussed with the pt and all of their questions were answered in detail. Proper foot hygiene and care was discussed with the pt. Informed patient on proper diabetic foot care and importance of tight glycemic control. Patient to check feet daily and contact the office with any questions/problems/concerns.    Other comorbidity noted and will be managed by PCP.  12/15/2021    Electronically signed by Autumn Dash DPM on 12/15/2021 at 8:50 AM  12/15/2021

## 2022-02-03 ENCOUNTER — HOSPITAL ENCOUNTER (OUTPATIENT)
Age: 77
Setting detail: OBSERVATION
Discharge: HOME | End: 2022-02-04
Attending: STUDENT IN AN ORGANIZED HEALTH CARE EDUCATION/TRAINING PROGRAM | Admitting: INTERNAL MEDICINE
Payer: MEDICARE

## 2022-02-03 ENCOUNTER — APPOINTMENT (OUTPATIENT)
Dept: GENERAL RADIOLOGY | Age: 77
End: 2022-02-03
Payer: MEDICARE

## 2022-02-03 DIAGNOSIS — R07.9 CHEST PAIN, UNSPECIFIED TYPE: Primary | ICD-10-CM

## 2022-02-03 LAB
ABSOLUTE EOS #: 0.03 K/UL (ref 0–0.4)
ABSOLUTE IMMATURE GRANULOCYTE: ABNORMAL K/UL (ref 0–0.3)
ABSOLUTE LYMPH #: 1.16 K/UL (ref 1–4.8)
ABSOLUTE MONO #: 0.24 K/UL (ref 0.1–1.3)
ANION GAP SERPL CALCULATED.3IONS-SCNC: 11 MMOL/L (ref 9–17)
BASOPHILS # BLD: 0 % (ref 0–2)
BASOPHILS ABSOLUTE: 0 K/UL (ref 0–0.2)
BNP INTERPRETATION: ABNORMAL
BUN BLDV-MCNC: 20 MG/DL (ref 8–23)
BUN/CREAT BLD: ABNORMAL (ref 9–20)
CALCIUM SERPL-MCNC: 9.7 MG/DL (ref 8.6–10.4)
CHLORIDE BLD-SCNC: 107 MMOL/L (ref 98–107)
CO2: 28 MMOL/L (ref 20–31)
CREAT SERPL-MCNC: 1.08 MG/DL (ref 0.5–0.9)
DIFFERENTIAL TYPE: ABNORMAL
EOSINOPHILS RELATIVE PERCENT: 1 % (ref 0–4)
GFR AFRICAN AMERICAN: 60 ML/MIN
GFR NON-AFRICAN AMERICAN: 49 ML/MIN
GFR SERPL CREATININE-BSD FRML MDRD: ABNORMAL ML/MIN/{1.73_M2}
GFR SERPL CREATININE-BSD FRML MDRD: ABNORMAL ML/MIN/{1.73_M2}
GLUCOSE BLD-MCNC: 140 MG/DL (ref 65–105)
GLUCOSE BLD-MCNC: 68 MG/DL (ref 70–99)
HCT VFR BLD CALC: 37.5 % (ref 36–46)
HEMOGLOBIN: 12.5 G/DL (ref 12–16)
IMMATURE GRANULOCYTES: ABNORMAL %
LYMPHOCYTES # BLD: 34 % (ref 24–44)
MCH RBC QN AUTO: 30.8 PG (ref 26–34)
MCHC RBC AUTO-ENTMCNC: 33.5 G/DL (ref 31–37)
MCV RBC AUTO: 92 FL (ref 80–100)
MONOCYTES # BLD: 7 % (ref 1–7)
MORPHOLOGY: ABNORMAL
MORPHOLOGY: ABNORMAL
NRBC AUTOMATED: ABNORMAL PER 100 WBC
PDW BLD-RTO: 12.7 % (ref 11.5–14.9)
PLATELET # BLD: 171 K/UL (ref 150–450)
PLATELET ESTIMATE: ABNORMAL
PMV BLD AUTO: 7.4 FL (ref 6–12)
POTASSIUM SERPL-SCNC: 3.7 MMOL/L (ref 3.7–5.3)
PRO-BNP: 546 PG/ML
RBC # BLD: 4.07 M/UL (ref 4–5.2)
RBC # BLD: ABNORMAL 10*6/UL
SEG NEUTROPHILS: 58 % (ref 36–66)
SEGMENTED NEUTROPHILS ABSOLUTE COUNT: 1.97 K/UL (ref 1.3–9.1)
SODIUM BLD-SCNC: 146 MMOL/L (ref 135–144)
TROPONIN INTERP: NORMAL
TROPONIN INTERP: NORMAL
TROPONIN T: NORMAL NG/ML
TROPONIN T: NORMAL NG/ML
TROPONIN, HIGH SENSITIVITY: 13 NG/L (ref 0–14)
TROPONIN, HIGH SENSITIVITY: 13 NG/L (ref 0–14)
WBC # BLD: 3.4 K/UL (ref 3.5–11)
WBC # BLD: ABNORMAL 10*3/UL

## 2022-02-03 PROCEDURE — 99220 PR INITIAL OBSERVATION CARE/DAY 70 MINUTES: CPT | Performed by: INTERNAL MEDICINE

## 2022-02-03 PROCEDURE — 82947 ASSAY GLUCOSE BLOOD QUANT: CPT

## 2022-02-03 PROCEDURE — 83880 ASSAY OF NATRIURETIC PEPTIDE: CPT

## 2022-02-03 PROCEDURE — 80048 BASIC METABOLIC PNL TOTAL CA: CPT

## 2022-02-03 PROCEDURE — G0378 HOSPITAL OBSERVATION PER HR: HCPCS

## 2022-02-03 PROCEDURE — 85025 COMPLETE CBC W/AUTO DIFF WBC: CPT

## 2022-02-03 PROCEDURE — 93005 ELECTROCARDIOGRAM TRACING: CPT | Performed by: STUDENT IN AN ORGANIZED HEALTH CARE EDUCATION/TRAINING PROGRAM

## 2022-02-03 PROCEDURE — 84484 ASSAY OF TROPONIN QUANT: CPT

## 2022-02-03 PROCEDURE — 2580000003 HC RX 258: Performed by: NURSE PRACTITIONER

## 2022-02-03 PROCEDURE — 71045 X-RAY EXAM CHEST 1 VIEW: CPT

## 2022-02-03 PROCEDURE — 6370000000 HC RX 637 (ALT 250 FOR IP): Performed by: NURSE PRACTITIONER

## 2022-02-03 PROCEDURE — 6370000000 HC RX 637 (ALT 250 FOR IP): Performed by: STUDENT IN AN ORGANIZED HEALTH CARE EDUCATION/TRAINING PROGRAM

## 2022-02-03 PROCEDURE — 36415 COLL VENOUS BLD VENIPUNCTURE: CPT

## 2022-02-03 PROCEDURE — 99284 EMERGENCY DEPT VISIT MOD MDM: CPT

## 2022-02-03 RX ORDER — NITROGLYCERIN 0.4 MG/1
0.4 TABLET SUBLINGUAL EVERY 5 MIN PRN
Status: DISCONTINUED | OUTPATIENT
Start: 2022-02-03 | End: 2022-02-04 | Stop reason: HOSPADM

## 2022-02-03 RX ORDER — POTASSIUM CHLORIDE 7.45 MG/ML
10 INJECTION INTRAVENOUS PRN
Status: DISCONTINUED | OUTPATIENT
Start: 2022-02-03 | End: 2022-02-04 | Stop reason: HOSPADM

## 2022-02-03 RX ORDER — ACETAMINOPHEN 325 MG/1
650 TABLET ORAL EVERY 6 HOURS PRN
Status: DISCONTINUED | OUTPATIENT
Start: 2022-02-03 | End: 2022-02-04 | Stop reason: HOSPADM

## 2022-02-03 RX ORDER — MAGNESIUM SULFATE 1 G/100ML
1000 INJECTION INTRAVENOUS PRN
Status: DISCONTINUED | OUTPATIENT
Start: 2022-02-03 | End: 2022-02-04 | Stop reason: HOSPADM

## 2022-02-03 RX ORDER — CLOPIDOGREL BISULFATE 75 MG/1
75 TABLET ORAL DAILY
Status: DISCONTINUED | OUTPATIENT
Start: 2022-02-04 | End: 2022-02-04 | Stop reason: HOSPADM

## 2022-02-03 RX ORDER — SODIUM CHLORIDE 0.9 % (FLUSH) 0.9 %
10 SYRINGE (ML) INJECTION PRN
Status: DISCONTINUED | OUTPATIENT
Start: 2022-02-03 | End: 2022-02-04 | Stop reason: HOSPADM

## 2022-02-03 RX ORDER — LOSARTAN POTASSIUM 50 MG/1
50 TABLET ORAL NIGHTLY
Status: DISCONTINUED | OUTPATIENT
Start: 2022-02-03 | End: 2022-02-04 | Stop reason: HOSPADM

## 2022-02-03 RX ORDER — SODIUM CHLORIDE 0.9 % (FLUSH) 0.9 %
5-40 SYRINGE (ML) INJECTION EVERY 12 HOURS SCHEDULED
Status: DISCONTINUED | OUTPATIENT
Start: 2022-02-03 | End: 2022-02-04 | Stop reason: HOSPADM

## 2022-02-03 RX ORDER — GABAPENTIN 100 MG/1
100 CAPSULE ORAL NIGHTLY
Status: DISCONTINUED | OUTPATIENT
Start: 2022-02-03 | End: 2022-02-04 | Stop reason: HOSPADM

## 2022-02-03 RX ORDER — ASPIRIN 81 MG/1
324 TABLET, CHEWABLE ORAL ONCE
Status: COMPLETED | OUTPATIENT
Start: 2022-02-03 | End: 2022-02-03

## 2022-02-03 RX ORDER — LANOLIN ALCOHOL/MO/W.PET/CERES
5 CREAM (GRAM) TOPICAL NIGHTLY
COMMUNITY
End: 2022-02-09

## 2022-02-03 RX ORDER — DEXTROSE MONOHYDRATE 25 G/50ML
12.5 INJECTION, SOLUTION INTRAVENOUS PRN
Status: DISCONTINUED | OUTPATIENT
Start: 2022-02-03 | End: 2022-02-04 | Stop reason: HOSPADM

## 2022-02-03 RX ORDER — UBIDECARENONE 100 MG
100 CAPSULE ORAL DAILY
Status: DISCONTINUED | OUTPATIENT
Start: 2022-02-04 | End: 2022-02-04 | Stop reason: HOSPADM

## 2022-02-03 RX ORDER — ATORVASTATIN CALCIUM 40 MG/1
40 TABLET, FILM COATED ORAL DAILY
Status: DISCONTINUED | OUTPATIENT
Start: 2022-02-04 | End: 2022-02-04 | Stop reason: HOSPADM

## 2022-02-03 RX ORDER — INSULIN GLARGINE 100 [IU]/ML
10 INJECTION, SOLUTION SUBCUTANEOUS NIGHTLY
Status: DISCONTINUED | OUTPATIENT
Start: 2022-02-03 | End: 2022-02-04 | Stop reason: HOSPADM

## 2022-02-03 RX ORDER — ACETAMINOPHEN 650 MG/1
650 SUPPOSITORY RECTAL EVERY 6 HOURS PRN
Status: DISCONTINUED | OUTPATIENT
Start: 2022-02-03 | End: 2022-02-04 | Stop reason: HOSPADM

## 2022-02-03 RX ORDER — POTASSIUM CHLORIDE 20 MEQ/1
40 TABLET, EXTENDED RELEASE ORAL PRN
Status: DISCONTINUED | OUTPATIENT
Start: 2022-02-03 | End: 2022-02-04 | Stop reason: HOSPADM

## 2022-02-03 RX ORDER — METOPROLOL SUCCINATE 25 MG/1
25 TABLET, EXTENDED RELEASE ORAL DAILY
Status: DISCONTINUED | OUTPATIENT
Start: 2022-02-04 | End: 2022-02-04 | Stop reason: HOSPADM

## 2022-02-03 RX ORDER — ONDANSETRON 2 MG/ML
4 INJECTION INTRAMUSCULAR; INTRAVENOUS EVERY 6 HOURS PRN
Status: DISCONTINUED | OUTPATIENT
Start: 2022-02-03 | End: 2022-02-04 | Stop reason: HOSPADM

## 2022-02-03 RX ORDER — ONDANSETRON 4 MG/1
4 TABLET, ORALLY DISINTEGRATING ORAL EVERY 8 HOURS PRN
Status: DISCONTINUED | OUTPATIENT
Start: 2022-02-03 | End: 2022-02-04 | Stop reason: HOSPADM

## 2022-02-03 RX ORDER — DEXTROSE MONOHYDRATE 50 MG/ML
100 INJECTION, SOLUTION INTRAVENOUS PRN
Status: DISCONTINUED | OUTPATIENT
Start: 2022-02-03 | End: 2022-02-04 | Stop reason: HOSPADM

## 2022-02-03 RX ORDER — ASPIRIN 81 MG/1
81 TABLET, CHEWABLE ORAL DAILY
Status: DISCONTINUED | OUTPATIENT
Start: 2022-02-04 | End: 2022-02-04 | Stop reason: HOSPADM

## 2022-02-03 RX ORDER — NICOTINE POLACRILEX 4 MG
15 LOZENGE BUCCAL PRN
Status: DISCONTINUED | OUTPATIENT
Start: 2022-02-03 | End: 2022-02-04 | Stop reason: HOSPADM

## 2022-02-03 RX ORDER — ASPIRIN 81 MG/1
TABLET, CHEWABLE ORAL
Status: DISPENSED
Start: 2022-02-03 | End: 2022-02-04

## 2022-02-03 RX ORDER — ISOSORBIDE MONONITRATE 60 MG/1
120 TABLET, EXTENDED RELEASE ORAL DAILY
Status: DISCONTINUED | OUTPATIENT
Start: 2022-02-04 | End: 2022-02-04 | Stop reason: HOSPADM

## 2022-02-03 RX ORDER — M-VIT,TX,IRON,MINS/CALC/FOLIC 27MG-0.4MG
1 TABLET ORAL DAILY
Status: DISCONTINUED | OUTPATIENT
Start: 2022-02-04 | End: 2022-02-04 | Stop reason: HOSPADM

## 2022-02-03 RX ORDER — SODIUM CHLORIDE 9 MG/ML
25 INJECTION, SOLUTION INTRAVENOUS PRN
Status: DISCONTINUED | OUTPATIENT
Start: 2022-02-03 | End: 2022-02-04 | Stop reason: HOSPADM

## 2022-02-03 RX ORDER — LANOLIN ALCOHOL/MO/W.PET/CERES
5 CREAM (GRAM) TOPICAL NIGHTLY
Status: DISCONTINUED | OUTPATIENT
Start: 2022-02-03 | End: 2022-02-04 | Stop reason: HOSPADM

## 2022-02-03 RX ADMIN — Medication 4.5 MG: at 21:54

## 2022-02-03 RX ADMIN — LOSARTAN POTASSIUM 50 MG: 50 TABLET, FILM COATED ORAL at 21:54

## 2022-02-03 RX ADMIN — GABAPENTIN 100 MG: 100 CAPSULE ORAL at 21:54

## 2022-02-03 RX ADMIN — INSULIN GLARGINE 10 UNITS: 100 INJECTION, SOLUTION SUBCUTANEOUS at 22:02

## 2022-02-03 RX ADMIN — ASPIRIN 324 MG: 81 TABLET, CHEWABLE ORAL at 17:21

## 2022-02-03 RX ADMIN — SODIUM CHLORIDE, PRESERVATIVE FREE 10 ML: 5 INJECTION INTRAVENOUS at 21:54

## 2022-02-03 SDOH — SOCIAL STABILITY: SOCIAL NETWORK
DO YOU BELONG TO ANY CLUBS OR ORGANIZATIONS SUCH AS CHURCH GROUPS UNIONS, FRATERNAL OR ATHLETIC GROUPS, OR SCHOOL GROUPS?: NO

## 2022-02-03 SDOH — HEALTH STABILITY: MENTAL HEALTH: HOW OFTEN DO YOU HAVE A DRINK CONTAINING ALCOHOL?: NEVER

## 2022-02-03 SDOH — ECONOMIC STABILITY: FOOD INSECURITY: WITHIN THE PAST 12 MONTHS, THE FOOD YOU BOUGHT JUST DIDN'T LAST AND YOU DIDN'T HAVE MONEY TO GET MORE.: NEVER TRUE

## 2022-02-03 SDOH — HEALTH STABILITY: MENTAL HEALTH
STRESS IS WHEN SOMEONE FEELS TENSE, NERVOUS, ANXIOUS, OR CAN'T SLEEP AT NIGHT BECAUSE THEIR MIND IS TROUBLED. HOW STRESSED ARE YOU?: NOT AT ALL

## 2022-02-03 SDOH — SOCIAL STABILITY: SOCIAL INSECURITY: WITHIN THE LAST YEAR, HAVE YOU BEEN AFRAID OF YOUR PARTNER OR EX-PARTNER?: NO

## 2022-02-03 SDOH — ECONOMIC STABILITY: TRANSPORTATION INSECURITY
IN THE PAST 12 MONTHS, HAS LACK OF TRANSPORTATION KEPT YOU FROM MEETINGS, WORK, OR FROM GETTING THINGS NEEDED FOR DAILY LIVING?: NO

## 2022-02-03 SDOH — SOCIAL STABILITY: SOCIAL INSECURITY
WITHIN THE LAST YEAR, HAVE TO BEEN RAPED OR FORCED TO HAVE ANY KIND OF SEXUAL ACTIVITY BY YOUR PARTNER OR EX-PARTNER?: NO

## 2022-02-03 SDOH — HEALTH STABILITY: PHYSICAL HEALTH: ON AVERAGE, HOW MANY MINUTES DO YOU ENGAGE IN EXERCISE AT THIS LEVEL?: 0 MIN

## 2022-02-03 SDOH — SOCIAL STABILITY: SOCIAL INSECURITY
WITHIN THE LAST YEAR, HAVE YOU BEEN KICKED, HIT, SLAPPED, OR OTHERWISE PHYSICALLY HURT BY YOUR PARTNER OR EX-PARTNER?: NO

## 2022-02-03 SDOH — SOCIAL STABILITY: SOCIAL NETWORK
IN A TYPICAL WEEK, HOW MANY TIMES DO YOU TALK ON THE PHONE WITH FAMILY, FRIENDS, OR NEIGHBORS?: MORE THAN THREE TIMES A WEEK

## 2022-02-03 SDOH — ECONOMIC STABILITY: INCOME INSECURITY: IN THE LAST 12 MONTHS, WAS THERE A TIME WHEN YOU WERE NOT ABLE TO PAY THE MORTGAGE OR RENT ON TIME?: NO

## 2022-02-03 SDOH — SOCIAL STABILITY: SOCIAL NETWORK: ARE YOU MARRIED, WIDOWED, DIVORCED, SEPARATED, NEVER MARRIED, OR LIVING WITH A PARTNER?: LIVING WITH PARTNER

## 2022-02-03 SDOH — ECONOMIC STABILITY: TRANSPORTATION INSECURITY
IN THE PAST 12 MONTHS, HAS THE LACK OF TRANSPORTATION KEPT YOU FROM MEDICAL APPOINTMENTS OR FROM GETTING MEDICATIONS?: NO

## 2022-02-03 SDOH — SOCIAL STABILITY: SOCIAL NETWORK: HOW OFTEN DO YOU ATTEND CHURCH OR RELIGIOUS SERVICES?: 1 TO 4 TIMES PER YEAR

## 2022-02-03 SDOH — HEALTH STABILITY: MENTAL HEALTH: HOW MANY STANDARD DRINKS CONTAINING ALCOHOL DO YOU HAVE ON A TYPICAL DAY?: 1 OR 2

## 2022-02-03 SDOH — SOCIAL STABILITY: SOCIAL NETWORK: HOW OFTEN DO YOU ATTENT MEETINGS OF THE CLUB OR ORGANIZATION YOU BELONG TO?: NEVER

## 2022-02-03 SDOH — ECONOMIC STABILITY: INCOME INSECURITY: HOW HARD IS IT FOR YOU TO PAY FOR THE VERY BASICS LIKE FOOD, HOUSING, MEDICAL CARE, AND HEATING?: NOT HARD AT ALL

## 2022-02-03 SDOH — ECONOMIC STABILITY: HOUSING INSECURITY
IN THE LAST 12 MONTHS, WAS THERE A TIME WHEN YOU DID NOT HAVE A STEADY PLACE TO SLEEP OR SLEPT IN A SHELTER (INCLUDING NOW)?: NO

## 2022-02-03 SDOH — ECONOMIC STABILITY: HOUSING INSECURITY: IN THE LAST 12 MONTHS, HOW MANY PLACES HAVE YOU LIVED?: 1

## 2022-02-03 SDOH — SOCIAL STABILITY: SOCIAL INSECURITY: WITHIN THE LAST YEAR, HAVE YOU BEEN HUMILIATED OR EMOTIONALLY ABUSED IN OTHER WAYS BY YOUR PARTNER OR EX-PARTNER?: NO

## 2022-02-03 SDOH — ECONOMIC STABILITY: FOOD INSECURITY: WITHIN THE PAST 12 MONTHS, YOU WORRIED THAT YOUR FOOD WOULD RUN OUT BEFORE YOU GOT MONEY TO BUY MORE.: NEVER TRUE

## 2022-02-03 SDOH — SOCIAL STABILITY: SOCIAL NETWORK: HOW OFTEN DO YOU GET TOGETHER WITH FRIENDS OR RELATIVES?: TWICE A WEEK

## 2022-02-03 SDOH — HEALTH STABILITY: PHYSICAL HEALTH: ON AVERAGE, HOW MANY DAYS PER WEEK DO YOU ENGAGE IN MODERATE TO STRENUOUS EXERCISE (LIKE A BRISK WALK)?: 3 DAYS

## 2022-02-03 ASSESSMENT — ENCOUNTER SYMPTOMS
SINUS PRESSURE: 0
WHEEZING: 0
ABDOMINAL PAIN: 0
COUGH: 0
NAUSEA: 0
SHORTNESS OF BREATH: 1
SINUS PAIN: 0
VOMITING: 0
CONSTIPATION: 0
BACK PAIN: 0
DIARRHEA: 0
COLOR CHANGE: 0
SORE THROAT: 0
TROUBLE SWALLOWING: 0
CHEST TIGHTNESS: 1

## 2022-02-03 ASSESSMENT — PAIN DESCRIPTION - LOCATION: LOCATION: CHEST

## 2022-02-03 ASSESSMENT — PAIN SCALES - GENERAL: PAINLEVEL_OUTOF10: 4

## 2022-02-03 ASSESSMENT — HEART SCORE: ECG: 1

## 2022-02-03 NOTE — ED PROVIDER NOTES
EMERGENCY DEPARTMENT ENCOUNTER   ATTENDING ATTESTATION     Pt Name: India Earl  MRN: [id number]  [place] 1945  Date of evaluation: 2/3/22       India Earl is a 68 y.o. female who presents with Chest Pain (Constant pressure to mid chest x 1 week, associated symptoms include SOB. prescribed nitro SL with no relief. )      MDM:   35-year-old female history of coronary artery disease, diabetes presents for evaluation of substernal chest pressure. Symptoms been coming and going for the past week. 4-5 out of 10 right now. Describes substernal nonradiating pressure. Mild associated shortness of breath. No leg swelling. Vital signs are stable. Initial EKG shows PACs but no other concerning changes. Work-up for cardiac etiology, pneumonia, pneumothorax, pleural effusion, symptomatic anemia, renal failure, acute coronary syndrome, heart failure. EKG  Sinus rhythm rate of 61 premature atrial complexes T wave inversions in V1, V2, aVL no change from prior    Vitals:   Vitals:    02/03/22 1641   BP: (!) 156/54   Pulse: 73   Resp: 19   Temp: 97.9 °F (36.6 °C)   TempSrc: Oral   SpO2: 98%   Weight: 120 lb (54.4 kg)   Height: 5' 3\" (1.6 m)         I personally saw and examined the patient. I have reviewed and agree with the resident's findings, including all diagnostic interpretations and treatment plan as written. I was present for the key portions of any procedures performed and the inclusive time noted for any critical care statement. The care is provided during an unprecedented national emergency due to the novel coronavirus, COVID 19.   Serena Hampton MD  Attending Emergency Physician            Serena Hampton MD  02/03/22 2026

## 2022-02-03 NOTE — ED PROVIDER NOTES
[facility] ED  Emergency Department Encounter  Emergency Medicine Resident     Pt Name: Matt Bran  MRN: [id number]  [place] 1945  Date of evaluation: 2/3/22  PCP:  Warner Ibarra MD    [address] [place] [address]       Chief Complaint   Patient presents with    Chest Pain     Constant pressure to mid chest x 1 week, associated symptoms include SOB. prescribed nitro SL with no relief. HISTORY OFPRESENT ILLNESS  (Location/Symptom, Timing/Onset, Context/Setting, Quality, Duration, Modifying Factors,Severity.)      Matt Bran is a 68 y. o.yo female who presents with complaints of chest pressure for the last week. Patient states that she has been having mid chest pressure for 1 week with progressive shortness of breath. Patient states that the pressure is all the time and not associated with exertion. Patient does have positive heart history with stents placed due to coronary artery disease. Patient had cardiac catheterization back in March which showed a patent stent and mild disease. Patient denies any nausea, vomiting, radiation of the pain. Patient denies any leg swelling. Patient states she has not had any recent illness and she is vaccinated for COVID-19. PAST MEDICAL / SURGICAL / SOCIAL / FAMILY HISTORY      has a past medical history of Abnormal cardiovascular stress test, CAD (coronary artery disease), Chest pressure, Fatigue, History of pneumonia, HTN (hypertension), Hyperlipidemia, Positive cardiac stress test, SOB (shortness of breath), and Type 2 diabetes mellitus without complication ([facility] [address] has a past surgical history that includes Cholecystectomy; Colonoscopy; Hysterectomy, vaginal; Coronary angioplasty with stent (08/14/2020); Hysterectomy; Appendectomy; and Cardiac catheterization (03/09/2021).      Social History     Socioeconomic History    Marital status:      Spouse name: Not on file    Number of children: Not on file    Years of education: Not on file    Highest education level: Not on file   Occupational History    Not on file   Tobacco Use    Smoking status: Never Smoker    Smokeless tobacco: Never Used   Vaping Use    Vaping Use: Never used   Substance and Sexual Activity    Alcohol use: No     Alcohol/week: 0.0 standard drinks    Drug use: No    Sexual activity: Not on file   Other Topics Concern    Not on file   Social History Narrative    Not on file     Social Determinants of Health     Financial Resource Strain: Low Risk     Difficulty of Paying Living Expenses: Not hard at all   Food Insecurity: No Food Insecurity    Worried About Running Out of Food in the Last Year: Never true    Rajinder of Food in the Last Year: Never true   Transportation Needs:     Lack of Transportation (Medical): Not on file    Lack of Transportation (Non-Medical):  Not on file   Physical Activity:     Days of Exercise per Week: Not on file    Minutes of Exercise per Session: Not on file   Stress:     Feeling of Stress : Not on file   Social Connections:     Frequency of Communication with Friends and Family: Not on file    Frequency of Social Gatherings with Friends and Family: Not on file    Attends Judaism Services: Not on file    Active Member of [address] [place] [address] [employer] or Organizations: Not on file    Attends Club or Organization Meetings: Not on file    Marital Status: Not on file   Intimate Partner Violence:     Fear of Current or Ex-Partner: Not on file    Emotionally Abused: Not on file    Physically Abused: Not on file    Sexually Abused: Not on file   Housing Stability:     Unable to Pay for Housing in the Last Year: Not on file    Number of [place] in the Last Year: Not on file    Unstable Housing in the Last Year: Not on file       Family History   Problem Relation Age of Onset    Heart Disease Mother     Diabetes Mother     Heart Disease Brother     Esophageal Cancer Brother     Diabetes Maternal Aunt     Diabetes Maternal Uncle     Diabetes Maternal Grandmother         Allergies:  Patient has no known allergies. Home Medications:  Prior to Admission medications    Medication Sig Start Date End Date Taking? Authorizing Provider   isosorbide mononitrate (IMDUR) 60 MG extended release tablet Take 1 tablet by mouth daily 11/12/21   Historical Provider, MD   Insulin Glargine, 2 Unit Dial, (TOUJEO MAX SOLOSTAR) 300 UNIT/ML SOPN Inject 15 Units into the skin every morning    Historical Provider, MD   Coenzyme Q10 (CO Q-10) 100 MG CAPS Take 1 capsule by mouth daily    Historical Provider, MD   amLODIPine (NORVASC) 5 MG tablet Take 1 tablet by mouth nightly 3/9/21   Venice Mane MD   B-D UF III MINI PEN NEEDLES 31G X 5 MM MISC  9/9/20   Historical Provider, MD   ciclopirox (PENLAC) 8 % solution Apply topically nightly. Remove once weekly with alcohol or nail polish remover. 9/30/20   Tanner Hurt DPM   atorvastatin (LIPITOR) 40 MG tablet Take 1 tablet by mouth daily 8/14/20   Grupo Kevin MD   clopidogrel (PLAVIX) 75 MG tablet Take 1 tablet by mouth daily 8/14/20   Grupo Kevin MD   metoprolol succinate (TOPROL XL) 25 MG extended release tablet Take 1 tablet by mouth daily 8/14/20   Grupo Kevin MD   gabapentin (NEURONTIN) 100 MG capsule Take 1 capsule by mouth daily. 7/30/20   Historical Provider, MD   insulin lispro, 1 Unit Dial, (HUMALOG KWIKPEN) 100 UNIT/ML SOPN Inject into the skin 3 times daily PER SLIDING SCALE    Historical Provider, MD   losartan (COZAAR) 50 MG tablet Take 50 mg by mouth daily    Jak Mathews MD   Multiple Vitamins TABS Take 1 tablet by mouth daily    Jak Mathews MD       REVIEW OFSYSTEMS    (2-9 systems for level 4, 10 or more for level 5)      Review of Systems   Constitutional: Negative for chills, diaphoresis, fatigue and fever. HENT: Negative for congestion, sinus pressure, sinus pain, sore throat and trouble swallowing.     Respiratory: Positive for chest tightness and shortness of breath. Negative for cough and wheezing. Cardiovascular: Positive for chest pain. Negative for palpitations and leg swelling. Gastrointestinal: Negative for abdominal pain, constipation, diarrhea, nausea and vomiting. Genitourinary: Negative for difficulty urinating, dysuria, flank pain and urgency. Musculoskeletal: Negative for back pain, neck pain and neck stiffness. Skin: Negative for color change, pallor and rash. Neurological: Negative for dizziness, tremors, speech difficulty, weakness, light-headedness and headaches. PHYSICAL EXAM   (up to 7 for level 4, 8 or more forlevel 5)      INITIAL VITALS:   ED Triage Vitals [02/03/22 1641]   BP Temp Temp Source Pulse Resp SpO2 Height Weight   (!) 156/54 97.9 °F (36.6 °C) Oral 73 19 98 % 5' 3\" (1.6 m) 120 lb (54.4 kg)       Physical Exam  Constitutional:       General: She is not in acute distress. Appearance: She is not ill-appearing or toxic-appearing. HENT:      Head: Normocephalic and atraumatic. Right Ear: External ear normal.      Left Ear: External ear normal.      Nose: Nose normal. No rhinorrhea. Eyes:      Extraocular Movements: Extraocular movements intact. Pupils: Pupils are equal, round, and reactive to light. Cardiovascular:      Rate and Rhythm: Normal rate and regular rhythm. Pulses: Normal pulses. Heart sounds: No murmur heard. Pulmonary:      Effort: Pulmonary effort is normal. No respiratory distress. Breath sounds: Normal breath sounds. No wheezing. Chest:      Chest wall: No tenderness. Abdominal:      Palpations: Abdomen is soft. Tenderness: There is no abdominal tenderness. Musculoskeletal:         General: No swelling. Normal range of motion. Cervical back: Normal range of motion and neck supple. Right lower leg: No edema. Left lower leg: No edema. Skin:     General: Skin is warm and dry. Neurological:      General: No focal deficit present.       Mental Status: She is alert and oriented to person, place, and time. DIFFERENTIAL  DIAGNOSIS     PLAN (LABS / IMAGING / EKG):  Orders Placed This Encounter   Procedures    XR CHEST PORTABLE    CBC Auto Differential    Basic Metabolic Panel w/ Reflex to MG    Troponin    Brain Natriuretic Peptide    Inpatient consult to Internal Medicine    Inpatient consult to Cardiology    EKG 12 Lead    EKG 12 Lead       MEDICATIONS ORDERED:  Orders Placed This Encounter   Medications    aspirin chewable tablet 324 mg    aspirin 81 MG chewable tablet     Mel[place] Plate: cabinet override       DDX: ACS, NSTEMI, STEMI, atypical chest pain, gastritis, CHF    Initial MDM/Plan: 68 y.o. female who presents with complaints of 1 week of chest pressure with associated dyspnea. Patient states this is been progressively getting worse and she called her cardiologist who recommended her come to the emergency department. Patient does have elevated heart score of 7.   We will plan for cardiac work-up and likely admission    DIAGNOSTIC RESULTS / EMERGENCYDEPARTMENT COURSE / MDM     LABS:  Labs Reviewed   CBC WITH AUTO DIFFERENTIAL - Abnormal; Notable for the following components:       Result Value    WBC 3.4 (*)     All other components within normal limits   BASIC METABOLIC PANEL W/ REFLEX TO MG FOR LOW K - Abnormal; Notable for the following components:    Glucose 68 (*)     CREATININE 1.08 (*)     Sodium 146 (*)     GFR Non- 49 (*)     GFR  60 (*)     All other components within normal limits   BRAIN NATRIURETIC PEPTIDE - Abnormal; Notable for the following components:    Pro- (*)     All other components within normal limits   TROPONIN   TROPONIN         RADIOLOGY:  XR CHEST PORTABLE    Result Date: 2/3/2022  EXAMINATION: ONE XRAY VIEW OF THE CHEST 2/3/2022 1:56 pm COMPARISON: 08/17/2020, 06/15/2020, 06/04/2020 HISTORY: ORDERING SYSTEM PROVIDED HISTORY: CP TECHNOLOGIST PROVIDED HISTORY: CP Reason for Exam: midsternal chest pain and associated sob FINDINGS: Lungs are clear aside from some scattered calcified granulomata and calcified mediastinal nodes. No pneumothorax or pleural effusion. Heart size is within normal limits. Atherosclerotic aorta. No acute bony findings. No acute findings. EKG  Sinus rhythm with premature atrial complexes, T wave inversion present in V1 to aVR and aVL. When compared to EKG from August 17,020 it is unchanged. All EKG's are interpreted by the Emergency Department Physicianwho either signs or Co-signs this chart in the absence of a cardiologist.    EMERGENCY DEPARTMENT COURSE:  ED Course as of 02/03/22 1948   Thu Feb 03, 2022   1723 Glucose(!): 68 [CD]   1735 Work-up unremarkable thus far [CD]   1807 Chest x-ray unremarkable. [CD]   0325 Troponin, High Sensitivity: 13 [CD]   1807 Pro-BNP(!): 546 [CD]   1835 Patient resting comfortably. Denies any complaints at this time [CD]   1847 Heart score of 7 [CD]   1913 Troponin, High Sensitivity: 13 [CD]   1921 Patient agreeable to admission for cardiac evaluation. Will page admitting team and cardiology. [CD]   1943 Spoke to admitting team.  They will place admit orders [CD]      ED Course User Index  [CD] Zaira Lombardi DO          PROCEDURES:  None    CONSULTS:  IP CONSULT TO INTERNAL MEDICINE  IP CONSULT TO CARDIOLOGY    CRITICAL CARE:  Please see attending documentation    FINAL IMPRESSION      1. Chest pain, unspecified type          DISPOSITION / PLAN     DISPOSITION    Decision to admit for cardiac evaluation. PATIENT REFERRED TO:  No follow-up provider specified.     DISCHARGE MEDICATIONS:  New Prescriptions    No medications on file       Zaira Lombardi DO  Emergency Medicine Resident    (Please note that portions of this note were completed with a voice recognition program.Efforts were made to edit the dictations but occasionally words are mis-transcribed.)        Zaira Lombardi DO  Resident  02/03/22 1948

## 2022-02-04 ENCOUNTER — APPOINTMENT (OUTPATIENT)
Dept: NON INVASIVE DIAGNOSTICS | Age: 77
End: 2022-02-04
Payer: MEDICARE

## 2022-02-04 ENCOUNTER — APPOINTMENT (OUTPATIENT)
Dept: NUCLEAR MEDICINE | Age: 77
End: 2022-02-04
Payer: MEDICARE

## 2022-02-04 VITALS
OXYGEN SATURATION: 100 % | HEIGHT: 63 IN | DIASTOLIC BLOOD PRESSURE: 73 MMHG | WEIGHT: 121 LBS | BODY MASS INDEX: 21.44 KG/M2 | RESPIRATION RATE: 16 BRPM | SYSTOLIC BLOOD PRESSURE: 129 MMHG | HEART RATE: 57 BPM | TEMPERATURE: 97.7 F

## 2022-02-04 LAB
ALBUMIN SERPL-MCNC: 3.7 G/DL (ref 3.5–5.2)
ALBUMIN/GLOBULIN RATIO: ABNORMAL (ref 1–2.5)
ALP BLD-CCNC: 47 U/L (ref 35–104)
ALT SERPL-CCNC: 19 U/L (ref 5–33)
ANION GAP SERPL CALCULATED.3IONS-SCNC: 8 MMOL/L (ref 9–17)
AST SERPL-CCNC: 22 U/L
BILIRUB SERPL-MCNC: 0.43 MG/DL (ref 0.3–1.2)
BUN BLDV-MCNC: 16 MG/DL (ref 8–23)
BUN/CREAT BLD: ABNORMAL (ref 9–20)
CALCIUM SERPL-MCNC: 9.2 MG/DL (ref 8.6–10.4)
CHLORIDE BLD-SCNC: 108 MMOL/L (ref 98–107)
CHOLESTEROL/HDL RATIO: 1.8
CHOLESTEROL: 130 MG/DL
CO2: 27 MMOL/L (ref 20–31)
CREAT SERPL-MCNC: 1 MG/DL (ref 0.5–0.9)
EKG ATRIAL RATE: 61 BPM
EKG P AXIS: 91 DEGREES
EKG P-R INTERVAL: 176 MS
EKG Q-T INTERVAL: 404 MS
EKG QRS DURATION: 74 MS
EKG QTC CALCULATION (BAZETT): 406 MS
EKG R AXIS: 75 DEGREES
EKG T AXIS: 82 DEGREES
EKG VENTRICULAR RATE: 61 BPM
ESTIMATED AVERAGE GLUCOSE: 160 MG/DL
GFR AFRICAN AMERICAN: >60 ML/MIN
GFR NON-AFRICAN AMERICAN: 54 ML/MIN
GFR SERPL CREATININE-BSD FRML MDRD: ABNORMAL ML/MIN/{1.73_M2}
GFR SERPL CREATININE-BSD FRML MDRD: ABNORMAL ML/MIN/{1.73_M2}
GLUCOSE BLD-MCNC: 108 MG/DL (ref 65–105)
GLUCOSE BLD-MCNC: 160 MG/DL (ref 65–105)
GLUCOSE BLD-MCNC: 69 MG/DL (ref 65–105)
GLUCOSE BLD-MCNC: 80 MG/DL (ref 70–99)
HBA1C MFR BLD: 7.2 % (ref 4–6)
HCT VFR BLD CALC: 37.3 % (ref 36–46)
HDLC SERPL-MCNC: 73 MG/DL
HEMOGLOBIN: 12.5 G/DL (ref 12–16)
LDL CHOLESTEROL: 37 MG/DL (ref 0–130)
LV EF: 55 %
LV EF: 59 %
LVEF MODALITY: NORMAL
LVEF MODALITY: NORMAL
MAGNESIUM: 1.8 MG/DL (ref 1.6–2.6)
MCH RBC QN AUTO: 31 PG (ref 26–34)
MCHC RBC AUTO-ENTMCNC: 33.6 G/DL (ref 31–37)
MCV RBC AUTO: 92.2 FL (ref 80–100)
NRBC AUTOMATED: ABNORMAL PER 100 WBC
PDW BLD-RTO: 12.9 % (ref 11.5–14.9)
PLATELET # BLD: 155 K/UL (ref 150–450)
PMV BLD AUTO: 7.6 FL (ref 6–12)
POTASSIUM SERPL-SCNC: 4.2 MMOL/L (ref 3.7–5.3)
RBC # BLD: 4.05 M/UL (ref 4–5.2)
SODIUM BLD-SCNC: 143 MMOL/L (ref 135–144)
TOTAL PROTEIN: 5.6 G/DL (ref 6.4–8.3)
TRIGL SERPL-MCNC: 98 MG/DL
TSH SERPL DL<=0.05 MIU/L-ACNC: 2.38 MIU/L (ref 0.3–5)
VLDLC SERPL CALC-MCNC: NORMAL MG/DL (ref 1–30)
WBC # BLD: 3.3 K/UL (ref 3.5–11)

## 2022-02-04 PROCEDURE — G0378 HOSPITAL OBSERVATION PER HR: HCPCS

## 2022-02-04 PROCEDURE — 6360000002 HC RX W HCPCS: Performed by: NURSE PRACTITIONER

## 2022-02-04 PROCEDURE — 3430000000 HC RX DIAGNOSTIC RADIOPHARMACEUTICAL: Performed by: NURSE PRACTITIONER

## 2022-02-04 PROCEDURE — 82947 ASSAY GLUCOSE BLOOD QUANT: CPT

## 2022-02-04 PROCEDURE — 84443 ASSAY THYROID STIM HORMONE: CPT

## 2022-02-04 PROCEDURE — 93306 TTE W/DOPPLER COMPLETE: CPT

## 2022-02-04 PROCEDURE — 78452 HT MUSCLE IMAGE SPECT MULT: CPT

## 2022-02-04 PROCEDURE — 6370000000 HC RX 637 (ALT 250 FOR IP): Performed by: INTERNAL MEDICINE

## 2022-02-04 PROCEDURE — 83735 ASSAY OF MAGNESIUM: CPT

## 2022-02-04 PROCEDURE — 93017 CV STRESS TEST TRACING ONLY: CPT

## 2022-02-04 PROCEDURE — 80053 COMPREHEN METABOLIC PANEL: CPT

## 2022-02-04 PROCEDURE — 6370000000 HC RX 637 (ALT 250 FOR IP): Performed by: NURSE PRACTITIONER

## 2022-02-04 PROCEDURE — 83036 HEMOGLOBIN GLYCOSYLATED A1C: CPT

## 2022-02-04 PROCEDURE — 2500000003 HC RX 250 WO HCPCS: Performed by: NURSE PRACTITIONER

## 2022-02-04 PROCEDURE — 96372 THER/PROPH/DIAG INJ SC/IM: CPT

## 2022-02-04 PROCEDURE — 80061 LIPID PANEL: CPT

## 2022-02-04 PROCEDURE — 2580000003 HC RX 258: Performed by: NURSE PRACTITIONER

## 2022-02-04 PROCEDURE — 85027 COMPLETE CBC AUTOMATED: CPT

## 2022-02-04 PROCEDURE — A9500 TC99M SESTAMIBI: HCPCS | Performed by: NURSE PRACTITIONER

## 2022-02-04 PROCEDURE — 36415 COLL VENOUS BLD VENIPUNCTURE: CPT

## 2022-02-04 RX ORDER — SODIUM CHLORIDE 9 MG/ML
500 INJECTION, SOLUTION INTRAVENOUS CONTINUOUS PRN
Status: ACTIVE | OUTPATIENT
Start: 2022-02-04 | End: 2022-02-04

## 2022-02-04 RX ORDER — RANOLAZINE 500 MG/1
500 TABLET, EXTENDED RELEASE ORAL 2 TIMES DAILY
Status: DISCONTINUED | OUTPATIENT
Start: 2022-02-04 | End: 2022-02-04 | Stop reason: HOSPADM

## 2022-02-04 RX ORDER — SODIUM CHLORIDE 0.9 % (FLUSH) 0.9 %
5-40 SYRINGE (ML) INJECTION PRN
Status: ACTIVE | OUTPATIENT
Start: 2022-02-04 | End: 2022-02-04

## 2022-02-04 RX ORDER — ALBUTEROL SULFATE 90 UG/1
2 AEROSOL, METERED RESPIRATORY (INHALATION) PRN
Status: ACTIVE | OUTPATIENT
Start: 2022-02-04 | End: 2022-02-04

## 2022-02-04 RX ORDER — METOPROLOL TARTRATE 5 MG/5ML
5 INJECTION INTRAVENOUS EVERY 5 MIN PRN
Status: ACTIVE | OUTPATIENT
Start: 2022-02-04 | End: 2022-02-04

## 2022-02-04 RX ORDER — ATROPINE SULFATE 0.1 MG/ML
0.5 INJECTION INTRAVENOUS EVERY 5 MIN PRN
Status: ACTIVE | OUTPATIENT
Start: 2022-02-04 | End: 2022-02-04

## 2022-02-04 RX ORDER — ASPIRIN 81 MG/1
81 TABLET, CHEWABLE ORAL DAILY
Qty: 30 TABLET | Refills: 3 | Status: SHIPPED | OUTPATIENT
Start: 2022-02-05

## 2022-02-04 RX ORDER — NITROGLYCERIN 0.4 MG/1
0.4 TABLET SUBLINGUAL EVERY 5 MIN PRN
Status: ACTIVE | OUTPATIENT
Start: 2022-02-04 | End: 2022-02-04

## 2022-02-04 RX ORDER — RANOLAZINE 500 MG/1
500 TABLET, EXTENDED RELEASE ORAL 2 TIMES DAILY
Qty: 60 TABLET | Refills: 3 | Status: SHIPPED | OUTPATIENT
Start: 2022-02-04

## 2022-02-04 RX ORDER — AMINOPHYLLINE DIHYDRATE 25 MG/ML
50 INJECTION, SOLUTION INTRAVENOUS PRN
Status: ACTIVE | OUTPATIENT
Start: 2022-02-04 | End: 2022-02-04

## 2022-02-04 RX ORDER — SODIUM CHLORIDE 0.9 % (FLUSH) 0.9 %
10 SYRINGE (ML) INJECTION PRN
Status: DISCONTINUED | OUTPATIENT
Start: 2022-02-04 | End: 2022-02-04 | Stop reason: HOSPADM

## 2022-02-04 RX ADMIN — SODIUM CHLORIDE, PRESERVATIVE FREE 10 ML: 5 INJECTION INTRAVENOUS at 07:35

## 2022-02-04 RX ADMIN — TETRAKIS(2-METHOXYISOBUTYLISOCYANIDE)COPPER(I) TETRAFLUOROBORATE 10.7 MILLICURIE: 1 INJECTION, POWDER, LYOPHILIZED, FOR SOLUTION INTRAVENOUS at 07:35

## 2022-02-04 RX ADMIN — MULTIPLE VITAMINS W/ MINERALS TAB 1 TABLET: TAB at 11:22

## 2022-02-04 RX ADMIN — Medication 100 MG: at 11:25

## 2022-02-04 RX ADMIN — METOPROLOL SUCCINATE 25 MG: 25 TABLET, EXTENDED RELEASE ORAL at 11:22

## 2022-02-04 RX ADMIN — ISOSORBIDE MONONITRATE 120 MG: 60 TABLET, EXTENDED RELEASE ORAL at 11:22

## 2022-02-04 RX ADMIN — CLOPIDOGREL BISULFATE 75 MG: 75 TABLET ORAL at 11:22

## 2022-02-04 RX ADMIN — REGADENOSON 0.4 MG: 0.08 INJECTION, SOLUTION INTRAVENOUS at 09:49

## 2022-02-04 RX ADMIN — SODIUM CHLORIDE, PRESERVATIVE FREE 10 ML: 5 INJECTION INTRAVENOUS at 08:49

## 2022-02-04 RX ADMIN — ASPIRIN 81 MG: 81 TABLET, CHEWABLE ORAL at 11:22

## 2022-02-04 RX ADMIN — SODIUM CHLORIDE, PRESERVATIVE FREE 10 ML: 5 INJECTION INTRAVENOUS at 11:27

## 2022-02-04 RX ADMIN — ATORVASTATIN CALCIUM 40 MG: 40 TABLET, FILM COATED ORAL at 11:22

## 2022-02-04 RX ADMIN — ENOXAPARIN SODIUM 40 MG: 100 INJECTION SUBCUTANEOUS at 11:25

## 2022-02-04 RX ADMIN — Medication 12.5 G: at 07:24

## 2022-02-04 RX ADMIN — TETRAKIS(2-METHOXYISOBUTYLISOCYANIDE)COPPER(I) TETRAFLUOROBORATE 35.8 MILLICURIE: 1 INJECTION, POWDER, LYOPHILIZED, FOR SOLUTION INTRAVENOUS at 09:51

## 2022-02-04 RX ADMIN — RANOLAZINE 500 MG: 500 TABLET, FILM COATED, EXTENDED RELEASE ORAL at 11:29

## 2022-02-04 ASSESSMENT — ENCOUNTER SYMPTOMS
ABDOMINAL PAIN: 0
NAUSEA: 0
SHORTNESS OF BREATH: 1
SORE THROAT: 0
DIARRHEA: 0
COUGH: 0
CONSTIPATION: 0
VOMITING: 0
BACK PAIN: 0
WHEEZING: 0

## 2022-02-04 NOTE — PLAN OF CARE
Problem: Infection:  Goal: Will remain free from infection  Description: Will remain free from infection  2/4/2022 1357 by Mini Mane RN  Outcome: Met This Shift  Note: Pt shows no signs or symptoms of infection at this time. VS stable, alert and oriented x4. Hand hygiene utilized upon entry and exit. Will continue to monitor. Problem: Daily Care:  Goal: Daily care needs are met  Description: Daily care needs are met  2/4/2022 1357 by Mini Mane RN  Outcome: Met This Shift  Note: Pt given supplies to complete ADLs     Problem: Pain:  Goal: Pain level will decrease  Description: Pain level will decrease  2/4/2022 1357 by Mini Mane RN  Outcome: Met This Shift  Note: Denies chest pain this shift.

## 2022-02-04 NOTE — PLAN OF CARE
Problem: Pain:  Goal: Patient's pain/discomfort is manageable  Description: Patient's pain/discomfort is manageable  2/4/2022 0249 by Linda Coleman RN  Outcome: Ongoing  Note: Pt had no complaints of any pain or discomfort     Problem: Skin Integrity:  Goal: Skin integrity will stabilize  Description: Skin integrity will stabilize  2/4/2022 0249 by Linda Coleman RN  Outcome: Ongoing  Note: Pts skin integrity stable      Problem: SAFETY  Goal: Free from intentional harm  Outcome: Ongoing  Note: Pt free from any intentional harm

## 2022-02-04 NOTE — DISCHARGE SUMMARY
[facility] Internal Medicine    Discharge Summary     Patient ID: Adelaida Fuchs  :  1945   MRN: [id number]     ACCOUNT:  [de-identified]   Patient's PCP: Ju Godinez MD  Admit Date: 2/3/2022   Discharge Date: 22  Length of Stay: 0  Code Status:  Full Code  Admitting Physician: Harmony Lowe MD  Discharge Physician: Harmony Lowe MD     Active Discharge Diagnoses:     Primary Problem  <principal problem not specified>      [place] Problems    Diagnosis Date Noted    Chest pain [R07.9] 2022    Type 2 diabetes mellitus without complication, with long-term current use of insulin ([facility] [address] [E11.9, Z79.4] 2017       Admission Condition:  fair     Discharged Condition: fair    Hospital Stay:     Hospital Course:  Adelaida Fuchs is a 68 y.o. female who was admitted for the management of <principal problem not specified> , presented to ER with Chest Pain (Constant pressure to mid chest x 1 week, associated symptoms include SOB. prescribed nitro SL with no relief. )    49-year-old female history of coronary artery disease status post stenting , hypertension hyperlipidemia type 2 diabetes, presenting with cardiac sounding chest pain intermittent over last 1 week  Chest pain rule out ACS-troponins flat, EKG remarkable elevated, stress test today. Patient on aspirin, Plavix, Cozaar, Toradol, Imdur. Her chest pain is reproducible on palpation of costochondral joints-suspected costochondritis  Type 2 diabetes-Home dose insulin initiated, patient was n.p.o. overnight and had low blood sugar to 69     2/4  Echo unremarkable, stress test negative.   Patient has been seen by cardiology  Patient can be discharged home  Cardiology recommending E[facility]P outpatient-explained to patient and advised to schedule with her cardiologist Dr. Mayra Amanda therapeutic interventions: As above    Significant Diagnostic Studies:   Labs / Micro:    Lab Results   Component Value Date    WBC 3.3 (L) 02/04/2022    HGB 12.5 02/04/2022    HCT 37.3 02/04/2022    MCV 92.2 02/04/2022     02/04/2022          Lab Results   Component Value Date     02/04/2022    K 4.2 02/04/2022     02/04/2022    CO2 27 02/04/2022    BUN 16 02/04/2022    CREATININE 1.00 02/04/2022    GLUCOSE 80 02/04/2022    CALCIUM 9.2 02/04/2022          Radiology:    ECHO Complete 2D W Doppler W Color    Result Date: 2/4/2022  1604 [facility] Transthoracic Echocardiography Report (TTE)  Patient Name [id number] Curiel       Date of Study                 02/04/2022               Faviola Bernard   Date of      1945  Gender                        Female  Birth   Age          68 year(s)  Race                             Room Number  2125        Height:                       62.99 inch, 160 cm   Corporate ID [id number]    Weight:                       119 pounds, 54 kg  #   Patient Acct [de-identified]   BSA:           1.55 m^2       BMI:      21.1  #                                                                kg/m^2   MR #         [id number]      Sonographer                   MiahGianSunshine   Accession #  [id number]  Interpreting Physician        Leonela Serrano   Fellow                   Referring Nurse Practitioner   Interpreting             Referring Physician           Abraham Fraire  Fellow                                                 Dequan Jean-Baptiste  Type of Study   TTE procedure:2D Echocardiogram, M-Mode, Doppler, Color Doppler. Procedure Date Date: 02/04/2022 Start: 10:14 AM Study Location: [facility] Technical Quality: Fair visualization Indications:Chest pain. History / Tech. Comments: STENT DM HLD HTN Patient Status: Inpatient Height: 62.99 inches Weight: 119.06 pounds BSA: 1.55 m^2 BMI: 21.1 kg/m^2 Rhythm: Sinus arrhythmia HR: 55 bpm BP: 121/74 mmHg Allergies   - *No Known Allergies.  CONCLUSIONS Summary Normal left ventricle size and function with an estimated EF > 55%. No segmental wall motion abnormalities seen. Mild left ventricular hypertrophy. Normal right ventricular size and function. Mitral annular calcification is seen. Trivial mitral regurgitation. Mild tricuspid regurgitation. Normal right ventricular systolic pressure. No significant pericardial effusion is seen. Signature ----------------------------------------------------------------------------  Electronically signed by Sunshine Dooley(Sonographer) on 02/04/2022 10:59  AM ---------------------------------------------------------------------------- ----------------------------------------------------------------------------  Electronically signed by Verito Muse(Interpreting physician) on  02/04/2022 12:01 PM ---------------------------------------------------------------------------- FINDINGS Left Atrium Left atrium is normal in size. Left Ventricle Normal left ventricle size and function with an estimated EF > 55%. No segmental wall motion abnormalities seen. Mild left ventricular hypertrophy. Right Atrium Right atrium is normal in size. Right Ventricle Normal right ventricular size and function. Mitral Valve Mitral annular calcification is seen. Trivial mitral regurgitation. Aortic Valve Normal aortic valve structure and function without stenosis or regurgitation. Tricuspid Valve Normal tricuspid valve structure and function. Mild tricuspid regurgitation. Normal right ventricular systolic pressure. Pulmonic Valve Pulmonic valve not well visualized but Doppler velocities are normal. Mild pulmonic insufficiency. Pericardial Effusion No significant pericardial effusion is seen. Miscellaneous Normal aortic root dimension. E/e' average 10.7 IVC normal diameter & inspiratory collapse indicating normal RA filling pressure .  M-mode / 2D Measurements & Calculations:   LVIDd:4.15 cm(3.7 - 5.6 cm)      Diastolic AFHYOV:52.0 ml  TBTCZ:2.21 cm(2.2 - 4.0 cm)      Systolic YRYKML:74.5 ml  KBGP:4.84 cm(0.6 - 1.1 cm) Aortic Root:3 cm(2.0 - 3.7 cm)  LVPWd:1.29 cm(0.6 - 1.1 cm)      LA Dimension: 4.3 cm(1.9 - 4.0 cm)  Fractional Shortenin.4 %     LA volume/Index: 39.3 ml /25m^2  Calculated LVEF (%): 64.76 %     LVOT:1.8 cm   Mitral:                                Aortic   Peak E-Wave: 0.89 m/s                  Peak Velocity: 1.40 m/s  Peak A-Wave: 0.76 m/s                  Mean Velocity: 0.93 m/s  E/A Ratio: 1.18                        Peak Gradient: 7.84 mmHg  Peak Gradient: 3.18 mmHg               Mean Gradient: 4 mmHg  Deceleration Time: 201 msec                                          Area (continuity): 1.47 cm^2                                         AV VTI: 32.4 cm   Tricuspid:                             Pulmonic:   Estimated RVSP: 29 mmHg  Peak TR Velocity: 2.53 m/s  Peak TR Gradient: 25.6036 mmHg  Estimated RA Pressure: 3 mmHg                                         Estimated PASP: 28.6 mmHg  Septal Wall E' velocity:0.08 m/s Lateral Wall E' velocity:0.08 m/s    XR CHEST PORTABLE    Result Date: 2/3/2022  EXAMINATION: ONE XRAY VIEW OF THE CHEST 2/3/2022 1:56 pm COMPARISON: 2020, 06/15/2020, 2020 HISTORY: ORDERING SYSTEM PROVIDED HISTORY: CP TECHNOLOGIST PROVIDED HISTORY:  Reason for Exam: midsternal chest pain and associated sob FINDINGS: Lungs are clear aside from some scattered calcified granulomata and calcified mediastinal nodes. No pneumothorax or pleural effusion. Heart size is within normal limits. Atherosclerotic aorta. No acute bony findings. No acute findings. NM Cardiac Stress Test Nuclear Imaging    Result Date: 2022  EXAMINATION: MYOCARDIAL PERFUSION IMAGING 2022 7:36 am TECHNIQUE: Rest dose:  10.7 mCi Tc-99m sestamibi intravenously Stress dose:  35.8 mCi Tc-99m sestamibi intravenously Under cardiology supervision, 0.4 mg Lexiscan was infused intravenously prior to injection of the stress dose. SPECT imaging was acquired following injection of the sestamibi.   ECG gating was obtained following the stress acquisition. COMPARISON: 06/26/2020 HISTORY: ORDERING SYSTEM PROVIDED HISTORY: chest pain TECHNOLOGIST PROVIDED HISTORY: Reason for Exam: Chest pain Procedure Type->Rx chest pain Reason for Exam: CP 70-year-old female with chest pain FINDINGS: The patient achieved a maximum heart rate of 80 beats per minute, 55 % of the maximum age predicted heart rate of 144 beats per minute. Perfusion: There is no scintigraphic evidence for a reversible or fixed perfusion defect to suggest reversible ischemia or infarct. Function: The gated SPECT data demonstrates normal left ventricular size and normal wall motion. Left ventricular ejection fraction:  59% TID score:  1.17 (threshold value of 1.39 is used for Lexiscan stress with Tc-99m). There is no stress-induced cavitary dilatation to suggest compensated triple vessel disease. End diastolic volume:  78RF Scores are visually adjusted to account for potential artifact. Summed stress score:  0 Summed rest score:  0 Summed reversibility score:  0     1. No definitive scintigraphic evidence for reversible ischemia or infarct 2. Left ventricular ejection fraction of 59%. 3.  Please see report for EKG portion of the examination which will be performed separately by physician from cardiology. Risk stratification:  Low risk Note:  Risk stratification incorporates both clinical history and test results. Final risk determination is the responsibility of the ordering provider as history and other test results may increase or decrease the risk stratification reported for this examination. Risk stratification criteria are adapted from \"Noninvasive Risk Stratification\" criteria from [employer]. Al, ACC/AATS/AHA/ASE/ASNC/SCAI/SCCT/STS 2017 Appropriate Use Criteria For Coronary Revascularization in Patients With Stable Ischemic Heart Disease [facility] Volume 69, Issue 17, May 2017 High risk (>3% annual death or MI) 1.   Severe resting LV dysfunction (LVEF >35%) not readily explained by non coronary causes 2. Resting perfusion abnormalities greater than 10% of the myocardium in patients without prior history or evidence of MI 3. Stress-induced perfusion abnormalities encumbering greater than or equal to 10% myocardium or stress segmental scores indicating multiple vascular territories with abnormalities 4. Stress-induced LV dilatation (TID ratio greater than 1.19 for exercise and greater than 1.39 for regadenoson) Intermediate risk (1% to 3% annual death or MI) 1. Mild/moderate resting LV dysfunction (LVEF 35% to 49%) not readily explained by non coronary causes. 2.  Resting perfusion abnormalities in 5%-9.9% of the myocardium in patients without a history or prior evidence of MI 3. Stress-induced perfusion abnormality encumbering 5%-9.9% of the myocardium or stress segmental scores indicating 1 vascular territory with abnormalities but without LV dilation 4. Small wall motion abnormality involving 1-2 segments and only 1 coronary bed. Low Risk (Less than 1% annual death or MI) 1. Normal or small myocardial perfusion defect at rest or with stress encumbering less than 5% of the myocardium. RECOMMENDATIONS: Unavailable         Consultations:    Consults:     Final Specialist Recommendations/Findings:   IP CONSULT TO INTERNAL MEDICINE  IP CONSULT TO CARDIOLOGY      The patient was seen and examined on day of discharge and this discharge summary is in conjunction with any daily progress note from day of discharge. Discharge plan:     Disposition: Home      Instructions to Patient: Keep follow-up appointments      Requiring Further Evaluation/Follow Up POST HOSPITALIZATION/Incidental Findings:     Physician Follow Up:     No follow-up provider specified.      Activity: Resume as directed    Discharge Medications:      Medication List      START taking these medications    aspirin 81 MG chewable tablet  Take 1 tablet by mouth daily     ranolazine 500 MG extended release tablet  Commonly known as: RANEXA  Take 1 tablet by mouth 2 times daily        CONTINUE taking these medications    atorvastatin 40 MG tablet  Commonly known as: LIPITOR  Take 1 tablet by mouth daily     B-D UF III MINI PEN NEEDLES 31G X 5 MM Misc  Generic drug: Insulin Pen Needle     clopidogrel 75 MG tablet  Commonly known as: PLAVIX  Take 1 tablet by mouth daily     Co Q-10 100 MG Caps     gabapentin 100 MG capsule  Commonly known as: NEURONTIN     HumaLOG KwikPen 100 UNIT/ML Sopn  Generic drug: insulin lispro (1 Unit Dial)     isosorbide mononitrate 60 MG extended release tablet  Commonly known as: IMDUR     losartan 50 MG tablet  Commonly known as: COZAAR     melatonin 3 MG Tabs tablet     metoprolol succinate 25 MG extended release tablet  Commonly known as: TOPROL XL  Take 1 tablet by mouth daily     multivitamin Tabs tablet     Toujeo Max SoloStar 300 UNIT/ML Sopn  Generic drug: Insulin Glargine (2 Unit Dial)        STOP taking these medications    amLODIPine 5 MG tablet  Commonly known as: NORVASC     ciclopirox 8 % solution  Commonly known as: PENLAC           Where to Get Your Medications      These medications were sent to [address] [place] [address]    Phone: [phone]   aspirin 81 MG chewable tablet  ranolazine 500 MG extended release tablet         Time Spent on discharge is  35 mins in patient examination, evaluation, counseling as well as medication reconciliation, prescriptions for required medications, discharge plan and follow up. Electronically signed by   Harmony Lowe MD  2/4/2022  1:46 PM      Thank you Dr. Ju Godinez MD for the opportunity to be involved in this patient's care.

## 2022-02-04 NOTE — PROGRESS NOTES
Pt arrived to PCU room 2125 via wheelchair. Pt ambulated to bed with steady gait. Telemetry monitor applied and vital signs taken. Pt A&Ox4. Admission assessment and questions completed. Bed locked and in lowest position and side rails x2.

## 2022-02-04 NOTE — CONSULTS
[facility]    Consult                        Today's Date: 2/4/2022  Patient Name: Tae Herrmann  Date of admission: 2/3/2022  4:35 PM  Patient's age: 68 y.o., 1945  Admission Dx: Chest pain [R07.9]  Chest pain, unspecified type [R07.9]    Reason for Consult:  Cardiac evaluation    Requesting Physician: Tania Mesa MD    CHIEF COMPLAINT:  Chest pain    History Obtained From:  Patient and chart. Patient was seen in stress lab    HISTORY OF PRESENT ILLNESS:      The patient is a 68 y.o.  female who is admitted for chest pain. Patient reports that she started having chest pain 2 weeks ago and intermittent in nature. It only happens at rest. She rate discomfort at 4/10 lasting for several seconds. It does not radiate. ECG SR, PAC, HS trop negative. She had cardiac cath last year as noted below. She had cardiology office visit 1/27/22 and reported chest pain and at that time Imdur was increased to 120mg po qday    Known us for -copied from last cardiology office note 1/27/22    1. Diabetes mellitus type 2  2. Dyslipidemia  3. Essential hypertension  4. Nuclear stress test on 06/20 revealed no ischemia / infarction, however there is septal hypokinesis, ejection fraction 70%  5. 2D echo on 06/20 revealed EF of 60-65%, no significant valve disease  6. Cath 8/14/20- LM normal, LAD 20-30%, LCx 10-20%, RCA prox 75% (PTCA/DELIA), EF 60% Plavix  7. Nuclear Stress 2/21- no ischemia/infarct, EF 68%. 8. Cath 3/9/21 d/t recurrent angina- LM normal, LAD distal 40%, D1 very small w/ ostial 40%, D2 very small but normal - Distal/apical narrowingimproved with IC NTG., LCx mild prox 20-30%, OM1 LI, RCA patent prox stent followed by mid 40%.  Add Norvasc & continue Imdur    Past Medical History:   has a past medical history of Abnormal cardiovascular stress test, CAD (coronary artery disease), Chest pressure, Fatigue, History of pneumonia, HTN (hypertension), Hyperlipidemia, Pneumonia, Positive cardiac stress test, SOB (shortness of breath), and Type 2 diabetes mellitus without complication ([address] Past Surgical History:   has a past surgical history that includes Cholecystectomy; Colonoscopy; Hysterectomy, vaginal; Coronary angioplasty with stent (08/14/2020); Hysterectomy; Appendectomy; Cardiac catheterization (03/09/2021); and eye surgery. Home Medications:    Prior to Admission medications    Medication Sig Start Date End Date Taking? Authorizing Provider   melatonin 3 MG TABS tablet Take 5 mg by mouth nightly   Yes Historical Provider, MD   isosorbide mononitrate (IMDUR) 60 MG extended release tablet Take 120 mg by mouth daily  11/12/21  Yes Historical Provider, MD   Insulin Glargine, 2 Unit Dial, (TOUJEO MAX SOLOSTAR) 300 UNIT/ML SOPN Inject 10 Units into the skin nightly    Yes Historical Provider, MD   Coenzyme Q10 (CO Q-10) 100 MG CAPS Take 1 capsule by mouth daily   Yes Historical Provider, MD   atorvastatin (LIPITOR) 40 MG tablet Take 1 tablet by mouth daily 8/14/20  Yes Azucena Díaz MD   clopidogrel (PLAVIX) 75 MG tablet Take 1 tablet by mouth daily 8/14/20  Yes Azucena Díaz MD   metoprolol succinate (TOPROL XL) 25 MG extended release tablet Take 1 tablet by mouth daily 8/14/20  Yes Azucena Díaz MD   gabapentin (NEURONTIN) 100 MG capsule Take 1 capsule by mouth at bedtime. 7/30/20  Yes Historical Provider, MD   insulin lispro, 1 Unit Dial, (HUMALOG KWIKPEN) 100 UNIT/ML SOPN Inject into the skin 3 times daily PER SLIDING SCALE   Yes Historical Provider, MD   losartan (COZAAR) 50 MG tablet Take 50 mg by mouth nightly    Yes Cezar Isaac MD   Multiple Vitamins TABS Take 1 tablet by mouth daily   Yes Cezar Isaac MD   B-D UF III MINI PEN NEEDLES 31G X 5 MM MISC  9/9/20   Historical Provider, MD       Allergies:  Patient has no known allergies. Social History:   reports that she has never smoked.  She has never used smokeless tobacco. She reports that she does not drink alcohol and does not use drugs. Family History: family history includes Diabetes in her father, maternal aunt, maternal grandmother, maternal uncle, and mother; Esophageal Cancer in her brother; Heart Disease in her brother and mother. No h/o sudden cardiac death. No for premature CAD    REVIEW OF SYSTEMS:    · Constitutional: there has been no unanticipated weight loss. There's been No change in energy level, No change in activity level. · Eyes: No visual changes or diplopia. No scleral icterus. · ENT: No Headaches, hearing loss or vertigo. No mouth sores or sore throat. · Cardiovascular: see above  · Respiratory: see above  · Gastrointestinal: No abdominal pain, appetite loss, blood in stools. · Genitourinary: No dysuria, trouble voiding, or hematuria. · Musculoskeletal:  No gait disturbance, No weakness or joint complaints. · Integumentary: No rash or pruritis. · Neurological: No headache or diplopia. No tingling  · Psychiatric: No anxiety, or depression. · Endocrine: No temperature intolerance. · Hematologic/Lymphatic: No abnormal bruising or bleeding, blood clots or swollen lymph nodes. · Allergic/Immunologic: No nasal congestion or hives. PHYSICAL EXAM:      BP (!) 143/59   Pulse 67   Temp 97.8 °F (36.6 °C) (Oral)   Resp 16   Ht 5' 3\" (1.6 m)   Wt 121 lb (54.9 kg)   SpO2 100%   BMI 21.43 kg/m²    Constitutional and General Appearance: alert, cooperative, no distress and appears stated age  HEENT: PERRL, no cervical lymphadenopathy. No masses palpable. Normal oral mucosa  Respiratory:  · Normal excursion and expansion without use of accessory muscles  · Resp Auscultation: Good respiratory effort. No for increased work of breathing.  On auscultation: clear to auscultation bilaterally  Cardiovascular:  · Heart tones are crisp and normal. regular S1 and S2.  · Jugular venous pulsation Normal  · The carotid upstroke is normal in amplitude and contour without delay or bruit   Abdomen: · soft  · Bowel sounds present  Extremities:  ·  No edema  Neurological:  · Alert and oriented. DATA:    Diagnostics:    EKG: NSR, PAC    Labs:     CBC:   Recent Labs     02/03/22  1655 02/04/22  0611   WBC 3.4* 3.3*   HGB 12.5 12.5   HCT 37.5 37.3    155     BMP:   Recent Labs     02/03/22  1655 02/04/22  0611   * 143   K 3.7 4.2   CO2 28 27   BUN 20 16   CREATININE 1.08* 1.00*   LABGLOM 49* 54*   GLUCOSE 68* 80     BNP: No results for input(s): BNP in the last 72 hours. PT/INR: No results for input(s): PROTIME, INR in the last 72 hours. APTT:No results for input(s): APTT in the last 72 hours. CARDIAC ENZYMES:No results for input(s): CKTOTAL, CKMB, CKMBINDEX, TROPONINI in the last 72 hours. 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 Continue ASA and plavix  2. Continue Imdur  3. Continue metoprolol  4. Continue losartan  5. Add ranexa 500mg po BID  6. Ok to discharge, if stress test is negative for ischemia  7. Recommend EECP as OP if stress test is negative. Discussed with patient and Nurse.     Renu Resendez MD, MD  [facility] Consult           [phone]

## 2022-02-04 NOTE — CARE COORDINATION
CASE MANAGEMENT NOTE:    Admission Date:  2/3/2022 Talya Lopez is a 68 y.o.  female    Admitted for : Chest pain [R07.9]  Chest pain, unspecified type [R07.9]    Met with:  Patient    PCP:  Obdulia rodriguez                                Insurance:  [facility] [employer]      Is patient alert and oriented at time of discussion:  Yes    Current Residence/ Living Arrangements:  independently at home             Current Services PTA:  No    Does patient go to outpatient dialysis: No  If yes, location and chair time:     Is patient agreeable to VNS: No    Freedom of choice provided:  Yes    List of [address] provided: No    VNS chosen:  No    DME:  none    Home Oxygen: No    Nebulizer: No    CPAP/BIPAP: No    Supplier: N/A    Potential Assistance Needed: No    SNF needed: No    Freedom of choice and list provided: NA    Pharmacy:  [address] [place] [address]       Does Patient want to use MEDS to BEDS? No    Is patient currently receiving oral anticoagulation therapy? No    Is the Patient an [facility] with Readmission Risk Score greater than 14%? No  If yes, pt needs a follow up appointment made within 7 days. Family Members/Caregivers that pt would like involved in their care:    Yes    If yes, list name here:  [address]    Transportation Provider:  Family             Discharge Plan:  2/4/22 - [employer]ermude[employer] medicare - Patient is from home with spouse, DME: none, VNS - denies needs. Stress test today, if negative probably;ly discharge home today with no needs. Will follow . //pf                 Electronically signed by: Edward Moore RN on 2/4/2022 at 1:43 PM

## 2022-02-04 NOTE — PROGRESS NOTES
CST Lexiscan. Stress Tech performs patient preparation of physical comfort, review test procedures, pre-stress EKG. Lung Sounds clear t/o. Consent verified by pt. .  Educated patient on test procedure and possible side effects of Lexiscan as well as s/s to report. Cardiologist reviewed pre-test EKG and is present for test.  Patient tolerated test well with minor SOB, which resolved to baseline after test with caffeine. Start /60 HR 72  Stop /54 HR 70  EKG portion of testing is completed and negative, nuc. med. portion is still pending.

## 2022-02-04 NOTE — PROGRESS NOTES
Patient educated on discharge instructions which include: medication schedule, reasons for new medications and some side effects and need to follow-up. Patient given new prescriptions during teaching. Patient verbalizes understanding of discharge and states readiness for discharge. All belongings were gathered by patient and in patient's possession. No distress noted at this time.      Current vital signs:  /73   Pulse 57   Temp 97.7 °F (36.5 °C) (Oral)   Resp 16   Ht 5' 3\" (1.6 m)   Wt 121 lb (54.9 kg)   SpO2 100%   BMI 21.43 kg/m²                MEWS Score: 1

## 2022-02-04 NOTE — H&P
[address] [facility]     HISTORY AND PHYSICAL EXAMINATION            Date:   2/3/2022  Patientname:  Lonnie Dunne  Date of admission:  2/3/2022  4:35 PM  MRN:   [id number]  Account:  [de-identified]  YOB: 1945  PCP:    Carlos Akers MD  Room:   03/03  Code Status:    Prior    CHIEF COMPLAINT     Chief Complaint   Patient presents with    Chest Pain     Constant pressure to mid chest x 1 week, associated symptoms include SOB. prescribed nitro SL with no relief. HISTORY OF PRESENT ILLNESS  (Character, Onset, Location, Duration,  Exacerbating/RelievingFactors, Radiation,   Associated Symptoms, Severity )      The patient is a 68 y.o.  female, with a history of coronary artery disease, HTN, hyperlipidemia, peripheral neuropathy, and diabetes type 2, who presents with chest pain. According to patient, she has been having intermittent episodes of non-radiating midsternal chest pressure over the past 2 weeks or so. Reports that episodes are increasing in frequency to the point that pain is nearly constant at this point. Symptoms are now associated with several days of intermittent shortness of breath. Denies fever, chills, cough, abdominal pain, nausea, vomiting, diarrhea, and urinary symptoms. Denies dizziness and lightheadedness. There are no aggravating or alleviating factors - pain is not aggravated with activity and is not relieved with rest or NTG. Symptoms are reported as constant and moderate; progressively worsening. PAST MEDICAL HISTORY   Patient  has a past medical history of Abnormal cardiovascular stress test, CAD (coronary artery disease), Chest pressure, Fatigue, History of pneumonia, HTN (hypertension), Hyperlipidemia, Positive cardiac stress test, SOB (shortness of breath), and Type 2 diabetes mellitus without complication ([facility] [address]     PAST SURGICAL HISTORY    Patient  has a past surgical history that includes Cholecystectomy; Colonoscopy; Hysterectomy, vaginal; Coronary angioplasty with stent (08/14/2020); Hysterectomy; Appendectomy; and Cardiac catheterization (03/09/2021). FAMILY HISTORY    Patient family history includes Diabetes in her maternal aunt, maternal grandmother, maternal uncle, and mother; Esophageal Cancer in her brother; Heart Disease in her brother and mother. SOCIAL HISTORY    Patient  reports that she has never smoked. She has never used smokeless tobacco. She reports that she does not drink alcohol and does not use drugs. HOME MEDICATIONS        Prior to Admission medications    Medication Sig Start Date End Date Taking? Authorizing Provider   isosorbide mononitrate (IMDUR) 60 MG extended release tablet Take 1 tablet by mouth daily 11/12/21   Historical Provider, MD   Insulin Glargine, 2 Unit Dial, (TOUJEO MAX SOLOSTAR) 300 UNIT/ML SOPN Inject 15 Units into the skin every morning    Historical Provider, MD   Coenzyme Q10 (CO Q-10) 100 MG CAPS Take 1 capsule by mouth daily    Historical Provider, MD   amLODIPine (NORVASC) 5 MG tablet Take 1 tablet by mouth nightly 3/9/21   Russel Arenas MD   B-D UF III MINI PEN NEEDLES 31G X 5 MM MISC  9/9/20   Historical Provider, MD   ciclopirox (PENLAC) 8 % solution Apply topically nightly. Remove once weekly with alcohol or nail polish remover. 9/30/20   Demetrius Christian DPM   atorvastatin (LIPITOR) 40 MG tablet Take 1 tablet by mouth daily 8/14/20   Jessica Cazares MD   clopidogrel (PLAVIX) 75 MG tablet Take 1 tablet by mouth daily 8/14/20   Jessica Cazares MD   metoprolol succinate (TOPROL XL) 25 MG extended release tablet Take 1 tablet by mouth daily 8/14/20   Jessica Cazares MD   gabapentin (NEURONTIN) 100 MG capsule Take 1 capsule by mouth daily.  7/30/20   Historical Provider, MD   insulin lispro, 1 Unit Dial, (HUMALOG KWIKPEN) 100 UNIT/ML SOPN Inject into the skin 3 times daily PER SLIDING SCALE    Historical Provider, MD   losartan (COZAAR) 50 MG tablet Take 50 mg by mouth daily abdominal tenderness. There is no guarding. Musculoskeletal:         General: No tenderness. Normal range of motion. Cervical back: Normal range of motion and neck supple. Lymphadenopathy:      Cervical: No cervical adenopathy. Skin:     General: Skin is warm and dry. Coloration: Skin is not pale. Findings: No erythema or rash. Neurological:      Mental Status: She is alert and oriented to person, place, and time. Motor: No seizure activity. Coordination: Coordination normal.   Psychiatric:         Behavior: Behavior normal.         Thought Content: Thought content normal.       DIAGNOSTICS      EKG:   EKG 12 Lead [[id number]]    Collected: 02/03/22 1657    Updated: 02/03/22 1658     Ventricular Rate 61 BPM    Atrial Rate 61 BPM    P-R Interval 176 ms    QRS Duration 74 ms    Q-T Interval 404 ms    QTc Calculation (Bazett) 406 ms    P Axis 91 degrees    R Axis 75 degrees    T Axis 82 degrees   Narrative:     Sinus rhythm with Premature atrial complexes   Otherwise normal ECG   When compared with ECG of 17-AUG-2020 13:37,   Premature atrial complexes are now Present     Labs:  CBC:   Recent Labs     02/03/22  1655   WBC 3.4*   HGB 12.5        BMP:    Recent Labs     02/03/22  1655   *   K 3.7      CO2 28   BUN 20   CREATININE 1.08*   GLUCOSE 68*     S. Calcium:  Recent Labs     02/03/22  1655   CALCIUM 9.7     S. Ionized Calcium:No results for input(s): IONCA in the last 72 hours. S. Magnesium:No results for input(s): MG in the last 72 hours. S. Phosphorus:No results for input(s): PHOS in the last 72 hours. S. Glucose:No results for input(s): POCGLU in the last 72 hours. Glycosylated hemoglobin A1C:   Lab Results   Component Value Date    LABA1C 6.8 06/22/2020     Hepatic: No results for input(s): AST, ALT, ALB, ALKPHOS in the last 72 hours.     Invalid input(s):  PROT,  BILITOT,  BILIDIR  CARDIAC ENZY:   Recent Labs     02/03/22  1655 02/03/22  1836   TROPHS 13 13     INR: No results for input(s): INR in the last 72 hours. BNP:   Recent Labs     02/03/22  1655   PROBNP 546*      ABGs: No results for input(s): PH, PCO2, PO2, HCO3, O2SAT in the last 72 hours. Lipids: No results for input(s): CHOL, TRIG, HDL, LDL, LDLCALC in the last 72 hours. Pancreatic functions:No results for input(s): LIPASE, AMYLASE in the last 72 hours. Glenda Bolognese: No results for input(s): LACTA in the last 72 hours. Thyroid functions:   Lab Results   Component Value Date    TSH 2.53 02/24/2021      U/A:No results for input(s): NITRITE, COLORU, WBCUA, RBCUA, MUCUS, BACTERIA, CLARITYU, SPECGRAV, LEUKOCYTESUR, BLOODU, GLUCOSEU, AMORPHOUS in the last 72 hours. Invalid input(s): Quillian Blanca  No results for input(s): COVID19 in the last 72 hours. Imaging/Diagonstics:     XR CHEST PORTABLE    Result Date: 2/3/2022  EXAMINATION: ONE XRAY VIEW OF THE CHEST 2/3/2022 1:56 pm COMPARISON: 08/17/2020, 06/15/2020, 06/04/2020 HISTORY: ORDERING SYSTEM PROVIDED HISTORY: CP TECHNOLOGIST PROVIDED HISTORY: CP Reason for Exam: midsternal chest pain and associated sob FINDINGS: Lungs are clear aside from some scattered calcified granulomata and calcified mediastinal nodes. No pneumothorax or pleural effusion. Heart size is within normal limits. Atherosclerotic aorta. No acute bony findings. No acute findings. ASSESSMENT  and  PLAN     Active Problems:    * No active hospital problems. *  Resolved Problems:    * No resolved hospital problems.  *    Plan:    Atypical Chest Pain  -Troponin  13 x 2  -EKG - Sinus rhythm with PACs  -Stress Test in am  --NPO after midnight; ok to resume po diet in am after stress test   -Check magnesium, TSH, Lipid panel, & HgbA1c in am  --Pro-BNP - 546 in ED  -Check 2D echocardiogram in am  -CXR - no acute findings  -Pain/nausea control  -EKG PRN chest pain  -Heart cath 3/9/2021 showed patent proximal RCA stent and mild CAD  --Normal LV function  -Consider cardiology consult  --Sees Dr. Kings Umanzor    Diabetes Mellitus  -Continue home dose insulin  -POCT ac and hs with sliding scale coverage    Creatinine 1.08  -Near baseline for this patient    Consultations:     Flex Chacko TO HelenENRIQUE - CNP   2/3/2022  7:38 PM    [address]  [address]  [place], [address] [place] [address]. Phone [phone]     Attending Physician Statement  I have discussed the care of Elijah Daugherty with the CNP. I have examined the patient myself and taken ros and hpi , including pertinent history and exam findings. I have reviewed the key elements of all parts of the encounter with the CNPt. I agree with the assessment, plan and orders as documented by the CNP. 59-year-old female history of coronary artery disease status post stenting 2020, hypertension hyperlipidemia type 2 diabetes, presenting with cardiac sounding chest pain intermittent over last 1 week  Chest pain rule out ACS-troponins flat, EKG remarkable elevated, stress test today. Patient on aspirin, Plavix, Cozaar, Toradol, Imdur. Type 2 diabetes-Home dose insulin initiated, patient was n.p.o. overnight and had low blood sugar to 69    2/4  Echo unremarkable, stress test negative  Patient can be discharged home  Cardiology recommending EECP outpatient-explained to patient and advised to schedule with her cardiologist Dr. Ramona Traore  Her chest pain is reproducible on palpation of costochondral joints      Body mass index is 21.43 kg/m².       Electronically signed by Medardo Parson MD

## 2022-02-04 NOTE — PROCEDURES
[address] [facility]                    [address] [facility]                              CARDIAC STRESS TEST    PATIENT NAME: Beto Rivas                    :        1945  MED REC NO:   [id number]                              ROOM:       2125  ACCOUNT NO:   [de-identified]                           ADMIT DATE: 2022  PROVIDER:     Samira Ingram MD    DATE OF STUDY:  2022    TEST TYPE: LEXISCAN CARDIOLYTE STRESS TEST  INDICATION: CHEST PAIN  REFERRING PHYSICIAN: NANCY TOPETE    RESTING HEART RATE: 72 BEATS PER MINUTE  RESTING BLOOD PRESSURE: 152/60    MEDICATION(S) GIVEN: 0.4 MG IV LEXISCAN  REASON FOR TERMINATION: MEDICATION INFUSION COMPLETE    RESTING EKG: NORMAL  COMMENTS: NORMAL SINUS RHYTHM, PREMATURE ATRIAL CONTRACTION  STRESS HEART RESPONSE: NORMAL  BLOOD PRESSURE RESPONSE: APPROPRIATE  STRESS EKGs: NO CHANGES SEEN  ISCHEMIC EKG CHANGES: NONE    EKG IMPRESSION: ELECTROCARDIOGRAPHICALLY NEGATIVE LEXISCAN STRESS TEST. RADIOISOTOPE RESULTS TO FOLLOW FROM THE DEPARTMENT OF NUCLEAR MEDICINE.             Negrito Steele MD    D: 2022 10:31:31       T: 2022 10:32:27     /[id number]  Job#: [id number]     Doc#: Unknown    CC:    (Retain this field even if not dictated or not decipherable)

## 2022-02-07 ENCOUNTER — TELEPHONE (OUTPATIENT)
Dept: FAMILY MEDICINE CLINIC | Age: 77
End: 2022-02-07

## 2022-02-07 ENCOUNTER — CARE COORDINATION (OUTPATIENT)
Dept: CASE MANAGEMENT | Age: 77
End: 2022-02-07

## 2022-02-07 ENCOUNTER — HOSPITAL ENCOUNTER (OUTPATIENT)
Age: 77
Setting detail: SPECIMEN
Discharge: HOME | End: 2022-02-07

## 2022-02-07 DIAGNOSIS — I10 PRIMARY HYPERTENSION: ICD-10-CM

## 2022-02-07 DIAGNOSIS — R53.83 FATIGUE, UNSPECIFIED TYPE: ICD-10-CM

## 2022-02-07 DIAGNOSIS — R74.8 ELEVATED LIVER ENZYMES: ICD-10-CM

## 2022-02-07 DIAGNOSIS — I25.118 CORONARY ARTERY DISEASE OF NATIVE HEART WITH STABLE ANGINA PECTORIS, UNSPECIFIED VESSEL OR LESION TYPE (HCC): ICD-10-CM

## 2022-02-07 DIAGNOSIS — R07.9 CHEST PAIN, UNSPECIFIED TYPE: Primary | ICD-10-CM

## 2022-02-07 DIAGNOSIS — E55.9 VITAMIN D DEFICIENCY: ICD-10-CM

## 2022-02-07 DIAGNOSIS — Z79.4 TYPE 2 DIABETES MELLITUS WITHOUT COMPLICATION, WITH LONG-TERM CURRENT USE OF INSULIN (HCC): ICD-10-CM

## 2022-02-07 DIAGNOSIS — G62.9 PERIPHERAL POLYNEUROPATHY: ICD-10-CM

## 2022-02-07 DIAGNOSIS — E11.9 TYPE 2 DIABETES MELLITUS WITHOUT COMPLICATION, WITH LONG-TERM CURRENT USE OF INSULIN (HCC): ICD-10-CM

## 2022-02-07 DIAGNOSIS — E78.00 PURE HYPERCHOLESTEROLEMIA: ICD-10-CM

## 2022-02-07 LAB
ALBUMIN SERPL-MCNC: 4 G/DL (ref 3.5–5.2)
ALBUMIN SERPL-MCNC: 4 G/DL (ref 3.5–5.2)
ALBUMIN/GLOBULIN RATIO: 1.9 (ref 1–2.5)
ALBUMIN/GLOBULIN RATIO: 1.9 (ref 1–2.5)
ALP BLD-CCNC: 56 U/L (ref 35–104)
ALP BLD-CCNC: 56 U/L (ref 35–104)
ALT SERPL-CCNC: 21 U/L (ref 5–33)
ALT SERPL-CCNC: 21 U/L (ref 5–33)
ANION GAP SERPL CALCULATED.3IONS-SCNC: 12 MMOL/L (ref 9–17)
ANION GAP SERPL CALCULATED.3IONS-SCNC: 12 MMOL/L (ref 9–17)
AST SERPL-CCNC: 25 U/L
AST SERPL-CCNC: 25 U/L
BILIRUB SERPL-MCNC: 0.58 MG/DL (ref 0.3–1.2)
BILIRUB SERPL-MCNC: 0.58 MG/DL (ref 0.3–1.2)
BUN BLDV-MCNC: 24 MG/DL (ref 8–23)
BUN BLDV-MCNC: 24 MG/DL (ref 8–23)
BUN/CREAT BLD: ABNORMAL (ref 9–20)
BUN/CREAT BLD: ABNORMAL (ref 9–20)
CALCIUM SERPL-MCNC: 9.1 MG/DL (ref 8.6–10.4)
CALCIUM SERPL-MCNC: 9.1 MG/DL (ref 8.6–10.4)
CHLORIDE BLD-SCNC: 109 MMOL/L (ref 98–107)
CHLORIDE BLD-SCNC: 109 MMOL/L (ref 98–107)
CHOLESTEROL, FASTING: 144 MG/DL
CHOLESTEROL/HDL RATIO: 1.9
CHOLESTEROL/HDL RATIO: 1.9
CHOLESTEROL: 144 MG/DL
CO2: 24 MMOL/L (ref 20–31)
CO2: 24 MMOL/L (ref 20–31)
CREAT SERPL-MCNC: 1.34 MG/DL (ref 0.5–0.9)
CREAT SERPL-MCNC: 1.34 MG/DL (ref 0.5–0.9)
GFR AFRICAN AMERICAN: 47 ML/MIN
GFR AFRICAN AMERICAN: 47 ML/MIN
GFR NON-AFRICAN AMERICAN: 38 ML/MIN
GFR NON-AFRICAN AMERICAN: 38 ML/MIN
GFR SERPL CREATININE-BSD FRML MDRD: ABNORMAL ML/MIN/{1.73_M2}
GLUCOSE BLD-MCNC: 140 MG/DL (ref 70–99)
GLUCOSE BLD-MCNC: 140 MG/DL (ref 70–99)
HCT VFR BLD CALC: 40.1 % (ref 36.3–47.1)
HDLC SERPL-MCNC: 75 MG/DL
HDLC SERPL-MCNC: 75 MG/DL
HEMOGLOBIN: 13.3 G/DL (ref 11.9–15.1)
LDL CHOLESTEROL: 53 MG/DL (ref 0–130)
LDL CHOLESTEROL: 53 MG/DL (ref 0–130)
MCH RBC QN AUTO: 31.8 PG (ref 25.2–33.5)
MCHC RBC AUTO-ENTMCNC: 33.2 G/DL (ref 28.4–34.8)
MCV RBC AUTO: 95.9 FL (ref 82.6–102.9)
NRBC AUTOMATED: 0 PER 100 WBC
PDW BLD-RTO: 12.3 % (ref 11.8–14.4)
PLATELET # BLD: 156 K/UL (ref 138–453)
PMV BLD AUTO: 10.3 FL (ref 8.1–13.5)
POTASSIUM SERPL-SCNC: 4.8 MMOL/L (ref 3.7–5.3)
POTASSIUM SERPL-SCNC: 4.8 MMOL/L (ref 3.7–5.3)
RBC # BLD: 4.18 M/UL (ref 3.95–5.11)
SODIUM BLD-SCNC: 145 MMOL/L (ref 135–144)
SODIUM BLD-SCNC: 145 MMOL/L (ref 135–144)
THYROXINE, FREE: 1.15 NG/DL (ref 0.93–1.7)
TOTAL PROTEIN: 6.1 G/DL (ref 6.4–8.3)
TOTAL PROTEIN: 6.1 G/DL (ref 6.4–8.3)
TRIGL SERPL-MCNC: 79 MG/DL
TRIGLYCERIDE, FASTING: 79 MG/DL
TSH SERPL DL<=0.05 MIU/L-ACNC: 2.93 MIU/L (ref 0.3–5)
VITAMIN B-12: 967 PG/ML (ref 232–1245)
VITAMIN D 25-HYDROXY: 39.2 NG/ML (ref 30–100)
VLDLC SERPL CALC-MCNC: NORMAL MG/DL (ref 1–30)
VLDLC SERPL CALC-MCNC: NORMAL MG/DL (ref 1–30)
WBC # BLD: 4.4 K/UL (ref 3.5–11.3)

## 2022-02-07 PROCEDURE — 1111F DSCHRG MED/CURRENT MED MERGE: CPT | Performed by: FAMILY MEDICINE

## 2022-02-07 RX ORDER — NITROGLYCERIN 0.4 MG/1
0.4 TABLET SUBLINGUAL EVERY 5 MIN PRN
COMMUNITY

## 2022-02-07 NOTE — CARE COORDINATION
Transitions of Care Call  Call within 2 business days of discharge: Yes- one time call     Patient: Efrain Baird Patient : 1945   MRN: [id number]  Reason for Admission: chest pain (observation stay)  Discharge Date: 22 RARS: No data recorded    Last Discharge [facility]       Complaint Diagnosis Description Type Department Provider    2/3/22 Chest Pain Chest pain, unspecified type ED to Hosp-Admission (Discharged) (ADMITTED) Demario Simental MD; Joshua Kilgore. .. Call to pt who states she is doing okay  Denies CP, SOB, fever  States eating and drinking well   States she has nitroglycerin (new a few weeks ago) but did not use this prior to coming to hospital- educated on use   Confirms new med ranexa and states its use  Confirms PCP appt  and cardiology 2/10  States her voice is hoarse but denies pain or difficulty swallowing   Denies needs  Writer informs this is final ([facility]) phone call- v/u. Encouraged call writer/ [facility] or providers if questions or concerns- v/u. Episode closed      Was this an external facility discharge? No Discharge Facility: [facility]     Challenges to be reviewed by the provider   Additional needs identified to be addressed with provider: No  none                 Encounter was not routed to provider for escalation. Method of communication with provider: none. Discussed COVID-19 related testing which was: not done at this time. Test results were: not done. Patient informed of results, if available? n/a. Current Symptoms: no new symptoms and no worsening symptoms    Reviewed New or Changed Meds: yes    Do you have what you need at home?  Durable Medical Equipment ordered at discharge: None   Home Health/Outpatient orders at discharge: none   Was patient discharged with a pulse oximeter? No Discussed and confirmed pulse oximeter discharge instructions and when to notify provider or seek emergency care.     Patient education provided: Reviewed appropriate site of care based on symptoms and resources available to patient including: PCP, Specialist and When to call 911. Follow up appointment scheduled within 7 days of discharge: yes. If no appointment scheduled, scheduling offered: yes  Future Appointments   Date Time Provider Rashad Wise   2/9/2022 10:30 AM MD ARACELI Gutierrez [address]   2/28/2022  8:45 AM [facility]       Interventions: Scheduled appointment with PCP-confirmed appt 2/9  Scheduled appointment with [address] cardiology appt 2/10  Obtained and reviewed discharge summary and/or continuity of care documents  Assessment and support for treatment adherence and medication management-1111F complete  Reviewed discharge instructions, medical action plan and red flags with patient who verbalized understanding. No further follow-up call indicated based on severity of symptoms and risk factors. Plan for next call: n/a  Provided contact information for future needs.     Euel Schaumann, RN

## 2022-02-07 NOTE — TELEPHONE ENCOUNTER
[address] Transitions Initial Follow Up Call    Outreach made within 2 business days of discharge: Yes    Patient: Maryse Valles Patient : 1945   MRN: [id number]  Reason for Admission: There are no discharge diagnoses documented for the most recent discharge. Discharge Date: 22       Spoke with: patient    Discharge department/facility: [facility]    TCM Interactive Patient Contact:  Was patient able to fill all prescriptions: Yes  Was patient instructed to bring all medications to the follow-up visit: Yes  Is patient taking all medications as directed in the discharge summary?  Yes  Does patient understand their discharge instructions: Yes  Does patient have questions or concerns that need addressed prior to 7-14 day follow up office visit: yes - 22    Scheduled appointment with PCP within 7-14 days    Follow Up  Future Appointments   Date Time Provider Rashad Wise   2022  8:45 AM Vivi Alston MD [address]  [address], [place]

## 2022-02-09 ENCOUNTER — OFFICE VISIT (OUTPATIENT)
Dept: FAMILY MEDICINE CLINIC | Age: 77
End: 2022-02-09
Payer: MEDICARE

## 2022-02-09 VITALS
BODY MASS INDEX: 21.61 KG/M2 | RESPIRATION RATE: 16 BRPM | HEART RATE: 54 BPM | WEIGHT: 122 LBS | TEMPERATURE: 97.9 F | OXYGEN SATURATION: 98 % | SYSTOLIC BLOOD PRESSURE: 104 MMHG | DIASTOLIC BLOOD PRESSURE: 56 MMHG

## 2022-02-09 DIAGNOSIS — R74.8 ELEVATED LIVER ENZYMES: ICD-10-CM

## 2022-02-09 DIAGNOSIS — E11.40 TYPE 2 DIABETES MELLITUS WITH DIABETIC NEUROPATHY, WITH LONG-TERM CURRENT USE OF INSULIN (HCC): ICD-10-CM

## 2022-02-09 DIAGNOSIS — I10 PRIMARY HYPERTENSION: ICD-10-CM

## 2022-02-09 DIAGNOSIS — E78.00 PURE HYPERCHOLESTEROLEMIA: ICD-10-CM

## 2022-02-09 DIAGNOSIS — E55.9 VITAMIN D DEFICIENCY: ICD-10-CM

## 2022-02-09 DIAGNOSIS — I25.118 CORONARY ARTERY DISEASE OF NATIVE HEART WITH STABLE ANGINA PECTORIS, UNSPECIFIED VESSEL OR LESION TYPE (HCC): ICD-10-CM

## 2022-02-09 DIAGNOSIS — R07.9 CHEST PAIN, UNSPECIFIED TYPE: Primary | ICD-10-CM

## 2022-02-09 DIAGNOSIS — Z79.4 TYPE 2 DIABETES MELLITUS WITH DIABETIC NEUROPATHY, WITH LONG-TERM CURRENT USE OF INSULIN (HCC): ICD-10-CM

## 2022-02-09 DIAGNOSIS — E11.9 TYPE 2 DIABETES MELLITUS WITHOUT COMPLICATION, WITH LONG-TERM CURRENT USE OF INSULIN (HCC): ICD-10-CM

## 2022-02-09 DIAGNOSIS — Z79.4 TYPE 2 DIABETES MELLITUS WITHOUT COMPLICATION, WITH LONG-TERM CURRENT USE OF INSULIN (HCC): ICD-10-CM

## 2022-02-09 PROCEDURE — 1111F DSCHRG MED/CURRENT MED MERGE: CPT | Performed by: FAMILY MEDICINE

## 2022-02-09 PROCEDURE — 99495 TRANSJ CARE MGMT MOD F2F 14D: CPT | Performed by: FAMILY MEDICINE

## 2022-02-09 RX ORDER — CHOLECALCIFEROL (VITAMIN D3) 125 MCG
5 CAPSULE ORAL NIGHTLY PRN
COMMUNITY

## 2022-02-09 ASSESSMENT — PATIENT HEALTH QUESTIONNAIRE - PHQ9
SUM OF ALL RESPONSES TO PHQ QUESTIONS 1-9: 0
2. FEELING DOWN, DEPRESSED OR HOPELESS: 0
1. LITTLE INTEREST OR PLEASURE IN DOING THINGS: 0
SUM OF ALL RESPONSES TO PHQ9 QUESTIONS 1 & 2: 0
SUM OF ALL RESPONSES TO PHQ QUESTIONS 1-9: 0

## 2022-02-09 NOTE — PROGRESS NOTES
Post-Discharge Transitional Care Management Services or Hospital Follow Up      lEijah Daugherty   YOB: 1945    Date of Office Visit:  2/9/2022  Date of Hospital Admission: 2/3/22  Date of Hospital Discharge: 2/4/22  Risk of hospital readmission (high >=14%. Medium >=10%) :No data recorded    Care management risk score Rising risk (score 2-5) and Complex Care (Scores >=6): 1     Non face to face  following discharge, date last encounter closed (first attempt may have been earlier): 2/7/2022 12:55 PM    Call initiated 2 business days of discharge: Yes    Patient Active Problem List   Diagnosis    Diabetic retinopathy ([facility] [address]    Hyperlipidemia    Osteoporosis    Type 2 diabetes mellitus without complication, with [facility]-term [facility]    Allergic rhinitis    Coronary artery disease of native heart with stable angina pectoris ([facility] [address]    Vitamin D deficiency    Peripheral polyneuropathy    Elevated liver enzymes    HTN (hypertension)    Type 2 diabetes mellitus with diabetic neuropathy       No Known Allergies    Medications listed as ordered at the time of discharge from hospital     Medication List          Accurate as of February 9, 2022 11:14 AM. If you have any questions, ask your nurse or doctor.             CONTINUE taking these medications    aspirin 81 MG chewable tablet  Take 1 tablet by mouth daily     atorvastatin 40 MG tablet  Commonly known as: LIPITOR  Take 1 tablet by mouth daily     B-D UF III MINI PEN NEEDLES 31G X 5 MM Misc  Generic drug: Insulin Pen Needle     clopidogrel 75 MG tablet  Commonly known as: PLAVIX  Take 1 tablet by mouth daily     Co Q-10 100 MG Caps     gabapentin 100 MG capsule  Commonly known as: NEURONTIN     HumaLOG KwikPen 100 UNIT/ML Sopn  Generic drug: insulin lispro (1 Unit Dial)     isosorbide mononitrate 60 MG extended release tablet  Commonly known as: IMDUR     losartan 50 MG tablet  Commonly known as: COZAAR     melatonin 5 MG Tabs tablet     metoprolol succinate 25 MG extended release tablet  Commonly known as: TOPROL XL  Take 1 tablet by mouth daily     multivitamin Tabs tablet     nitroGLYCERIN 0.4 MG SL tablet  Commonly known as: NITROSTAT     ranolazine 500 MG extended release tablet  Commonly known as: RANEXA  Take 1 tablet by mouth 2 times daily     Toujeo Max SoloStar 300 UNIT/ML Sopn  Generic drug: Insulin Glargine (2 Unit Dial)              Medications marked \"taking\" at this time  Outpatient Medications Marked as Taking for the 2/9/22 encounter (Office Visit) with Jane Hamilton MD   Medication Sig Dispense Refill    melatonin 5 MG TABS tablet Take 5 mg by mouth nightly as needed      nitroGLYCERIN (NITROSTAT) 0.4 MG SL tablet Place 0.4 mg under the tongue every 5 minutes as needed for Chest pain up to max of 3 total doses. If no relief after 1 dose, call 911.  aspirin 81 MG chewable tablet Take 1 tablet by mouth daily 30 tablet 3    ranolazine (RANEXA) 500 MG extended release tablet Take 1 tablet by mouth 2 times daily 60 tablet 3    isosorbide mononitrate (IMDUR) 60 MG extended release tablet Take 120 mg by mouth daily       Insulin Glargine, 2 Unit Dial, (TOUJEO MAX SOLOSTAR) 300 UNIT/ML SOPN Inject 10 Units into the skin nightly       Coenzyme Q10 (CO Q-10) 100 MG CAPS Take 1 capsule by mouth daily      B-D UF III MINI PEN NEEDLES 31G X 5 MM MISC       atorvastatin (LIPITOR) 40 MG tablet Take 1 tablet by mouth daily 90 tablet 1    clopidogrel (PLAVIX) 75 MG tablet Take 1 tablet by mouth daily 30 tablet 5    metoprolol succinate (TOPROL XL) 25 MG extended release tablet Take 1 tablet by mouth daily 90 tablet 1    gabapentin (NEURONTIN) 100 MG capsule Take 1 capsule by mouth at bedtime.        insulin lispro, 1 Unit Dial, (HUMALOG KWIKPEN) 100 UNIT/ML SOPN Inject into the skin 3 times daily PER SLIDING SCALE      losartan (COZAAR) 50 MG tablet Take 50 mg by mouth nightly       Multiple Vitamins TABS Take 1 tablet by mouth daily          Medications patient taking as of now reconciled against medications ordered at time of hospital discharge: Yes    Chief Complaint   Patient presents with    Follow-Up from [address] /has an appt with Dr. Cristy To tomorrow     History of Present illness - Follow up of Hospital diagnosis(es): Pt here today for a hospital follow up secondary to CP. She was seen by cardiology and had a stress test and ECHO and were normal. She states she has not had anymore CP, but does get SOB. Pt denies any N/V/D/C or abdominal pain. Pt denies any fever or chills. Inpatient course: Discharge summary reviewed- see chart. Interval history/Current status: See HPI    A comprehensive review of systems was negative except for what was noted in the HPI. Vitals:    02/09/22 1044   BP: (!) 104/56   Pulse: 54   Resp: 16   Temp: 97.9 °F (36.6 °C)   SpO2: 98%   Weight: 122 lb (55.3 kg)     Body mass index is 21.61 kg/m². Wt Readings from Last 3 Encounters:   02/09/22 122 lb (55.3 kg)   02/04/22 121 lb (54.9 kg)   12/15/21 122 lb (55.3 kg)     BP Readings from Last 3 Encounters:   02/09/22 (!) 104/56   02/04/22 129/73   11/29/21 134/64        Physical Exam:  General Appearance: alert and oriented to person, place and time, well-developed and well-nourished, in no acute distress  Pulmonary/Chest: clear to auscultation bilaterally- no wheezes, rales or rhonchi, normal air movement, no respiratory distress  Cardiovascular: normal rate, regular rhythm and no murmurs  Abdomen: soft, non-tender, non-distended, normal bowel sounds, no masses or organomegaly  Extremities: no edema and Fransisco's sign negative bilaterally    Assessment/Plan:  1. Chest pain, unspecified type  - Cont with the Ranexa, ASA, Imdur, Plavix, Lipitor, and Toprol as prescribed  - Will follow up with Dr. Cristy To tomorrow for possible EECP  - DE DISCHARGE MEDS RECONCILED W/ CURRENT OUTPATIENT MED LIST    2.  Coronary artery disease of native heart with stable angina pectoris, unspecified vessel or lesion type ([facility] [address]  - See #1  - NM DISCHARGE MEDS RECONCILED W/ CURRENT OUTPATIENT MED LIST    3. Primary hypertension  - Cont Toprol and Cozaar as prescribed    4. Pure hypercholesterolemia  - Cont Lipitor    5. Type 2 diabetes mellitus without complication, with long-term current use of insulin (HCC)  - Cont Toujeo and Humalog as prescribed  - Follow up with Dr. Bhavya Garces    6. Type 2 diabetes mellitus with diabetic neuropathy, with long-term current use of insulin (HCC)  - Cont Gabapentin as prescribed    7. Vitamin D deficiency      8.  Elevated liver enzymes  - Stable        Medical Decision Making: moderate complexity

## 2022-02-09 NOTE — PROGRESS NOTES
Post-Discharge Transitional Care Management Services or Hospital Follow Up      Germain Blizzard   YOB: 1945    Date of Office Visit:  2/9/2022  Date of Hospital Admission: 2/3/22  Date of Hospital Discharge: 2/4/22  Risk of hospital readmission (high >=14%. Medium >=10%) :No data recorded    Care management risk score Rising risk (score 2-5) and Complex Care (Scores >=6): 1     Non face to face  following discharge, date last encounter closed (first attempt may have been earlier): 2/7/2022 12:55 PM    Call initiated 2 business days of discharge: Yes    Patient Active Problem List   Diagnosis    Diabetic retinopathy ([facility] [facility]    Hyperlipidemia    Osteoporosis    Type 2 diabetes mellitus without complication, with long-term current use of insulin ([facility] [address]    Allergic rhinitis    Coronary artery disease of native heart with stable angina pectoris ([facility] [address]    Vitamin D deficiency    Peripheral polyneuropathy    Elevated liver enzymes    HTN (hypertension)    Chest pain       No Known Allergies    Medications listed as ordered at the time of discharge from hospital     Medication List          Accurate as of February 9, 2022  9:56 AM. If you have any questions, ask your nurse or doctor.             CONTINUE taking these medications    aspirin 81 MG chewable tablet  Take 1 tablet by mouth daily     atorvastatin 40 MG tablet  Commonly known as: LIPITOR  Take 1 tablet by mouth daily     B-D UF III MINI PEN NEEDLES 31G X 5 MM Misc  Generic drug: Insulin Pen Needle     clopidogrel 75 MG tablet  Commonly known as: PLAVIX  Take 1 tablet by mouth daily     Co Q-10 100 MG Caps     gabapentin 100 MG capsule  Commonly known as: NEURONTIN     HumaLOG KwikPen 100 UNIT/ML Sopn  Generic drug: insulin lispro (1 Unit Dial)     isosorbide mononitrate 60 MG extended release tablet  Commonly known as: IMDUR     losartan 50 MG tablet  Commonly known as: COZAAR     melatonin 3 MG Tabs tablet     metoprolol succinate 25 MG extended release tablet  Commonly known as: TOPROL XL  Take 1 tablet by mouth daily     multivitamin Tabs tablet     nitroGLYCERIN 0.4 MG SL tablet  Commonly known as: NITROSTAT     ranolazine 500 MG extended release tablet  Commonly known as: RANEXA  Take 1 tablet by mouth 2 times daily     Ashley Cochran SoloStar 300 UNIT/ML Sopn  Generic drug: Insulin Glargine (2 Unit Dial)              Medications marked \"taking\" at this time  No outpatient medications have been marked as taking for the 2/9/22 encounter (Appointment) with Ita Cifuentes MD.        Medications patient taking as of now reconciled against medications ordered at time of hospital discharge: {YES / ML:71729}    No chief complaint on file. History of Present illness - Follow up of Hospital diagnosis(es): ***    Inpatient course: Discharge summary reviewed- see chart. Interval history/Current status: ***    {OPTIONAL WHEN USING 57872 - ROS (THIS WILL AUTO-DELETE IF NOT USED) :[id number]::\"A comprehensive review of systems was negative except for what was noted in the HPI. \"}    There were no vitals filed for this visit. There is no height or weight on file to calculate BMI. Wt Readings from Last 3 Encounters:   02/04/22 121 lb (54.9 kg)   12/15/21 122 lb (55.3 kg)   11/29/21 122 lb (55.3 kg)     BP Readings from Last 3 Encounters:   02/04/22 129/73   11/29/21 134/64   09/03/21 (!) 114/56        Physical Exam:  {GENERAL PHYSICAL KOIU:09372}    Assessment/Plan:  There are no diagnoses linked to this encounter.       Medical Decision Making: {TCMPN4:16588} [>50% of Time Spent on Counseling for ____] : Greater than 50% of the encounter time was spent on counseling for [unfilled] [Time Spent: ___ minutes] : I have spent [unfilled] minutes of face to face time with the patient

## 2022-03-21 ENCOUNTER — TELEPHONE (OUTPATIENT)
Dept: FAMILY MEDICINE CLINIC | Age: 77
End: 2022-03-21

## 2022-03-21 NOTE — TELEPHONE ENCOUNTER
----- Message from Varsha Cielo sent at 3/21/2022 10:12 AM EDT -----  Subject: Message to Provider    QUESTIONS  Information for Provider? pt has some questions and wants a call back from   her pcp . please call pt to further assist and if appt is needed she is   willing to make one but wants a call first   ---------------------------------------------------------------------------  --------------  [address]  What is the best way for the office to contact you? OK to leave message on   voicemail  Preferred Call Back Phone Number? [phone]  ---------------------------------------------------------------------------  --------------  SCRIPT ANSWERS  Relationship to Patient?  Self

## 2022-03-21 NOTE — TELEPHONE ENCOUNTER
Patient was hospitalized over night a few weeks ago, 02/09/22 and was prescribed ranexa by Dr. Trevor Lamar at discharge. She took it for a while but it caused constipation and weakness so she stopped taking it but is still having the chest pressure. She called Dr. Reyes Ka office to see if there is something else she could take they told her to call her PCP for something. She is still C/O feeling weak and is going OOT in a couple weeks and needs to feel better by then.

## 2022-03-21 NOTE — TELEPHONE ENCOUNTER
Discussed instructions with patient. She said they wouldn't help her this am and I explained that she needs to tell them that they started the medication and she stopped taking it and is still having the chest pressure.

## 2022-03-21 NOTE — TELEPHONE ENCOUNTER
She needs to call Cardiology and let them know she started with these symptoms after taking the Ranexa and she stopped. It may take time for the symptoms to improve with her stopping the Ranexa, but she needs to let them know the symptoms started with the Ranexa and she has stopped the medication and having chest pain.

## 2022-05-06 ENCOUNTER — HOSPITAL ENCOUNTER (OUTPATIENT)
Age: 77
Setting detail: SPECIMEN
Discharge: HOME | End: 2022-05-06

## 2022-05-06 DIAGNOSIS — N28.9 RENAL INSUFFICIENCY: ICD-10-CM

## 2022-05-06 DIAGNOSIS — E11.9 TYPE 2 DIABETES MELLITUS WITHOUT COMPLICATION, WITH LONG-TERM CURRENT USE OF INSULIN (HCC): ICD-10-CM

## 2022-05-06 DIAGNOSIS — Z79.4 TYPE 2 DIABETES MELLITUS WITHOUT COMPLICATION, WITH LONG-TERM CURRENT USE OF INSULIN (HCC): ICD-10-CM

## 2022-05-06 LAB
ANION GAP SERPL CALCULATED.3IONS-SCNC: 8 MMOL/L (ref 9–17)
BUN BLDV-MCNC: 26 MG/DL (ref 8–23)
CALCIUM SERPL-MCNC: 9.4 MG/DL (ref 8.6–10.4)
CHLORIDE BLD-SCNC: 103 MMOL/L (ref 98–107)
CO2: 29 MMOL/L (ref 20–31)
CREAT SERPL-MCNC: 0.99 MG/DL (ref 0.5–0.9)
GFR AFRICAN AMERICAN: >60 ML/MIN
GFR NON-AFRICAN AMERICAN: 54 ML/MIN
GFR SERPL CREATININE-BSD FRML MDRD: ABNORMAL ML/MIN/{1.73_M2}
GLUCOSE BLD-MCNC: 205 MG/DL (ref 70–99)
POTASSIUM SERPL-SCNC: 4.1 MMOL/L (ref 3.7–5.3)
SODIUM BLD-SCNC: 140 MMOL/L (ref 135–144)

## 2022-05-11 ENCOUNTER — OFFICE VISIT (OUTPATIENT)
Dept: FAMILY MEDICINE CLINIC | Age: 77
End: 2022-05-11
Payer: MEDICARE

## 2022-05-11 VITALS
RESPIRATION RATE: 16 BRPM | WEIGHT: 119 LBS | SYSTOLIC BLOOD PRESSURE: 114 MMHG | HEART RATE: 52 BPM | BODY MASS INDEX: 21.08 KG/M2 | OXYGEN SATURATION: 99 % | TEMPERATURE: 97.3 F | DIASTOLIC BLOOD PRESSURE: 52 MMHG

## 2022-05-11 DIAGNOSIS — I10 PRIMARY HYPERTENSION: Primary | ICD-10-CM

## 2022-05-11 DIAGNOSIS — E78.00 PURE HYPERCHOLESTEROLEMIA: ICD-10-CM

## 2022-05-11 DIAGNOSIS — E11.3599 PROLIFERATIVE DIABETIC RETINOPATHY ASSOCIATED WITH TYPE 2 DIABETES MELLITUS, MACULAR EDEMA PRESENCE UNSPECIFIED, UNSPECIFIED LATERALITY (HCC): ICD-10-CM

## 2022-05-11 DIAGNOSIS — E11.9 TYPE 2 DIABETES MELLITUS WITHOUT COMPLICATION, WITH LONG-TERM CURRENT USE OF INSULIN (HCC): ICD-10-CM

## 2022-05-11 DIAGNOSIS — G62.9 PERIPHERAL POLYNEUROPATHY: ICD-10-CM

## 2022-05-11 DIAGNOSIS — R68.89 FORGETFULNESS: ICD-10-CM

## 2022-05-11 DIAGNOSIS — R74.8 ELEVATED LIVER ENZYMES: ICD-10-CM

## 2022-05-11 DIAGNOSIS — I25.118 CORONARY ARTERY DISEASE OF NATIVE HEART WITH STABLE ANGINA PECTORIS, UNSPECIFIED VESSEL OR LESION TYPE (HCC): ICD-10-CM

## 2022-05-11 DIAGNOSIS — E55.9 VITAMIN D DEFICIENCY: ICD-10-CM

## 2022-05-11 DIAGNOSIS — Z79.4 TYPE 2 DIABETES MELLITUS WITHOUT COMPLICATION, WITH LONG-TERM CURRENT USE OF INSULIN (HCC): ICD-10-CM

## 2022-05-11 PROCEDURE — 99214 OFFICE O/P EST MOD 30 MIN: CPT | Performed by: FAMILY MEDICINE

## 2022-05-11 PROCEDURE — 3051F HG A1C>EQUAL 7.0%<8.0%: CPT | Performed by: FAMILY MEDICINE

## 2022-05-11 RX ORDER — ISOSORBIDE MONONITRATE 120 MG/1
1 TABLET, EXTENDED RELEASE ORAL DAILY
COMMUNITY
Start: 2022-04-25

## 2022-05-11 ASSESSMENT — ENCOUNTER SYMPTOMS
BACK PAIN: 0
SORE THROAT: 0
CHEST TIGHTNESS: 0
ABDOMINAL PAIN: 0
SHORTNESS OF BREATH: 0
ABDOMINAL DISTENTION: 0
VOMITING: 0
COUGH: 0
RHINORRHEA: 0
CONSTIPATION: 0
DIARRHEA: 0
NAUSEA: 0

## 2022-05-11 NOTE — PROGRESS NOTES
Billie Baum MD    [facility] [place] [facility]  [id number]  [address] [address]  Dept: [phone]  Dept Fax: [phone]     Patient ID: Allyssa Dominguez is a 68 y.o. female. HPI    Established patient here today for f/u on chronic medical problems, go over labs and/or diagnostic studies, and medication refills. Pt denies any fever or chills. Pt today denies any HA, chest pain, or SOB. Pt denies any N/V/D/C or abdominal pain. Her last HGBA1C was 6.6%. she has been c/o forgetfulness. She also states when she walks she sometimes sways to the one side. Pt has not been taking the Ranexa. Otherwise pt doing well on current tx and no other concerns today. The patient's past medical, surgical, social, and family history as well as his current medications and allergies were reviewed as documented in today's encounter. My previous office notes, consult notes, labs and diagnostic studies were reviewed prior to and during encounter. Current Outpatient Medications on File Prior to Visit   Medication Sig Dispense Refill    isosorbide mononitrate (IMDUR) 120 MG extended release tablet Take 1 tablet by mouth daily      melatonin 5 MG TABS tablet Take 5 mg by mouth nightly as needed      nitroGLYCERIN (NITROSTAT) 0.4 MG SL tablet Place 0.4 mg under the tongue every 5 minutes as needed for Chest pain up to max of 3 total doses. If no relief after 1 dose, call 911.       aspirin 81 MG chewable tablet Take 1 tablet by mouth daily 30 tablet 3    Insulin Glargine, 2 Unit Dial, (TOUJEO MAX SOLOSTAR) 300 UNIT/ML SOPN Inject 10 Units into the skin nightly       Coenzyme Q10 (CO Q-10) 100 MG CAPS Take 1 capsule by mouth daily      B-D UF III MINI PEN NEEDLES 31G X 5 MM MISC       atorvastatin (LIPITOR) 40 MG tablet Take 1 tablet by mouth daily 90 tablet 1    clopidogrel (PLAVIX) 75 MG tablet Take 1 tablet by mouth daily 30 tablet 5    metoprolol succinate (TOPROL XL) 25 MG extended release tablet Take 1 tablet by mouth daily 90 tablet 1    insulin lispro, 1 Unit Dial, (HUMALOG KWIKPEN) 100 UNIT/ML SOPN Inject into the skin 3 times daily PER SLIDING SCALE      losartan (COZAAR) 50 MG tablet Take 50 mg by mouth nightly       Multiple Vitamins TABS Take 1 tablet by mouth daily      ranolazine (RANEXA) 500 MG extended release tablet Take 1 tablet by mouth 2 times daily 60 tablet 3     No current facility-administered medications on file prior to visit. Subjective:     Review of Systems   Constitutional: Negative for appetite change and fever. HENT: Negative for congestion, ear pain, rhinorrhea and sore throat. Respiratory: Negative for cough, chest tightness and shortness of breath. Cardiovascular: Negative for chest pain and palpitations. Gastrointestinal: Negative for abdominal distention, abdominal pain, constipation, diarrhea, nausea and vomiting. Genitourinary: Negative for difficulty urinating and dysuria. Musculoskeletal: Positive for gait problem (sways to one side at times). Negative for arthralgias, back pain and myalgias. Skin: Negative for rash. Neurological: Negative for dizziness, weakness, light-headedness and headaches. Hematological: Negative for adenopathy. Psychiatric/Behavioral: Negative for behavioral problems and sleep disturbance. The patient is not nervous/anxious. Forgetfulness        Objective:     Physical Exam  Vitals reviewed. Constitutional:       General: She is not in acute distress. Appearance: She is well-developed. HENT:      Head: Normocephalic and atraumatic. Right Ear: External ear normal.      Left Ear: External ear normal.      Nose: Nose normal.      Mouth/Throat:      Pharynx: No oropharyngeal exudate. Eyes:      Conjunctiva/sclera: Conjunctivae normal.      Pupils: Pupils are equal, round, and reactive to light.    Cardiovascular:      Rate and Rhythm: Normal rate and regular rhythm. Heart sounds: Normal heart sounds. No murmur heard. Pulmonary:      Effort: Pulmonary effort is normal. No respiratory distress. Breath sounds: Normal breath sounds. No wheezing. Chest:      Chest wall: No tenderness. Abdominal:      General: Bowel sounds are normal. There is no distension. Palpations: Abdomen is soft. There is no mass. Tenderness: There is no abdominal tenderness. Musculoskeletal:         General: No tenderness. Normal range of motion. Cervical back: Normal range of motion. Lymphadenopathy:      Cervical: No cervical adenopathy. Skin:     Findings: No rash. Neurological:      Mental Status: She is alert and oriented to person, place, and time. Deep Tendon Reflexes: Reflexes are normal and symmetric. Psychiatric:         Behavior: Behavior normal.         Assessment:      Diagnosis Orders   1. Primary hypertension  CBC    Comprehensive Metabolic Panel   2. Pure hypercholesterolemia  Lipid Panel   3. Type 2 diabetes mellitus without complication, with long-term current use of insulin (HCC)  Comprehensive Metabolic Panel   4. Coronary artery disease of native heart with stable angina pectoris, unspecified vessel or lesion type [facility])  Comprehensive Metabolic Panel    Lipid Panel   5. Elevated liver enzymes  Comprehensive Metabolic Panel   6. Vitamin D deficiency  Vitamin D 25 Hydroxy   7. Peripheral polyneuropathy  Comprehensive Metabolic Panel   8. Proliferative diabetic retinopathy associated with type 2 diabetes mellitus, macular edema presence unspecified, unspecified laterality ([facility] [address]     9.  Forgetfulness           Plan:     Orders Placed This Encounter   Procedures    CBC     Standing Status:   Future     Standing Expiration Date:   5/11/2023    Comprehensive Metabolic Panel     Standing Status:   Future     Standing Expiration Date:   5/11/2023    Lipid Panel     Standing Status:   Future     Standing Expiration Date: 5/11/2023     Order Specific Question:   Is Patient Fasting?/# of Hours     Answer:   8-10 Hours    Vitamin D 25 Hydroxy     Standing Status:   Future     Standing Expiration Date:   5/11/2023       Will cont with the Cozaar, Toprol, and Imdur as prescribed for BP control    Will cont with low fat/chol diet and Lipitor as ordered    Continue with ADA diet, current diabetic meds, and monitoring BS's as instructed and she does follow with Dr. Shad Casas - her last HGBA1C was 6.6%    Cont with daily foot exams and yearly eye exams    Will cont with Cozaar for renal protection    Stay well hydrated and no NSAID's - her kidney function did improve    Will cont to follow with Cardiology as instructed. I am going to get her last office note because she states she has not been taking the Ranexa and doesn't remember it being prescribed    Will do a MMSE at her next visit secondary to her forgetfulness    Rest of systems unchanged, continue current treatments. Medications, labs, diagnostic studies, consultations and follow-up as documented in this encounter. Rest of systems unchanged, continue current treatments    On this date May 11, 2022,  I have spent greater than 50% of visit reviewing previous notes, test results and face to face with the patient discussing the diagnoses, importance of compliance with the treatment plan, counseling, coordinating care as well as documenting on the day of the visit. Billie Gonzalez MD

## 2022-06-01 ENCOUNTER — HOSPITAL ENCOUNTER (OUTPATIENT)
Age: 77
Setting detail: SPECIMEN
Discharge: HOME | End: 2022-06-01

## 2022-06-01 LAB
ANION GAP SERPL CALCULATED.3IONS-SCNC: 13 MMOL/L (ref 9–17)
BUN BLDV-MCNC: 46 MG/DL (ref 8–23)
CALCIUM SERPL-MCNC: 9.6 MG/DL (ref 8.6–10.4)
CHLORIDE BLD-SCNC: 102 MMOL/L (ref 98–107)
CO2: 19 MMOL/L (ref 20–31)
CREAT SERPL-MCNC: 1.39 MG/DL (ref 0.5–0.9)
GFR AFRICAN AMERICAN: 45 ML/MIN
GFR NON-AFRICAN AMERICAN: 37 ML/MIN
GFR SERPL CREATININE-BSD FRML MDRD: ABNORMAL ML/MIN/{1.73_M2}
GLUCOSE BLD-MCNC: 185 MG/DL (ref 70–99)
HCT VFR BLD CALC: 39.3 % (ref 36.3–47.1)
HEMOGLOBIN: 13.3 G/DL (ref 11.9–15.1)
MAGNESIUM: 2.1 MG/DL (ref 1.6–2.6)
MCH RBC QN AUTO: 31.4 PG (ref 25.2–33.5)
MCHC RBC AUTO-ENTMCNC: 33.8 G/DL (ref 28.4–34.8)
MCV RBC AUTO: 92.9 FL (ref 82.6–102.9)
NRBC AUTOMATED: 0 PER 100 WBC
PDW BLD-RTO: 12.4 % (ref 11.8–14.4)
PLATELET # BLD: 165 K/UL (ref 138–453)
PMV BLD AUTO: 10.4 FL (ref 8.1–13.5)
POTASSIUM SERPL-SCNC: 4.5 MMOL/L (ref 3.7–5.3)
RBC # BLD: 4.23 M/UL (ref 3.95–5.11)
SODIUM BLD-SCNC: 134 MMOL/L (ref 135–144)
WBC # BLD: 4.6 K/UL (ref 3.5–11.3)

## 2022-06-13 ENCOUNTER — HOSPITAL ENCOUNTER (OUTPATIENT)
Age: 77
Setting detail: SPECIMEN
Discharge: HOME | End: 2022-06-13

## 2022-06-13 LAB
ANION GAP SERPL CALCULATED.3IONS-SCNC: 13 MMOL/L (ref 9–17)
BUN BLDV-MCNC: 26 MG/DL (ref 8–23)
CALCIUM SERPL-MCNC: 9.4 MG/DL (ref 8.6–10.4)
CHLORIDE BLD-SCNC: 105 MMOL/L (ref 98–107)
CO2: 21 MMOL/L (ref 20–31)
CREAT SERPL-MCNC: 1.07 MG/DL (ref 0.5–0.9)
GFR AFRICAN AMERICAN: >60 ML/MIN
GFR NON-AFRICAN AMERICAN: 50 ML/MIN
GFR SERPL CREATININE-BSD FRML MDRD: ABNORMAL ML/MIN/{1.73_M2}
GLUCOSE BLD-MCNC: 267 MG/DL (ref 70–99)
POTASSIUM SERPL-SCNC: 5.4 MMOL/L (ref 3.7–5.3)
SODIUM BLD-SCNC: 139 MMOL/L (ref 135–144)

## 2022-08-09 ENCOUNTER — HOSPITAL ENCOUNTER (OUTPATIENT)
Age: 77
Setting detail: SPECIMEN
Discharge: HOME | End: 2022-08-09

## 2022-08-09 DIAGNOSIS — I25.118 CORONARY ARTERY DISEASE OF NATIVE HEART WITH STABLE ANGINA PECTORIS, UNSPECIFIED VESSEL OR LESION TYPE (HCC): ICD-10-CM

## 2022-08-09 DIAGNOSIS — R74.8 ELEVATED LIVER ENZYMES: ICD-10-CM

## 2022-08-09 DIAGNOSIS — E55.9 VITAMIN D DEFICIENCY: ICD-10-CM

## 2022-08-09 DIAGNOSIS — G62.9 PERIPHERAL POLYNEUROPATHY: ICD-10-CM

## 2022-08-09 DIAGNOSIS — E11.9 TYPE 2 DIABETES MELLITUS WITHOUT COMPLICATION, WITH LONG-TERM CURRENT USE OF INSULIN (HCC): ICD-10-CM

## 2022-08-09 DIAGNOSIS — E78.00 PURE HYPERCHOLESTEROLEMIA: ICD-10-CM

## 2022-08-09 DIAGNOSIS — Z79.4 TYPE 2 DIABETES MELLITUS WITHOUT COMPLICATION, WITH LONG-TERM CURRENT USE OF INSULIN (HCC): ICD-10-CM

## 2022-08-09 DIAGNOSIS — I10 PRIMARY HYPERTENSION: ICD-10-CM

## 2022-08-09 LAB
ALBUMIN SERPL-MCNC: 4 G/DL (ref 3.5–5.2)
ALBUMIN SERPL-MCNC: 4 G/DL (ref 3.5–5.2)
ALBUMIN/GLOBULIN RATIO: 1.6 (ref 1–2.5)
ALBUMIN/GLOBULIN RATIO: 1.6 (ref 1–2.5)
ALP BLD-CCNC: 58 U/L (ref 35–104)
ALP BLD-CCNC: 58 U/L (ref 35–104)
ALT SERPL-CCNC: 49 U/L (ref 5–33)
ALT SERPL-CCNC: 49 U/L (ref 5–33)
ANION GAP SERPL CALCULATED.3IONS-SCNC: 9 MMOL/L (ref 9–17)
ANION GAP SERPL CALCULATED.3IONS-SCNC: 9 MMOL/L (ref 9–17)
AST SERPL-CCNC: 49 U/L
AST SERPL-CCNC: 49 U/L
BILIRUB SERPL-MCNC: 0.58 MG/DL (ref 0.3–1.2)
BILIRUB SERPL-MCNC: 0.58 MG/DL (ref 0.3–1.2)
BUN BLDV-MCNC: 22 MG/DL (ref 8–23)
BUN BLDV-MCNC: 22 MG/DL (ref 8–23)
CALCIUM SERPL-MCNC: 9.3 MG/DL (ref 8.6–10.4)
CALCIUM SERPL-MCNC: 9.3 MG/DL (ref 8.6–10.4)
CHLORIDE BLD-SCNC: 106 MMOL/L (ref 98–107)
CHLORIDE BLD-SCNC: 106 MMOL/L (ref 98–107)
CHOLESTEROL, FASTING: 143 MG/DL
CHOLESTEROL/HDL RATIO: 2
CHOLESTEROL/HDL RATIO: 2
CHOLESTEROL: 143 MG/DL
CO2: 27 MMOL/L (ref 20–31)
CO2: 27 MMOL/L (ref 20–31)
CREAT SERPL-MCNC: 1.09 MG/DL (ref 0.5–0.9)
CREAT SERPL-MCNC: 1.09 MG/DL (ref 0.5–0.9)
CREATININE URINE: 157.1 MG/DL (ref 28–217)
GFR AFRICAN AMERICAN: 59 ML/MIN
GFR AFRICAN AMERICAN: 59 ML/MIN
GFR NON-AFRICAN AMERICAN: 49 ML/MIN
GFR NON-AFRICAN AMERICAN: 49 ML/MIN
GFR SERPL CREATININE-BSD FRML MDRD: ABNORMAL ML/MIN/{1.73_M2}
GFR SERPL CREATININE-BSD FRML MDRD: ABNORMAL ML/MIN/{1.73_M2}
GLUCOSE BLD-MCNC: 161 MG/DL (ref 70–99)
GLUCOSE BLD-MCNC: 161 MG/DL (ref 70–99)
HCT VFR BLD CALC: 41.4 % (ref 36.3–47.1)
HDLC SERPL-MCNC: 73 MG/DL
HDLC SERPL-MCNC: 73 MG/DL
HEMOGLOBIN: 13.1 G/DL (ref 11.9–15.1)
LDL CHOLESTEROL: 50 MG/DL (ref 0–130)
LDL CHOLESTEROL: 50 MG/DL (ref 0–130)
MCH RBC QN AUTO: 30.6 PG (ref 25.2–33.5)
MCHC RBC AUTO-ENTMCNC: 31.6 G/DL (ref 28.4–34.8)
MCV RBC AUTO: 96.7 FL (ref 82.6–102.9)
MICROALBUMIN/CREAT 24H UR: 73 MG/L
MICROALBUMIN/CREAT UR-RTO: 46 MCG/MG CREAT
NRBC AUTOMATED: 0 PER 100 WBC
PDW BLD-RTO: 13.2 % (ref 11.8–14.4)
PLATELET # BLD: 148 K/UL (ref 138–453)
PMV BLD AUTO: 10.1 FL (ref 8.1–13.5)
POTASSIUM SERPL-SCNC: 4.8 MMOL/L (ref 3.7–5.3)
POTASSIUM SERPL-SCNC: 4.8 MMOL/L (ref 3.7–5.3)
RBC # BLD: 4.28 M/UL (ref 3.95–5.11)
SODIUM BLD-SCNC: 142 MMOL/L (ref 135–144)
SODIUM BLD-SCNC: 142 MMOL/L (ref 135–144)
TOTAL PROTEIN: 6.5 G/DL (ref 6.4–8.3)
TOTAL PROTEIN: 6.5 G/DL (ref 6.4–8.3)
TRIGL SERPL-MCNC: 99 MG/DL
TRIGLYCERIDE, FASTING: 99 MG/DL
TSH SERPL DL<=0.05 MIU/L-ACNC: 2.32 UIU/ML (ref 0.3–5)
VITAMIN D 25-HYDROXY: 42.3 NG/ML
WBC # BLD: 3.5 K/UL (ref 3.5–11.3)

## 2022-08-18 ENCOUNTER — OFFICE VISIT (OUTPATIENT)
Dept: FAMILY MEDICINE CLINIC | Age: 77
End: 2022-08-18
Payer: MEDICARE

## 2022-08-18 VITALS
DIASTOLIC BLOOD PRESSURE: 64 MMHG | HEART RATE: 68 BPM | TEMPERATURE: 97.1 F | WEIGHT: 118 LBS | RESPIRATION RATE: 16 BRPM | SYSTOLIC BLOOD PRESSURE: 122 MMHG | OXYGEN SATURATION: 98 % | BODY MASS INDEX: 20.9 KG/M2

## 2022-08-18 DIAGNOSIS — E11.9 TYPE 2 DIABETES MELLITUS WITHOUT COMPLICATION, WITH LONG-TERM CURRENT USE OF INSULIN (HCC): ICD-10-CM

## 2022-08-18 DIAGNOSIS — E55.9 VITAMIN D DEFICIENCY: ICD-10-CM

## 2022-08-18 DIAGNOSIS — E78.00 PURE HYPERCHOLESTEROLEMIA: ICD-10-CM

## 2022-08-18 DIAGNOSIS — G62.9 PERIPHERAL POLYNEUROPATHY: ICD-10-CM

## 2022-08-18 DIAGNOSIS — R74.8 ELEVATED LIVER ENZYMES: ICD-10-CM

## 2022-08-18 DIAGNOSIS — N28.9 RENAL INSUFFICIENCY: ICD-10-CM

## 2022-08-18 DIAGNOSIS — Z79.4 TYPE 2 DIABETES MELLITUS WITHOUT COMPLICATION, WITH LONG-TERM CURRENT USE OF INSULIN (HCC): ICD-10-CM

## 2022-08-18 DIAGNOSIS — I25.118 CORONARY ARTERY DISEASE OF NATIVE HEART WITH STABLE ANGINA PECTORIS, UNSPECIFIED VESSEL OR LESION TYPE (HCC): ICD-10-CM

## 2022-08-18 DIAGNOSIS — I10 PRIMARY HYPERTENSION: Primary | ICD-10-CM

## 2022-08-18 PROCEDURE — 3051F HG A1C>EQUAL 7.0%<8.0%: CPT | Performed by: FAMILY MEDICINE

## 2022-08-18 PROCEDURE — 99214 OFFICE O/P EST MOD 30 MIN: CPT | Performed by: FAMILY MEDICINE

## 2022-08-18 PROCEDURE — 1123F ACP DISCUSS/DSCN MKR DOCD: CPT | Performed by: FAMILY MEDICINE

## 2022-08-18 ASSESSMENT — ENCOUNTER SYMPTOMS
CHEST TIGHTNESS: 0
BACK PAIN: 0
SORE THROAT: 0
COUGH: 0
RHINORRHEA: 0
NAUSEA: 0
ABDOMINAL DISTENTION: 0
ABDOMINAL PAIN: 0
CONSTIPATION: 0
SHORTNESS OF BREATH: 0
DIARRHEA: 0
VOMITING: 0

## 2022-08-18 NOTE — PROGRESS NOTES
Billie Kohler MD    [facility]  [id number]  [place] [address]  [address] [address]  Dept: [phone]  Dept Fax: [phone]     Patient ID: Boy Paz is a 68 y.o. female. HPI    Established patient here today for f/u on chronic medical problems, go over labs and/or diagnostic studies, and medication refills. Pt denies any fever or chills. Pt today denies any HA, chest pain, or SOB. Pt denies any N/V/D/C or abdominal pain. Her last HGBA1C was 6.6%. She has been c/o forgetfulness. Otherwise pt doing well on current tx and no other concerns today. The patient's past medical, surgical, social, and family history as well as his current medications and allergies were reviewed as documented in today's encounter. My previous office notes, consult notes, labs and diagnostic studies were reviewed prior to and during encounter. Current Outpatient Medications on File Prior to Visit   Medication Sig Dispense Refill    isosorbide mononitrate (IMDUR) 120 MG extended release tablet Take 1 tablet by mouth daily      melatonin 5 MG TABS tablet Take 5 mg by mouth nightly as needed      nitroGLYCERIN (NITROSTAT) 0.4 MG SL tablet Place 0.4 mg under the tongue every 5 minutes as needed for Chest pain up to max of 3 total doses.  If no relief after 1 dose, call 911.      aspirin 81 MG chewable tablet Take 1 tablet by mouth daily 30 tablet 3    Insulin Glargine, 2 Unit Dial, (TOUJEO MAX SOLOSTAR) 300 UNIT/ML SOPN Inject 10 Units into the skin nightly       Coenzyme Q10 (CO Q-10) 100 MG CAPS Take 1 capsule by mouth daily      B-D UF III MINI PEN NEEDLES 31G X 5 MM MISC       clopidogrel (PLAVIX) 75 MG tablet Take 1 tablet by mouth daily 30 tablet 5    metoprolol succinate (TOPROL XL) 25 MG extended release tablet Take 1 tablet by mouth daily 90 tablet 1    insulin lispro, 1 Unit Dial, 100 UNIT/ML SOPN Inject into the skin 3 times daily PER SLIDING SCALE No murmur heard. Pulmonary:      Effort: Pulmonary effort is normal. No respiratory distress. Breath sounds: Normal breath sounds. No wheezing. Chest:      Chest wall: No tenderness. Abdominal:      General: Bowel sounds are normal. There is no distension. Palpations: Abdomen is soft. There is no mass. Tenderness: There is no abdominal tenderness. Musculoskeletal:         General: No swelling or tenderness. Normal range of motion. Cervical back: Normal range of motion. Lymphadenopathy:      Cervical: No cervical adenopathy. Skin:     General: Skin is warm. Findings: No rash. Neurological:      Mental Status: She is alert and oriented to person, place, and time. Deep Tendon Reflexes: Reflexes are normal and symmetric. Psychiatric:         Mood and Affect: Mood normal.         Behavior: Behavior normal.     MMSE done in the office today was 29/30    Assessment:      Diagnosis Orders   1. Primary hypertension        2. Pure hypercholesterolemia        3. Type 2 diabetes mellitus without complication, with long-term current use of insulin ([address]        4. Peripheral polyneuropathy        5. Elevated liver enzymes        6. Vitamin D deficiency        7. Coronary artery disease of native heart with stable angina pectoris, unspecified vessel or lesion type ([address]        8. Renal insufficiency              Plan:      Will cont with the Cozaar, Toprol, and Imdur as prescribed for BP control    Will cont with low fat/chol diet and Lipitor as ordered    Continue with ADA diet, current diabetic meds, and monitoring BS's as instructed and she does follow with Dr. Santos Riddle - her last HGBA1C was 6.6%    Cont with daily foot exams and yearly eye exams    Will cont with Cozaar for renal protection    Stay well hydrated and no NSAID's - her kidney function is stable    Pt did score a 29/30 on her MMSE and I did state I think her forgetfulness is more age related    Will cont to follow with Cardiology as instructed    Rest of systems unchanged, continue current treatments. Medications, labs, diagnostic studies, consultations and follow-up as documented in this encounter. Rest of systems unchanged, continue current treatments    On this date August 18, 2022,  I have spent greater than 50% of visit reviewing previous notes, test results and face to face with the patient discussing the diagnoses, importance of compliance with the treatment plan, counseling, coordinating care as well as documenting on the day of the visit. Kristi R. Nella Landau, MD

## 2022-12-01 ENCOUNTER — OFFICE VISIT (OUTPATIENT)
Dept: FAMILY MEDICINE CLINIC | Age: 77
End: 2022-12-01

## 2022-12-01 VITALS
WEIGHT: 115 LBS | DIASTOLIC BLOOD PRESSURE: 56 MMHG | HEART RATE: 66 BPM | OXYGEN SATURATION: 98 % | HEIGHT: 62 IN | SYSTOLIC BLOOD PRESSURE: 116 MMHG | TEMPERATURE: 98.8 F | BODY MASS INDEX: 21.16 KG/M2 | RESPIRATION RATE: 16 BRPM

## 2022-12-01 DIAGNOSIS — E55.9 VITAMIN D DEFICIENCY: ICD-10-CM

## 2022-12-01 DIAGNOSIS — Z00.00 MEDICARE ANNUAL WELLNESS VISIT, SUBSEQUENT: Primary | ICD-10-CM

## 2022-12-01 DIAGNOSIS — I25.118 CORONARY ARTERY DISEASE OF NATIVE HEART WITH STABLE ANGINA PECTORIS, UNSPECIFIED VESSEL OR LESION TYPE (HCC): ICD-10-CM

## 2022-12-01 DIAGNOSIS — E78.00 PURE HYPERCHOLESTEROLEMIA: ICD-10-CM

## 2022-12-01 DIAGNOSIS — I10 PRIMARY HYPERTENSION: ICD-10-CM

## 2022-12-01 DIAGNOSIS — Z79.4 TYPE 2 DIABETES MELLITUS WITHOUT COMPLICATION, WITH LONG-TERM CURRENT USE OF INSULIN (HCC): ICD-10-CM

## 2022-12-01 DIAGNOSIS — E11.9 TYPE 2 DIABETES MELLITUS WITHOUT COMPLICATION, WITH LONG-TERM CURRENT USE OF INSULIN (HCC): ICD-10-CM

## 2022-12-01 DIAGNOSIS — R74.8 ELEVATED LIVER ENZYMES: ICD-10-CM

## 2022-12-01 DIAGNOSIS — G62.9 PERIPHERAL POLYNEUROPATHY: ICD-10-CM

## 2022-12-01 DIAGNOSIS — N28.9 RENAL INSUFFICIENCY: ICD-10-CM

## 2022-12-01 RX ORDER — ATORVASTATIN CALCIUM 40 MG/1
1 TABLET, FILM COATED ORAL NIGHTLY
COMMUNITY
Start: 2022-10-11

## 2022-12-01 ASSESSMENT — PATIENT HEALTH QUESTIONNAIRE - PHQ9
SUM OF ALL RESPONSES TO PHQ QUESTIONS 1-9: 0
SUM OF ALL RESPONSES TO PHQ QUESTIONS 1-9: 0
SUM OF ALL RESPONSES TO PHQ9 QUESTIONS 1 & 2: 0
SUM OF ALL RESPONSES TO PHQ QUESTIONS 1-9: 0
SUM OF ALL RESPONSES TO PHQ QUESTIONS 1-9: 0
2. FEELING DOWN, DEPRESSED OR HOPELESS: 0
1. LITTLE INTEREST OR PLEASURE IN DOING THINGS: 0

## 2022-12-01 ASSESSMENT — ENCOUNTER SYMPTOMS
COUGH: 0
CHEST TIGHTNESS: 0
VOMITING: 0
RHINORRHEA: 0
DIARRHEA: 0
ABDOMINAL DISTENTION: 0
BACK PAIN: 0
SORE THROAT: 0
CONSTIPATION: 0
NAUSEA: 0
ABDOMINAL PAIN: 0
SHORTNESS OF BREATH: 0

## 2022-12-01 ASSESSMENT — LIFESTYLE VARIABLES: HOW OFTEN DO YOU HAVE A DRINK CONTAINING ALCOHOL: NEVER

## 2022-12-01 NOTE — PROGRESS NOTES
Kristi R. Marquita Schirmer, MD    [address] [facility]  [id number] [address], [address]  [address] [address]  Dept: [phone]  Dept Fax: [phone]     Patient ID: Aurelio Kauffman is a 68 y.o. female. HPI    Established patient here today for f/u on chronic medical problems, go over labs and/or diagnostic studies, and medication refills. Pt denies any fever or chills. Pt today denies any HA, chest pain, or SOB. Pt denies any N/V/D/C or abdominal pain. Her last HGBA1C was 6.6%. She has been under some stress with her  dealing with some medical issues and is probably going to lose his toe. Otherwise pt doing well on current tx and no other concerns today. The patient's past medical, surgical, social, and family history as well as his current medications and allergies were reviewed as documented in today's encounter. My previous office notes, consult notes, labs and diagnostic studies were reviewed prior to and during encounter. Current Outpatient Medications on File Prior to Visit   Medication Sig Dispense Refill    atorvastatin (LIPITOR) 40 MG tablet Take 1 tablet by mouth at bedtime      isosorbide mononitrate (IMDUR) 120 MG extended release tablet Take 1 tablet by mouth daily      nitroGLYCERIN (NITROSTAT) 0.4 MG SL tablet Place 0.4 mg under the tongue every 5 minutes as needed for Chest pain up to max of 3 total doses.  If no relief after 1 dose, call 911.      aspirin 81 MG chewable tablet Take 1 tablet by mouth daily 30 tablet 3    Insulin Glargine, 2 Unit Dial, (TOUJEO MAX SOLOSTAR) 300 UNIT/ML SOPN Inject 10 Units into the skin nightly       Coenzyme Q10 (CO Q-10) 100 MG CAPS Take 1 capsule by mouth daily      B-D UF III MINI PEN NEEDLES 31G X 5 MM MISC       clopidogrel (PLAVIX) 75 MG tablet Take 1 tablet by mouth daily 30 tablet 5    metoprolol succinate (TOPROL XL) 25 MG extended release tablet Take 1 tablet by mouth daily 90 tablet 1 Conjunctiva/sclera: Conjunctivae normal.      Pupils: Pupils are equal, round, and reactive to light. Cardiovascular:      Rate and Rhythm: Normal rate and regular rhythm. Heart sounds: Normal heart sounds. No murmur heard. Pulmonary:      Effort: Pulmonary effort is normal. No respiratory distress. Breath sounds: Normal breath sounds. No wheezing. Chest:      Chest wall: No tenderness. Abdominal:      General: Bowel sounds are normal. There is no distension. Palpations: Abdomen is soft. There is no mass. Tenderness: There is no abdominal tenderness. Musculoskeletal:         General: No swelling or tenderness. Normal range of motion. Cervical back: Normal range of motion. Lymphadenopathy:      Cervical: No cervical adenopathy. Skin:     General: Skin is warm. Findings: No rash. Neurological:      Mental Status: She is alert and oriented to person, place, and time. Deep Tendon Reflexes: Reflexes are normal and symmetric. Psychiatric:         Mood and Affect: Mood normal.         Behavior: Behavior normal.      Comments: Tearful today       Assessment:      Diagnosis Orders   1. Primary hypertension        2. Pure hypercholesterolemia        3. Type 2 diabetes mellitus without complication, with long-term current use of insulin ([address]        4. Coronary artery disease of native heart with stable angina pectoris, unspecified vessel or lesion type ([address]        5. Renal insufficiency        6. Elevated liver enzymes        7. Peripheral polyneuropathy        8. Vitamin D deficiency              Plan:      Will cont with the Cozaar, Toprol, and Imdur as prescribed for BP control    Will cont with low fat/chol diet and Lipitor as ordered    Continue with ADA diet, current diabetic meds, and monitoring BS's as instructed and she does follow with Dr. Michael Mansfield - her last HGBA1C was 6.6%    Cont with daily foot exams and yearly eye exams    Will cont with Cozaar for renal protection    Stay well hydrated and no NSAID's - her kidney function is stable    Will monitor her mood, but I did state if she does start having more bad than good days we may want to try her on something to take the edge off    Will cont to follow with Cardiology as instructed    Rest of systems unchanged, continue current treatments. Medications, labs, diagnostic studies, consultations and follow-up as documented in this encounter. Rest of systems unchanged, continue current treatments    On this date December 1, 2022,  I have spent greater than 50% of visit reviewing previous notes, test results and face to face with the patient discussing the diagnoses, importance of compliance with the treatment plan, counseling, coordinating care as well as documenting on the day of the visit. Billie Amaya MD   Medicare Annual Wellness Visit    Chuy Asher is here for Medicare AWV and Hypertension    Assessment & Plan   Primary hypertension  Pure hypercholesterolemia  Type 2 diabetes mellitus without complication, with long-term current use of insulin ([facility] [facility]  Coronary artery disease of native heart with stable angina pectoris, unspecified vessel or lesion type [facility])  Renal insufficiency  Elevated liver enzymes  Peripheral polyneuropathy  Vitamin D deficiency    Recommendations for Preventive Services Due: see orders and patient instructions/AVS.  Recommended screening schedule for the next 5-10 years is provided to the patient in written form: see Patient Instructions/AVS.     No follow-ups on file. Subjective   The following acute and/or chronic problems were also addressed today:  See additional note     Patient's complete Health Risk Assessment and screening values have been reviewed and are found in Flowsheets. The following problems were reviewed today and where indicated follow up appointments were made and/or referrals ordered.     Positive Risk Factor Screenings with Interventions: General Health and ACP:  General  In general, how would you say your health is?: Good  In the past 7 days, have you experienced any of the following: New or Increased Pain, New or Increased Fatigue, Loneliness, Social Isolation, Stress or Anger?: (!) Yes  Select all that apply: (!) Stress  Do you get the social and emotional support that you need?: Yes  Do you have a Living Will?: Yes    Advance Directives       Power of  Living Will ACP-Advance Directive ACP-Power of     Not on File Not on File Not on File Not on File        General Health Risk Interventions:  Stress: patient's comments regarding reasons for stress and/or anger: her  dealing with some medical issues, regular exercise recommended- 3-5 times per week, 30-45 minutes per session, relaxation techniques discussed, patient declines any further evaluation/treatment for this issue    Health Habits/Nutrition:  Physical Activity: Inactive    Days of Exercise per Week: 0 days    Minutes of Exercise per Session: 0 min     Have you lost any weight without trying in the past 3 months?: No  Body mass index: 21.16  Have you seen the dentist within the past year?: Yes  Health Habits/Nutrition Interventions:  Inadequate physical activity:  patient agrees to exercise for at least 150 minutes/week             Objective   Vitals:    12/01/22 0839   BP: (!) 116/56   Pulse: 66   Resp: 16   Temp: 98.8 °F (37.1 °C)   SpO2: 98%   Weight: 115 lb (52.2 kg)   Height: 5' 1.81\" (1.57 m)      Body mass index is 21.16 kg/m². No Known Allergies  Prior to Visit Medications    Medication Sig Taking?  Authorizing Provider   atorvastatin (LIPITOR) 40 MG tablet Take 1 tablet by mouth at bedtime Yes Historical Provider, MD   isosorbide mononitrate (IMDUR) 120 MG extended release tablet Take 1 tablet by mouth daily Yes Historical Provider, MD   nitroGLYCERIN (NITROSTAT) 0.4 MG SL tablet Place 0.4 mg under the tongue every 5 minutes as needed for Chest pain up to max of 3 total doses. If no relief after 1 dose, call 911.  Yes Historical Provider, MD   aspirin 81 MG chewable tablet Take 1 tablet by mouth daily Yes Margareth Corcoran MD   Insulin Glargine, 2 Unit Dial, (TOUJEO MAX SOLOSTAR) 300 UNIT/ML SOPN Inject 10 Units into the skin nightly  Yes Historical Provider, MD   Coenzyme Q10 (CO Q-10) 100 MG CAPS Take 1 capsule by mouth daily Yes Historical Provider, MD   B-MARY UF III MINI PEN NEEDLES 31G X 5 MM MISC  Yes Historical Provider, MD   clopidogrel (PLAVIX) 75 MG tablet Take 1 tablet by mouth daily Yes Mark Hodgkin, MD   metoprolol succinate (TOPROL XL) 25 MG extended release tablet Take 1 tablet by mouth daily Yes Mark Hodgkin, MD   insulin lispro, 1 Unit Dial, 100 UNIT/ML SOPN Inject into the skin 3 times daily PER SLIDING SCALE Yes Historical Provider, MD   losartan (COZAAR) 50 MG tablet Take 50 mg by mouth nightly  Yes Blair De Los Santos MD   Multiple Vitamins TABS Take 1 tablet by mouth daily Yes Blair De Los Santos MD   melatonin 5 MG TABS tablet Take 5 mg by mouth nightly as needed  Patient not taking: Reported on 12/1/2022  Historical Provider, MD   ranolazine (RANEXA) 500 MG extended release tablet Take 1 tablet by mouth 2 times daily  Patient not taking: No sig reported  Margareth Corcoran MD       [facility] (Including outside providers/suppliers regularly involved in providing care):   Patient Care Team:  Nathanael Otero MD as PCP - General (Family Medicine)  Nathanael Otero MD as PCP - [facility]ABILITATION [facility]  Chandler Zamora as Consulting Physician (Endocrinology)  Harjit Craft MD as Consulting Physician (Ophthalmology)  Beckie Vergara DO as Consulting Physician (Cardiology)  Sp Francis DPM as Consulting Physician (Podiatry)     Reviewed and updated this visit:  Tobacco  Allergies  Meds  Med Hx  Surg Hx  Soc Hx  Fam Hx

## 2022-12-01 NOTE — PATIENT INSTRUCTIONS
Personalized Preventive Plan for Neeraj Lundberg - 12/1/2022  Medicare offers a range of preventive health benefits. Some of the tests and screenings are paid in full while other may be subject to a deductible, co-insurance, and/or copay. Some of these benefits include a comprehensive review of your medical history including lifestyle, illnesses that may run in your family, and various assessments and screenings as appropriate. After reviewing your medical record and screening and assessments performed today your provider may have ordered immunizations, labs, imaging, and/or referrals for you. A list of these orders (if applicable) as well as your Preventive Care list are included within your After Visit Summary for your review. Other Preventive Recommendations:    A preventive eye exam performed by an eye specialist is recommended every 1-2 years to screen for glaucoma; cataracts, macular degeneration, and other eye disorders. A preventive dental visit is recommended every 6 months. Try to get at least 150 minutes of exercise per week or 10,000 steps per day on a pedometer . Order or download the FREE \"Exercise & Physical Activity: Your Everyday Guide\" from [employer]. Call 5-114.973.1672 or search [employer] online. You need 7493-7605 mg of calcium and 5228-2457 IU of vitamin D per day. It is possible to meet your calcium requirement with diet alone, but a vitamin D supplement is usually necessary to meet this goal.  When exposed to the sun, use a sunscreen that protects against both UVA and UVB radiation with an SPF of 30 or greater. Reapply every 2 to 3 hours or after sweating, drying off with a towel, or swimming. Always wear a seat belt when traveling in a car. Always wear a helmet when riding a bicycle or motorcycle. Heart-Healthy Diet   Sodium, Fat, and Cholesterol Controlled Diet       What Is a Heart Healthy Diet?    A heart-healthy diet is one that limits sodium , certain types of fat , and cholesterol . This type of diet is recommended for:   People with any form of cardiovascular disease (eg, coronary heart disease , peripheral vascular disease , previous heart attack , previous stroke )   People with risk factors for cardiovascular disease, such as high blood pressure , high cholesterol , or diabetes   Anyone who wants to lower their risk of developing cardiovascular disease   Sodium    Sodium is a mineral found in many foods. In general, most people consume much more sodium than they need. Diets high in sodium can increase blood pressure and lead to edema (water retention). On a heart-healthy diet, you should consume no more than 2,300 mg (milligrams) of sodium per dayabout the amount in one teaspoon of table salt. The foods highest in sodium include table salt (about 50% sodium), processed foods, convenience foods, and preserved foods. Cholesterol    Cholesterol is a fat-like, waxy substance in your blood. Our bodies make some cholesterol. It is also found in animal products, with the highest amounts in fatty meat, egg yolks, whole milk, cheese, shellfish, and organ meats. On a heart-healthy diet, you should limit your cholesterol intake to less than 200 mg per day. It is normal and important to have some cholesterol in your bloodstream. But too much cholesterol can cause plaque to build up within your arteries, which can eventually lead to a heart attack or stroke. The two types of cholesterol that are most commonly referred to are:   Low-density lipoprotein (LDL) cholesterol  Also known as bad cholesterol, this is the cholesterol that tends to build up along your arteries. Bad cholesterol levels are increased by eating fats that are saturated or hydrogenated. Optimal level of this cholesterol is less than 100. Over 130 starts to get risky for heart disease.    High-density lipoprotein (HDL) cholesterol  Also known as good cholesterol, this type of cholesterol actually carries cholesterol away from your arteries and may, therefore, help lower your risk of having a heart attack. You want this level to be high (ideally greater than 60). It is a risk to have a level less than 40. You can raise this good cholesterol by eating olive oil, canola oil, avocados, or nuts. Exercise raises this level, too. Fat    Fat is calorie dense and packs a lot of calories into a small amount of food. Even though fats should be limited due to their high calorie content, not all fats are bad. In fact, some fats are quite healthful. Fat can be broken down into four main types. The good-for-you fats are:   Monounsaturated fat  found in oils such as olive and canola, avocados, and nuts and natural nut butters; can decrease cholesterol levels, while keeping levels of HDL cholesterol high   Polyunsaturated fat  found in oils such as safflower, sunflower, soybean, corn, and sesame; can decrease total cholesterol and LDL cholesterol   Omega-3 fatty acids  particularly those found in fatty fish (such as salmon, trout, tuna, mackerel, herring, and sardines); can decrease risk of arrhythmias, decrease triglyceride levels, and slightly lower blood pressure   The fats that you want to limit are:   Saturated fat  found in animal products, many fast foods, and a few vegetables; increases total blood cholesterol, including LDL levels   Animal fats that are saturated include: butter, lard, whole-milk dairy products, meat fat, and poultry skin   Vegetable fats that are saturated include: hydrogenated shortening, palm oil, coconut oil, cocoa butter   Hydrogenated or trans fat  found in margarine and vegetable shortening, most shelf stable snack foods, and fried foods; increases LDL and decreases HDL     It is generally recommended that you limit your total fat for the day to less than 30% of your total calories.  If you follow an 1800-calorie heart healthy diet, for example, this would mean 60 grams of fat or less per day. Saturated fat and trans fat in your diet raises your blood cholesterol the most, much more than dietary cholesterol does. For this reason, on a heart-healthy diet, less than 7% of your calories should come from saturated fat and ideally 0% from trans fat. On an 1800-calorie diet, this translates into less than 14 grams of saturated fat per day, leaving 46 grams of fat to come from mono- and polyunsaturated fats.    Food Choices on a Heart Healthy Diet   Food Category   Foods Recommended   Foods to Avoid   Grains   Breads and rolls without salted tops Most dry and cooked cereals Unsalted crackers and breadsticks Low-sodium or homemade breadcrumbs or stuffing All rice and pastas   Breads, rolls, and crackers with salted tops High-fat baked goods (eg, muffins, donuts, pastries) Quick breads, self-rising flour, and biscuit mixes Regular bread crumbs Instant hot cereals Commercially prepared rice, pasta, or stuffing mixes   Vegetables   Most fresh, frozen, and low-sodium canned vegetables Low-sodium and salt-free vegetable juices Canned vegetables if unsalted or rinsed   Regular canned vegetables and juices, including sauerkraut and pickled vegetables Frozen vegetables with sauces Commercially prepared potato and vegetable mixes   Fruits   Most fresh, frozen, and canned fruits All fruit juices   Fruits processed with salt or sodium   Milk   Nonfat or low-fat (1%) milk Nonfat or low-fat yogurt Cottage cheese, low-fat ricotta, cheeses labeled as low-fat and low-sodium   Whole milk Reduced-fat (2%) milk Malted and chocolate milk Full fat yogurt Most cheeses (unless low-fat and low salt) Buttermilk (no more than 1 cup per week)   Meats and Beans   Lean cuts of fresh or frozen beef, veal, lamb, or pork (look for the word loin) Fresh or frozen poultry without the skin Fresh or frozen fish and some shellfish Egg whites and egg substitutes (Limit whole eggs to three per week) Tofu Nuts or seeds (unsalted, dry-roasted), low-sodium peanut butter Dried peas, beans, and lentils   Any smoked, cured, salted, or canned meat, fish, or poultry (including mcclure, chipped beef, cold cuts, hot dogs, sausages, sardines, and anchovies) Poultry skins Breaded and/or fried fish or meats Canned peas, beans, and lentils Salted nuts   Fats and Oils   Olive oil and canola oil Low-sodium, low-fat salad dressings and mayonnaise   Butter, margarine, coconut and palm oils, mcclure fat   Snacks, Sweets, and Condiments   Low-sodium or unsalted versions of broths, soups, soy sauce, and condiments Pepper, herbs, and spices; vinegar, lemon, or lime juice Low-fat frozen desserts (yogurt, sherbet, fruit bars) Sugar, cocoa powder, honey, syrup, jam, and preserves Low-fat, trans-fat free cookies, cakes, and pies Rafi and animal crackers, fig bars, denise snaps   High-fat desserts Broth, soups, gravies, and sauces, made from instant mixes or other high-sodium ingredients Salted snack foods Canned olives Meat tenderizers, seasoning salt, and most flavored vinegars   Beverages   Low-sodium carbonated beverages Tea and coffee in moderation Soy milk   Commercially softened water   Suggestions   Make whole grains, fruits, and vegetables the base of your diet. Choose heart-healthy fats such as canola, olive, and flaxseed oil, and foods high in heart-healthy fats, such as nuts, seeds, soybeans, tofu, and fish. Eat fish at least twice per week; the fish highest in omega-3 fatty acids and lowest in mercury include salmon, herring, mackerel, sardines, and canned chunk light tuna. If you eat fish less than twice per week or have high triglycerides, talk to your doctor about taking fish oil supplements. Read food labels. For products low in fat and cholesterol, look for fat free, low-fat, cholesterol free, saturated fat free, and trans fat freeAlso scan the Nutrition Facts Label, which lists saturated fat, trans fat, and cholesterol amounts. For products low in sodium, look for sodium free, very low sodium, low sodium, no added salt, and unsalted   Skip the salt when cooking or at the table; if food needs more flavor, get creative and try out different herbs and spices. Garlic and onion also add substantial flavor to foods. Trim any visible fat off meat and poultry before cooking, and drain the fat off after dee. Use cooking methods that require little or no added fat, such as grilling, boiling, baking, poaching, broiling, roasting, steaming, stir-frying, and sauting. Avoid fast food and convenience food. They tend to be high in saturated and trans fat and have a lot of added salt. Talk to a registered dietitian for individualized diet advice. Last Reviewed: March 2011 Bri Jenkins MS, MPH, RD   Updated: 3/29/2011     Heart-Healthy Diet   Sodium, Fat, and Cholesterol Controlled Diet       What Is a Heart Healthy Diet? A heart-healthy diet is one that limits sodium , certain types of fat , and cholesterol . This type of diet is recommended for:   People with any form of cardiovascular disease (eg, coronary heart disease , peripheral vascular disease , previous heart attack , previous stroke )   People with risk factors for cardiovascular disease, such as high blood pressure , high cholesterol , or diabetes   Anyone who wants to lower their risk of developing cardiovascular disease   Sodium    Sodium is a mineral found in many foods. In general, most people consume much more sodium than they need. Diets high in sodium can increase blood pressure and lead to edema (water retention). On a heart-healthy diet, you should consume no more than 2,300 mg (milligrams) of sodium per dayabout the amount in one teaspoon of table salt. The foods highest in sodium include table salt (about 50% sodium), processed foods, convenience foods, and preserved foods. Cholesterol    Cholesterol is a fat-like, waxy substance in your blood.  Our bodies make some cholesterol. It is also found in animal products, with the highest amounts in fatty meat, egg yolks, whole milk, cheese, shellfish, and organ meats. On a heart-healthy diet, you should limit your cholesterol intake to less than 200 mg per day. It is normal and important to have some cholesterol in your bloodstream. But too much cholesterol can cause plaque to build up within your arteries, which can eventually lead to a heart attack or stroke. The two types of cholesterol that are most commonly referred to are:   Low-density lipoprotein (LDL) cholesterol  Also known as bad cholesterol, this is the cholesterol that tends to build up along your arteries. Bad cholesterol levels are increased by eating fats that are saturated or hydrogenated. Optimal level of this cholesterol is less than 100. Over 130 starts to get risky for heart disease. High-density lipoprotein (HDL) cholesterol  Also known as good cholesterol, this type of cholesterol actually carries cholesterol away from your arteries and may, therefore, help lower your risk of having a heart attack. You want this level to be high (ideally greater than 60). It is a risk to have a level less than 40. You can raise this good cholesterol by eating olive oil, canola oil, avocados, or nuts. Exercise raises this level, too. Fat    Fat is calorie dense and packs a lot of calories into a small amount of food. Even though fats should be limited due to their high calorie content, not all fats are bad. In fact, some fats are quite healthful. Fat can be broken down into four main types.    The good-for-you fats are:   Monounsaturated fat  found in oils such as olive and canola, avocados, and nuts and natural nut butters; can decrease cholesterol levels, while keeping levels of HDL cholesterol high   Polyunsaturated fat  found in oils such as safflower, sunflower, soybean, corn, and sesame; can decrease total cholesterol and LDL cholesterol   Omega-3 fatty acids  particularly those found in fatty fish (such as salmon, trout, tuna, mackerel, herring, and sardines); can decrease risk of arrhythmias, decrease triglyceride levels, and slightly lower blood pressure   The fats that you want to limit are:   Saturated fat  found in animal products, many fast foods, and a few vegetables; increases total blood cholesterol, including LDL levels   Animal fats that are saturated include: butter, lard, whole-milk dairy products, meat fat, and poultry skin   Vegetable fats that are saturated include: hydrogenated shortening, palm oil, coconut oil, cocoa butter   Hydrogenated or trans fat  found in margarine and vegetable shortening, most shelf stable snack foods, and fried foods; increases LDL and decreases HDL     It is generally recommended that you limit your total fat for the day to less than 30% of your total calories. If you follow an 1800-calorie heart healthy diet, for example, this would mean 60 grams of fat or less per day. Saturated fat and trans fat in your diet raises your blood cholesterol the most, much more than dietary cholesterol does. For this reason, on a heart-healthy diet, less than 7% of your calories should come from saturated fat and ideally 0% from trans fat. On an 1800-calorie diet, this translates into less than 14 grams of saturated fat per day, leaving 46 grams of fat to come from mono- and polyunsaturated fats.    Food Choices on a Heart Healthy Diet   Food Category   Foods Recommended   Foods to Avoid   Grains   Breads and rolls without salted tops Most dry and cooked cereals Unsalted crackers and breadsticks Low-sodium or homemade breadcrumbs or stuffing All rice and pastas   Breads, rolls, and crackers with salted tops High-fat baked goods (eg, muffins, donuts, pastries) Quick breads, self-rising flour, and biscuit mixes Regular bread crumbs Instant hot cereals Commercially prepared rice, pasta, or stuffing mixes   Vegetables   Most fresh, frozen, and low-sodium canned vegetables Low-sodium and salt-free vegetable juices Canned vegetables if unsalted or rinsed   Regular canned vegetables and juices, including sauerkraut and pickled vegetables Frozen vegetables with sauces Commercially prepared potato and vegetable mixes   Fruits   Most fresh, frozen, and canned fruits All fruit juices   Fruits processed with salt or sodium   Milk   Nonfat or low-fat (1%) milk Nonfat or low-fat yogurt Cottage cheese, low-fat ricotta, cheeses labeled as low-fat and low-sodium   Whole milk Reduced-fat (2%) milk Malted and chocolate milk Full fat yogurt Most cheeses (unless low-fat and low salt) Buttermilk (no more than 1 cup per week)   Meats and Beans   Lean cuts of fresh or frozen beef, veal, lamb, or pork (look for the word loin) Fresh or frozen poultry without the skin Fresh or frozen fish and some shellfish Egg whites and egg substitutes (Limit whole eggs to three per week) Tofu Nuts or seeds (unsalted, dry-roasted), low-sodium peanut butter Dried peas, beans, and lentils   Any smoked, cured, salted, or canned meat, fish, or poultry (including mcclure, chipped beef, cold cuts, hot dogs, sausages, sardines, and anchovies) Poultry skins Breaded and/or fried fish or meats Canned peas, beans, and lentils Salted nuts   Fats and Oils   Olive oil and canola oil Low-sodium, low-fat salad dressings and mayonnaise   Butter, margarine, coconut and palm oils, mcclure fat   Snacks, Sweets, and Condiments   Low-sodium or unsalted versions of broths, soups, soy sauce, and condiments Pepper, herbs, and spices; vinegar, lemon, or lime juice Low-fat frozen desserts (yogurt, sherbet, fruit bars) Sugar, cocoa powder, honey, syrup, jam, and preserves Low-fat, trans-fat free cookies, cakes, and pies Rafi and animal crackers, fig bars, denise snaps   High-fat desserts Broth, soups, gravies, and sauces, made from instant mixes or other high-sodium ingredients Salted snack foods Canned olives Meat tenderizers, seasoning salt, and most flavored vinegars   Beverages   Low-sodium carbonated beverages Tea and coffee in moderation Soy milk   Commercially softened water   Suggestions   Make whole grains, fruits, and vegetables the base of your diet. Choose heart-healthy fats such as canola, olive, and flaxseed oil, and foods high in heart-healthy fats, such as nuts, seeds, soybeans, tofu, and fish. Eat fish at least twice per week; the fish highest in omega-3 fatty acids and lowest in mercury include salmon, herring, mackerel, sardines, and canned chunk light tuna. If you eat fish less than twice per week or have high triglycerides, talk to your doctor about taking fish oil supplements. Read food labels. For products low in fat and cholesterol, look for fat free, low-fat, cholesterol free, saturated fat free, and trans fat freeAlso scan the Nutrition Facts Label, which lists saturated fat, trans fat, and cholesterol amounts. For products low in sodium, look for sodium free, very low sodium, low sodium, no added salt, and unsalted   Skip the salt when cooking or at the table; if food needs more flavor, get creative and try out different herbs and spices. Garlic and onion also add substantial flavor to foods. Trim any visible fat off meat and poultry before cooking, and drain the fat off after dee. Use cooking methods that require little or no added fat, such as grilling, boiling, baking, poaching, broiling, roasting, steaming, stir-frying, and sauting. Avoid fast food and convenience food. They tend to be high in saturated and trans fat and have a lot of added salt. Talk to a registered dietitian for individualized diet advice. Last Reviewed: March 2011 Autumn Kiser MS, MPH, RD   Updated: 3/29/2011       Preventing Osteoporosis: After Your Visit  Your Care Instructions  Osteoporosis means the bones are weak and thin enough that they can break easily.  The older you are, the more likely you are to get osteoporosis. But with plenty of calcium, vitamin D, and exercise, you can help prevent osteoporosis. The preteen and teen years are a key time for bone building. With the help of calcium, vitamin D, and exercise in those early years and beyond, the bones reach their peak density and strength by age 27. After age 27, your bones naturally start to thin and weaken. The stronger your bones are at around age 27, the lower your risk for osteoporosis. But no matter what your age and risk are, your bones still need calcium, vitamin D, and exercise to stay strong. Also avoid smoking, and limit alcohol. Smoking and heavy alcohol use can make your bones thinner. Talk to your doctor about any special risks you might have, such as having a close relative with osteoporosis or taking a medicine that can weaken bones. Your doctor can tell you the best ways to protect your bones from thinning. Follow-up care is a key part of your treatment and safety. Be sure to make and go to all appointments, and call your doctor if you are having problems. It's also a good idea to know your test results and keep a list of the medicines you take. How can you care for yourself at home? Get enough calcium and vitamin D. The [place] [employer] recommends adults younger than age 46 need 1,000 mg of calcium and 600 IU of vitamin D each day. Women ages 46 to 79 need 1,200 mg of calcium and 600 IU of vitamin D each day. Men ages 46 to 79 need 1,000 mg of calcium and 600 IU of vitamin D each day. Adults 71 and older need 1,200 mg of calcium and 800 IU of vitamin D each day. Eat foods rich in calcium, like yogurt, cheese, milk, and dark green vegetables. Eat foods rich in vitamin D, like eggs, fatty fish, cereal, and fortified milk. Get some sunshine. Your body uses sunshine to make its own vitamin D. The safest time to be out in the sun is before 10 a.m. or after 3 p.m. Avoid getting sunburned.  Sunburn can increase your risk of skin cancer. Talk to your doctor about taking a calcium plus vitamin D supplement. Ask about what type of calcium is right for you, and how much to take at a time. Adults ages 23 to 48 should not get more than 2,500 mg of calcium and 4,000 IU of vitamin D each day, whether it is from supplements and/or food. Adults ages 46 and older should not get more than 2,000 mg of calcium and 4,000 IU of vitamin D each day from supplements and/or food. Get regular bone-building exercise. Weight-bearing and resistance exercises keep bones healthy by working the muscles and bones against gravity. Start out at an exercise level that feels right for you. Add a little at a time until you can do the following:  Do 30 minutes of weight-bearing exercise on most days of the week. Walking, jogging, stair climbing, and dancing are good choices. Do resistance exercises with weights or elastic bands 2 to 3 days a week. Limit alcohol. Drink no more than 1 alcohol drink a day if you are a woman. Drink no more than 2 alcohol drinks a day if you are a man. Do not smoke. Smoking can make bones thin faster. If you need help quitting, talk to your doctor about stop-smoking programs and medicines. These can increase your chances of quitting for good. When should you call for help? Watch closely for changes in your health, and be sure to contact your doctor if:  You need help with a healthy eating plan. You need help with an exercise plan    © 7763-6202 [employer]. Care instructions adapted under license by [facility]. This care instruction is for use with your licensed healthcare professional. If you have questions about a medical condition or this instruction, always ask your healthcare professional. [place] [address] any warranty or liability for your use of this information. Content Version: 9.4.63444; Last Revised: June 20, 2011                High-Fiber Diet     What Is Fiber? Dietary fiber is a form of carbohydrate found in plants that cannot be digested by humans. All plants contain fiber, including fruits, vegetables, grains, and legumes. Fiber is often classified into two categories: soluble and insoluble. Soluble fiber draws water into the bowel and can help slow digestion. Examples of foods that are high in soluble fiber include oatmeal, oat bran, barley, legumes (eg, beans and peas), apples, and strawberries. Insoluble fiber speeds digestion and can add bulk to the stool. Examples of foods that are high in insoluble fiber include whole-wheat products, wheat bran, cauliflower, green beans, and potatoes. Why Follow a High-Fiber Diet? A high-fiber diet is often recommended to prevent and treat constipation , hemorrhoids , diverticulitis , and irritable bowel syndrome . Eating a high-fiber diet can also help improve your cholesterol levels, lower your risk of coronary heart disease , reduce your risk of type 2 diabetes , and lower your weight. For people with type 1 or 2 diabetes, a high-fiber diet can also help stabilize blood sugar levels. How Much Fiber Should I Eat? A high-fiber diet should contain  20-35 grams  of fiber a day. This is actually the amount recommended for the general adult population; however, most Americans eat only 15 grams of fiber per day. Digestion of Fiber   Eating a higher fiber diet than usual can take some getting used to by your body's digestive system. To avoid the side effects of sudden increases in dietary fiber (eg, gas, cramping, bloating, and diarrhea), increase fiber gradually and be sure to drink plenty of fluids every day. Tips for Increasing Fiber Intake   Whenever possible, choose whole grains over refined grains (eg, brown rice instead of white rice, whole-wheat bread instead of white bread). Include a variety of grains in your diet, such as wheat, rye, barley, oats, quinoa, and bulgur. Eat more vegetarian-based meals. Here are some ideas: black bean burgers, eggplant lasagna, and veggie tofu stir-dooley. Choose high-fiber snacks, such as fruits, popcorn, whole-grain crackers, and nuts. Make whole-grain cereal or whole-grain toast part of your daily breakfast regime. When eating out, whether ordering a sandwich or dinner, ask for extra vegetables. When baking, replace part of the white flour with whole-wheat flour. Whole-wheat flour is particularly easy to incorporate into a recipe. High-Fiber Diet Eating Guide   Food Category   Foods Recommended   Notes   Grains   Whole-grain breads, muffins, bagels, or jennifer bread Rye bread Whole-wheat crackers or crisp breads Whole-grain or bran cereals Oatmeal, oat bran, or grits Wheat germ Whole-wheat pasta and brown rice   Read the ingredients list on food labels. Look for products that list \"whole\" as the first ingredient (eg, whole-wheat, whole oats). Choose cereals with at least 2 grams of fiber per serving. Vegetables   All vegetables, especially asparagus, bean sprouts, broccoli, [place] sprouts, cabbage, carrots, cauliflower, celery, corn, greens, green beans, green pepper, onions, peas, potatoes (with skin), snow peas, spinach, squash, sweet potatoes, tomatoes, zucchini   For maximum fiber intake, eat the peels of fruits and vegetablesjust be sure to wash them well first.   Fruits   All fruits, especially apples, berries, grapefruits, mangoes, nectarines, oranges, peaches, pears, dried fruits (figs, dates, prunes, raisins)   Choose raw fruits and vegetables over juice, cooked, or cannedraw fruit has more fiber. Dried fruit is also a good source of fiber. Milk   With the exception of yogurt containing inulin (a type of fiber), dairy foods provide little fiber. Add more fiber by topping your yogurt or cottage cheese with fresh fruit, whole grain or bran cereals, nuts, or seeds.    Meats and Beans   All beans and peas, especially Garbanzo beans, kidney beans, lentils, lima beans, split peas, and castellano beans All nuts and seeds, especially almonds, peanuts, [place] nuts, cashews, peanut butter, walnuts, sesame and sunflower seeds All meat, poultry, fish, and eggs   Increase fiber in meat dishes by adding castellano beans, kidney beans, black-eyed peas, bran, or oatmeal. If you are following a low-fat diet, use nuts and seeds only in moderation. Fats and Oils   All in moderation   Fats and oils do not provide fiber   Snacks, Sweets, and Condiments   Fruit Nuts Popcorn, whole-wheat pretzels, or trail mix made with dried fruits, nuts, and seeds Cakes, breads, and cookies made with oatmeal or whole-wheat flour   Most snack foods do not provide much fiber. Choose snacks with at least 2 grams of fiber per serving. Last Reviewed: March 2011 Lynn Colindres MS, MPH, RD   Updated: 3/29/2011     Keep Your Memory Sherlynmelissa Stinson       Many factors can affect your ability to remembera hectic lifestyle, aging, stress, chronic disease, and certain medicines. But, there are steps you can take to sharpen your mind and help preserve your memory. Challenge Your Brain   Regularly challenging your mind may help keeps it in top shape. Good mental exercises include:   Crossword puzzlesUse a dictionary if you need it; you will learn more that way. Brainteasers Try some! Crafts, such as wood working and sewing   Hobbies, such as gardening and building model airplanes   SocializingVisit old friends or join groups to meet new ones. Reading   Learning a new language   Taking a class, whether it be art history or veronica chi   TravelingExperience the food, history, and culture of your destination   Learning to use a computer   Going to museums, the theater, or thought-provoking movies   Changing things in your daily life, such as reversing your pattern in the grocery store or brushing your teeth using your nondominant hand   Use Memory Aids   There is no need to remember every detail on your own.  These memory aids can help:   Calendars and day planners   Electronic organizers to store all sorts of helpful informationThese devices can \"beep\" to remind you of appointments. A book of days to record birthdays, anniversaries, and other occasions that occur on the same date every year   Detailed \"to-do\" lists and strategically placed sticky notes   Quick \"study\" sessionsBefore a gathering, review who will be there so their names will be fresh in your mind. Establish routinesFor example, keep your keys, wallet, and umbrella in the same place all the time or take medicine with your 8:00 AM glass of juice   Live a Healthy Life   Many actions that will keep your body strong will do the same for your mind. For example:   Talk to Your Doctor About Herbs and Supplements    Malnutrition and vitamin deficiencies can impair your mental function. For example, vitamin B12 deficiency can cause a range of symptoms, including confusion. But, what if your nutritional needs are being met? Can herbs and supplements still offer a benefit? Researchers have investigated a range of natural remedies, such as ginkgo , ginseng , and the supplement phosphatidylserine (PS). So far, though, the evidence is inconsistent as to whether these products can improve memory or thinking. If you are interested in taking herbs and supplements, talk to your doctor first because they may interact with other medicines that you are taking. Exercise Regularly    Among the many benefits of regular exercise are increased blood flow to the brain and decreased risk of certain diseases that can interfere with memory function. One study found that even moderate exercise has a beneficial effect. Examples of \"moderate\" exercise include:   Playing 18 holes of golf once a week, without a cart   Playing tennis twice a week   Walking one mile per day   Manage Stress    It can be tough to remember what is important when your mind is cluttered. Make time for relaxation.  Choose activities that calm you down, and make it routine. Manage Chronic Conditions    Side effects of high blood pressure , diabetes, and heart disease can interfere with mental function. Many of the lifestyle steps discussed here can help manage these conditions. Strive to eat a healthy diet, exercise regularly, get stress under control, and follow your doctor's advice for your condition. Minimize Medications    Talk to your doctor about the medicines that you take. Some may be unnecessary. Also, healthy lifestyle habits may lower the need for certain drugs. Last Reviewed: April 2010 Milady Bal MD   Updated: 4/13/2010     Keeping Home terri Allen       As we get older, changes in balance, gait, strength, vision, hearing, and cognition make even the most youthful senior more prone to accidents. Falls are one of the leading health risks for older people. This increased risk of falling is related to:   Aging process (eg, decreased muscle strength, slowed reflexes)   Higher incidence of chronic health problems (eg, arthritis, diabetes) that may limit mobility, agility or sensory awareness   Side effects of medicine (eg, dizziness, blurred vision)especially medicines like prescription pain medicines and drugs used to treat mental health conditions   Depending on the brittleness of your bones, the consequences of a fall can be serious and long lasting. Home Life   Research by the Association of Aging [facility]) shows that some home accidents among older adults can be prevented by making simple lifestyle changes and basic modifications and repairs to the home environment. Here are some lifestyle changes that experts recommend:   Have your hearing and vision checked regularly. Be sure to wear prescription glasses that are right for you. Speak to your doctor or pharmacist about the possible side effects of your medicines. A number of medicines can cause dizziness.    If you have problems with sleep, talk to your doctor. Limit your intake of alcohol. If necessary, use a cane or walker to help maintain your balance. Wear supportive, rubber-soled shoes, even at home. If you live in a region that gets wintry weather, you may want to put special cleats on your shoes to prevent you from slipping on the snow and ice. Exercise regularly to help maintain muscle tone, agility, and balance. Always hold the banister when going up or down stairs. Also, use  bars when getting in or out of the bath or shower, or using the toilet. To avoid dizziness, get up slowly from a lying down position. Sit up first, dangling your legs for a minute or two before rising to a standing position. Overall Home Safety Check   According to the Consumer Product Safety Commision's \"Older Consumer Home Safety Checklist,\" it is important to check for potential hazards in each room. And remember, proper lighting is an essential factor in home safety. If you cannot see clearly, you are more likely to fall. Important questions to ask yourself include:   Are lamp, electric, extension, and telephone cords placed out of the flow of traffic and maintained in good condition? Have frayed cords been replaced? Are all small rugs and runners slip resistant? If not, you can secure them to the floor with a special double-sided carpet tape. Are smoke detectors properly locatedone on every floor of your home and one outside of every sleeping area? Are they in good working order? Are batteries replaced at least once a year? Do you have a well-maintained carbon monoxide detector outside every sleeping are in your home? Does your furniture layout leave plenty of space to maneuver between and around chairs, tables, beds, and sofas? Are hallways, stairs and passages between rooms well lit? Can you reach a lamp without getting out of bed? Are floor surfaces well maintained?  Shag rugs, high-pile carpeting, tile floors, and polished wood floors can be particularly slippery. Stairs should always have handrails and be carpeted or fitted with a non-skid tread. Is your telephone easily reachable. Is the cord safely tucked away? Room by Room   According to the Association of Aging, bathrooms and shahid are the two most potentially hazardous rooms in your home. In the Kitchen    Be sure your stove is in proper working order and always make sure burners and the oven are off before you go out or go to sleep. Keep pots on the back burners, turn handles away from the front of the stove, and keep stove clean and free of grease build-up. Kitchen ventilation systems and range exhausts should be working properly. Keep flammable objects such as towels and pot holders away from the cooking area except when in use. Make sure kitchen curtains are tied back. Move cords and appliances away from the sink and hot surfaces. If extension cords are needed, install wiring guides so they do not hang over the sink, range, or working areas. Look for coffee pots, kettles and toaster ovens with automatic shut-offs. Keep a mop handy in the kitchen so you can wipe up spills instantly. You should also have a small fire extinguisher. Arrange your kitchen with frequently used items on lower shelves to avoid the need to stand on a stepstool to reach them. Make sure countertops are well-lit to avoid injuries while cutting and preparing food. In the Bathroom    Use a non-slip mat or decals in the tub and shower, since wet, soapy tile or porcelain surfaces are extremely slippery. Make sure bathroom rugs are non-skid or tape them firmly to the floor. Bathtubs should have at least one, preferably two, grab bars, firmly attached to structural supports in the wall. (Do not use built-in soap holders or glass shower doors as grab bars.)    Tub seats fitted with non-slip material on the legs allow you to wash sitting down.  For people with limited mobility, bathtub transfer benches allow you to slide safely into the tub. Raised toilet seats and toilet safety rails are helpful for those with knee or hip problems. In the [facility]    Make sure you use a nightlight and that the area around your bed is clear of potential obstacles. Be careful with electric blankets and never go to sleep with a heating pad, which can cause serious burns even if on a low setting. Use fire-resistant mattress covers and pillows, and NEVER smoke in bed. Keep a phone next to the bed that is programmed to dial 911 at the push of a button. If you have a chronic condition, you may want to sign on with an automatic call-in service. Typically the system includes a small pendant that connects directly to an emergency medical voice-response system. You should also make arrangements to stay in contact with someonefriend, neighbor, family memberon a regular schedule. Fire Prevention   According to the [employer] (Smoke Alarms for Every) [address] [place] [address], senior citizens are one of the two highest risk groups for death and serious injuries due to residential fires. When cooking, wear short-sleeved items, never a bulky long-sleeved robe. The [facility]'s Safety Checklist for Older Consumers emphasizes the importance of checking basements, garages, workshops and storage areas for fire hazards, such as volatile liquids, piles of old rags or clothing and overloaded circuits. Never smoke in bed or when lying down on a couch or recliner chair. Small portable electric or kerosene heaters are responsible for many home fires and should be used cautiously if at all. If you do use one, be sure to keep them away from flammable materials. In case of fire, make sure you have a pre-established emergency exit plan. Have a professional check your fireplace and other fuel-burning appliances yearly.     Helping Hands   Baby boomers entering the resendez years will continue to see the development of new products to help older adults live safely and independently in spite of age-related changes. Making Life More Livable  , by Elise Cortez, lists over 1,000 products for \"living well in the mature years,\" such as bathing and mobility aids, household security devices, ergonomically designed knives and peelers, and faucet valves and knobs for temperature control. Medical supply stores and organizations are good sources of information about products that improve your quality of life and insure your safety. Last Reviewed: November 2009 Sara Hurtado MD   Updated: 3/7/2011            Advance Care Planning: Care Instructions  Your Care Instructions     It can be hard to live with an illness that cannot be cured. But if your health is getting worse, you may want to make decisions about end-of-life care. Planning for the end of your life does not mean that you are giving up. It is a way to make sure that your wishes are met. Clearly stating your wishes can make it easier for your loved ones. Making plans while you are still able may also ease your mind and make your final days less stressful and more meaningful. Follow-up care is a key part of your treatment and safety. Be sure to make and go to all appointments, and call your doctor if you are having problems. It's also a good idea to know your test results and keep a list of the medicines you take. What can you do to plan for the end of life? You can bring these issues up with your doctor. You do not need to wait until your doctor starts the conversation. You might start with, \"What makes life worth living for me is. Elvira Balling .\" And then follow it with, \"I would not be willing to live with . Elvira Balling Elvira Balling \" When you complete this sentence it helps your doctor understand your wishes. Talk openly and honestly with your doctor. This is the best way to understand the decisions you will need to make as your health changes. Know that you can always change your mind.   Ask your doctor about commonly used life-support measures. These include tube feedings, breathing machines, and fluids given through a vein (IV). Understanding these treatments will help you decide whether you want them. You may choose to have these life-supporting treatments for a limited time. This allows a trial period to see whether they will help you. You may also decide that you want your doctor to take only certain measures to keep you alive. It may help to think about the big picture, like what makes life worth living for you or what your values and goals are. Talk to your doctor about how long you are likely to live. Your doctor may be able to give you an idea of what usually happens with your specific illness. Think about preparing papers that state your wishes. These papers are called advance directives. If you do this early and review them often, there will not be any confusion about what you want. You can change your instructions at any time. Which papers should you prepare? Advance directives are legal papers that tell doctors how you want to be cared for at the end of your life. You do not need a  to write these papers. Ask your doctor or your state [facility] department for information on how to write your advance directives. They may have the forms for each of these types of papers. Make sure your doctor has a copy of these on file, and give a copy to a family member or close friend. Consider a do-not-resuscitate order (DNR). This order asks that no extra treatments be done if your heart stops or you stop breathing. Extra treatments may include cardiopulmonary resuscitation (CPR), electrical shock to restart your heart, or a machine to breathe for you. If you decide to have a DNR order, ask your doctor to explain and write it. Place the order in your home where everyone can easily see it. Consider a living will.  A living will explains your wishes about life support and other treatments at the end of your life if you become unable to speak for yourself. Living garrett tell doctors to use or not use treatments that would keep you alive. You must have one or two witnesses or a notary present when you sign this form. A living will may be called something else in your state. Consider a medical power of . This form allows you to name a person to make decisions about your care if you are not able to. Most people ask a close friend or family member. Talk to this person about the kinds of treatments you want and those that you do not want. Make sure this person understands your wishes. A medical power of  may be called something else in your state. These legal papers are simple to change. Tell your doctor what you want to change, and ask him or her to make a note in your medical file. Give your family updated copies of the papers. Where can you learn more? Go to https://[email or web address].niiu. org and sign in to your Today [employer] account. Enter P184 in the [employer] box to learn more about \"Advance Care Planning: Care Instructions. \"     If you do not have an account, please click on the \"Sign Up Now\" link. Current as of: October 6, 2021               Content Version: 13.4  © 2006-2022 Healthwise, Incorporated. Care instructions adapted under license by [facility] ([facility]). If you have questions about a medical condition or this instruction, always ask your healthcare professional. [place] [address] any warranty or liability for your use of this information.

## 2022-12-22 ENCOUNTER — HOSPITAL ENCOUNTER (OUTPATIENT)
Dept: WOMENS IMAGING | Age: 77
Discharge: HOME | End: 2022-12-24
Payer: MEDICARE

## 2022-12-22 DIAGNOSIS — Z12.31 VISIT FOR SCREENING MAMMOGRAM: ICD-10-CM

## 2022-12-22 PROCEDURE — 77063 BREAST TOMOSYNTHESIS BI: CPT

## 2023-03-21 ENCOUNTER — OFFICE VISIT (OUTPATIENT)
Dept: FAMILY MEDICINE CLINIC | Age: 78
End: 2023-03-21
Payer: MEDICARE

## 2023-03-21 VITALS
RESPIRATION RATE: 16 BRPM | WEIGHT: 118 LBS | SYSTOLIC BLOOD PRESSURE: 130 MMHG | TEMPERATURE: 98.4 F | BODY MASS INDEX: 21.71 KG/M2 | OXYGEN SATURATION: 98 % | DIASTOLIC BLOOD PRESSURE: 62 MMHG | HEART RATE: 100 BPM

## 2023-03-21 DIAGNOSIS — G62.9 PERIPHERAL POLYNEUROPATHY: ICD-10-CM

## 2023-03-21 DIAGNOSIS — Z79.4 TYPE 2 DIABETES MELLITUS WITHOUT COMPLICATION, WITH LONG-TERM CURRENT USE OF INSULIN (HCC): ICD-10-CM

## 2023-03-21 DIAGNOSIS — E78.00 PURE HYPERCHOLESTEROLEMIA: ICD-10-CM

## 2023-03-21 DIAGNOSIS — E55.9 VITAMIN D DEFICIENCY: ICD-10-CM

## 2023-03-21 DIAGNOSIS — E11.9 TYPE 2 DIABETES MELLITUS WITHOUT COMPLICATION, WITH LONG-TERM CURRENT USE OF INSULIN (HCC): ICD-10-CM

## 2023-03-21 DIAGNOSIS — R74.8 ELEVATED LIVER ENZYMES: ICD-10-CM

## 2023-03-21 DIAGNOSIS — I25.118 CORONARY ARTERY DISEASE OF NATIVE HEART WITH STABLE ANGINA PECTORIS, UNSPECIFIED VESSEL OR LESION TYPE (HCC): ICD-10-CM

## 2023-03-21 DIAGNOSIS — N28.9 RENAL INSUFFICIENCY: ICD-10-CM

## 2023-03-21 DIAGNOSIS — I10 PRIMARY HYPERTENSION: Primary | ICD-10-CM

## 2023-03-21 PROCEDURE — 3078F DIAST BP <80 MM HG: CPT | Performed by: FAMILY MEDICINE

## 2023-03-21 PROCEDURE — 3075F SYST BP GE 130 - 139MM HG: CPT | Performed by: FAMILY MEDICINE

## 2023-03-21 PROCEDURE — 99214 OFFICE O/P EST MOD 30 MIN: CPT | Performed by: FAMILY MEDICINE

## 2023-03-21 PROCEDURE — 1123F ACP DISCUSS/DSCN MKR DOCD: CPT | Performed by: FAMILY MEDICINE

## 2023-03-21 RX ORDER — MAGNESIUM OXIDE/MAG AA CHELATE 300 MG
2 CAPSULE ORAL NIGHTLY
COMMUNITY

## 2023-03-21 SDOH — ECONOMIC STABILITY: FOOD INSECURITY: WITHIN THE PAST 12 MONTHS, THE FOOD YOU BOUGHT JUST DIDN'T LAST AND YOU DIDN'T HAVE MONEY TO GET MORE.: NEVER TRUE

## 2023-03-21 SDOH — ECONOMIC STABILITY: INCOME INSECURITY: HOW HARD IS IT FOR YOU TO PAY FOR THE VERY BASICS LIKE FOOD, HOUSING, MEDICAL CARE, AND HEATING?: NOT HARD AT ALL

## 2023-03-21 SDOH — ECONOMIC STABILITY: FOOD INSECURITY: WITHIN THE PAST 12 MONTHS, YOU WORRIED THAT YOUR FOOD WOULD RUN OUT BEFORE YOU GOT MONEY TO BUY MORE.: NEVER TRUE

## 2023-03-21 ASSESSMENT — ENCOUNTER SYMPTOMS
CHEST TIGHTNESS: 0
CONSTIPATION: 0
SHORTNESS OF BREATH: 0
DIARRHEA: 0
ABDOMINAL PAIN: 0
VOMITING: 0
BACK PAIN: 0
COUGH: 0
RHINORRHEA: 0
ABDOMINAL DISTENTION: 0
SORE THROAT: 0
NAUSEA: 0

## 2023-03-21 ASSESSMENT — PATIENT HEALTH QUESTIONNAIRE - PHQ9
SUM OF ALL RESPONSES TO PHQ QUESTIONS 1-9: 2
SUM OF ALL RESPONSES TO PHQ9 QUESTIONS 1 & 2: 2
2. FEELING DOWN, DEPRESSED OR HOPELESS: 1
1. LITTLE INTEREST OR PLEASURE IN DOING THINGS: 1
SUM OF ALL RESPONSES TO PHQ QUESTIONS 1-9: 2

## 2023-03-21 NOTE — PROGRESS NOTES
BS's as instructed and she does follow with Dr. Judith Nicolas - her last HGBA1C was 7.8%    Cont with daily foot exams and yearly eye exams    Will cont with Cozaar for renal protection    Stay well hydrated and no NSAID's    Will cont to follow with Cardiology as instructed    Rest of systems unchanged, continue current treatments. Medications, labs, diagnostic studies, consultations and follow-up as documented in this encounter. Rest of systems unchanged, continue current treatments    On this date March 21, 2023,  I have spent greater than 50% of visit reviewing previous notes, test results and face to face with the patient discussing the diagnoses, importance of compliance with the treatment plan, counseling, coordinating care as well as documenting on the day of the visit. Billie Galindo MD

## 2023-04-19 ENCOUNTER — HOSPITAL ENCOUNTER (OUTPATIENT)
Age: 78
Setting detail: SPECIMEN
Discharge: HOME | End: 2023-04-19

## 2023-04-19 DIAGNOSIS — N28.9 RENAL INSUFFICIENCY: ICD-10-CM

## 2023-04-19 DIAGNOSIS — E87.5 HYPERKALEMIA: ICD-10-CM

## 2023-04-19 DIAGNOSIS — E87.0 HYPERNATREMIA: ICD-10-CM

## 2023-04-19 LAB
ANION GAP SERPL CALCULATED.3IONS-SCNC: 16 MMOL/L (ref 9–17)
BUN SERPL-MCNC: 23 MG/DL (ref 8–23)
CALCIUM SERPL-MCNC: 9.5 MG/DL (ref 8.6–10.4)
CHLORIDE SERPL-SCNC: 107 MMOL/L (ref 98–107)
CO2 SERPL-SCNC: 21 MMOL/L (ref 20–31)
CREAT SERPL-MCNC: 1.03 MG/DL (ref 0.5–0.9)
GFR SERPL CREATININE-BSD FRML MDRD: 56 ML/MIN/1.73M2
GLUCOSE SERPL-MCNC: 247 MG/DL (ref 70–99)
POTASSIUM SERPL-SCNC: 5.2 MMOL/L (ref 3.7–5.3)
SODIUM SERPL-SCNC: 144 MMOL/L (ref 135–144)

## 2023-06-22 ENCOUNTER — OFFICE VISIT (OUTPATIENT)
Dept: FAMILY MEDICINE CLINIC | Age: 78
End: 2023-06-22
Payer: MEDICARE

## 2023-06-22 VITALS
SYSTOLIC BLOOD PRESSURE: 118 MMHG | WEIGHT: 121 LBS | HEART RATE: 71 BPM | DIASTOLIC BLOOD PRESSURE: 60 MMHG | OXYGEN SATURATION: 97 % | BODY MASS INDEX: 21.43 KG/M2

## 2023-06-22 DIAGNOSIS — I25.118 CORONARY ARTERY DISEASE OF NATIVE HEART WITH STABLE ANGINA PECTORIS, UNSPECIFIED VESSEL OR LESION TYPE (HCC): ICD-10-CM

## 2023-06-22 DIAGNOSIS — E11.9 TYPE 2 DIABETES MELLITUS WITHOUT COMPLICATION, WITH LONG-TERM CURRENT USE OF INSULIN (HCC): Chronic | ICD-10-CM

## 2023-06-22 DIAGNOSIS — E78.00 PURE HYPERCHOLESTEROLEMIA: ICD-10-CM

## 2023-06-22 DIAGNOSIS — E55.9 VITAMIN D DEFICIENCY: ICD-10-CM

## 2023-06-22 DIAGNOSIS — R74.8 ELEVATED LIVER ENZYMES: ICD-10-CM

## 2023-06-22 DIAGNOSIS — Z79.4 TYPE 2 DIABETES MELLITUS WITHOUT COMPLICATION, WITH LONG-TERM CURRENT USE OF INSULIN (HCC): Chronic | ICD-10-CM

## 2023-06-22 DIAGNOSIS — I10 PRIMARY HYPERTENSION: Primary | ICD-10-CM

## 2023-06-22 DIAGNOSIS — N28.9 RENAL INSUFFICIENCY: ICD-10-CM

## 2023-06-22 DIAGNOSIS — G62.9 PERIPHERAL POLYNEUROPATHY: ICD-10-CM

## 2023-06-22 PROCEDURE — 1123F ACP DISCUSS/DSCN MKR DOCD: CPT | Performed by: FAMILY MEDICINE

## 2023-06-22 PROCEDURE — 3078F DIAST BP <80 MM HG: CPT | Performed by: FAMILY MEDICINE

## 2023-06-22 PROCEDURE — 3074F SYST BP LT 130 MM HG: CPT | Performed by: FAMILY MEDICINE

## 2023-06-22 PROCEDURE — 99214 OFFICE O/P EST MOD 30 MIN: CPT | Performed by: FAMILY MEDICINE

## 2023-06-22 ASSESSMENT — ENCOUNTER SYMPTOMS
RHINORRHEA: 0
VOMITING: 0
NAUSEA: 0
BACK PAIN: 0
ABDOMINAL DISTENTION: 0
ABDOMINAL PAIN: 0
COUGH: 0
SHORTNESS OF BREATH: 0
SORE THROAT: 0
CONSTIPATION: 0
DIARRHEA: 0
CHEST TIGHTNESS: 0

## 2023-06-22 NOTE — PROGRESS NOTES
Billie Welch MD    [address] [place] [address]  [id number]  [address] [address]  Dept: [phone]  Dept Fax: [phone]     Patient ID: Sophy Campos is a 66 y.o. female. HPI    Established patient here today for f/u on chronic medical problems, go over labs and/or diagnostic studies, and medication refills. Pt denies any fever or chills. Pt today denies any HA, chest pain, or SOB. Pt denies any N/V/D/C or abdominal pain. Her last HGBA1C was 7.2%. Otherwise pt doing well on current tx and no other concerns today. The patient's past medical, surgical, social, and family history as well as his current medications and allergies were reviewed as documented in today's encounter. My previous office notes, consult notes, labs and diagnostic studies were reviewed prior to and during encounter.     Current Outpatient Medications on File Prior to Visit   Medication Sig Dispense Refill    isosorbide mononitrate (IMDUR) 120 MG extended release tablet TAKE 1 TABLET DAILY 90 tablet 3    atorvastatin (LIPITOR) 40 MG tablet TAKE 1 TABLET DAILY 90 tablet 3    Magnesium 300 MG CAPS Take 2 capsules by mouth at bedtime      aspirin 81 MG chewable tablet Take 1 tablet by mouth daily 30 tablet 3    Insulin Glargine, 2 Unit Dial, (TOUJEO MAX SOLOSTAR) 300 UNIT/ML SOPN Inject 10 Units into the skin nightly       Coenzyme Q10 (CO Q-10) 100 MG CAPS Take 1 capsule by mouth daily      B-D UF III MINI PEN NEEDLES 31G X 5 MM MISC       clopidogrel (PLAVIX) 75 MG tablet Take 1 tablet by mouth daily 30 tablet 5    metoprolol succinate (TOPROL XL) 25 MG extended release tablet Take 1 tablet by mouth daily 90 tablet 1    insulin lispro, 1 Unit Dial, 100 UNIT/ML SOPN Inject into the skin 3 times daily PER SLIDING SCALE      losartan (COZAAR) 50 MG tablet Take 1 tablet by mouth nightly      Multiple Vitamins TABS Take 1 tablet by mouth daily      sodium polystyrene

## 2023-08-03 ENCOUNTER — HOSPITAL ENCOUNTER (OUTPATIENT)
Age: 78
Discharge: HOME | End: 2023-08-03
Payer: MEDICARE

## 2023-08-03 DIAGNOSIS — R74.8 ELEVATED LIVER ENZYMES: ICD-10-CM

## 2023-08-03 DIAGNOSIS — Z79.4 TYPE 2 DIABETES MELLITUS WITHOUT COMPLICATION, WITH LONG-TERM CURRENT USE OF INSULIN (HCC): Chronic | ICD-10-CM

## 2023-08-03 DIAGNOSIS — N28.9 RENAL INSUFFICIENCY: ICD-10-CM

## 2023-08-03 DIAGNOSIS — I25.118 CORONARY ARTERY DISEASE OF NATIVE HEART WITH STABLE ANGINA PECTORIS, UNSPECIFIED VESSEL OR LESION TYPE (HCC): ICD-10-CM

## 2023-08-03 DIAGNOSIS — E78.00 PURE HYPERCHOLESTEROLEMIA: ICD-10-CM

## 2023-08-03 DIAGNOSIS — I10 PRIMARY HYPERTENSION: ICD-10-CM

## 2023-08-03 DIAGNOSIS — E55.9 VITAMIN D DEFICIENCY: ICD-10-CM

## 2023-08-03 DIAGNOSIS — G62.9 PERIPHERAL POLYNEUROPATHY: ICD-10-CM

## 2023-08-03 DIAGNOSIS — E11.9 TYPE 2 DIABETES MELLITUS WITHOUT COMPLICATION, WITH LONG-TERM CURRENT USE OF INSULIN (HCC): Chronic | ICD-10-CM

## 2023-08-03 LAB
25(OH)D3 SERPL-MCNC: 61.2 NG/ML
ALBUMIN SERPL-MCNC: 4.1 G/DL (ref 3.5–5.2)
ALP SERPL-CCNC: 62 U/L (ref 35–104)
ALT SERPL-CCNC: 20 U/L (ref 5–33)
ANION GAP SERPL CALCULATED.3IONS-SCNC: 9 MMOL/L (ref 9–17)
AST SERPL-CCNC: 23 U/L
BILIRUB SERPL-MCNC: 0.5 MG/DL (ref 0.3–1.2)
BUN SERPL-MCNC: 24 MG/DL (ref 8–23)
CALCIUM SERPL-MCNC: 9.6 MG/DL (ref 8.6–10.4)
CHLORIDE SERPL-SCNC: 106 MMOL/L (ref 98–107)
CHOLEST SERPL-MCNC: 139 MG/DL
CHOLESTEROL/HDL RATIO: 1.8
CO2 SERPL-SCNC: 26 MMOL/L (ref 20–31)
CREAT SERPL-MCNC: 1 MG/DL (ref 0.5–0.9)
ERYTHROCYTE [DISTWIDTH] IN BLOOD BY AUTOMATED COUNT: 13.4 % (ref 11.5–14.9)
GFR SERPL CREATININE-BSD FRML MDRD: 58 ML/MIN/1.73M2
GLUCOSE SERPL-MCNC: 89 MG/DL (ref 70–99)
HCT VFR BLD AUTO: 39.8 % (ref 36–46)
HDLC SERPL-MCNC: 76 MG/DL
HGB BLD-MCNC: 13.1 G/DL (ref 12–16)
LDLC SERPL CALC-MCNC: 53 MG/DL (ref 0–130)
MCH RBC QN AUTO: 30.5 PG (ref 26–34)
MCHC RBC AUTO-ENTMCNC: 32.9 G/DL (ref 31–37)
MCV RBC AUTO: 92.6 FL (ref 80–100)
PLATELET # BLD AUTO: 172 K/UL (ref 150–450)
PMV BLD AUTO: 8 FL (ref 6–12)
POTASSIUM SERPL-SCNC: 4.6 MMOL/L (ref 3.7–5.3)
PROT SERPL-MCNC: 6.7 G/DL (ref 6.4–8.3)
RBC # BLD AUTO: 4.29 M/UL (ref 4–5.2)
SODIUM SERPL-SCNC: 141 MMOL/L (ref 135–144)
TRIGL SERPL-MCNC: 50 MG/DL
WBC OTHER # BLD: 3.7 K/UL (ref 3.5–11)

## 2023-08-03 PROCEDURE — 85027 COMPLETE CBC AUTOMATED: CPT

## 2023-08-03 PROCEDURE — 80061 LIPID PANEL: CPT

## 2023-08-03 PROCEDURE — 36415 COLL VENOUS BLD VENIPUNCTURE: CPT

## 2023-08-03 PROCEDURE — 80053 COMPREHEN METABOLIC PANEL: CPT

## 2023-08-03 PROCEDURE — 82306 VITAMIN D 25 HYDROXY: CPT

## 2023-08-04 ENCOUNTER — HOSPITAL ENCOUNTER (OUTPATIENT)
Dept: WOMENS IMAGING | Age: 78
End: 2023-08-04
Payer: MEDICARE

## 2023-08-04 PROCEDURE — 77080 DXA BONE DENSITY AXIAL: CPT

## 2023-09-28 NOTE — PROGRESS NOTES
"SUBJECTIVE:  Subjective  Shari Atkinson is a 9 m.o. female who is here with mother for Well Child    HPI  Current concerns include feeding as below.     Nutrition:  Current diet:formula Norma and purees 3 times daily. Interested in table foods.   Difficulties with feeding? As above    Elimination:  Stool consistency and frequency: Normal    Sleep:difficulty with going to sleep and difficulty with staying asleep this past week , had congestion last week.     Social Screening:  Current  arrangements: home with family still in trailer, painting now very close to moving back in.       Caregiver concerns regarding:  Hearing? no  Vision? no  Dental? no  Motor skills? no  Behavior/Activity? no    Developmental Screenin/28/2023     1:31 PM 2023     1:30 PM 2023     3:42 PM 2023     3:30 PM 2023     2:20 PM 3/2/2023     3:17 PM   SWYC 9-MONTH DEVELOPMENTAL MILESTONES BREAK   Holds up arms to be picked up  somewhat       Gets to a sitting position by him or herself  not yet- can't to this by herself       Picks up food and eats it  not yet       Pulls up to standing  not yet- pulls to her knees       Plays games like "peek-a-partida" or "pat-a-cake"  somewhat       Calls you "mama" or "timmy" or similar name  not yet- says timmy as a babble, does repeat some sounds.        Looks around when you say things like "Where's your bottle?" or "Where's your blanket?"  not yet       Copies sounds that you make  very much       Walks across a room without help  not yet       Follows directions - like "Come here" or "Give me the ball"  somewhat       (Patient-Entered) Total Development Score - 9 months 5  Incomplete  Incomplete Incomplete   (Provider-Entered) Total Development Score - 9 months    13     (Provider-Entered) Development Status    Needs review     (Needs Review if <12)    SWYC Developmental Milestones Result: Needs Review- score is below the normal threshold for age on date of " Ambulated to bathroom and in halls. Gait steady. . Right groin puncture site and right pedal pulse assessment unchanged. "screening.      Review of Systems  A comprehensive review of symptoms was completed and negative except as noted above.     OBJECTIVE:  Vital signs  Vitals:    09/28/23 1338   Temp: 98.8 °F (37.1 °C)   TempSrc: Axillary   Weight: 8.975 kg (19 lb 12.6 oz)   Height: 2' 4.35" (0.72 m)   HC: 44 cm (17.32")       Physical Exam  Vitals and nursing note reviewed.   Constitutional:       General: She is active. She has a strong cry. She is not in acute distress.     Appearance: Normal appearance. She is well-developed. She is not toxic-appearing.   HENT:      Head: No cranial deformity. Anterior fontanelle is flat.      Right Ear: Tympanic membrane, ear canal and external ear normal.      Left Ear: Tympanic membrane, ear canal and external ear normal.      Nose: Nose normal. No congestion.      Mouth/Throat:      Mouth: Mucous membranes are moist.      Pharynx: Oropharynx is clear.   Eyes:      General: Red reflex is present bilaterally.         Right eye: No discharge.         Left eye: No discharge.      Conjunctiva/sclera: Conjunctivae normal.      Pupils: Pupils are equal, round, and reactive to light.   Cardiovascular:      Rate and Rhythm: Normal rate and regular rhythm.      Pulses: Pulses are strong.      Heart sounds: No murmur heard.  Pulmonary:      Effort: Pulmonary effort is normal. No respiratory distress, nasal flaring or retractions.      Breath sounds: Normal breath sounds. No decreased air movement. No wheezing.   Abdominal:      General: Bowel sounds are normal. There is no distension.      Palpations: Abdomen is soft. There is no mass.      Tenderness: There is no abdominal tenderness.      Hernia: No hernia is present.   Genitourinary:     Labia: No labial fusion.    Musculoskeletal:         General: No deformity or signs of injury. Normal range of motion.      Cervical back: Normal range of motion and neck supple.      Comments: Ortolani's and Preston's signs absent bilaterally, leg length symmetrical, " and thigh & gluteal folds symmetrical   Skin:     General: Skin is warm and moist.      Capillary Refill: Capillary refill takes less than 2 seconds.      Turgor: Normal.      Coloration: Skin is not jaundiced or mottled.      Findings: No rash.   Neurological:      Mental Status: She is alert.      Motor: No abnormal muscle tone.      Primitive Reflexes: Suck normal. Symmetric Tramaine.      Deep Tendon Reflexes: Reflexes are normal and symmetric.        ASSESSMENT/PLAN:  Shari was seen today for well child.    Diagnoses and all orders for this visit:    Encounter for well child check without abnormal findings  -     SWYC-Developmental Test    Encounter for screening for global developmental delays (milestones)  -     SWYC-Developmental Test    Gross motor delay    Feeding difficulties     Early steps    Preventive Health Issues Addressed:  1. Anticipatory guidance discussed and a handout covering well-child issues for age was provided.    2. Growth and development were reviewed/discussed and are within acceptable ranges for age.    3. Immunizations and screening tests today: per orders.        Follow Up:  Follow up in about 3 months (around 12/28/2023).

## 2023-11-08 ENCOUNTER — HOSPITAL ENCOUNTER (OUTPATIENT)
Age: 78
Setting detail: SPECIMEN
Discharge: HOME | End: 2023-11-08

## 2023-11-08 ENCOUNTER — OFFICE VISIT (OUTPATIENT)
Dept: FAMILY MEDICINE CLINIC | Age: 78
End: 2023-11-08
Payer: MEDICARE

## 2023-11-08 VITALS
DIASTOLIC BLOOD PRESSURE: 60 MMHG | WEIGHT: 124 LBS | HEART RATE: 62 BPM | HEIGHT: 63 IN | SYSTOLIC BLOOD PRESSURE: 128 MMHG | OXYGEN SATURATION: 98 % | BODY MASS INDEX: 21.97 KG/M2

## 2023-11-08 DIAGNOSIS — E78.00 PURE HYPERCHOLESTEROLEMIA: ICD-10-CM

## 2023-11-08 DIAGNOSIS — I10 PRIMARY HYPERTENSION: ICD-10-CM

## 2023-11-08 DIAGNOSIS — R74.8 ELEVATED LIVER ENZYMES: ICD-10-CM

## 2023-11-08 DIAGNOSIS — M79.672 LEFT FOOT PAIN: ICD-10-CM

## 2023-11-08 DIAGNOSIS — Z79.4 TYPE 2 DIABETES MELLITUS WITHOUT COMPLICATION, WITH LONG-TERM CURRENT USE OF INSULIN (HCC): Chronic | ICD-10-CM

## 2023-11-08 DIAGNOSIS — I25.118 CORONARY ARTERY DISEASE OF NATIVE HEART WITH STABLE ANGINA PECTORIS, UNSPECIFIED VESSEL OR LESION TYPE (HCC): ICD-10-CM

## 2023-11-08 DIAGNOSIS — E11.9 TYPE 2 DIABETES MELLITUS WITHOUT COMPLICATION, WITH LONG-TERM CURRENT USE OF INSULIN (HCC): Chronic | ICD-10-CM

## 2023-11-08 DIAGNOSIS — E55.9 VITAMIN D DEFICIENCY: ICD-10-CM

## 2023-11-08 DIAGNOSIS — I10 PRIMARY HYPERTENSION: Primary | ICD-10-CM

## 2023-11-08 DIAGNOSIS — N28.9 RENAL INSUFFICIENCY: ICD-10-CM

## 2023-11-08 DIAGNOSIS — G62.9 PERIPHERAL POLYNEUROPATHY: ICD-10-CM

## 2023-11-08 DIAGNOSIS — R25.2 LEG CRAMPS: ICD-10-CM

## 2023-11-08 LAB
ERYTHROCYTE [DISTWIDTH] IN BLOOD BY AUTOMATED COUNT: 13.6 % (ref 11.8–14.4)
HCT VFR BLD AUTO: 40.9 % (ref 36.3–47.1)
HGB BLD-MCNC: 13.1 G/DL (ref 11.9–15.1)
MCH RBC QN AUTO: 30.7 PG (ref 25.2–33.5)
MCHC RBC AUTO-ENTMCNC: 32 G/DL (ref 28.4–34.8)
MCV RBC AUTO: 95.8 FL (ref 82.6–102.9)
NRBC BLD-RTO: 0 PER 100 WBC
PLATELET # BLD AUTO: 190 K/UL (ref 138–453)
PMV BLD AUTO: 10.7 FL (ref 8.1–13.5)
RBC # BLD AUTO: 4.27 M/UL (ref 3.95–5.11)
WBC OTHER # BLD: 3.8 K/UL (ref 3.5–11.3)

## 2023-11-08 PROCEDURE — 1123F ACP DISCUSS/DSCN MKR DOCD: CPT | Performed by: FAMILY MEDICINE

## 2023-11-08 PROCEDURE — 3074F SYST BP LT 130 MM HG: CPT | Performed by: FAMILY MEDICINE

## 2023-11-08 PROCEDURE — 99214 OFFICE O/P EST MOD 30 MIN: CPT | Performed by: FAMILY MEDICINE

## 2023-11-08 PROCEDURE — 3078F DIAST BP <80 MM HG: CPT | Performed by: FAMILY MEDICINE

## 2023-11-08 ASSESSMENT — ENCOUNTER SYMPTOMS
SHORTNESS OF BREATH: 0
DIARRHEA: 0
ABDOMINAL DISTENTION: 0
COUGH: 0
ABDOMINAL PAIN: 0
NAUSEA: 0
BACK PAIN: 0
CONSTIPATION: 0
RHINORRHEA: 0
SORE THROAT: 0
VOMITING: 0
CHEST TIGHTNESS: 0

## 2023-11-08 NOTE — PROGRESS NOTES
facility-administered medications on file prior to visit. Subjective:     Review of Systems   Constitutional:  Negative for appetite change and fever. HENT:  Negative for congestion, ear pain, rhinorrhea and sore throat. Respiratory:  Negative for cough, chest tightness and shortness of breath. Cardiovascular:  Negative for chest pain and palpitations. Gastrointestinal:  Negative for abdominal distention, abdominal pain, constipation, diarrhea, nausea and vomiting. Genitourinary:  Negative for difficulty urinating and dysuria. Musculoskeletal:  Negative for arthralgias, back pain and myalgias. Leg cramps  Pain outer left foot   Skin:  Negative for rash. Neurological:  Negative for dizziness, light-headedness and headaches. Hematological:  Negative for adenopathy. Psychiatric/Behavioral:  Negative for behavioral problems and sleep disturbance. The patient is not nervous/anxious. Objective:     Physical Exam  Vitals reviewed. Constitutional:       General: She is not in acute distress. Appearance: Normal appearance. She is well-developed. She is not ill-appearing. HENT:      Head: Normocephalic and atraumatic. Right Ear: External ear normal.      Left Ear: External ear normal.      Nose: Nose normal.      Mouth/Throat:      Mouth: Mucous membranes are moist.      Pharynx: No oropharyngeal exudate. Eyes:      Extraocular Movements: Extraocular movements intact. Conjunctiva/sclera: Conjunctivae normal.      Pupils: Pupils are equal, round, and reactive to light. Cardiovascular:      Rate and Rhythm: Normal rate and regular rhythm. Heart sounds: Normal heart sounds. No murmur heard. Pulmonary:      Effort: Pulmonary effort is normal. No respiratory distress. Breath sounds: Normal breath sounds. No wheezing. Chest:      Chest wall: No tenderness. Abdominal:      General: Bowel sounds are normal. There is no distension.       Palpations: Abdomen

## 2023-11-09 LAB
25(OH)D3 SERPL-MCNC: 55.9 NG/ML (ref 30–100)
ALBUMIN SERPL-MCNC: 4 G/DL (ref 3.5–5.2)
ALBUMIN/GLOB SERPL: 1 {RATIO} (ref 1–2.5)
ALP SERPL-CCNC: 63 U/L (ref 35–104)
ALT SERPL-CCNC: 26 U/L (ref 10–35)
ANION GAP SERPL CALCULATED.3IONS-SCNC: 11 MMOL/L (ref 9–16)
AST SERPL-CCNC: 39 U/L (ref 10–35)
BILIRUB SERPL-MCNC: 0.4 MG/DL (ref 0–1.2)
BUN SERPL-MCNC: 24 MG/DL (ref 8–23)
CALCIUM SERPL-MCNC: 9.8 MG/DL (ref 8.6–10.4)
CHLORIDE SERPL-SCNC: 104 MMOL/L (ref 98–107)
CO2 SERPL-SCNC: 25 MMOL/L (ref 20–31)
CREAT SERPL-MCNC: 1.1 MG/DL (ref 0.5–0.9)
GFR SERPL CREATININE-BSD FRML MDRD: 53 ML/MIN/1.73M2
GLUCOSE SERPL-MCNC: 250 MG/DL (ref 74–99)
MAGNESIUM SERPL-MCNC: 2.4 MG/DL (ref 1.6–2.4)
POTASSIUM SERPL-SCNC: 5.4 MMOL/L (ref 3.7–5.3)
PROT SERPL-MCNC: 6.7 G/DL (ref 6.6–8.7)
SODIUM SERPL-SCNC: 140 MMOL/L (ref 136–145)
T4 FREE SERPL-MCNC: 1.2 NG/DL (ref 0.93–1.7)
TSH SERPL DL<=0.05 MIU/L-ACNC: 2.32 UIU/ML (ref 0.27–4.2)
VIT B12 SERPL-MCNC: 1223 PG/ML (ref 232–1245)

## 2023-12-13 ENCOUNTER — HOSPITAL ENCOUNTER (OUTPATIENT)
Age: 78
Setting detail: SPECIMEN
Discharge: HOME | End: 2023-12-13

## 2023-12-13 DIAGNOSIS — D70.9 NEUTROPENIA, UNSPECIFIED TYPE (HCC): Primary | ICD-10-CM

## 2023-12-13 LAB
ERYTHROCYTE [DISTWIDTH] IN BLOOD BY AUTOMATED COUNT: 13.7 % (ref 11.8–14.4)
HCT VFR BLD AUTO: 43.6 % (ref 36.3–47.1)
HGB BLD-MCNC: 13.7 G/DL (ref 11.9–15.1)
MCH RBC QN AUTO: 30.3 PG (ref 25.2–33.5)
MCHC RBC AUTO-ENTMCNC: 31.4 G/DL (ref 28.4–34.8)
MCV RBC AUTO: 96.5 FL (ref 82.6–102.9)
NRBC BLD-RTO: 0 PER 100 WBC
PLATELET # BLD AUTO: 144 K/UL (ref 138–453)
PMV BLD AUTO: 10.1 FL (ref 8.1–13.5)
RBC # BLD AUTO: 4.52 M/UL (ref 3.95–5.11)
WBC OTHER # BLD: 2.9 K/UL (ref 3.5–11.3)

## 2023-12-14 LAB
25(OH)D3 SERPL-MCNC: 45.4 NG/ML (ref 30–100)
ALBUMIN SERPL-MCNC: 3.9 G/DL (ref 3.5–5.2)
ALBUMIN/GLOB SERPL: 2 {RATIO} (ref 1–2.5)
ALP SERPL-CCNC: 56 U/L (ref 35–104)
ALT SERPL-CCNC: 38 U/L (ref 10–35)
ANION GAP SERPL CALCULATED.3IONS-SCNC: 10 MMOL/L (ref 9–16)
AST SERPL-CCNC: 45 U/L (ref 10–35)
BILIRUB SERPL-MCNC: 0.6 MG/DL (ref 0–1.2)
BUN SERPL-MCNC: 20 MG/DL (ref 8–23)
CALCIUM SERPL-MCNC: 9.1 MG/DL (ref 8.6–10.4)
CHLORIDE SERPL-SCNC: 107 MMOL/L (ref 98–107)
CHOLEST SERPL-MCNC: 155 MG/DL (ref 0–199)
CHOLESTEROL/HDL RATIO: 2
CO2 SERPL-SCNC: 24 MMOL/L (ref 20–31)
CREAT SERPL-MCNC: 1 MG/DL (ref 0.5–0.9)
GFR SERPL CREATININE-BSD FRML MDRD: 56 ML/MIN/1.73M2
GLUCOSE SERPL-MCNC: 286 MG/DL (ref 74–99)
HDLC SERPL-MCNC: 77 MG/DL
LDLC SERPL CALC-MCNC: 60 MG/DL (ref 0–100)
POTASSIUM SERPL-SCNC: 5.1 MMOL/L (ref 3.7–5.3)
PROT SERPL-MCNC: 6.5 G/DL (ref 6.6–8.7)
SODIUM SERPL-SCNC: 141 MMOL/L (ref 136–145)
TRIGL SERPL-MCNC: 89 MG/DL (ref 0–149)
VLDLC SERPL CALC-MCNC: 18 MG/DL

## 2023-12-26 ENCOUNTER — HOSPITAL ENCOUNTER (OUTPATIENT)
Dept: WOMENS IMAGING | Age: 78
Discharge: HOME | End: 2023-12-28
Payer: MEDICARE

## 2023-12-26 VITALS — BODY MASS INDEX: 22.82 KG/M2 | HEIGHT: 62 IN | WEIGHT: 124 LBS

## 2023-12-26 DIAGNOSIS — Z12.31 ENCOUNTER FOR SCREENING MAMMOGRAM FOR MALIGNANT NEOPLASM OF BREAST: ICD-10-CM

## 2023-12-26 PROCEDURE — 77063 BREAST TOMOSYNTHESIS BI: CPT

## 2024-01-15 RX ORDER — CLOPIDOGREL BISULFATE 75 MG/1
75 TABLET ORAL DAILY
Qty: 30 TABLET | Refills: 5 | Status: CANCELLED | OUTPATIENT
Start: 2024-01-15

## 2024-01-24 ENCOUNTER — HOSPITAL ENCOUNTER (OUTPATIENT)
Age: 79
Setting detail: SPECIMEN
Discharge: HOME | End: 2024-01-24

## 2024-01-24 DIAGNOSIS — D70.9 NEUTROPENIA, UNSPECIFIED TYPE (HCC): ICD-10-CM

## 2024-01-24 LAB
BASOPHILS # BLD: <0.03 K/UL (ref 0–0.2)
BASOPHILS NFR BLD: 1 % (ref 0–2)
EOSINOPHIL # BLD: 0.07 K/UL (ref 0–0.44)
EOSINOPHILS RELATIVE PERCENT: 2 % (ref 1–4)
ERYTHROCYTE [DISTWIDTH] IN BLOOD BY AUTOMATED COUNT: 13.9 % (ref 11.8–14.4)
HCT VFR BLD AUTO: 38.7 % (ref 36.3–47.1)
HGB BLD-MCNC: 12.8 G/DL (ref 11.9–15.1)
IMM GRANULOCYTES # BLD AUTO: <0.03 K/UL (ref 0–0.3)
IMM GRANULOCYTES NFR BLD: 0 %
IMM RETICS NFR: 9 % (ref 2.7–18.3)
LYMPHOCYTES NFR BLD: 0.74 K/UL (ref 1.1–3.7)
LYMPHOCYTES RELATIVE PERCENT: 21 % (ref 24–43)
MCH RBC QN AUTO: 31.2 PG (ref 25.2–33.5)
MCHC RBC AUTO-ENTMCNC: 33.1 G/DL (ref 28.4–34.8)
MCV RBC AUTO: 94.4 FL (ref 82.6–102.9)
MONOCYTES NFR BLD: 0.25 K/UL (ref 0.1–1.2)
MONOCYTES NFR BLD: 7 % (ref 3–12)
NEUTROPHILS NFR BLD: 69 % (ref 36–65)
NEUTS SEG NFR BLD: 2.43 K/UL (ref 1.5–8.1)
NRBC BLD-RTO: 0 PER 100 WBC
PLATELET # BLD AUTO: 134 K/UL (ref 138–453)
PMV BLD AUTO: 10 FL (ref 8.1–13.5)
RBC # BLD AUTO: 4.1 M/UL (ref 3.95–5.11)
RETIC HEMOGLOBIN: 35.1 PG (ref 28.2–35.7)
RETICS # AUTO: 0.05 M/UL (ref 0.03–0.08)
RETICS/RBC NFR AUTO: 1.3 % (ref 0.5–1.9)
WBC OTHER # BLD: 3.5 K/UL (ref 3.5–11.3)

## 2024-01-25 LAB — SURGICAL PATHOLOGY REPORT: NORMAL

## 2024-01-26 LAB — PATH REV BLD -IMP: NORMAL

## 2024-02-14 ENCOUNTER — OFFICE VISIT (OUTPATIENT)
Dept: FAMILY MEDICINE CLINIC | Age: 79
End: 2024-02-14
Payer: MEDICARE

## 2024-02-14 VITALS
SYSTOLIC BLOOD PRESSURE: 104 MMHG | BODY MASS INDEX: 23.41 KG/M2 | HEIGHT: 61 IN | RESPIRATION RATE: 16 BRPM | DIASTOLIC BLOOD PRESSURE: 56 MMHG | OXYGEN SATURATION: 98 % | HEART RATE: 60 BPM | WEIGHT: 124 LBS | TEMPERATURE: 99.2 F

## 2024-02-14 DIAGNOSIS — E11.9 TYPE 2 DIABETES MELLITUS WITHOUT COMPLICATION, WITH LONG-TERM CURRENT USE OF INSULIN (HCC): Chronic | ICD-10-CM

## 2024-02-14 DIAGNOSIS — E55.9 VITAMIN D DEFICIENCY: ICD-10-CM

## 2024-02-14 DIAGNOSIS — N28.9 RENAL INSUFFICIENCY: ICD-10-CM

## 2024-02-14 DIAGNOSIS — E78.00 PURE HYPERCHOLESTEROLEMIA: ICD-10-CM

## 2024-02-14 DIAGNOSIS — I10 PRIMARY HYPERTENSION: Primary | ICD-10-CM

## 2024-02-14 DIAGNOSIS — G62.9 PERIPHERAL POLYNEUROPATHY: ICD-10-CM

## 2024-02-14 DIAGNOSIS — Z79.4 TYPE 2 DIABETES MELLITUS WITHOUT COMPLICATION, WITH LONG-TERM CURRENT USE OF INSULIN (HCC): Chronic | ICD-10-CM

## 2024-02-14 DIAGNOSIS — R74.8 ELEVATED LIVER ENZYMES: ICD-10-CM

## 2024-02-14 DIAGNOSIS — I25.118 CORONARY ARTERY DISEASE OF NATIVE HEART WITH STABLE ANGINA PECTORIS, UNSPECIFIED VESSEL OR LESION TYPE (HCC): ICD-10-CM

## 2024-02-14 PROCEDURE — 3074F SYST BP LT 130 MM HG: CPT | Performed by: FAMILY MEDICINE

## 2024-02-14 PROCEDURE — 3078F DIAST BP <80 MM HG: CPT | Performed by: FAMILY MEDICINE

## 2024-02-14 PROCEDURE — 1123F ACP DISCUSS/DSCN MKR DOCD: CPT | Performed by: FAMILY MEDICINE

## 2024-02-14 PROCEDURE — 99214 OFFICE O/P EST MOD 30 MIN: CPT | Performed by: FAMILY MEDICINE

## 2024-02-14 RX ORDER — METOPROLOL SUCCINATE 25 MG/1
25 TABLET, EXTENDED RELEASE ORAL DAILY
COMMUNITY
Start: 2023-11-29

## 2024-02-14 RX ORDER — INSULIN GLARGINE 300 U/ML
INJECTION, SOLUTION SUBCUTANEOUS
Qty: 9 ML | Refills: 0
Start: 2024-02-14

## 2024-02-14 NOTE — PROGRESS NOTES
Billie Espinal MD    [facility]  [facility]  [address], [address]  [facility] [address]  Dept: [phone]  Dept Fax: [phone]     Patient ID: Francia Ferguson is a 78 y.o. female.    HPI    Established patient here today for f/u on chronic medical problems, go over labs and/or diagnostic studies, and medication refills. Pt denies any fever or chills.  Pt today denies any HA, chest pain, or SOB.  Pt denies any N/V/D/C or abdominal pain.     Otherwise pt doing well on current tx and no other concerns today.     The patient's past medical, surgical, social, and family history as well as his current medications and allergies were reviewed as documented in today's encounter.      My previous office notes, consult notes, labs and diagnostic studies were reviewed prior to and during encounter.    Current Outpatient Medications on File Prior to Visit   Medication Sig Dispense Refill    isosorbide mononitrate (IMDUR) 120 MG extended release tablet TAKE 1 TABLET DAILY 90 tablet 3    atorvastatin (LIPITOR) 40 MG tablet TAKE 1 TABLET DAILY 90 tablet 3    Magnesium 300 MG CAPS Take 2 capsules by mouth at bedtime      aspirin 81 MG chewable tablet Take 1 tablet by mouth daily 30 tablet 3    Coenzyme Q10 (CO Q-10) 100 MG CAPS Take 1 capsule by mouth daily      B-D UF III MINI PEN NEEDLES 31G X 5 MM MISC       clopidogrel (PLAVIX) 75 MG tablet Take 1 tablet by mouth daily 30 tablet 5    insulin lispro, 1 Unit Dial, 100 UNIT/ML SOPN Inject into the skin 3 times daily PER SLIDING SCALE      Multiple Vitamins TABS Take 1 tablet by mouth daily       No current facility-administered medications on file prior to visit.        Subjective:     Review of Systems   Constitutional:  Negative for appetite change and fever.   HENT:  Negative for congestion, ear pain, rhinorrhea and sore throat.    Respiratory:  Negative for cough, chest tightness and shortness of breath.

## 2024-05-22 ENCOUNTER — HOSPITAL ENCOUNTER (OUTPATIENT)
Age: 79
Setting detail: SPECIMEN
Discharge: HOME | End: 2024-05-22

## 2024-05-22 ENCOUNTER — OFFICE VISIT (OUTPATIENT)
Dept: FAMILY MEDICINE CLINIC | Age: 79
End: 2024-05-22
Payer: MEDICARE

## 2024-05-22 VITALS
WEIGHT: 122 LBS | RESPIRATION RATE: 16 BRPM | DIASTOLIC BLOOD PRESSURE: 46 MMHG | TEMPERATURE: 98.8 F | OXYGEN SATURATION: 98 % | SYSTOLIC BLOOD PRESSURE: 98 MMHG | BODY MASS INDEX: 23.03 KG/M2 | HEART RATE: 56 BPM | HEIGHT: 61 IN

## 2024-05-22 DIAGNOSIS — G62.9 PERIPHERAL POLYNEUROPATHY: ICD-10-CM

## 2024-05-22 DIAGNOSIS — E55.9 VITAMIN D DEFICIENCY: ICD-10-CM

## 2024-05-22 DIAGNOSIS — I25.118 CORONARY ARTERY DISEASE OF NATIVE HEART WITH STABLE ANGINA PECTORIS, UNSPECIFIED VESSEL OR LESION TYPE (HCC): ICD-10-CM

## 2024-05-22 DIAGNOSIS — Z79.4 TYPE 2 DIABETES MELLITUS WITHOUT COMPLICATION, WITH LONG-TERM CURRENT USE OF INSULIN (HCC): Chronic | ICD-10-CM

## 2024-05-22 DIAGNOSIS — E11.9 TYPE 2 DIABETES MELLITUS WITHOUT COMPLICATION, WITH LONG-TERM CURRENT USE OF INSULIN (HCC): Chronic | ICD-10-CM

## 2024-05-22 DIAGNOSIS — R74.8 ELEVATED LIVER ENZYMES: ICD-10-CM

## 2024-05-22 DIAGNOSIS — I10 PRIMARY HYPERTENSION: Primary | ICD-10-CM

## 2024-05-22 DIAGNOSIS — N28.9 RENAL INSUFFICIENCY: ICD-10-CM

## 2024-05-22 DIAGNOSIS — E78.00 PURE HYPERCHOLESTEROLEMIA: ICD-10-CM

## 2024-05-22 DIAGNOSIS — I10 PRIMARY HYPERTENSION: ICD-10-CM

## 2024-05-22 LAB
25(OH)D3 SERPL-MCNC: 53.9 NG/ML (ref 30–100)
ALBUMIN SERPL-MCNC: 4.2 G/DL (ref 3.5–5.2)
ALBUMIN/GLOB SERPL: 2 {RATIO} (ref 1–2.5)
ALP SERPL-CCNC: 56 U/L (ref 35–104)
ALT SERPL-CCNC: 26 U/L (ref 10–35)
ANION GAP SERPL CALCULATED.3IONS-SCNC: 7 MMOL/L (ref 9–16)
AST SERPL-CCNC: 32 U/L (ref 10–35)
BILIRUB SERPL-MCNC: 0.6 MG/DL (ref 0–1.2)
BUN SERPL-MCNC: 23 MG/DL (ref 8–23)
CALCIUM SERPL-MCNC: 9.3 MG/DL (ref 8.6–10.4)
CHLORIDE SERPL-SCNC: 108 MMOL/L (ref 98–107)
CHOLEST SERPL-MCNC: 138 MG/DL (ref 0–199)
CHOLESTEROL/HDL RATIO: 2
CO2 SERPL-SCNC: 27 MMOL/L (ref 20–31)
CREAT SERPL-MCNC: 1.1 MG/DL (ref 0.5–0.9)
ERYTHROCYTE [DISTWIDTH] IN BLOOD BY AUTOMATED COUNT: 13.2 % (ref 11.8–14.4)
GFR, ESTIMATED: 49 ML/MIN/1.73M2
GLUCOSE SERPL-MCNC: 124 MG/DL (ref 74–99)
HCT VFR BLD AUTO: 37.8 % (ref 36.3–47.1)
HDLC SERPL-MCNC: 82 MG/DL
HGB BLD-MCNC: 12.1 G/DL (ref 11.9–15.1)
LDLC SERPL CALC-MCNC: 44 MG/DL (ref 0–100)
MCH RBC QN AUTO: 30.3 PG (ref 25.2–33.5)
MCHC RBC AUTO-ENTMCNC: 32 G/DL (ref 28.4–34.8)
MCV RBC AUTO: 94.5 FL (ref 82.6–102.9)
NRBC BLD-RTO: 0 PER 100 WBC
PLATELET # BLD AUTO: 154 K/UL (ref 138–453)
PMV BLD AUTO: 10.2 FL (ref 8.1–13.5)
POTASSIUM SERPL-SCNC: 4.6 MMOL/L (ref 3.7–5.3)
PROT SERPL-MCNC: 6.3 G/DL (ref 6.6–8.7)
RBC # BLD AUTO: 4 M/UL (ref 3.95–5.11)
SODIUM SERPL-SCNC: 142 MMOL/L (ref 136–145)
TRIGL SERPL-MCNC: 63 MG/DL
VLDLC SERPL CALC-MCNC: 13 MG/DL
WBC OTHER # BLD: 4.4 K/UL (ref 3.5–11.3)

## 2024-05-22 PROCEDURE — 1123F ACP DISCUSS/DSCN MKR DOCD: CPT | Performed by: FAMILY MEDICINE

## 2024-05-22 PROCEDURE — 3078F DIAST BP <80 MM HG: CPT | Performed by: FAMILY MEDICINE

## 2024-05-22 PROCEDURE — 3074F SYST BP LT 130 MM HG: CPT | Performed by: FAMILY MEDICINE

## 2024-05-22 PROCEDURE — 99214 OFFICE O/P EST MOD 30 MIN: CPT | Performed by: FAMILY MEDICINE

## 2024-05-22 SDOH — ECONOMIC STABILITY: FOOD INSECURITY: WITHIN THE PAST 12 MONTHS, THE FOOD YOU BOUGHT JUST DIDN'T LAST AND YOU DIDN'T HAVE MONEY TO GET MORE.: NEVER TRUE

## 2024-05-22 SDOH — ECONOMIC STABILITY: FOOD INSECURITY: WITHIN THE PAST 12 MONTHS, YOU WORRIED THAT YOUR FOOD WOULD RUN OUT BEFORE YOU GOT MONEY TO BUY MORE.: NEVER TRUE

## 2024-05-22 SDOH — ECONOMIC STABILITY: INCOME INSECURITY: HOW HARD IS IT FOR YOU TO PAY FOR THE VERY BASICS LIKE FOOD, HOUSING, MEDICAL CARE, AND HEATING?: NOT HARD AT ALL

## 2024-05-22 ASSESSMENT — ENCOUNTER SYMPTOMS
RHINORRHEA: 0
NAUSEA: 0
BACK PAIN: 0
ABDOMINAL PAIN: 0
SHORTNESS OF BREATH: 0
ABDOMINAL DISTENTION: 0
SORE THROAT: 0
COUGH: 0
CHEST TIGHTNESS: 0
CONSTIPATION: 0
VOMITING: 0
DIARRHEA: 0

## 2024-05-22 ASSESSMENT — PATIENT HEALTH QUESTIONNAIRE - PHQ9
2. FEELING DOWN, DEPRESSED OR HOPELESS: NOT AT ALL
SUM OF ALL RESPONSES TO PHQ QUESTIONS 1-9: 0
SUM OF ALL RESPONSES TO PHQ QUESTIONS 1-9: 0
1. LITTLE INTEREST OR PLEASURE IN DOING THINGS: NOT AT ALL
SUM OF ALL RESPONSES TO PHQ QUESTIONS 1-9: 0
SUM OF ALL RESPONSES TO PHQ QUESTIONS 1-9: 0
SUM OF ALL RESPONSES TO PHQ9 QUESTIONS 1 & 2: 0

## 2024-05-22 NOTE — PROGRESS NOTES
yearly eye exams    Will cont with Cozaar for renal protection    Stay well hydrated and no NSAID's    Will cont to follow with Cardiology as instructed    Get labs now and will call with results     Rest of systems unchanged, continue current treatments.    Medications, labs, diagnostic studies, consultations and follow-up as documented in this encounter. Rest of systems unchanged, continue current treatments    On this date May 22, 2024,  I have spent greater than 50% of visit reviewing previous notes, test results and face to face with the patient discussing the diagnoses, importance of compliance with the treatment plan, counseling, coordinating care as well as documenting on the day of the visit.    Billie Espinal MD

## 2024-05-23 ENCOUNTER — TELEMEDICINE (OUTPATIENT)
Dept: FAMILY MEDICINE CLINIC | Age: 79
End: 2024-05-23
Payer: MEDICARE

## 2024-05-23 DIAGNOSIS — Z00.00 MEDICARE ANNUAL WELLNESS VISIT, SUBSEQUENT: Primary | ICD-10-CM

## 2024-05-23 PROCEDURE — 1123F ACP DISCUSS/DSCN MKR DOCD: CPT | Performed by: FAMILY MEDICINE

## 2024-05-23 PROCEDURE — G0439 PPPS, SUBSEQ VISIT: HCPCS | Performed by: FAMILY MEDICINE

## 2024-05-23 ASSESSMENT — PATIENT HEALTH QUESTIONNAIRE - PHQ9
SUM OF ALL RESPONSES TO PHQ QUESTIONS 1-9: 0
1. LITTLE INTEREST OR PLEASURE IN DOING THINGS: NOT AT ALL
SUM OF ALL RESPONSES TO PHQ QUESTIONS 1-9: 0
2. FEELING DOWN, DEPRESSED OR HOPELESS: NOT AT ALL
SUM OF ALL RESPONSES TO PHQ QUESTIONS 1-9: 0
SUM OF ALL RESPONSES TO PHQ QUESTIONS 1-9: 0
SUM OF ALL RESPONSES TO PHQ9 QUESTIONS 1 & 2: 0

## 2024-05-23 ASSESSMENT — LIFESTYLE VARIABLES: HOW OFTEN DO YOU HAVE A DRINK CONTAINING ALCOHOL: NEVER

## 2024-05-23 NOTE — PATIENT INSTRUCTIONS
be carpeted or fitted with a non-skid tread.   Is your telephone easily reachable. Is the cord safely tucked away?   Room by Room   According to the [employer] of Aging, bathrooms and shahid are the two most potentially hazardous rooms in your home.   In the Kitchen    Be sure your stove is in proper working order and always make sure burners and the oven are off before you go out or go to sleep.    Keep pots on the back burners, turn handles away from the front of the stove, and keep stove clean and free of grease build-up.    Kitchen ventilation systems and range exhausts should be working properly.    Keep flammable objects such as towels and pot holders away from the cooking area except when in use. Make sure kitchen curtains are tied back.    Move cords and appliances away from the sink and hot surfaces. If extension cords are needed, install wiring guides so they do not hang over the sink, range, or working areas.    Look for coffee pots, kettles and toaster ovens with automatic shut-offs.    Keep a mop handy in the kitchen so you can wipe up spills instantly. You should also have a small fire extinguisher.    Arrange your kitchen with frequently used items on lower shelves to avoid the need to stand on a stepstool to reach them.    Make sure countertops are well-lit to avoid injuries while cutting and preparing food.    In the Bathroom    Use a non-slip mat or decals in the tub and shower, since wet, soapy tile or porcelain surfaces are extremely slippery.    Make sure bathroom rugs are non-skid or tape them firmly to the floor. Bathtubs should have at least one, preferably two, grab bars, firmly attached to structural supports in the wall. (Do not use built-in soap holders or glass shower doors as grab bars.)    Tub seats fitted with non-slip material on the legs allow you to wash sitting down. For people with limited mobility, bathtub transfer benches allow you to slide safely into the tub.    Raised

## 2024-05-23 NOTE — PROGRESS NOTES
Medicare Annual Wellness Visit    Francia Ferguson is here for Medicare AWV (labs)    Assessment & Plan     Recommendations for Preventive Services Due: see orders and patient instructions/AVS.  Recommended screening schedule for the next 5-10 years is provided to the patient in written form: see Patient Instructions/AVS.     No follow-ups on file.     Subjective     Patient's complete Health Risk Assessment and screening values have been reviewed and are found in Flowsheets. The following problems were reviewed today and where indicated follow up appointments were made and/or referrals ordered.    Positive Risk Factor Screenings with Interventions:                Activity, Diet, and Weight:  On average, how many days per week do you engage in moderate to strenuous exercise (like a brisk walk)?: 3 days  On average, how many minutes do you engage in exercise at this level?: 30 min    Do you eat balanced/healthy meals regularly?: (!) No    There is no height or weight on file to calculate BMI.      Do you eat balanced/healthy meals regularly Interventions:  Patient declines any further evaluation or treatment           Safety:  Do you have working smoke detectors?: (!) No    Interventions:  See AVS for additional education material                   Objective      Patient-Reported Vitals  No data recorded          No Known Allergies  Prior to Visit Medications    Medication Sig Taking? Authorizing Provider   UNABLE TO FIND prevegen Yes Provider, MD Devendra   atorvastatin (LIPITOR) 40 MG tablet Take 1 tablet by mouth daily Yes Abraham Fraire DO   isosorbide mononitrate (IMDUR) 120 MG extended release tablet Take 1 tablet by mouth daily Yes Abraham Fraire DO   metoprolol succinate (TOPROL XL) 25 MG extended release tablet Take 1 tablet by mouth daily Yes Abraham Fraire DO   clopidogrel (PLAVIX) 75 MG tablet Take 1 tablet by mouth daily Yes Abraham Fraire DO   Insulin Glargine, 2 Unit Dial, (TOUJEO MAX SOLOSTAR) 300 UNIT/ML SOPN

## 2024-10-31 ENCOUNTER — TELEPHONE (OUTPATIENT)
Dept: FAMILY MEDICINE CLINIC | Age: 79
End: 2024-10-31

## 2024-10-31 NOTE — TELEPHONE ENCOUNTER
Pt called in requesting an appt today or tomorrow because her BS was in the 300s-400s yesterday and she has been feeling weak. BS was 203 12pm today. Pt prefers a later appt. Please advise.

## 2024-10-31 NOTE — TELEPHONE ENCOUNTER
Patient called back and said she called Dr. Roberts yesterday and he was not in the office and she didn't really understand what they were telling her to do. I asked if she called them today and she said no. I instructed her to call them since they are managing her DM and let us know if they don't help.

## 2024-11-14 ENCOUNTER — OFFICE VISIT (OUTPATIENT)
Dept: PRIMARY CARE CLINIC | Age: 79
End: 2024-11-14

## 2024-11-14 VITALS
HEART RATE: 69 BPM | BODY MASS INDEX: 21.62 KG/M2 | HEIGHT: 63 IN | SYSTOLIC BLOOD PRESSURE: 120 MMHG | OXYGEN SATURATION: 99 % | DIASTOLIC BLOOD PRESSURE: 56 MMHG | WEIGHT: 122 LBS

## 2024-11-14 DIAGNOSIS — Z76.89 ENCOUNTER TO ESTABLISH CARE WITH NEW DOCTOR: Primary | ICD-10-CM

## 2024-11-14 DIAGNOSIS — E11.22 TYPE 2 DIABETES MELLITUS WITH STAGE 3A CHRONIC KIDNEY DISEASE, WITH LONG-TERM CURRENT USE OF INSULIN (HCC): ICD-10-CM

## 2024-11-14 DIAGNOSIS — I10 PRIMARY HYPERTENSION: ICD-10-CM

## 2024-11-14 DIAGNOSIS — N18.31 TYPE 2 DIABETES MELLITUS WITH STAGE 3A CHRONIC KIDNEY DISEASE, WITH LONG-TERM CURRENT USE OF INSULIN (HCC): ICD-10-CM

## 2024-11-14 DIAGNOSIS — Z79.4 TYPE 2 DIABETES MELLITUS WITH STAGE 3A CHRONIC KIDNEY DISEASE, WITH LONG-TERM CURRENT USE OF INSULIN (HCC): ICD-10-CM

## 2024-11-14 DIAGNOSIS — Z23 NEED FOR VACCINATION: ICD-10-CM

## 2024-11-14 DIAGNOSIS — I25.118 CORONARY ARTERY DISEASE OF NATIVE ARTERY OF NATIVE HEART WITH STABLE ANGINA PECTORIS (HCC): ICD-10-CM

## 2024-11-14 DIAGNOSIS — E11.3599 PROLIFERATIVE DIABETIC RETINOPATHY ASSOCIATED WITH TYPE 2 DIABETES MELLITUS, MACULAR EDEMA PRESENCE UNSPECIFIED, UNSPECIFIED LATERALITY (HCC): ICD-10-CM

## 2024-11-14 LAB
ALBUMIN/GLOBULIN RATIO: 1.7 (ref 1–2.5)
ALBUMIN: 4.2 G/DL (ref 3.5–5.2)
ALP BLD-CCNC: 64 U/L (ref 35–104)
ALT SERPL-CCNC: 24 U/L (ref 10–35)
ANION GAP SERPL CALCULATED.3IONS-SCNC: 10 MMOL/L (ref 9–16)
AST SERPL-CCNC: 30 U/L (ref 10–35)
BILIRUB SERPL-MCNC: 0.5 MG/DL (ref 0–1.2)
BUN BLDV-MCNC: 24 MG/DL (ref 8–23)
CALCIUM SERPL-MCNC: 9.9 MG/DL (ref 8.6–10.4)
CHLORIDE BLD-SCNC: 105 MMOL/L (ref 98–107)
CO2: 27 MMOL/L (ref 20–31)
CREAT SERPL-MCNC: 1.2 MG/DL (ref 0.6–0.9)
GFR, ESTIMATED: 46 ML/MIN/1.73M2
GLUCOSE BLD-MCNC: 149 MG/DL (ref 74–99)
POTASSIUM SERPL-SCNC: 5 MMOL/L (ref 3.7–5.3)
SODIUM BLD-SCNC: 142 MMOL/L (ref 136–145)
TOTAL PROTEIN: 6.7 G/DL (ref 6.6–8.7)

## 2024-11-14 ASSESSMENT — ENCOUNTER SYMPTOMS
VOMITING: 0
ABDOMINAL PAIN: 0
NAUSEA: 0
SHORTNESS OF BREATH: 0

## 2024-11-14 NOTE — PROGRESS NOTES
with Imdur 120 mg daily, Plavix 75 mg daily, ASA 81 mg daily, atorvastatin 40 mg daily - managed by cardiologist, Dr. Fraire. Patient was not aware that she had a stent.    Plan:   Continue to follow with endocrinology for management of diabetes  Continue to follow with cardiology for management of CAD, HTN  Patient reminded to have yearly diabetic eye exam  Give flu shot  Recheck renal function for CKD    Return in about 3 months (around 2/14/2025) for Medicate Wellness Visit.    Orders Placed This Encounter   Procedures    Influenza, FLUAD Trivalent, (age 65 y+), IM, Preservative Free, 0.5mL    Comprehensive Metabolic Panel     Standing Status:   Future     Number of Occurrences:   1     Standing Expiration Date:   11/15/2025     No orders of the defined types were placed in this encounter.      Discussed use, benefit, and side effects of prescribed medications. All patient questions answered. Pt voiced understanding. Reviewed health maintenance. Patient agreed with treatment plan. Follow up as directed.     Electronically signed by Jack Boone DO on 11/14/2024 at 12:29 PM

## 2024-12-30 DIAGNOSIS — N18.31 TYPE 2 DIABETES MELLITUS WITH STAGE 3A CHRONIC KIDNEY DISEASE, WITH LONG-TERM CURRENT USE OF INSULIN (HCC): Primary | ICD-10-CM

## 2024-12-30 DIAGNOSIS — Z79.4 TYPE 2 DIABETES MELLITUS WITH STAGE 3A CHRONIC KIDNEY DISEASE, WITH LONG-TERM CURRENT USE OF INSULIN (HCC): Primary | ICD-10-CM

## 2024-12-30 DIAGNOSIS — E11.22 TYPE 2 DIABETES MELLITUS WITH STAGE 3A CHRONIC KIDNEY DISEASE, WITH LONG-TERM CURRENT USE OF INSULIN (HCC): Primary | ICD-10-CM

## 2024-12-30 RX ORDER — FLURBIPROFEN SODIUM 0.3 MG/ML
1 SOLUTION/ DROPS OPHTHALMIC DAILY
Qty: 100 EACH | Refills: 3 | Status: SHIPPED | OUTPATIENT
Start: 2024-12-30

## 2025-01-07 ENCOUNTER — OFFICE VISIT (OUTPATIENT)
Dept: PRIMARY CARE CLINIC | Age: 80
End: 2025-01-07
Payer: MEDICARE

## 2025-01-07 VITALS
DIASTOLIC BLOOD PRESSURE: 72 MMHG | WEIGHT: 121.4 LBS | SYSTOLIC BLOOD PRESSURE: 116 MMHG | OXYGEN SATURATION: 99 % | HEART RATE: 63 BPM | BODY MASS INDEX: 22.92 KG/M2 | HEIGHT: 61 IN

## 2025-01-07 DIAGNOSIS — R41.89 COGNITIVE IMPAIRMENT: ICD-10-CM

## 2025-01-07 DIAGNOSIS — R41.3 MEMORY DEFICIT: Primary | ICD-10-CM

## 2025-01-07 DIAGNOSIS — N18.32 CKD STAGE 3B, GFR 30-44 ML/MIN (HCC): ICD-10-CM

## 2025-01-07 PROCEDURE — 1036F TOBACCO NON-USER: CPT | Performed by: STUDENT IN AN ORGANIZED HEALTH CARE EDUCATION/TRAINING PROGRAM

## 2025-01-07 PROCEDURE — 1090F PRES/ABSN URINE INCON ASSESS: CPT | Performed by: STUDENT IN AN ORGANIZED HEALTH CARE EDUCATION/TRAINING PROGRAM

## 2025-01-07 PROCEDURE — G2211 COMPLEX E/M VISIT ADD ON: HCPCS | Performed by: STUDENT IN AN ORGANIZED HEALTH CARE EDUCATION/TRAINING PROGRAM

## 2025-01-07 PROCEDURE — 3078F DIAST BP <80 MM HG: CPT | Performed by: STUDENT IN AN ORGANIZED HEALTH CARE EDUCATION/TRAINING PROGRAM

## 2025-01-07 PROCEDURE — 1123F ACP DISCUSS/DSCN MKR DOCD: CPT | Performed by: STUDENT IN AN ORGANIZED HEALTH CARE EDUCATION/TRAINING PROGRAM

## 2025-01-07 PROCEDURE — 3074F SYST BP LT 130 MM HG: CPT | Performed by: STUDENT IN AN ORGANIZED HEALTH CARE EDUCATION/TRAINING PROGRAM

## 2025-01-07 PROCEDURE — 99214 OFFICE O/P EST MOD 30 MIN: CPT | Performed by: STUDENT IN AN ORGANIZED HEALTH CARE EDUCATION/TRAINING PROGRAM

## 2025-01-07 PROCEDURE — G8427 DOCREV CUR MEDS BY ELIG CLIN: HCPCS | Performed by: STUDENT IN AN ORGANIZED HEALTH CARE EDUCATION/TRAINING PROGRAM

## 2025-01-07 PROCEDURE — G8420 CALC BMI NORM PARAMETERS: HCPCS | Performed by: STUDENT IN AN ORGANIZED HEALTH CARE EDUCATION/TRAINING PROGRAM

## 2025-01-07 PROCEDURE — G8400 PT W/DXA NO RESULTS DOC: HCPCS | Performed by: STUDENT IN AN ORGANIZED HEALTH CARE EDUCATION/TRAINING PROGRAM

## 2025-01-07 PROCEDURE — 1159F MED LIST DOCD IN RCRD: CPT | Performed by: STUDENT IN AN ORGANIZED HEALTH CARE EDUCATION/TRAINING PROGRAM

## 2025-01-07 PROCEDURE — M1299 PR FLU IMMUNIZE ORDER/ADMIN: HCPCS | Performed by: STUDENT IN AN ORGANIZED HEALTH CARE EDUCATION/TRAINING PROGRAM

## 2025-01-07 ASSESSMENT — PATIENT HEALTH QUESTIONNAIRE - PHQ9
SUM OF ALL RESPONSES TO PHQ9 QUESTIONS 1 & 2: 2
SUM OF ALL RESPONSES TO PHQ QUESTIONS 1-9: 2
2. FEELING DOWN, DEPRESSED OR HOPELESS: MORE THAN HALF THE DAYS
SUM OF ALL RESPONSES TO PHQ QUESTIONS 1-9: 2
1. LITTLE INTEREST OR PLEASURE IN DOING THINGS: NOT AT ALL

## 2025-01-07 ASSESSMENT — ENCOUNTER SYMPTOMS
ABDOMINAL PAIN: 0
NAUSEA: 0
VOMITING: 0
SHORTNESS OF BREATH: 0

## 2025-01-07 NOTE — PROGRESS NOTES
[facility]  [facility]  [address] [address]  Dept: [phone]    Francia Ferguson is a 79 y.o. female established patient, who presents acutely for her complaints as noted below:    Chief Complaint   Patient presents with    Memory Loss     Patient is starting notice memory issues, started a couple weeks ago.       HPI:     Concern about memory difficulty. States that this started a couple weeks ago.     Reviewed prior notes from PCP.  Reviewed previous labs.    No components found for: \"LDLCHOLESTEROL\", \"LDLCALC\"    (goal LDL is <100)   AST (U/L)   Date Value   11/14/2024 30     ALT (U/L)   Date Value   11/14/2024 24     BUN (mg/dL)   Date Value   11/14/2024 24 (H)     Hemoglobin A1C (%)   Date Value   02/04/2022 7.2 (H)     TSH (uIU/mL)   Date Value   11/08/2023 2.32     BP Readings from Last 3 Encounters:   01/07/25 116/72   11/14/24 (!) 120/56   05/22/24 (!) 98/46          (goal 120/80)    Past Medical History:   Diagnosis Date    Abnormal cardiovascular stress test 06/2020    no ischemia / infarction   there is septal hypokinesis   /  EF 70%    CAD (coronary artery disease)     Chest pressure     Fatigue     History of pneumonia 03/2020    HTN (hypertension)     Hyperlipidemia     Pneumonia     Positive cardiac stress test     Renal insufficiency 8/18/2022    SOB (shortness of breath)     Type 2 diabetes mellitus without complication (HCC)       Past Surgical History:   Procedure Laterality Date    APPENDECTOMY      CARDIAC CATHETERIZATION  03/09/2021    CHOLECYSTECTOMY      COLONOSCOPY      CORONARY ANGIOPLASTY WITH STENT PLACEMENT  08/14/2020    High grade stenosis in Proximal RCA s/p successful PCI with DELIA (3.25 X 12 mm)    EYE SURGERY      HYSTERECTOMY (CERVIX STATUS UNKNOWN)      HYSTERECTOMY, VAGINAL      left both ovaries in       Family History   Problem Relation Age of Onset    Heart Disease Mother     Diabetes Mother     Diabetes Father

## 2025-01-24 ENCOUNTER — TELEPHONE (OUTPATIENT)
Dept: PRIMARY CARE CLINIC | Age: 80
End: 2025-01-24

## 2025-01-24 DIAGNOSIS — N18.32 CKD STAGE 3B, GFR 30-44 ML/MIN (HCC): ICD-10-CM

## 2025-01-24 NOTE — TELEPHONE ENCOUNTER
----- Message from Jacobo BOONE sent at 1/24/2025  8:49 AM EST -----  Regarding: ECC Referral Request  ECC Results Request    Which lab or imaging result is the patient calling about: MRI and Blood work    Which provider ordered the test? Jack Boone, DO    Was this a Non-[facility] Provider: Yes    Date the test was preformed (MM/DD/YYYY):1/20/2025  --------------------------------------------------------------------------------------------------------------------------    Relationship to Patient: Other /Son: Radha     Call Back Info: OK to leave message on voicemail  Preferred Call Back Number: Phone  [phone] or email: [email or web address]

## 2025-01-27 ENCOUNTER — HOSPITAL ENCOUNTER (EMERGENCY)
Age: 80
Discharge: HOME | End: 2025-01-27
Attending: EMERGENCY MEDICINE
Payer: MEDICARE

## 2025-01-27 VITALS
HEIGHT: 63 IN | SYSTOLIC BLOOD PRESSURE: 137 MMHG | TEMPERATURE: 98 F | OXYGEN SATURATION: 98 % | BODY MASS INDEX: 21.26 KG/M2 | HEART RATE: 71 BPM | DIASTOLIC BLOOD PRESSURE: 100 MMHG | WEIGHT: 120 LBS | RESPIRATION RATE: 15 BRPM

## 2025-01-27 DIAGNOSIS — N30.00 ACUTE CYSTITIS WITHOUT HEMATURIA: Primary | ICD-10-CM

## 2025-01-27 LAB
BACTERIA URNS QL MICRO: ABNORMAL
BILIRUB UR QL STRIP: NEGATIVE
CASTS #/AREA URNS LPF: ABNORMAL /LPF
CASTS #/AREA URNS LPF: ABNORMAL /LPF
CLARITY UR: ABNORMAL
COLOR UR: YELLOW
EPI CELLS #/AREA URNS HPF: ABNORMAL /HPF
GLUCOSE UR STRIP-MCNC: ABNORMAL MG/DL
HGB UR QL STRIP.AUTO: ABNORMAL
KETONES UR STRIP-MCNC: ABNORMAL MG/DL
LEUKOCYTE ESTERASE UR QL STRIP: ABNORMAL
NITRITE UR QL STRIP: NEGATIVE
PH UR STRIP: 5 [PH] (ref 5–8)
PROT UR STRIP-MCNC: ABNORMAL MG/DL
RBC #/AREA URNS HPF: ABNORMAL /HPF
SP GR UR STRIP: 1.03 (ref 1–1.03)
UROBILINOGEN UR STRIP-ACNC: NORMAL EU/DL (ref 0–1)
WBC #/AREA URNS HPF: ABNORMAL /HPF

## 2025-01-27 PROCEDURE — 87086 URINE CULTURE/COLONY COUNT: CPT

## 2025-01-27 PROCEDURE — 81001 URINALYSIS AUTO W/SCOPE: CPT

## 2025-01-27 PROCEDURE — 87077 CULTURE AEROBIC IDENTIFY: CPT

## 2025-01-27 PROCEDURE — 87186 SC STD MICRODIL/AGAR DIL: CPT

## 2025-01-27 PROCEDURE — 99283 EMERGENCY DEPT VISIT LOW MDM: CPT

## 2025-01-27 RX ORDER — CEPHALEXIN 500 MG/1
500 CAPSULE ORAL 2 TIMES DAILY
Qty: 14 CAPSULE | Refills: 0 | Status: SHIPPED | OUTPATIENT
Start: 2025-01-27 | End: 2025-02-03

## 2025-01-27 ASSESSMENT — ENCOUNTER SYMPTOMS
CHEST TIGHTNESS: 0
COUGH: 0
NAUSEA: 0
BACK PAIN: 0
FACIAL SWELLING: 0
EYE PAIN: 0
SHORTNESS OF BREATH: 0
SORE THROAT: 0
SINUS PRESSURE: 0
BLOOD IN STOOL: 0
TROUBLE SWALLOWING: 0
VOMITING: 0
DIARRHEA: 0
ABDOMINAL PAIN: 0
COLOR CHANGE: 0
EYE DISCHARGE: 0
EYE REDNESS: 0
CONSTIPATION: 0
WHEEZING: 0
RHINORRHEA: 0

## 2025-01-27 ASSESSMENT — LIFESTYLE VARIABLES
HOW MANY STANDARD DRINKS CONTAINING ALCOHOL DO YOU HAVE ON A TYPICAL DAY: PATIENT DOES NOT DRINK
HOW OFTEN DO YOU HAVE A DRINK CONTAINING ALCOHOL: NEVER

## 2025-01-27 ASSESSMENT — PAIN - FUNCTIONAL ASSESSMENT: PAIN_FUNCTIONAL_ASSESSMENT: NONE - DENIES PAIN

## 2025-01-27 NOTE — ED PROVIDER NOTES
and neck pain.   Skin:  Negative for color change, pallor, rash and wound.   Neurological:  Negative for dizziness, tremors, seizures, syncope, speech difficulty, weakness, numbness and headaches.   Psychiatric/Behavioral:  Positive for agitation and confusion. Negative for decreased concentration, hallucinations, self-injury, sleep disturbance and suicidal ideas.        PAST MEDICAL HISTORY     Past Medical History:   Diagnosis Date    Abnormal cardiovascular stress test 06/2020    no ischemia / infarction   there is septal hypokinesis   /  EF 70%    CAD (coronary artery disease)     Chest pressure     Fatigue     History of pneumonia 03/2020    HTN (hypertension)     Hyperlipidemia     Pneumonia     Positive cardiac stress test     Renal insufficiency 8/18/2022    SOB (shortness of breath)     Type 2 diabetes mellitus without complication (HCC)        SURGICAL HISTORY       Past Surgical History:   Procedure Laterality Date    APPENDECTOMY      CARDIAC CATHETERIZATION  03/09/2021    CHOLECYSTECTOMY      COLONOSCOPY      CORONARY ANGIOPLASTY WITH STENT PLACEMENT  08/14/2020    High grade stenosis in Proximal RCA s/p successful PCI with DELIA (3.25 X 12 mm)    EYE SURGERY      HYSTERECTOMY (CERVIX STATUS UNKNOWN)      HYSTERECTOMY, VAGINAL      left both ovaries in       CURRENT MEDICATIONS       Previous Medications    ASPIRIN 81 MG CHEWABLE TABLET    Take 1 tablet by mouth daily    ATORVASTATIN (LIPITOR) 40 MG TABLET    Take 1 tablet by mouth daily    B-D UF III MINI PEN NEEDLES 31G X 5 MM MISC    Inject 1 each as directed daily    CLOPIDOGREL (PLAVIX) 75 MG TABLET    Take 1 tablet by mouth daily    COENZYME Q10 (CO Q-10) 100 MG CAPS    Take 1 capsule by mouth daily    CONTINUOUS GLUCOSE SENSOR (FREESTYLE OLGA 2 SENSOR) MISC    Change every 14 days.    INSULIN GLARGINE, 2 UNIT DIAL, (TOUJEO MAX SOLOSTAR) 300 UNIT/ML SOPN    Inject 8U nightly    INSULIN LISPRO, 1 UNIT DIAL, 100 UNIT/ML SOPN    Inject into the skin 3

## 2025-01-28 LAB
MICROORGANISM SPEC CULT: ABNORMAL
SPECIMEN DESCRIPTION: ABNORMAL

## 2025-01-30 ENCOUNTER — OFFICE VISIT (OUTPATIENT)
Dept: PRIMARY CARE CLINIC | Age: 80
End: 2025-01-30
Payer: MEDICARE

## 2025-01-30 ENCOUNTER — TELEPHONE (OUTPATIENT)
Dept: PRIMARY CARE CLINIC | Age: 80
End: 2025-01-30

## 2025-01-30 VITALS
SYSTOLIC BLOOD PRESSURE: 130 MMHG | WEIGHT: 119.8 LBS | BODY MASS INDEX: 22.62 KG/M2 | HEART RATE: 70 BPM | DIASTOLIC BLOOD PRESSURE: 68 MMHG | HEIGHT: 61 IN | OXYGEN SATURATION: 99 %

## 2025-01-30 DIAGNOSIS — R41.89 COGNITIVE IMPAIRMENT: Primary | ICD-10-CM

## 2025-01-30 DIAGNOSIS — E11.3599 PROLIFERATIVE DIABETIC RETINOPATHY ASSOCIATED WITH TYPE 2 DIABETES MELLITUS, MACULAR EDEMA PRESENCE UNSPECIFIED, UNSPECIFIED LATERALITY (HCC): ICD-10-CM

## 2025-01-30 DIAGNOSIS — R41.3 MEMORY DEFICIT: ICD-10-CM

## 2025-01-30 DIAGNOSIS — Z74.09 IMPAIRED MOBILITY: ICD-10-CM

## 2025-01-30 PROBLEM — I10 PRIMARY HYPERTENSION: Status: ACTIVE | Noted: 2024-04-10

## 2025-01-30 PROBLEM — I25.10 ARTERIOSCLEROSIS OF CORONARY ARTERY: Status: ACTIVE | Noted: 2021-03-04

## 2025-01-30 PROCEDURE — 1123F ACP DISCUSS/DSCN MKR DOCD: CPT | Performed by: STUDENT IN AN ORGANIZED HEALTH CARE EDUCATION/TRAINING PROGRAM

## 2025-01-30 PROCEDURE — G2211 COMPLEX E/M VISIT ADD ON: HCPCS | Performed by: STUDENT IN AN ORGANIZED HEALTH CARE EDUCATION/TRAINING PROGRAM

## 2025-01-30 PROCEDURE — 1159F MED LIST DOCD IN RCRD: CPT | Performed by: STUDENT IN AN ORGANIZED HEALTH CARE EDUCATION/TRAINING PROGRAM

## 2025-01-30 PROCEDURE — 3075F SYST BP GE 130 - 139MM HG: CPT | Performed by: STUDENT IN AN ORGANIZED HEALTH CARE EDUCATION/TRAINING PROGRAM

## 2025-01-30 PROCEDURE — 1036F TOBACCO NON-USER: CPT | Performed by: STUDENT IN AN ORGANIZED HEALTH CARE EDUCATION/TRAINING PROGRAM

## 2025-01-30 PROCEDURE — G8400 PT W/DXA NO RESULTS DOC: HCPCS | Performed by: STUDENT IN AN ORGANIZED HEALTH CARE EDUCATION/TRAINING PROGRAM

## 2025-01-30 PROCEDURE — G8420 CALC BMI NORM PARAMETERS: HCPCS | Performed by: STUDENT IN AN ORGANIZED HEALTH CARE EDUCATION/TRAINING PROGRAM

## 2025-01-30 PROCEDURE — 3078F DIAST BP <80 MM HG: CPT | Performed by: STUDENT IN AN ORGANIZED HEALTH CARE EDUCATION/TRAINING PROGRAM

## 2025-01-30 PROCEDURE — 1090F PRES/ABSN URINE INCON ASSESS: CPT | Performed by: STUDENT IN AN ORGANIZED HEALTH CARE EDUCATION/TRAINING PROGRAM

## 2025-01-30 PROCEDURE — 99214 OFFICE O/P EST MOD 30 MIN: CPT | Performed by: STUDENT IN AN ORGANIZED HEALTH CARE EDUCATION/TRAINING PROGRAM

## 2025-01-30 PROCEDURE — G8427 DOCREV CUR MEDS BY ELIG CLIN: HCPCS | Performed by: STUDENT IN AN ORGANIZED HEALTH CARE EDUCATION/TRAINING PROGRAM

## 2025-01-30 RX ORDER — MEMANTINE HYDROCHLORIDE 5 MG/1
5 TABLET ORAL 2 TIMES DAILY
Qty: 60 TABLET | Refills: 2 | Status: SHIPPED | OUTPATIENT
Start: 2025-01-30

## 2025-01-30 SDOH — ECONOMIC STABILITY: FOOD INSECURITY: WITHIN THE PAST 12 MONTHS, YOU WORRIED THAT YOUR FOOD WOULD RUN OUT BEFORE YOU GOT MONEY TO BUY MORE.: NEVER TRUE

## 2025-01-30 SDOH — ECONOMIC STABILITY: FOOD INSECURITY: WITHIN THE PAST 12 MONTHS, THE FOOD YOU BOUGHT JUST DIDN'T LAST AND YOU DIDN'T HAVE MONEY TO GET MORE.: NEVER TRUE

## 2025-01-30 ASSESSMENT — ENCOUNTER SYMPTOMS
NAUSEA: 0
VOMITING: 0
ABDOMINAL PAIN: 0
SHORTNESS OF BREATH: 0

## 2025-01-30 NOTE — PROGRESS NOTES
[facility]  [facility]  [address]  [facility] [address]  Dept: [phone]    Francia Ferguson is a 79 y.o. female established patient, who presents today for her medical conditions/complaints as noted below:    Chief Complaint   Patient presents with    Follow-up     Patient presents today for an ED follow up from [facility] on 1/27/25 for a bladder infection. Patient would also like to discuss previous MRI. Needs new handicap placard, they were supposed to get one at last appointment.       HPI:     Here for cognitive impairment follow-up. Patient completed MRI. Family here to discuss results. They have concerns about agitation and confusion in the patient. Treated for UTI in the ED 1/27/2025. Family states that cognition and memory has not improved with treatment of UTI.    Reviewed prior notes: previous PCP  Reviewed previous labs and imaging.    No components found for: \"LDLCHOLESTEROL\", \"LDLCALC\"    (goal LDL is <100)   AST (U/L)   Date Value   11/14/2024 30     ALT (U/L)   Date Value   11/14/2024 24     BUN (mg/dL)   Date Value   11/14/2024 24 (H)     Hemoglobin A1C (%)   Date Value   02/04/2022 7.2 (H)     TSH (uIU/mL)   Date Value   11/08/2023 2.32     BP Readings from Last 3 Encounters:   01/30/25 130/68   01/27/25 (!) 137/100   01/07/25 116/72          (goal 120/80)    Past Medical History:   Diagnosis Date    Abnormal cardiovascular stress test 06/2020    no ischemia / infarction   there is septal hypokinesis   /  EF 70%    CAD (coronary artery disease)     Chest pressure     Fatigue     History of pneumonia 03/2020    HTN (hypertension)     Hyperlipidemia     Pneumonia     Positive cardiac stress test     Renal insufficiency 8/18/2022    SOB (shortness of breath)     Type 2 diabetes mellitus without complication (HCC)       Past Surgical History:   Procedure Laterality Date    APPENDECTOMY      CARDIAC CATHETERIZATION  03/09/2021    CHOLECYSTECTOMY

## 2025-01-30 NOTE — TELEPHONE ENCOUNTER
Socorro Noriega stop by at the window this afternoon who is not in a patient hippa concerning about the patient behavior. Patient has been calling her and asking to pick her up stating she wants to go home when the patient is residing with the . Socorro did notice that her behavior has been getting worst. Family just wanted you to be aware.

## 2025-02-03 ENCOUNTER — HOSPITAL ENCOUNTER (EMERGENCY)
Age: 80
Discharge: HOME | End: 2025-02-03
Attending: STUDENT IN AN ORGANIZED HEALTH CARE EDUCATION/TRAINING PROGRAM
Payer: MEDICARE

## 2025-02-03 VITALS
WEIGHT: 119 LBS | SYSTOLIC BLOOD PRESSURE: 160 MMHG | RESPIRATION RATE: 16 BRPM | BODY MASS INDEX: 22.47 KG/M2 | DIASTOLIC BLOOD PRESSURE: 62 MMHG | OXYGEN SATURATION: 100 % | TEMPERATURE: 97.5 F | HEIGHT: 61 IN | HEART RATE: 67 BPM

## 2025-02-03 DIAGNOSIS — G91.9 HYDROCEPHALUS, UNSPECIFIED TYPE (HCC): Primary | ICD-10-CM

## 2025-02-03 LAB
BILIRUB UR QL STRIP: NEGATIVE
CASTS #/AREA URNS LPF: ABNORMAL /LPF
CASTS #/AREA URNS LPF: ABNORMAL /LPF
CLARITY UR: ABNORMAL
COLOR UR: YELLOW
CRYSTALS URNS MICRO: ABNORMAL /HPF
CRYSTALS URNS MICRO: ABNORMAL /HPF
EPI CELLS #/AREA URNS HPF: ABNORMAL /HPF
GLUCOSE UR STRIP-MCNC: ABNORMAL MG/DL
HGB UR QL STRIP.AUTO: NEGATIVE
KETONES UR STRIP-MCNC: ABNORMAL MG/DL
LEUKOCYTE ESTERASE UR QL STRIP: ABNORMAL
NITRITE UR QL STRIP: NEGATIVE
PH UR STRIP: 5 [PH] (ref 5–8)
PROT UR STRIP-MCNC: ABNORMAL MG/DL
RBC #/AREA URNS HPF: ABNORMAL /HPF
SP GR UR STRIP: 1.02 (ref 1–1.03)
UROBILINOGEN UR STRIP-ACNC: NORMAL EU/DL (ref 0–1)
WBC #/AREA URNS HPF: ABNORMAL /HPF

## 2025-02-03 PROCEDURE — 99283 EMERGENCY DEPT VISIT LOW MDM: CPT

## 2025-02-03 PROCEDURE — 81001 URINALYSIS AUTO W/SCOPE: CPT

## 2025-02-03 ASSESSMENT — ENCOUNTER SYMPTOMS
ABDOMINAL PAIN: 0
VOMITING: 0
SHORTNESS OF BREATH: 0
NAUSEA: 0

## 2025-02-03 NOTE — ED TRIAGE NOTES
Mode of arrival (squad #, walk in, police, etc) : EMS        Chief complaint(s): Agitation        Arrival Note (brief scenario, treatment PTA, etc).: Patient came to ED via EMS with chief complaint of Agitation. Patient is calm, alert and oriented at the moment. Pt has hx of DM, Dementia and hydrocephalus.         C= \"Have you ever felt that you should Cut down on your drinking?\"  No  A= \"Have people Annoyed you by criticizing your drinking?\"  No  G= \"Have you ever felt bad or Guilty about your drinking?\"  No  E= \"Have you ever had a drink as an Eye-opener first thing in the morning to steady your nerves or to help a hangover?\"  No      Deferred []      Reason for deferring: N/A    *If yes to two or more: probable alcohol abuse.*

## 2025-02-03 NOTE — DISCHARGE INSTRUCTIONS
Please follow-up with your primary care provider and your neurologist  Please continue taking all home medication as prescribed  You can stop taking the amantadine if you do not think it is helpful  Please stay well-hydrated  Return to the ER with any severe or worsening of symptoms that you cannot control at home

## 2025-02-03 NOTE — ED PROVIDER NOTES
Jonathan Becerra DO  Emergency Medicine Attending    (Please note that portions of this note were completed with a voice recognition program.  Efforts were made to edit the dictations but occasionally words are mis-transcribed.)          Jonathan Becerra DO  02/03/25 1525

## 2025-02-06 ENCOUNTER — OFFICE VISIT (OUTPATIENT)
Dept: NEUROLOGY | Age: 80
End: 2025-02-06
Payer: MEDICARE

## 2025-02-06 VITALS
SYSTOLIC BLOOD PRESSURE: 98 MMHG | HEART RATE: 80 BPM | DIASTOLIC BLOOD PRESSURE: 58 MMHG | BODY MASS INDEX: 22.24 KG/M2 | WEIGHT: 117.8 LBS | HEIGHT: 61 IN

## 2025-02-06 DIAGNOSIS — R41.3 MEMORY CHANGES: Primary | ICD-10-CM

## 2025-02-06 PROCEDURE — 3078F DIAST BP <80 MM HG: CPT | Performed by: STUDENT IN AN ORGANIZED HEALTH CARE EDUCATION/TRAINING PROGRAM

## 2025-02-06 PROCEDURE — 3074F SYST BP LT 130 MM HG: CPT | Performed by: STUDENT IN AN ORGANIZED HEALTH CARE EDUCATION/TRAINING PROGRAM

## 2025-02-06 PROCEDURE — 99483 ASSMT & CARE PLN PT COG IMP: CPT | Performed by: STUDENT IN AN ORGANIZED HEALTH CARE EDUCATION/TRAINING PROGRAM

## 2025-02-06 NOTE — PROGRESS NOTES
Initial Neurology Clinic Note    [facility]  Department of Neurology  Date of Service: 2/6/2025 at 10:08 AM      CLINIC NOTE - INITIAL VISIT  Subjective:     Francia Ferguson is a 79 y.o. left-handed female who is here for evaluation of memory problems. She is accompanied by her sons, Jameson and Ashok.  Patient lives with her spouse, Theo. Primary caregiver is patient and .  Patient and family identify problems with memory for couple of years but symptoms have become more noticeable over the past 4 to 5 weeks.  Both sons report frequent forgetfulness for example she would back her close daily thinking that she needs to leave home.  She misplaces her phone and glucose monitor.  She also repeats questions and conversations.  Last night she woke up at 3 AM thinking she has an appointment.  Patient talks about her grandparents and thinks that they live in her house.  She denies seeing them or talking to them.  There is no significant speech or word finding difficulty reported.  She was recently started on Namenda by her family doctor which make her dizzy and she stopped taking it after 2 doses.  She has history of hypotension and her blood pressure today was 98/58.  Patient/family denies hallucination , difficulty recognizing faces , abnormal body movements, changes in appetite , disinhibited/ compulsive behavior, depression.    ADLs: She is independent with her activities of daily life  Driving: She stopped driving a few months back for safety reasons.  Ambulation: Able to ambulate without any assistive device.  Denies any falls  Sleep: Denies sleep problems  Finances: She has always managed the finances and is currently managing them.  Sons are not aware if they are having any issues with finances and paying bills.   Medication administration: patient self medicates.    Previous work up:  MRI brain without contrast on 1/20/2025 reported as prominent ventricles.  No images available    Recent UA

## 2025-02-09 ENCOUNTER — HOSPITAL ENCOUNTER (INPATIENT)
Age: 80
LOS: 4 days | Discharge: NURSING, REHAB OR LONG-TERM CARE | DRG: 637 | End: 2025-02-13
Attending: EMERGENCY MEDICINE | Admitting: INTERNAL MEDICINE
Payer: MEDICARE

## 2025-02-09 ENCOUNTER — APPOINTMENT (OUTPATIENT)
Dept: CT IMAGING | Age: 80
DRG: 637 | End: 2025-02-09
Payer: MEDICARE

## 2025-02-09 ENCOUNTER — APPOINTMENT (OUTPATIENT)
Dept: GENERAL RADIOLOGY | Age: 80
DRG: 637 | End: 2025-02-09
Payer: MEDICARE

## 2025-02-09 DIAGNOSIS — R41.82 ALTERED MENTAL STATUS, UNSPECIFIED ALTERED MENTAL STATUS TYPE: Primary | ICD-10-CM

## 2025-02-09 DIAGNOSIS — J96.00 ACUTE RESPIRATORY FAILURE, UNSPECIFIED WHETHER WITH HYPOXIA OR HYPERCAPNIA: ICD-10-CM

## 2025-02-09 DIAGNOSIS — E11.11 DIABETIC KETOACIDOSIS WITH COMA ASSOCIATED WITH TYPE 2 DIABETES MELLITUS (HCC): ICD-10-CM

## 2025-02-09 DIAGNOSIS — I48.91 ATRIAL FIBRILLATION, UNSPECIFIED TYPE (HCC): ICD-10-CM

## 2025-02-09 PROBLEM — E11.10 DKA, TYPE 2, NOT AT GOAL (HCC): Status: ACTIVE | Noted: 2025-02-09

## 2025-02-09 LAB
ABO + RH BLD: NORMAL
ALBUMIN SERPL-MCNC: 4.1 G/DL (ref 3.5–5.2)
ALP SERPL-CCNC: 81 U/L (ref 35–104)
ALT SERPL-CCNC: 16 U/L (ref 10–35)
ANION GAP SERPL CALCULATED.3IONS-SCNC: 23 MMOL/L (ref 9–16)
ANION GAP SERPL CALCULATED.3IONS-SCNC: 31 MMOL/L (ref 9–16)
ANION GAP SERPL CALCULATED.3IONS-SCNC: 37 MMOL/L (ref 9–16)
ARM BAND NUMBER: NORMAL
ARTERIAL PATENCY WRIST A: ABNORMAL
AST SERPL-CCNC: 22 U/L (ref 10–35)
B-OH-BUTYR SERPL-MCNC: 11.95 MMOL/L (ref 0.02–0.27)
BACTERIA URNS QL MICRO: ABNORMAL
BASOPHILS # BLD: 0 K/UL (ref 0–0.2)
BASOPHILS NFR BLD: 0 % (ref 0–2)
BDY SITE: ABNORMAL
BILIRUB SERPL-MCNC: 0.2 MG/DL (ref 0–1.2)
BILIRUB UR QL STRIP: NEGATIVE
BLOOD BANK SAMPLE EXPIRATION: NORMAL
BLOOD GROUP ANTIBODIES SERPL: NEGATIVE
BODY TEMPERATURE: 37
BUN SERPL-MCNC: 46 MG/DL (ref 8–23)
BUN SERPL-MCNC: 48 MG/DL (ref 8–23)
BUN SERPL-MCNC: 49 MG/DL (ref 8–23)
CALCIUM SERPL-MCNC: 7.3 MG/DL (ref 8.6–10.4)
CALCIUM SERPL-MCNC: 7.7 MG/DL (ref 8.6–10.4)
CALCIUM SERPL-MCNC: 9.1 MG/DL (ref 8.6–10.4)
CASTS #/AREA URNS LPF: ABNORMAL /LPF
CHLORIDE SERPL-SCNC: 107 MMOL/L (ref 98–107)
CHLORIDE SERPL-SCNC: 113 MMOL/L (ref 98–107)
CHLORIDE SERPL-SCNC: 99 MMOL/L (ref 98–107)
CLARITY UR: CLEAR
CO2 SERPL-SCNC: 15 MMOL/L (ref 20–31)
CO2 SERPL-SCNC: 4 MMOL/L (ref 20–31)
CO2 SERPL-SCNC: 7 MMOL/L (ref 20–31)
COHGB MFR BLD: 0.3 % (ref 0–5)
COHGB MFR BLD: 0.3 % (ref 0–5)
COHGB MFR BLD: 1 % (ref 0–5)
COLOR UR: YELLOW
CREAT SERPL-MCNC: 2.3 MG/DL (ref 0.7–1.2)
CREAT SERPL-MCNC: 2.4 MG/DL (ref 0.7–1.2)
CREAT SERPL-MCNC: 2.6 MG/DL (ref 0.7–1.2)
EOSINOPHIL # BLD: 0 K/UL (ref 0–0.4)
EOSINOPHILS RELATIVE PERCENT: 0 % (ref 0–4)
EPI CELLS #/AREA URNS HPF: ABNORMAL /HPF
ERYTHROCYTE [DISTWIDTH] IN BLOOD BY AUTOMATED COUNT: 14.9 % (ref 11.5–14.9)
EST. AVERAGE GLUCOSE BLD GHB EST-MCNC: 166 MG/DL
FIO2 ON VENT: 30 %
FIO2 ON VENT: 30 %
FIO2 ON VENT: 40 %
FLUAV RNA RESP QL NAA+PROBE: NOT DETECTED
FLUBV RNA RESP QL NAA+PROBE: NOT DETECTED
GAS FLOW.O2 O2 DELIVERY SYS: ABNORMAL L/MIN
GAS FLOW.O2 SETTING OXYMISER: 18 L/MIN
GAS FLOW.O2 SETTING OXYMISER: 20 L/MIN
GAS FLOW.O2 SETTING OXYMISER: 20 L/MIN
GFR, ESTIMATED: 18 ML/MIN/1.73M2
GFR, ESTIMATED: 20 ML/MIN/1.73M2
GFR, ESTIMATED: 21 ML/MIN/1.73M2
GLUCOSE BLD-MCNC: 227 MG/DL (ref 65–105)
GLUCOSE BLD-MCNC: 248 MG/DL (ref 65–105)
GLUCOSE BLD-MCNC: 251 MG/DL (ref 65–105)
GLUCOSE BLD-MCNC: 310 MG/DL (ref 65–105)
GLUCOSE BLD-MCNC: 333 MG/DL (ref 65–105)
GLUCOSE BLD-MCNC: 439 MG/DL (ref 65–105)
GLUCOSE BLD-MCNC: 467 MG/DL (ref 65–105)
GLUCOSE BLD-MCNC: 546 MG/DL (ref 65–105)
GLUCOSE SERPL-MCNC: 272 MG/DL (ref 74–99)
GLUCOSE SERPL-MCNC: 513 MG/DL (ref 74–99)
GLUCOSE SERPL-MCNC: 690 MG/DL (ref 74–99)
GLUCOSE SERPL-MCNC: 741 MG/DL (ref 74–99)
GLUCOSE UR STRIP-MCNC: ABNORMAL MG/DL
HBA1C MFR BLD: 7.4 % (ref 4–6)
HCO3 ARTERIAL: 3.7 MMOL/L (ref 22–26)
HCO3 ARTERIAL: 4.1 MMOL/L (ref 22–26)
HCO3 ARTERIAL: 6.6 MMOL/L (ref 22–26)
HCT VFR BLD AUTO: 49.2 % (ref 36–46)
HGB BLD-MCNC: 14.9 G/DL (ref 12–16)
HGB UR QL STRIP.AUTO: ABNORMAL
INR PPP: 1.2
KETONES UR STRIP-MCNC: ABNORMAL MG/DL
LACTATE BLDV-SCNC: 4.6 MMOL/L (ref 0.5–1.9)
LACTATE BLDV-SCNC: 6.1 MMOL/L (ref 0.5–1.9)
LDLC SERPL DIRECT ASSAY-MCNC: 88 MG/DL
LEUKOCYTE ESTERASE UR QL STRIP: NEGATIVE
LIPASE SERPL-CCNC: 127 U/L (ref 13–60)
LYMPHOCYTES NFR BLD: 0.86 K/UL (ref 1–4.8)
LYMPHOCYTES RELATIVE PERCENT: 6 % (ref 24–44)
MAGNESIUM SERPL-MCNC: 1.8 MG/DL (ref 1.6–2.4)
MAGNESIUM SERPL-MCNC: 2.3 MG/DL (ref 1.6–2.4)
MAGNESIUM SERPL-MCNC: 2.7 MG/DL (ref 1.6–2.4)
MCH RBC QN AUTO: 30.1 PG (ref 26–34)
MCHC RBC AUTO-ENTMCNC: 30.3 G/DL (ref 31–37)
MCV RBC AUTO: 99.1 FL (ref 80–100)
METHEMOGLOBIN: 0.1 % (ref 0–1.9)
METHEMOGLOBIN: 0.2 % (ref 0–1.9)
METHEMOGLOBIN: 0.4 % (ref 0–1.9)
MONOCYTES NFR BLD: 0.86 K/UL (ref 0.1–1.3)
MONOCYTES NFR BLD: 6 % (ref 1–7)
MORPHOLOGY: NORMAL
NEGATIVE BASE EXCESS, ART: 23 MMOL/L (ref 0–2)
NEGATIVE BASE EXCESS, ART: 30.6 MMOL/L (ref 0–2)
NEGATIVE BASE EXCESS, ART: 30.8 MMOL/L (ref 0–2)
NEUTROPHILS NFR BLD: 88 % (ref 36–66)
NEUTS SEG NFR BLD: 12.68 K/UL (ref 1.3–9.1)
NITRITE UR QL STRIP: NEGATIVE
O2 SAT, ARTERIAL: 98.7 % (ref 95–98)
O2 SAT, ARTERIAL: 98.7 % (ref 95–98)
O2 SAT, ARTERIAL: 99.6 % (ref 95–98)
PCO2 ARTERIAL: 25.2 MMHG (ref 35–45)
PCO2 ARTERIAL: 25.9 MMHG (ref 35–45)
PCO2 ARTERIAL: 29.2 MMHG (ref 35–45)
PEEP RESPIRATORY: 7 CM[H2O]
PH ARTERIAL: 6.77 (ref 7.35–7.45)
PH ARTERIAL: 6.78 (ref 7.35–7.45)
PH ARTERIAL: 7.02 (ref 7.35–7.45)
PH UR STRIP: 5 [PH] (ref 5–8)
PHOSPHATE SERPL-MCNC: 1.3 MG/DL (ref 2.5–4.5)
PHOSPHATE SERPL-MCNC: 6.1 MG/DL (ref 2.5–4.5)
PHOSPHATE SERPL-MCNC: 9.5 MG/DL (ref 2.5–4.5)
PLATELET # BLD AUTO: 300 K/UL (ref 150–450)
PMV BLD AUTO: 8.6 FL (ref 6–12)
PO2 ARTERIAL: 170.7 MMHG (ref 80–100)
PO2 ARTERIAL: 173.1 MMHG (ref 80–100)
PO2 ARTERIAL: 236.9 MMHG (ref 80–100)
POTASSIUM SERPL-SCNC: 3.1 MMOL/L (ref 3.7–5.3)
POTASSIUM SERPL-SCNC: 3.9 MMOL/L (ref 3.7–5.3)
POTASSIUM SERPL-SCNC: 6.1 MMOL/L (ref 3.7–5.3)
PROCALCITONIN SERPL-MCNC: 0.42 NG/ML (ref 0–0.09)
PROT SERPL-MCNC: 6.8 G/DL (ref 6.6–8.7)
PROT UR STRIP-MCNC: ABNORMAL MG/DL
PROTHROMBIN TIME: 16.8 SEC (ref 11.8–14.6)
PT. POSITION: ABNORMAL
RBC # BLD AUTO: 4.97 M/UL (ref 4–5.2)
RBC #/AREA URNS HPF: ABNORMAL /HPF
RESPIRATORY RATE: 18
RESPIRATORY RATE: 20
RESPIRATORY RATE: 22
SARS-COV-2 RNA RESP QL NAA+PROBE: NOT DETECTED
SODIUM SERPL-SCNC: 140 MMOL/L (ref 136–145)
SODIUM SERPL-SCNC: 145 MMOL/L (ref 136–145)
SODIUM SERPL-SCNC: 151 MMOL/L (ref 136–145)
SOURCE: NORMAL
SP GR UR STRIP: 1.02 (ref 1–1.03)
SPECIMEN DESCRIPTION: NORMAL
T4 FREE SERPL-MCNC: 1.1 NG/DL (ref 0.9–1.7)
TEXT FOR RESPIRATORY: ABNORMAL
TOTAL RATE: 18
TOTAL RATE: 20
TOTAL RATE: 22
TRIGL SERPL-MCNC: 426 MG/DL (ref 0–149)
TROPONIN I SERPL HS-MCNC: 123 NG/L (ref 0–14)
TROPONIN I SERPL HS-MCNC: 69 NG/L (ref 0–14)
TROPONIN I SERPL HS-MCNC: 90 NG/L (ref 0–14)
TSH SERPL DL<=0.05 MIU/L-ACNC: 1.4 UIU/ML (ref 0.27–4.2)
UROBILINOGEN UR STRIP-ACNC: NORMAL EU/DL (ref 0–1)
VENTILATION MODE VENT: ABNORMAL
VT: 450
WBC #/AREA URNS HPF: ABNORMAL /HPF
WBC OTHER # BLD: 14.4 K/UL (ref 3.5–11)

## 2025-02-09 PROCEDURE — 2580000003 HC RX 258: Performed by: INTERNAL MEDICINE

## 2025-02-09 PROCEDURE — 87154 CUL TYP ID BLD PTHGN 6+ TRGT: CPT

## 2025-02-09 PROCEDURE — 84439 ASSAY OF FREE THYROXINE: CPT

## 2025-02-09 PROCEDURE — 31500 INSERT EMERGENCY AIRWAY: CPT

## 2025-02-09 PROCEDURE — 82947 ASSAY GLUCOSE BLOOD QUANT: CPT

## 2025-02-09 PROCEDURE — 84443 ASSAY THYROID STIM HORMONE: CPT

## 2025-02-09 PROCEDURE — 99285 EMERGENCY DEPT VISIT HI MDM: CPT

## 2025-02-09 PROCEDURE — 0BH17EZ INSERTION OF ENDOTRACHEAL AIRWAY INTO TRACHEA, VIA NATURAL OR ARTIFICIAL OPENING: ICD-10-PCS | Performed by: INTERNAL MEDICINE

## 2025-02-09 PROCEDURE — 80053 COMPREHEN METABOLIC PANEL: CPT

## 2025-02-09 PROCEDURE — 2500000003 HC RX 250 WO HCPCS: Performed by: EMERGENCY MEDICINE

## 2025-02-09 PROCEDURE — 6370000000 HC RX 637 (ALT 250 FOR IP): Performed by: EMERGENCY MEDICINE

## 2025-02-09 PROCEDURE — 2000000000 HC ICU R&B

## 2025-02-09 PROCEDURE — 70450 CT HEAD/BRAIN W/O DYE: CPT

## 2025-02-09 PROCEDURE — 2500000003 HC RX 250 WO HCPCS: Performed by: INTERNAL MEDICINE

## 2025-02-09 PROCEDURE — 6360000002 HC RX W HCPCS: Performed by: EMERGENCY MEDICINE

## 2025-02-09 PROCEDURE — 82805 BLOOD GASES W/O2 SATURATION: CPT

## 2025-02-09 PROCEDURE — 2580000003 HC RX 258: Performed by: EMERGENCY MEDICINE

## 2025-02-09 PROCEDURE — 86850 RBC ANTIBODY SCREEN: CPT

## 2025-02-09 PROCEDURE — 87040 BLOOD CULTURE FOR BACTERIA: CPT

## 2025-02-09 PROCEDURE — 36600 WITHDRAWAL OF ARTERIAL BLOOD: CPT

## 2025-02-09 PROCEDURE — 2500000003 HC RX 250 WO HCPCS

## 2025-02-09 PROCEDURE — 2580000003 HC RX 258: Performed by: NURSE PRACTITIONER

## 2025-02-09 PROCEDURE — 83735 ASSAY OF MAGNESIUM: CPT

## 2025-02-09 PROCEDURE — 83721 ASSAY OF BLOOD LIPOPROTEIN: CPT

## 2025-02-09 PROCEDURE — 93005 ELECTROCARDIOGRAM TRACING: CPT | Performed by: INTERNAL MEDICINE

## 2025-02-09 PROCEDURE — 82010 KETONE BODYS QUAN: CPT

## 2025-02-09 PROCEDURE — 81001 URINALYSIS AUTO W/SCOPE: CPT

## 2025-02-09 PROCEDURE — 36415 COLL VENOUS BLD VENIPUNCTURE: CPT

## 2025-02-09 PROCEDURE — 85025 COMPLETE CBC W/AUTO DIFF WBC: CPT

## 2025-02-09 PROCEDURE — 2580000003 HC RX 258

## 2025-02-09 PROCEDURE — 06HM33Z INSERTION OF INFUSION DEVICE INTO RIGHT FEMORAL VEIN, PERCUTANEOUS APPROACH: ICD-10-PCS | Performed by: INTERNAL MEDICINE

## 2025-02-09 PROCEDURE — 84478 ASSAY OF TRIGLYCERIDES: CPT

## 2025-02-09 PROCEDURE — 84484 ASSAY OF TROPONIN QUANT: CPT

## 2025-02-09 PROCEDURE — 86901 BLOOD TYPING SEROLOGIC RH(D): CPT

## 2025-02-09 PROCEDURE — 6360000002 HC RX W HCPCS: Performed by: NURSE PRACTITIONER

## 2025-02-09 PROCEDURE — 71045 X-RAY EXAM CHEST 1 VIEW: CPT

## 2025-02-09 PROCEDURE — 6360000002 HC RX W HCPCS: Performed by: INTERNAL MEDICINE

## 2025-02-09 PROCEDURE — 80048 BASIC METABOLIC PNL TOTAL CA: CPT

## 2025-02-09 PROCEDURE — 85610 PROTHROMBIN TIME: CPT

## 2025-02-09 PROCEDURE — 99291 CRITICAL CARE FIRST HOUR: CPT | Performed by: INTERNAL MEDICINE

## 2025-02-09 PROCEDURE — 96365 THER/PROPH/DIAG IV INF INIT: CPT

## 2025-02-09 PROCEDURE — 87636 SARSCOV2 & INF A&B AMP PRB: CPT

## 2025-02-09 PROCEDURE — 87205 SMEAR GRAM STAIN: CPT

## 2025-02-09 PROCEDURE — 83036 HEMOGLOBIN GLYCOSYLATED A1C: CPT

## 2025-02-09 PROCEDURE — 94002 VENT MGMT INPAT INIT DAY: CPT

## 2025-02-09 PROCEDURE — 84100 ASSAY OF PHOSPHORUS: CPT

## 2025-02-09 PROCEDURE — 96375 TX/PRO/DX INJ NEW DRUG ADDON: CPT

## 2025-02-09 PROCEDURE — 96366 THER/PROPH/DIAG IV INF ADDON: CPT

## 2025-02-09 PROCEDURE — 83690 ASSAY OF LIPASE: CPT

## 2025-02-09 PROCEDURE — 83605 ASSAY OF LACTIC ACID: CPT

## 2025-02-09 PROCEDURE — 86900 BLOOD TYPING SEROLOGIC ABO: CPT

## 2025-02-09 PROCEDURE — 93005 ELECTROCARDIOGRAM TRACING: CPT | Performed by: EMERGENCY MEDICINE

## 2025-02-09 PROCEDURE — 6370000000 HC RX 637 (ALT 250 FOR IP): Performed by: INTERNAL MEDICINE

## 2025-02-09 PROCEDURE — 87641 MR-STAPH DNA AMP PROBE: CPT

## 2025-02-09 PROCEDURE — 6360000002 HC RX W HCPCS

## 2025-02-09 PROCEDURE — 84145 PROCALCITONIN (PCT): CPT

## 2025-02-09 PROCEDURE — 5A1945Z RESPIRATORY VENTILATION, 24-96 CONSECUTIVE HOURS: ICD-10-PCS | Performed by: INTERNAL MEDICINE

## 2025-02-09 RX ORDER — 0.9 % SODIUM CHLORIDE 0.9 %
30 INTRAVENOUS SOLUTION INTRAVENOUS ONCE
Status: COMPLETED | OUTPATIENT
Start: 2025-02-09 | End: 2025-02-09

## 2025-02-09 RX ORDER — SODIUM CHLORIDE 0.9 % (FLUSH) 0.9 %
5-40 SYRINGE (ML) INJECTION EVERY 12 HOURS SCHEDULED
Status: DISCONTINUED | OUTPATIENT
Start: 2025-02-09 | End: 2025-02-13 | Stop reason: HOSPADM

## 2025-02-09 RX ORDER — SODIUM CHLORIDE 0.9 % (FLUSH) 0.9 %
5-40 SYRINGE (ML) INJECTION PRN
Status: DISCONTINUED | OUTPATIENT
Start: 2025-02-09 | End: 2025-02-13 | Stop reason: HOSPADM

## 2025-02-09 RX ORDER — 0.9 % SODIUM CHLORIDE 0.9 %
1000 INTRAVENOUS SOLUTION INTRAVENOUS ONCE
Status: COMPLETED | OUTPATIENT
Start: 2025-02-09 | End: 2025-02-09

## 2025-02-09 RX ORDER — ISOSORBIDE MONONITRATE 60 MG/1
120 TABLET, EXTENDED RELEASE ORAL DAILY
Status: DISCONTINUED | OUTPATIENT
Start: 2025-02-09 | End: 2025-02-13 | Stop reason: HOSPADM

## 2025-02-09 RX ORDER — METOPROLOL SUCCINATE 25 MG/1
25 TABLET, EXTENDED RELEASE ORAL DAILY
Status: DISCONTINUED | OUTPATIENT
Start: 2025-02-09 | End: 2025-02-10

## 2025-02-09 RX ORDER — DEXTROSE MONOHYDRATE, SODIUM CHLORIDE, AND POTASSIUM CHLORIDE 50; 1.49; 4.5 G/1000ML; G/1000ML; G/1000ML
INJECTION, SOLUTION INTRAVENOUS CONTINUOUS PRN
Status: DISCONTINUED | OUTPATIENT
Start: 2025-02-09 | End: 2025-02-10

## 2025-02-09 RX ORDER — POLYETHYLENE GLYCOL 3350 17 G/17G
17 POWDER, FOR SOLUTION ORAL DAILY PRN
Status: DISCONTINUED | OUTPATIENT
Start: 2025-02-09 | End: 2025-02-13 | Stop reason: HOSPADM

## 2025-02-09 RX ORDER — POTASSIUM CHLORIDE 7.45 MG/ML
10 INJECTION INTRAVENOUS PRN
Status: DISCONTINUED | OUTPATIENT
Start: 2025-02-09 | End: 2025-02-10

## 2025-02-09 RX ORDER — ATORVASTATIN CALCIUM 40 MG/1
40 TABLET, FILM COATED ORAL NIGHTLY
Status: DISCONTINUED | OUTPATIENT
Start: 2025-02-09 | End: 2025-02-12

## 2025-02-09 RX ORDER — ASPIRIN 81 MG/1
81 TABLET, CHEWABLE ORAL DAILY
Status: DISCONTINUED | OUTPATIENT
Start: 2025-02-09 | End: 2025-02-13 | Stop reason: HOSPADM

## 2025-02-09 RX ORDER — CLOPIDOGREL BISULFATE 75 MG/1
75 TABLET ORAL DAILY
Status: DISCONTINUED | OUTPATIENT
Start: 2025-02-09 | End: 2025-02-13 | Stop reason: HOSPADM

## 2025-02-09 RX ORDER — HEPARIN SODIUM 5000 [USP'U]/ML
5000 INJECTION, SOLUTION INTRAVENOUS; SUBCUTANEOUS EVERY 8 HOURS SCHEDULED
Status: DISCONTINUED | OUTPATIENT
Start: 2025-02-09 | End: 2025-02-09

## 2025-02-09 RX ORDER — ETOMIDATE 2 MG/ML
INJECTION INTRAVENOUS DAILY PRN
Status: COMPLETED | OUTPATIENT
Start: 2025-02-09 | End: 2025-02-09

## 2025-02-09 RX ORDER — ROCURONIUM BROMIDE 10 MG/ML
INJECTION, SOLUTION INTRAVENOUS DAILY PRN
Status: COMPLETED | OUTPATIENT
Start: 2025-02-09 | End: 2025-02-09

## 2025-02-09 RX ORDER — PROPOFOL 10 MG/ML
5-50 INJECTION, EMULSION INTRAVENOUS CONTINUOUS
Status: DISCONTINUED | OUTPATIENT
Start: 2025-02-09 | End: 2025-02-10

## 2025-02-09 RX ORDER — MAGNESIUM SULFATE HEPTAHYDRATE 40 MG/ML
2000 INJECTION, SOLUTION INTRAVENOUS PRN
Status: DISCONTINUED | OUTPATIENT
Start: 2025-02-09 | End: 2025-02-10

## 2025-02-09 RX ORDER — CHLORHEXIDINE GLUCONATE ORAL RINSE 1.2 MG/ML
15 SOLUTION DENTAL 2 TIMES DAILY
Status: DISCONTINUED | OUTPATIENT
Start: 2025-02-09 | End: 2025-02-10

## 2025-02-09 RX ORDER — ACETAMINOPHEN 325 MG/1
650 TABLET ORAL EVERY 6 HOURS PRN
Status: DISCONTINUED | OUTPATIENT
Start: 2025-02-09 | End: 2025-02-13 | Stop reason: HOSPADM

## 2025-02-09 RX ORDER — ONDANSETRON 2 MG/ML
4 INJECTION INTRAMUSCULAR; INTRAVENOUS EVERY 6 HOURS PRN
Status: DISCONTINUED | OUTPATIENT
Start: 2025-02-09 | End: 2025-02-13 | Stop reason: HOSPADM

## 2025-02-09 RX ORDER — NOREPINEPHRINE BITARTRATE 0.06 MG/ML
1-100 INJECTION, SOLUTION INTRAVENOUS CONTINUOUS
Status: DISCONTINUED | OUTPATIENT
Start: 2025-02-09 | End: 2025-02-10

## 2025-02-09 RX ORDER — SODIUM CHLORIDE 9 MG/ML
INJECTION, SOLUTION INTRAVENOUS PRN
Status: DISCONTINUED | OUTPATIENT
Start: 2025-02-09 | End: 2025-02-13 | Stop reason: HOSPADM

## 2025-02-09 RX ORDER — ONDANSETRON 4 MG/1
4 TABLET, ORALLY DISINTEGRATING ORAL EVERY 8 HOURS PRN
Status: DISCONTINUED | OUTPATIENT
Start: 2025-02-09 | End: 2025-02-13 | Stop reason: HOSPADM

## 2025-02-09 RX ORDER — M-VIT,TX,IRON,MINS/CALC/FOLIC 27MG-0.4MG
1 TABLET ORAL DAILY
Status: DISCONTINUED | OUTPATIENT
Start: 2025-02-09 | End: 2025-02-13 | Stop reason: HOSPADM

## 2025-02-09 RX ORDER — ACETAMINOPHEN 650 MG/1
650 SUPPOSITORY RECTAL EVERY 6 HOURS PRN
Status: DISCONTINUED | OUTPATIENT
Start: 2025-02-09 | End: 2025-02-13 | Stop reason: HOSPADM

## 2025-02-09 RX ORDER — METOPROLOL TARTRATE 1 MG/ML
5 INJECTION, SOLUTION INTRAVENOUS EVERY 4 HOURS
Status: DISCONTINUED | OUTPATIENT
Start: 2025-02-09 | End: 2025-02-09

## 2025-02-09 RX ORDER — FENTANYL CITRATE 50 UG/ML
100 INJECTION, SOLUTION INTRAMUSCULAR; INTRAVENOUS ONCE
Status: DISCONTINUED | OUTPATIENT
Start: 2025-02-09 | End: 2025-02-10

## 2025-02-09 RX ORDER — ATORVASTATIN CALCIUM 40 MG/1
40 TABLET, FILM COATED ORAL DAILY
Status: DISCONTINUED | OUTPATIENT
Start: 2025-02-09 | End: 2025-02-13 | Stop reason: HOSPADM

## 2025-02-09 RX ORDER — METOPROLOL TARTRATE 25 MG/1
25 TABLET, FILM COATED ORAL 2 TIMES DAILY
Status: DISCONTINUED | OUTPATIENT
Start: 2025-02-09 | End: 2025-02-10

## 2025-02-09 RX ORDER — DILTIAZEM HYDROCHLORIDE 5 MG/ML
10 INJECTION INTRAVENOUS ONCE
Status: COMPLETED | OUTPATIENT
Start: 2025-02-09 | End: 2025-02-09

## 2025-02-09 RX ORDER — FENTANYL CITRATE-0.9 % NACL/PF 10 MCG/ML
25-200 PLASTIC BAG, INJECTION (ML) INTRAVENOUS CONTINUOUS
Status: DISCONTINUED | OUTPATIENT
Start: 2025-02-09 | End: 2025-02-10

## 2025-02-09 RX ORDER — SODIUM CHLORIDE 9 MG/ML
INJECTION, SOLUTION INTRAVENOUS CONTINUOUS
Status: DISCONTINUED | OUTPATIENT
Start: 2025-02-09 | End: 2025-02-10

## 2025-02-09 RX ORDER — VANCOMYCIN 1.75 G/350ML
25 INJECTION, SOLUTION INTRAVENOUS ONCE
Status: COMPLETED | OUTPATIENT
Start: 2025-02-09 | End: 2025-02-09

## 2025-02-09 RX ADMIN — POTASSIUM CHLORIDE 10 MEQ: 7.46 INJECTION, SOLUTION INTRAVENOUS at 22:40

## 2025-02-09 RX ADMIN — SODIUM CHLORIDE, PRESERVATIVE FREE 10 ML: 5 INJECTION INTRAVENOUS at 20:23

## 2025-02-09 RX ADMIN — PIPERACILLIN AND TAZOBACTAM 4500 MG: 4; .5 INJECTION, POWDER, FOR SOLUTION INTRAVENOUS at 12:20

## 2025-02-09 RX ADMIN — PIPERACILLIN AND TAZOBACTAM 3375 MG: 3; .375 INJECTION, POWDER, LYOPHILIZED, FOR SOLUTION INTRAVENOUS at 20:19

## 2025-02-09 RX ADMIN — SODIUM CHLORIDE: 9 INJECTION, SOLUTION INTRAVENOUS at 12:58

## 2025-02-09 RX ADMIN — SODIUM CHLORIDE: 9 INJECTION, SOLUTION INTRAVENOUS at 16:50

## 2025-02-09 RX ADMIN — SODIUM CHLORIDE 1000 ML: 9 INJECTION, SOLUTION INTRAVENOUS at 16:51

## 2025-02-09 RX ADMIN — ROCURONIUM BROMIDE 75 MG: 10 INJECTION, SOLUTION INTRAVENOUS at 10:40

## 2025-02-09 RX ADMIN — POTASSIUM CHLORIDE 10 MEQ: 7.46 INJECTION, SOLUTION INTRAVENOUS at 21:10

## 2025-02-09 RX ADMIN — PANTOPRAZOLE SODIUM 8 MG/HR: 40 INJECTION, POWDER, LYOPHILIZED, FOR SOLUTION INTRAVENOUS at 17:07

## 2025-02-09 RX ADMIN — CHLORHEXIDINE GLUCONATE 0.12% ORAL RINSE 15 ML: 1.2 LIQUID ORAL at 20:18

## 2025-02-09 RX ADMIN — POTASSIUM CHLORIDE 10 MEQ: 7.46 INJECTION, SOLUTION INTRAVENOUS at 20:05

## 2025-02-09 RX ADMIN — Medication 5 MCG/MIN: at 18:33

## 2025-02-09 RX ADMIN — SODIUM CHLORIDE 1593 ML: 9 INJECTION, SOLUTION INTRAVENOUS at 11:34

## 2025-02-09 RX ADMIN — DILTIAZEM HYDROCHLORIDE 10 MG: 5 INJECTION, SOLUTION INTRAVENOUS at 11:40

## 2025-02-09 RX ADMIN — POTASSIUM CHLORIDE 10 MEQ: 7.46 INJECTION, SOLUTION INTRAVENOUS at 23:55

## 2025-02-09 RX ADMIN — POTASSIUM CHLORIDE 10 MEQ: 7.46 INJECTION, SOLUTION INTRAVENOUS at 18:18

## 2025-02-09 RX ADMIN — SODIUM BICARBONATE 100 MEQ: 84 INJECTION, SOLUTION INTRAVENOUS at 16:51

## 2025-02-09 RX ADMIN — PROPOFOL 10 MCG/KG/MIN: 10 INJECTION, EMULSION INTRAVENOUS at 16:52

## 2025-02-09 RX ADMIN — SODIUM PHOSPHATE, MONOBASIC, MONOHYDRATE AND SODIUM PHOSPHATE, DIBASIC, ANHYDROUS 15 MMOL: 142; 276 INJECTION, SOLUTION INTRAVENOUS at 21:52

## 2025-02-09 RX ADMIN — DEXTROSE MONOHYDRATE, SODIUM CHLORIDE, AND POTASSIUM CHLORIDE: 50; 4.5; 1.49 INJECTION, SOLUTION INTRAVENOUS at 20:38

## 2025-02-09 RX ADMIN — METOPROLOL TARTRATE 5 MG: 1 INJECTION, SOLUTION INTRAVENOUS at 18:10

## 2025-02-09 RX ADMIN — SODIUM CHLORIDE 15.2 UNITS/HR: 9 INJECTION, SOLUTION INTRAVENOUS at 17:31

## 2025-02-09 RX ADMIN — ETOMIDATE INJECTION 20 MG: 2 SOLUTION INTRAVENOUS at 10:40

## 2025-02-09 RX ADMIN — SODIUM CHLORIDE 10.8 UNITS/HR: 9 INJECTION, SOLUTION INTRAVENOUS at 13:03

## 2025-02-09 RX ADMIN — SODIUM BICARBONATE: 84 INJECTION, SOLUTION INTRAVENOUS at 11:56

## 2025-02-09 RX ADMIN — VANCOMYCIN 1250 MG: 1.75 INJECTION, SOLUTION INTRAVENOUS at 12:22

## 2025-02-09 RX ADMIN — PANTOPRAZOLE SODIUM 80 MG: 40 INJECTION, POWDER, LYOPHILIZED, FOR SOLUTION INTRAVENOUS at 16:54

## 2025-02-09 ASSESSMENT — PULMONARY FUNCTION TESTS
PIF_VALUE: 16
PIF_VALUE: 17
PIF_VALUE: 18
PIF_VALUE: 17
PIF_VALUE: 17
PIF_VALUE: 12
PIF_VALUE: 18
PIF_VALUE: 18
PIF_VALUE: 12
PIF_VALUE: 18
PIF_VALUE: 18
PIF_VALUE: 16
PIF_VALUE: 16
PIF_VALUE: 19
PIF_VALUE: 16
PIF_VALUE: 15
PIF_VALUE: 18
PIF_VALUE: 14
PIF_VALUE: 17
PIF_VALUE: 12
PIF_VALUE: 18
PIF_VALUE: 17
PIF_VALUE: 18
PIF_VALUE: 16
PIF_VALUE: 18
PIF_VALUE: 16
PIF_VALUE: 17
PIF_VALUE: 19
PIF_VALUE: 16
PIF_VALUE: 17
PIF_VALUE: 18
PIF_VALUE: 17

## 2025-02-09 NOTE — ED NOTES
Mode of arrival (squad #, walk in, police, etc) : [facility]. From home    Arrival Note (brief scenario, treatment PTA, etc).: EMS reports they were called to patients home due to altered mental status, on their arrival patient was unresponsive, en route to hospital they started to perform bag mask ventilations.   HR: 137-157 Rhythm: a-fib RVR  Glucose: 541  Per ems patient aspirated during an episode of coffee ground emesis.  Patient arrives to ED unresponsive. Respiratory therapist, Edi Madsen RN, Larissa COLMENARES and writer present at bedside. RSI preformed.

## 2025-02-09 NOTE — H&P
11.5 - 14.9 %    Platelets 300 150 - 450 k/uL    MPV 8.6 6.0 - 12.0 fL    Neutrophils % 88 (H) 36 - 66 %    Lymphocytes % 6 (L) 24 - 44 %    Monocytes % 6 1 - 7 %    Eosinophils % 0 0 - 4 %    Basophils % 0 0 - 2 %    Neutrophils Absolute 12.68 (H) 1.3 - 9.1 k/uL    Lymphocytes Absolute 0.86 (L) 1.0 - 4.8 k/uL    Monocytes Absolute 0.86 0.1 - 1.3 k/uL    Eosinophils Absolute 0.00 0.0 - 0.4 k/uL    Basophils Absolute 0.00 0.0 - 0.2 k/uL    Morphology Normal    EKG 12 Lead    Collection Time: 02/09/25 10:48 AM   Result Value Ref Range    Ventricular Rate 142 BPM    QRS Duration 84 ms    Q-T Interval 318 ms    QTc Calculation (Bazett) 489 ms    R Axis 69 degrees    T Axis -128 degrees   Urinalysis    Collection Time: 02/09/25 11:00 AM   Result Value Ref Range    Color, UA Yellow Yellow    Turbidity UA Clear Clear    Glucose, Ur LARGE (A) NEGATIVE mg/dL    Bilirubin, Urine NEGATIVE NEGATIVE    Ketones, Urine LARGE (A) NEGATIVE mg/dL    Specific Gravity, UA 1.025 1.000 - 1.030    Urine Hgb SMALL (A) NEGATIVE    pH, Urine 5.0 5.0 - 8.0    Protein, UA 2+ (A) NEGATIVE mg/dL    Urobilinogen, Urine Normal 0.0 - 1.0 EU/dL    Nitrite, Urine NEGATIVE NEGATIVE    Leukocyte Esterase, Urine NEGATIVE NEGATIVE   Blood Gas, Arterial    Collection Time: 02/09/25 11:00 AM   Result Value Ref Range    pH, Arterial 6.783 (LL) 7.350 - 7.450    pCO2, Arterial 25.2 (LL) 35.0 - 45.0 mmHg    pO2, Arterial 236.9 (H) 80.0 - 100.0 mmHg    HCO3, Arterial 3.7 (L) 22.0 - 26.0 mmol/L    Negative Base Excess, Art 30.8 (H) 0.0 - 2.0 mmol/L    O2 Sat, Arterial 98.7 (H) 95 - 98 %    Carboxyhemoglobin 0.3 0 - 5 %    Methemoglobin 0.2 0.0 - 1.9 %    Pt Temp 37     O2 Device/Flow/% VENTILATOR     Respiratory Rate 18     Joe Test PASS     Sample Site Right Radial Artery     Pt. Position SEMI-FOWLERS     Mode PRVC     Sed Rate 18     Total Rate 18          FIO2 40     Peep/Cpap 7    Microscopic Urinalysis    Collection Time: 02/09/25 11:00 AM   Result

## 2025-02-09 NOTE — ED PROVIDER NOTES
sodium chloride 0.9 % 100 mL IVPB ([id number])     Order Specific Question:   Antimicrobial Indications     Answer:   Sepsis of Unknown Etiology     Order Specific Question:   Sepsis duration of therapy     Answer:   Other    piperacillin-tazobactam (ZOSYN) 3,375 mg in sodium chloride 0.9 % 50 mL IVPB (mini-bag)     Order Specific Question:   Antimicrobial Indications     Answer:   Sepsis of Unknown Etiology     Order Specific Question:   Sepsis duration of therapy     Answer:   Other     DISCHARGE PRESCRIPTIONS:  Current Discharge Medication List        PHYSICIAN CONSULTS ORDERED THIS ENCOUNTER:  PHARMACY TO DOSE VANCOMYCIN  IP CONSULT TO INTERNAL MEDICINE  IP CONSULT TO CRITICAL CARE  IP CONSULT TO SOCIAL WORK  IP CONSULT TO CARDIOLOGY  IP CONSULT TO GI  IP CONSULT TO DIETITIAN  FINAL IMPRESSION      1. Altered mental status, unspecified altered mental status type    2. Diabetic ketoacidosis with coma associated with type 2 diabetes mellitus (HCC)    3. Acute respiratory failure, unspecified whether with hypoxia or hypercapnia          DISPOSITION/PLAN   DISPOSITION Admitted 02/09/2025 01:57:40 PM               OUTPATIENT FOLLOW UP THE PATIENT:  No follow-up provider specified.    DO Lavinia Price Nathan R,   02/09/25 2143       Obie Kate DO  02/09/25 2143

## 2025-02-10 ENCOUNTER — APPOINTMENT (OUTPATIENT)
Dept: GENERAL RADIOLOGY | Age: 80
DRG: 637 | End: 2025-02-10
Payer: MEDICARE

## 2025-02-10 ENCOUNTER — APPOINTMENT (OUTPATIENT)
Dept: MRI IMAGING | Age: 80
DRG: 637 | End: 2025-02-10
Payer: MEDICARE

## 2025-02-10 ENCOUNTER — APPOINTMENT (OUTPATIENT)
Age: 80
DRG: 637 | End: 2025-02-10
Attending: INTERNAL MEDICINE
Payer: MEDICARE

## 2025-02-10 PROBLEM — J96.00 ACUTE RESPIRATORY FAILURE (HCC): Status: ACTIVE | Noted: 2025-02-10

## 2025-02-10 PROBLEM — R41.82 ALTERED MENTAL STATUS: Status: ACTIVE | Noted: 2025-02-10

## 2025-02-10 PROBLEM — E11.11 DIABETIC KETOACIDOSIS WITH COMA ASSOCIATED WITH TYPE 2 DIABETES MELLITUS (HCC): Status: ACTIVE | Noted: 2025-02-09

## 2025-02-10 PROBLEM — I48.91 ATRIAL FIBRILLATION (HCC): Status: ACTIVE | Noted: 2025-02-10

## 2025-02-10 PROBLEM — D64.9 ANEMIA: Status: ACTIVE | Noted: 2025-02-10

## 2025-02-10 PROBLEM — K92.0 COFFEE GROUND EMESIS: Status: ACTIVE | Noted: 2025-02-10

## 2025-02-10 LAB
ANION GAP SERPL CALCULATED.3IONS-SCNC: 13 MMOL/L (ref 9–16)
ANION GAP SERPL CALCULATED.3IONS-SCNC: 14 MMOL/L (ref 9–16)
ANION GAP SERPL CALCULATED.3IONS-SCNC: 14 MMOL/L (ref 9–16)
ARTERIAL PATENCY WRIST A: ABNORMAL
BASOPHILS # BLD: 0 K/UL (ref 0–0.2)
BASOPHILS NFR BLD: 0 % (ref 0–2)
BDY SITE: ABNORMAL
BODY TEMPERATURE: 37
BUN SERPL-MCNC: 33 MG/DL (ref 8–23)
BUN SERPL-MCNC: 35 MG/DL (ref 8–23)
BUN SERPL-MCNC: 38 MG/DL (ref 8–23)
BUN SERPL-MCNC: 41 MG/DL (ref 8–23)
BUN SERPL-MCNC: 44 MG/DL (ref 8–23)
CALCIUM SERPL-MCNC: 7 MG/DL (ref 8.6–10.4)
CALCIUM SERPL-MCNC: 7 MG/DL (ref 8.6–10.4)
CALCIUM SERPL-MCNC: 7.1 MG/DL (ref 8.6–10.4)
CALCIUM SERPL-MCNC: 7.2 MG/DL (ref 8.6–10.4)
CALCIUM SERPL-MCNC: 7.4 MG/DL (ref 8.6–10.4)
CHLORIDE SERPL-SCNC: 116 MMOL/L (ref 98–107)
CHLORIDE SERPL-SCNC: 117 MMOL/L (ref 98–107)
CO2 SERPL-SCNC: 16 MMOL/L (ref 20–31)
CO2 SERPL-SCNC: 17 MMOL/L (ref 20–31)
CO2 SERPL-SCNC: 17 MMOL/L (ref 20–31)
CO2 SERPL-SCNC: 18 MMOL/L (ref 20–31)
CO2 SERPL-SCNC: 19 MMOL/L (ref 20–31)
COHGB MFR BLD: 1.2 % (ref 0–5)
CREAT SERPL-MCNC: 2.3 MG/DL (ref 0.7–1.2)
CREAT SERPL-MCNC: 2.4 MG/DL (ref 0.7–1.2)
CREAT SERPL-MCNC: 2.4 MG/DL (ref 0.7–1.2)
CREAT SERPL-MCNC: 2.6 MG/DL (ref 0.7–1.2)
EKG ATRIAL RATE: 65 BPM
EKG P AXIS: 66 DEGREES
EKG P-R INTERVAL: 242 MS
EKG Q-T INTERVAL: 376 MS
EKG QRS DURATION: 78 MS
EKG QTC CALCULATION (BAZETT): 391 MS
EKG R AXIS: 71 DEGREES
EKG T AXIS: 103 DEGREES
EKG VENTRICULAR RATE: 65 BPM
EOSINOPHIL # BLD: 0 K/UL (ref 0–0.4)
EOSINOPHILS RELATIVE PERCENT: 0 % (ref 0–4)
ERYTHROCYTE [DISTWIDTH] IN BLOOD BY AUTOMATED COUNT: 13.6 % (ref 11.5–14.9)
FIO2 ON VENT: 30 %
GAS FLOW.O2 O2 DELIVERY SYS: ABNORMAL L/MIN
GAS FLOW.O2 SETTING OXYMISER: 24 L/MIN
GFR, ESTIMATED: 18 ML/MIN/1.73M2
GFR, ESTIMATED: 20 ML/MIN/1.73M2
GFR, ESTIMATED: 20 ML/MIN/1.73M2
GFR, ESTIMATED: 21 ML/MIN/1.73M2
GLUCOSE BLD-MCNC: 103 MG/DL (ref 65–105)
GLUCOSE BLD-MCNC: 103 MG/DL (ref 65–105)
GLUCOSE BLD-MCNC: 106 MG/DL (ref 65–105)
GLUCOSE BLD-MCNC: 123 MG/DL (ref 65–105)
GLUCOSE BLD-MCNC: 133 MG/DL (ref 65–105)
GLUCOSE BLD-MCNC: 137 MG/DL (ref 65–105)
GLUCOSE BLD-MCNC: 138 MG/DL (ref 65–105)
GLUCOSE BLD-MCNC: 140 MG/DL (ref 65–105)
GLUCOSE BLD-MCNC: 141 MG/DL (ref 65–105)
GLUCOSE BLD-MCNC: 155 MG/DL (ref 65–105)
GLUCOSE BLD-MCNC: 162 MG/DL (ref 65–105)
GLUCOSE BLD-MCNC: 173 MG/DL (ref 65–105)
GLUCOSE BLD-MCNC: 179 MG/DL (ref 65–105)
GLUCOSE BLD-MCNC: 196 MG/DL (ref 65–105)
GLUCOSE BLD-MCNC: 200 MG/DL (ref 65–105)
GLUCOSE BLD-MCNC: 206 MG/DL (ref 65–105)
GLUCOSE BLD-MCNC: 210 MG/DL (ref 65–105)
GLUCOSE BLD-MCNC: 226 MG/DL (ref 65–105)
GLUCOSE BLD-MCNC: 233 MG/DL (ref 65–105)
GLUCOSE BLD-MCNC: 86 MG/DL (ref 65–105)
GLUCOSE BLD-MCNC: 98 MG/DL (ref 65–105)
GLUCOSE SERPL-MCNC: 121 MG/DL (ref 74–99)
GLUCOSE SERPL-MCNC: 144 MG/DL (ref 74–99)
GLUCOSE SERPL-MCNC: 187 MG/DL (ref 74–99)
GLUCOSE SERPL-MCNC: 246 MG/DL (ref 74–99)
GLUCOSE SERPL-MCNC: 77 MG/DL (ref 74–99)
HCO3 ARTERIAL: 17.8 MMOL/L (ref 22–26)
HCT VFR BLD AUTO: 34.5 % (ref 36–46)
HCT VFR BLD AUTO: 36.6 % (ref 36–46)
HGB BLD-MCNC: 11.6 G/DL (ref 12–16)
HGB BLD-MCNC: 12.4 G/DL (ref 12–16)
LYMPHOCYTES NFR BLD: 0 K/UL (ref 1–4.8)
LYMPHOCYTES RELATIVE PERCENT: 0 % (ref 24–44)
MAGNESIUM SERPL-MCNC: 1.6 MG/DL (ref 1.6–2.4)
MAGNESIUM SERPL-MCNC: 1.6 MG/DL (ref 1.6–2.4)
MAGNESIUM SERPL-MCNC: 1.8 MG/DL (ref 1.6–2.4)
MAGNESIUM SERPL-MCNC: 1.8 MG/DL (ref 1.6–2.4)
MAGNESIUM SERPL-MCNC: 1.9 MG/DL (ref 1.6–2.4)
MCH RBC QN AUTO: 30.4 PG (ref 26–34)
MCHC RBC AUTO-ENTMCNC: 33.6 G/DL (ref 31–37)
MCV RBC AUTO: 90.6 FL (ref 80–100)
METHEMOGLOBIN: 0.2 % (ref 0–1.9)
MONOCYTES NFR BLD: 1.08 K/UL (ref 0.1–1.3)
MONOCYTES NFR BLD: 12 % (ref 1–7)
MORPHOLOGY: ABNORMAL
MORPHOLOGY: ABNORMAL
MRSA, DNA, NASAL: NEGATIVE
NEGATIVE BASE EXCESS, ART: 4.9 MMOL/L (ref 0–2)
NEUTROPHILS NFR BLD: 88 % (ref 36–66)
NEUTS SEG NFR BLD: 7.92 K/UL (ref 1.3–9.1)
O2 SAT, ARTERIAL: 99.3 % (ref 95–98)
PCO2 ARTERIAL: 26.3 MMHG (ref 35–45)
PEEP RESPIRATORY: 7 CM[H2O]
PH ARTERIAL: 7.45 (ref 7.35–7.45)
PHOSPHATE SERPL-MCNC: 1.4 MG/DL (ref 2.5–4.5)
PHOSPHATE SERPL-MCNC: 1.5 MG/DL (ref 2.5–4.5)
PHOSPHATE SERPL-MCNC: 1.6 MG/DL (ref 2.5–4.5)
PHOSPHATE SERPL-MCNC: 1.9 MG/DL (ref 2.5–4.5)
PHOSPHATE SERPL-MCNC: 2.7 MG/DL (ref 2.5–4.5)
PLATELET # BLD AUTO: 152 K/UL (ref 150–450)
PMV BLD AUTO: 7.4 FL (ref 6–12)
PO2 ARTERIAL: 144.6 MMHG (ref 80–100)
POTASSIUM SERPL-SCNC: 3.4 MMOL/L (ref 3.7–5.3)
POTASSIUM SERPL-SCNC: 3.8 MMOL/L (ref 3.7–5.3)
POTASSIUM SERPL-SCNC: 3.8 MMOL/L (ref 3.7–5.3)
POTASSIUM SERPL-SCNC: 4.3 MMOL/L (ref 3.7–5.3)
POTASSIUM SERPL-SCNC: 4.5 MMOL/L (ref 3.7–5.3)
POTASSIUM SERPL-SCNC: 4.7 MMOL/L (ref 3.7–5.3)
RBC # BLD AUTO: 3.81 M/UL (ref 4–5.2)
SODIUM SERPL-SCNC: 147 MMOL/L (ref 136–145)
SODIUM SERPL-SCNC: 148 MMOL/L (ref 136–145)
SODIUM SERPL-SCNC: 149 MMOL/L (ref 136–145)
SPECIMEN DESCRIPTION: NORMAL
TEXT FOR RESPIRATORY: ABNORMAL
TOTAL RATE: 24
TROPONIN I SERPL HS-MCNC: 290 NG/L (ref 0–14)
TROPONIN I SERPL HS-MCNC: 372 NG/L (ref 0–14)
TROPONIN I SERPL HS-MCNC: 433 NG/L (ref 0–14)
VENTILATION MODE VENT: ABNORMAL
VT: 450
WBC OTHER # BLD: 9 K/UL (ref 3.5–11)

## 2025-02-10 PROCEDURE — 2700000000 HC OXYGEN THERAPY PER DAY

## 2025-02-10 PROCEDURE — 93306 TTE W/DOPPLER COMPLETE: CPT | Performed by: INTERNAL MEDICINE

## 2025-02-10 PROCEDURE — 2580000003 HC RX 258

## 2025-02-10 PROCEDURE — 82805 BLOOD GASES W/O2 SATURATION: CPT

## 2025-02-10 PROCEDURE — 94003 VENT MGMT INPAT SUBQ DAY: CPT

## 2025-02-10 PROCEDURE — 93005 ELECTROCARDIOGRAM TRACING: CPT | Performed by: INTERNAL MEDICINE

## 2025-02-10 PROCEDURE — 2500000003 HC RX 250 WO HCPCS: Performed by: INTERNAL MEDICINE

## 2025-02-10 PROCEDURE — 2580000003 HC RX 258: Performed by: INTERNAL MEDICINE

## 2025-02-10 PROCEDURE — 82565 ASSAY OF CREATININE: CPT

## 2025-02-10 PROCEDURE — 6370000000 HC RX 637 (ALT 250 FOR IP)

## 2025-02-10 PROCEDURE — 6370000000 HC RX 637 (ALT 250 FOR IP): Performed by: INTERNAL MEDICINE

## 2025-02-10 PROCEDURE — 6360000002 HC RX W HCPCS: Performed by: INTERNAL MEDICINE

## 2025-02-10 PROCEDURE — 6360000002 HC RX W HCPCS

## 2025-02-10 PROCEDURE — 51798 US URINE CAPACITY MEASURE: CPT

## 2025-02-10 PROCEDURE — 85018 HEMOGLOBIN: CPT

## 2025-02-10 PROCEDURE — 84100 ASSAY OF PHOSPHORUS: CPT

## 2025-02-10 PROCEDURE — 2500000003 HC RX 250 WO HCPCS

## 2025-02-10 PROCEDURE — 36600 WITHDRAWAL OF ARTERIAL BLOOD: CPT

## 2025-02-10 PROCEDURE — 99222 1ST HOSP IP/OBS MODERATE 55: CPT | Performed by: PSYCHIATRY & NEUROLOGY

## 2025-02-10 PROCEDURE — 84132 ASSAY OF SERUM POTASSIUM: CPT

## 2025-02-10 PROCEDURE — 2000000000 HC ICU R&B

## 2025-02-10 PROCEDURE — 2580000003 HC RX 258: Performed by: NURSE PRACTITIONER

## 2025-02-10 PROCEDURE — 51701 INSERT BLADDER CATHETER: CPT

## 2025-02-10 PROCEDURE — 93306 TTE W/DOPPLER COMPLETE: CPT

## 2025-02-10 PROCEDURE — 6360000002 HC RX W HCPCS: Performed by: NURSE PRACTITIONER

## 2025-02-10 PROCEDURE — 94761 N-INVAS EAR/PLS OXIMETRY MLT: CPT

## 2025-02-10 PROCEDURE — 2500000003 HC RX 250 WO HCPCS: Performed by: NURSE PRACTITIONER

## 2025-02-10 PROCEDURE — 2500000003 HC RX 250 WO HCPCS: Performed by: EMERGENCY MEDICINE

## 2025-02-10 PROCEDURE — 70551 MRI BRAIN STEM W/O DYE: CPT

## 2025-02-10 PROCEDURE — 80048 BASIC METABOLIC PNL TOTAL CA: CPT

## 2025-02-10 PROCEDURE — 36415 COLL VENOUS BLD VENIPUNCTURE: CPT

## 2025-02-10 PROCEDURE — 2580000003 HC RX 258: Performed by: EMERGENCY MEDICINE

## 2025-02-10 PROCEDURE — 99291 CRITICAL CARE FIRST HOUR: CPT | Performed by: INTERNAL MEDICINE

## 2025-02-10 PROCEDURE — 87040 BLOOD CULTURE FOR BACTERIA: CPT

## 2025-02-10 PROCEDURE — 82947 ASSAY GLUCOSE BLOOD QUANT: CPT

## 2025-02-10 PROCEDURE — 71045 X-RAY EXAM CHEST 1 VIEW: CPT

## 2025-02-10 PROCEDURE — 85025 COMPLETE CBC W/AUTO DIFF WBC: CPT

## 2025-02-10 PROCEDURE — APPNB30 APP NON BILLABLE TIME 0-30 MINS: Performed by: NURSE PRACTITIONER

## 2025-02-10 PROCEDURE — 6370000000 HC RX 637 (ALT 250 FOR IP): Performed by: EMERGENCY MEDICINE

## 2025-02-10 PROCEDURE — 83735 ASSAY OF MAGNESIUM: CPT

## 2025-02-10 PROCEDURE — 84484 ASSAY OF TROPONIN QUANT: CPT

## 2025-02-10 PROCEDURE — 6360000002 HC RX W HCPCS: Performed by: EMERGENCY MEDICINE

## 2025-02-10 PROCEDURE — 93010 ELECTROCARDIOGRAM REPORT: CPT | Performed by: INTERNAL MEDICINE

## 2025-02-10 PROCEDURE — 85014 HEMATOCRIT: CPT

## 2025-02-10 RX ORDER — MAGNESIUM SULFATE HEPTAHYDRATE 40 MG/ML
2000 INJECTION, SOLUTION INTRAVENOUS PRN
Status: DISCONTINUED | OUTPATIENT
Start: 2025-02-10 | End: 2025-02-10

## 2025-02-10 RX ORDER — INSULIN LISPRO 100 [IU]/ML
0-4 INJECTION, SOLUTION INTRAVENOUS; SUBCUTANEOUS EVERY 4 HOURS
Status: DISCONTINUED | OUTPATIENT
Start: 2025-02-10 | End: 2025-02-11

## 2025-02-10 RX ORDER — DEXTROSE MONOHYDRATE 100 MG/ML
INJECTION, SOLUTION INTRAVENOUS CONTINUOUS PRN
Status: DISCONTINUED | OUTPATIENT
Start: 2025-02-10 | End: 2025-02-13 | Stop reason: HOSPADM

## 2025-02-10 RX ORDER — METOPROLOL TARTRATE 1 MG/ML
5 INJECTION, SOLUTION INTRAVENOUS EVERY 4 HOURS PRN
Status: DISCONTINUED | OUTPATIENT
Start: 2025-02-10 | End: 2025-02-13 | Stop reason: HOSPADM

## 2025-02-10 RX ORDER — MAGNESIUM SULFATE HEPTAHYDRATE 40 MG/ML
2000 INJECTION, SOLUTION INTRAVENOUS ONCE
Status: COMPLETED | OUTPATIENT
Start: 2025-02-10 | End: 2025-02-10

## 2025-02-10 RX ORDER — INSULIN GLARGINE 100 [IU]/ML
8 INJECTION, SOLUTION SUBCUTANEOUS ONCE
Status: COMPLETED | OUTPATIENT
Start: 2025-02-10 | End: 2025-02-10

## 2025-02-10 RX ORDER — INSULIN GLARGINE 100 [IU]/ML
8 INJECTION, SOLUTION SUBCUTANEOUS NIGHTLY
Status: DISCONTINUED | OUTPATIENT
Start: 2025-02-10 | End: 2025-02-10

## 2025-02-10 RX ORDER — SODIUM CHLORIDE 450 MG/100ML
INJECTION, SOLUTION INTRAVENOUS CONTINUOUS
Status: DISCONTINUED | OUTPATIENT
Start: 2025-02-10 | End: 2025-02-10

## 2025-02-10 RX ORDER — POTASSIUM CHLORIDE 7.45 MG/ML
10 INJECTION INTRAVENOUS
Status: DISPENSED | OUTPATIENT
Start: 2025-02-10 | End: 2025-02-10

## 2025-02-10 RX ORDER — POTASSIUM CHLORIDE 7.45 MG/ML
10 INJECTION INTRAVENOUS PRN
Status: DISCONTINUED | OUTPATIENT
Start: 2025-02-10 | End: 2025-02-10

## 2025-02-10 RX ORDER — POTASSIUM CHLORIDE 1500 MG/1
40 TABLET, EXTENDED RELEASE ORAL PRN
Status: DISCONTINUED | OUTPATIENT
Start: 2025-02-10 | End: 2025-02-10

## 2025-02-10 RX ORDER — INSULIN GLARGINE 100 [IU]/ML
8 INJECTION, SOLUTION SUBCUTANEOUS NIGHTLY
Status: DISCONTINUED | OUTPATIENT
Start: 2025-02-11 | End: 2025-02-13 | Stop reason: HOSPADM

## 2025-02-10 RX ADMIN — WATER 5 MG: 1 INJECTION INTRAMUSCULAR; INTRAVENOUS; SUBCUTANEOUS at 21:58

## 2025-02-10 RX ADMIN — SODIUM CHLORIDE, PRESERVATIVE FREE 10 ML: 5 INJECTION INTRAVENOUS at 19:41

## 2025-02-10 RX ADMIN — POTASSIUM CHLORIDE 10 MEQ: 7.46 INJECTION, SOLUTION INTRAVENOUS at 05:54

## 2025-02-10 RX ADMIN — PANTOPRAZOLE SODIUM 8 MG/HR: 40 INJECTION, POWDER, LYOPHILIZED, FOR SOLUTION INTRAVENOUS at 23:02

## 2025-02-10 RX ADMIN — METOPROLOL TARTRATE 5 MG: 1 INJECTION, SOLUTION INTRAVENOUS at 16:02

## 2025-02-10 RX ADMIN — POTASSIUM CHLORIDE 10 MEQ: 7.46 INJECTION, SOLUTION INTRAVENOUS at 16:50

## 2025-02-10 RX ADMIN — CHLORHEXIDINE GLUCONATE 0.12% ORAL RINSE 15 ML: 1.2 LIQUID ORAL at 07:36

## 2025-02-10 RX ADMIN — POTASSIUM CHLORIDE 10 MEQ: 7.46 INJECTION, SOLUTION INTRAVENOUS at 02:10

## 2025-02-10 RX ADMIN — SODIUM CHLORIDE, PRESERVATIVE FREE 10 ML: 5 INJECTION INTRAVENOUS at 07:36

## 2025-02-10 RX ADMIN — METOPROLOL TARTRATE 5 MG: 1 INJECTION, SOLUTION INTRAVENOUS at 20:36

## 2025-02-10 RX ADMIN — POTASSIUM CHLORIDE 10 MEQ: 7.46 INJECTION, SOLUTION INTRAVENOUS at 08:05

## 2025-02-10 RX ADMIN — DEXTROSE MONOHYDRATE, SODIUM CHLORIDE, AND POTASSIUM CHLORIDE: 50; 4.5; 1.49 INJECTION, SOLUTION INTRAVENOUS at 10:39

## 2025-02-10 RX ADMIN — WATER 1000 MG: 1 INJECTION INTRAMUSCULAR; INTRAVENOUS; SUBCUTANEOUS at 10:35

## 2025-02-10 RX ADMIN — SODIUM PHOSPHATE, MONOBASIC, MONOHYDRATE AND SODIUM PHOSPHATE, DIBASIC, ANHYDROUS 15 MMOL: 142; 276 INJECTION, SOLUTION INTRAVENOUS at 15:42

## 2025-02-10 RX ADMIN — SODIUM CHLORIDE 0.8 UNITS/HR: 9 INJECTION, SOLUTION INTRAVENOUS at 07:04

## 2025-02-10 RX ADMIN — INSULIN GLARGINE 8 UNITS: 100 INJECTION, SOLUTION SUBCUTANEOUS at 16:58

## 2025-02-10 RX ADMIN — POTASSIUM CHLORIDE 10 MEQ: 7.46 INJECTION, SOLUTION INTRAVENOUS at 09:08

## 2025-02-10 RX ADMIN — POTASSIUM CHLORIDE 10 MEQ: 7.46 INJECTION, SOLUTION INTRAVENOUS at 15:41

## 2025-02-10 RX ADMIN — MAGNESIUM SULFATE HEPTAHYDRATE 2000 MG: 40 INJECTION, SOLUTION INTRAVENOUS at 05:53

## 2025-02-10 RX ADMIN — SODIUM PHOSPHATE, MONOBASIC, MONOHYDRATE AND SODIUM PHOSPHATE, DIBASIC, ANHYDROUS 10 MMOL: 142; 276 INJECTION, SOLUTION INTRAVENOUS at 02:42

## 2025-02-10 RX ADMIN — PIPERACILLIN AND TAZOBACTAM 3375 MG: 3; .375 INJECTION, POWDER, LYOPHILIZED, FOR SOLUTION INTRAVENOUS at 07:37

## 2025-02-10 RX ADMIN — PANTOPRAZOLE SODIUM 8 MG/HR: 40 INJECTION, POWDER, LYOPHILIZED, FOR SOLUTION INTRAVENOUS at 00:55

## 2025-02-10 RX ADMIN — SODIUM BICARBONATE: 84 INJECTION, SOLUTION INTRAVENOUS at 21:57

## 2025-02-10 RX ADMIN — POTASSIUM CHLORIDE 10 MEQ: 7.46 INJECTION, SOLUTION INTRAVENOUS at 01:08

## 2025-02-10 RX ADMIN — PROPOFOL 25 MCG/KG/MIN: 10 INJECTION, EMULSION INTRAVENOUS at 07:05

## 2025-02-10 RX ADMIN — PANTOPRAZOLE SODIUM 8 MG/HR: 40 INJECTION, POWDER, LYOPHILIZED, FOR SOLUTION INTRAVENOUS at 13:07

## 2025-02-10 ASSESSMENT — PULMONARY FUNCTION TESTS
PIF_VALUE: 19
PIF_VALUE: 17
PIF_VALUE: 18
PIF_VALUE: 17
PIF_VALUE: 17
PIF_VALUE: 13
PIF_VALUE: 17
PIF_VALUE: 19
PIF_VALUE: 17
PIF_VALUE: 18
PIF_VALUE: 18
PIF_VALUE: 17
PIF_VALUE: 18
PIF_VALUE: 19
PIF_VALUE: 18
PIF_VALUE: 16
PIF_VALUE: 17
PIF_VALUE: 19
PIF_VALUE: 17
PIF_VALUE: 18
PIF_VALUE: 17
PIF_VALUE: 18
PIF_VALUE: 18
PIF_VALUE: 17
PIF_VALUE: 18
PIF_VALUE: 18
PIF_VALUE: 14

## 2025-02-10 NOTE — SIGNIFICANT EVENT
I came back to see the patient off sedation.  She is squeezing my hands to command.  When I asked her to open her mouth or try to stick out her tongue she shakes her head no.  When I asked her to open her eyes she shakes her head no and resist me opening her eyes.  While she is not super cooperative she is certainly awake and aware enough to protect her own airway so we will proceed with extubation.  She has been on CPAP doing well.    Nba Hernandez MD  Pulmonary and Critical Care  [phone] [employer]  [phone] Cell

## 2025-02-10 NOTE — CARE COORDINATION
Case Management Assessment  Initial Evaluation    Date/Time of Evaluation: 2/10/2025 2:24PM  Assessment Completed by: Annie Abad RN    If patient is discharged prior to next notation, then this note serves as note for discharge by case management.    Patient Name: Francia Ferguson                   YOB: 1945  Diagnosis: DKA, type 2, not at goal (HCC) [E11.10]  Acute respiratory failure, unspecified whether with hypoxia or hypercapnia [J96.00]  Altered mental status, unspecified altered mental status type [R41.82]  Diabetic ketoacidosis with coma associated with type 2 diabetes mellitus (HCC) [E11.11]                   Date / Time: 2/9/2025 10:36 AM    Patient Admission Status: Inpatient   Readmission Risk (Low < 19, Mod (19-27), High > 27): Readmission Risk Score: 16.2    Current PCP: Jack Boone, DO  PCP verified by CM? Yes    Chart Reviewed: Yes      History Provided by: Significant Other, Medical Record  Patient Orientation: Other (see comment) (recently extubated)    Patient Cognition: Other (see comment) (recently extubated)    Hospitalization in the last 30 days (Readmission):  No    If yes, Readmission Assessment in CM Navigator will be completed.    Advance Directives:      Code Status: Full Code   Patient's Primary Decision Maker is: Legal Next of Kin    Primary Decision Maker: Theo Ferguson - Spouse - [phone]    Discharge Planning:    Patient lives with: Spouse/Significant Other Type of Home: House  Primary Care Giver: Family  Patient Support Systems include: Spouse/Significant Other, Children, Family Members   Current Financial resources: Medicare  Current community resources:    Current services prior to admission: Durable Medical Equipment            Current DME: Wheelchair, Glucometer            Type of Home Care services:  PT, OT, Nursing Services    ADLS  Prior functional level: Assistance with the following:, Mobility, Shopping, Housework, Cooking  Current functional level:

## 2025-02-10 NOTE — ACP (ADVANCE CARE PLANNING)
Advance Care Planning     Advance Care Planning Activator (Inpatient)  Conversation Note      Date of ACP Conversation: 2/10/2025     Conversation Conducted with: Legal next of kin    ACP Activator: Annie Abad RN    Health Care Decision Maker:     Current Designated Health Care Decision Maker:     Primary Decision Maker: Theo Ferguson - Spouse - [phone]  Click here to complete Healthcare Decision Makers including section of the Healthcare Decision Maker Relationship (ie \"Primary\")  Today we documented Decision Maker(s) consistent with Legal Next of Kin hierarchy.    Care Preferences    Ventilation:  \"If you were in your present state of health and suddenly became very ill and were unable to breathe on your own, what would your preference be about the use of a ventilator (breathing machine) if it were available to you?\"      Would the patient desire the use of ventilator (breathing machine)?: yes    \"If your health worsens and it becomes clear that your chance of recovery is unlikely, what would your preference be about the use of a ventilator (breathing machine) if it were available to you?\"     Would the patient desire the use of ventilator (breathing machine)?: Yes      Resuscitation  \"CPR works best to restart the heart when there is a sudden event, like a heart attack, in someone who is otherwise healthy. Unfortunately, CPR does not typically restart the heart for people who have serious health conditions or who are very sick.\"    \"In the event your heart stopped as a result of an underlying serious health condition, would you want attempts to be made to restart your heart (answer \"yes\" for attempt to resuscitate) or would you prefer a natural death (answer \"no\" for do not attempt to resuscitate)?\" yes       [] Yes   [] No   Educated Patient / Decision Maker regarding differences between Advance Directives and portable DNR orders.    Length of ACP Conversation in minutes:      Conversation

## 2025-02-11 PROBLEM — E11.10 DKA, TYPE 2, NOT AT GOAL (HCC): Status: ACTIVE | Noted: 2025-02-11

## 2025-02-11 LAB
ANION GAP SERPL CALCULATED.3IONS-SCNC: 12 MMOL/L (ref 9–16)
BASOPHILS # BLD: 0 K/UL (ref 0–0.2)
BASOPHILS NFR BLD: 0 % (ref 0–2)
BUN SERPL-MCNC: 27 MG/DL (ref 8–23)
CALCIUM SERPL-MCNC: 7.3 MG/DL (ref 8.6–10.4)
CHLORIDE SERPL-SCNC: 116 MMOL/L (ref 98–107)
CO2 SERPL-SCNC: 19 MMOL/L (ref 20–31)
CREAT SERPL-MCNC: 2 MG/DL (ref 0.7–1.2)
ECHO AO ROOT DIAM: 2.2 CM
ECHO AO ROOT INDEX: 1.51 CM/M2
ECHO BSA: 1.46 M2
ECHO EST RA PRESSURE: 8 MMHG
ECHO LA AREA 2C: 11.5 CM2
ECHO LA AREA 4C: 15.3 CM2
ECHO LA DIAMETER INDEX: 2.4 CM/M2
ECHO LA DIAMETER: 3.5 CM
ECHO LA MAJOR AXIS: 5 CM
ECHO LA MINOR AXIS: 4.3 CM
ECHO LA TO AORTIC ROOT RATIO: 1.59
ECHO LA VOL BP: 32 ML (ref 22–52)
ECHO LA VOL MOD A2C: 25 ML (ref 22–52)
ECHO LA VOL MOD A4C: 37 ML (ref 22–52)
ECHO LA VOL/BSA BIPLANE: 22 ML/M2 (ref 16–34)
ECHO LA VOLUME INDEX MOD A2C: 17 ML/M2 (ref 16–34)
ECHO LA VOLUME INDEX MOD A4C: 25 ML/M2 (ref 16–34)
ECHO LV E' LATERAL VELOCITY: 9.36 CM/S
ECHO LV E' SEPTAL VELOCITY: 8.05 CM/S
ECHO LV EDV A2C: 58 ML
ECHO LV EDV A4C: 38 ML
ECHO LV EDV INDEX A4C: 26 ML/M2
ECHO LV EDV NDEX A2C: 40 ML/M2
ECHO LV EF PHYSICIAN: 60 %
ECHO LV EJECTION FRACTION A2C: 56 %
ECHO LV EJECTION FRACTION A4C: 62 %
ECHO LV EJECTION FRACTION BIPLANE: 60 % (ref 55–100)
ECHO LV ESV A2C: 26 ML
ECHO LV ESV A4C: 14 ML
ECHO LV ESV INDEX A2C: 18 ML/M2
ECHO LV ESV INDEX A4C: 10 ML/M2
ECHO LV FRACTIONAL SHORTENING: 31 % (ref 28–44)
ECHO LV INTERNAL DIMENSION DIASTOLE INDEX: 2.4 CM/M2
ECHO LV INTERNAL DIMENSION DIASTOLIC: 3.5 CM (ref 3.9–5.3)
ECHO LV INTERNAL DIMENSION SYSTOLIC INDEX: 1.64 CM/M2
ECHO LV INTERNAL DIMENSION SYSTOLIC: 2.4 CM
ECHO LV IVSD: 0.9 CM (ref 0.6–0.9)
ECHO LV MASS 2D: 88.8 G (ref 67–162)
ECHO LV MASS INDEX 2D: 60.8 G/M2 (ref 43–95)
ECHO LV POSTERIOR WALL DIASTOLIC: 0.9 CM (ref 0.6–0.9)
ECHO LV RELATIVE WALL THICKNESS RATIO: 0.51
ECHO LVOT AREA: 2.5 CM2
ECHO LVOT DIAM: 1.8 CM
ECHO LVOT MEAN GRADIENT: 2 MMHG
ECHO LVOT PEAK GRADIENT: 4 MMHG
ECHO LVOT PEAK VELOCITY: 1 M/S
ECHO LVOT STROKE VOLUME INDEX: 33.6 ML/M2
ECHO LVOT SV: 49.1 ML
ECHO LVOT VTI: 19.3 CM
ECHO MV A VELOCITY: 0.74 M/S
ECHO MV AREA VTI: 1.2 CM2
ECHO MV E DECELERATION TIME (DT): 183 MS
ECHO MV E VELOCITY: 1.16 M/S
ECHO MV E/A RATIO: 1.57
ECHO MV E/E' LATERAL: 12.39
ECHO MV E/E' RATIO (AVERAGED): 13.4
ECHO MV E/E' SEPTAL: 14.41
ECHO MV LVOT VTI INDEX: 2.11
ECHO MV MAX VELOCITY: 1.3 M/S
ECHO MV MEAN GRADIENT: 2 MMHG
ECHO MV MEAN VELOCITY: 0.7 M/S
ECHO MV PEAK GRADIENT: 6 MMHG
ECHO MV VTI: 40.8 CM
ECHO RA AREA 4C: 12.7 CM2
ECHO RA END SYSTOLIC VOLUME APICAL 4 CHAMBER INDEX BSA: 22 ML/M2
ECHO RA VOLUME: 32 ML
ECHO RIGHT VENTRICULAR SYSTOLIC PRESSURE (RVSP): 29 MMHG
ECHO RV BASAL DIMENSION: 3.9 CM
ECHO RV TAPSE: 1.9 CM (ref 1.7–?)
ECHO TV REGURGITANT MAX VELOCITY: 2.31 M/S
ECHO TV REGURGITANT PEAK GRADIENT: 21 MMHG
EKG ATRIAL RATE: 68 BPM
EKG ATRIAL RATE: 75 BPM
EKG ATRIAL RATE: 82 BPM
EKG P AXIS: 48 DEGREES
EKG P AXIS: 79 DEGREES
EKG P AXIS: 86 DEGREES
EKG P-R INTERVAL: 180 MS
EKG P-R INTERVAL: 224 MS
EKG P-R INTERVAL: 226 MS
EKG Q-T INTERVAL: 318 MS
EKG Q-T INTERVAL: 386 MS
EKG Q-T INTERVAL: 386 MS
EKG Q-T INTERVAL: 418 MS
EKG QRS DURATION: 66 MS
EKG QRS DURATION: 72 MS
EKG QRS DURATION: 84 MS
EKG QRS DURATION: 92 MS
EKG QTC CALCULATION (BAZETT): 431 MS
EKG QTC CALCULATION (BAZETT): 444 MS
EKG QTC CALCULATION (BAZETT): 485 MS
EKG QTC CALCULATION (BAZETT): 489 MS
EKG R AXIS: 23 DEGREES
EKG R AXIS: 66 DEGREES
EKG R AXIS: 69 DEGREES
EKG R AXIS: 74 DEGREES
EKG T AXIS: -128 DEGREES
EKG T AXIS: 115 DEGREES
EKG T AXIS: 60 DEGREES
EKG T AXIS: 66 DEGREES
EKG VENTRICULAR RATE: 142 BPM
EKG VENTRICULAR RATE: 68 BPM
EKG VENTRICULAR RATE: 75 BPM
EKG VENTRICULAR RATE: 95 BPM
EOSINOPHIL # BLD: 0 K/UL (ref 0–0.4)
EOSINOPHILS RELATIVE PERCENT: 0 % (ref 0–4)
ERYTHROCYTE [DISTWIDTH] IN BLOOD BY AUTOMATED COUNT: 14.7 % (ref 11.5–14.9)
GFR, ESTIMATED: 25 ML/MIN/1.73M2
GLUCOSE BLD-MCNC: 132 MG/DL (ref 65–105)
GLUCOSE BLD-MCNC: 159 MG/DL (ref 65–105)
GLUCOSE BLD-MCNC: 196 MG/DL (ref 65–105)
GLUCOSE BLD-MCNC: 281 MG/DL (ref 65–105)
GLUCOSE BLD-MCNC: 330 MG/DL (ref 65–105)
GLUCOSE SERPL-MCNC: 149 MG/DL (ref 74–99)
HCT VFR BLD AUTO: 31.7 % (ref 36–46)
HCT VFR BLD AUTO: 32.3 % (ref 36–46)
HGB BLD-MCNC: 10.8 G/DL (ref 12–16)
HGB BLD-MCNC: 10.8 G/DL (ref 12–16)
LYMPHOCYTES NFR BLD: 0.7 K/UL (ref 1–4.8)
LYMPHOCYTES RELATIVE PERCENT: 10 % (ref 24–44)
MAGNESIUM SERPL-MCNC: 1.8 MG/DL (ref 1.6–2.4)
MCH RBC QN AUTO: 31 PG (ref 26–34)
MCHC RBC AUTO-ENTMCNC: 34 G/DL (ref 31–37)
MCV RBC AUTO: 91.3 FL (ref 80–100)
MICROORGANISM SPEC CULT: ABNORMAL
MONOCYTES NFR BLD: 0.4 K/UL (ref 0.1–1.3)
MONOCYTES NFR BLD: 6 % (ref 1–7)
NEUTROPHILS NFR BLD: 84 % (ref 36–66)
NEUTS SEG NFR BLD: 5.8 K/UL (ref 1.3–9.1)
PHOSPHATE SERPL-MCNC: 2.5 MG/DL (ref 2.5–4.5)
PLATELET # BLD AUTO: 110 K/UL (ref 150–450)
PMV BLD AUTO: 7.8 FL (ref 6–12)
POTASSIUM SERPL-SCNC: 3.9 MMOL/L (ref 3.7–5.3)
RBC # BLD AUTO: 3.47 M/UL (ref 4–5.2)
SERVICE CMNT-IMP: ABNORMAL
SODIUM SERPL-SCNC: 147 MMOL/L (ref 136–145)
SPECIMEN DESCRIPTION: ABNORMAL
WBC OTHER # BLD: 6.9 K/UL (ref 3.5–11)

## 2025-02-11 PROCEDURE — 2580000003 HC RX 258: Performed by: INTERNAL MEDICINE

## 2025-02-11 PROCEDURE — 2060000000 HC ICU INTERMEDIATE R&B

## 2025-02-11 PROCEDURE — 2500000003 HC RX 250 WO HCPCS: Performed by: INTERNAL MEDICINE

## 2025-02-11 PROCEDURE — 85018 HEMOGLOBIN: CPT

## 2025-02-11 PROCEDURE — 51798 US URINE CAPACITY MEASURE: CPT

## 2025-02-11 PROCEDURE — 51701 INSERT BLADDER CATHETER: CPT

## 2025-02-11 PROCEDURE — 6360000002 HC RX W HCPCS: Performed by: NURSE PRACTITIONER

## 2025-02-11 PROCEDURE — 99233 SBSQ HOSP IP/OBS HIGH 50: CPT | Performed by: INTERNAL MEDICINE

## 2025-02-11 PROCEDURE — 2580000003 HC RX 258: Performed by: NURSE PRACTITIONER

## 2025-02-11 PROCEDURE — 93010 ELECTROCARDIOGRAM REPORT: CPT | Performed by: INTERNAL MEDICINE

## 2025-02-11 PROCEDURE — APPSS30 APP SPLIT SHARED TIME 16-30 MINUTES: Performed by: NURSE PRACTITIONER

## 2025-02-11 PROCEDURE — 84100 ASSAY OF PHOSPHORUS: CPT

## 2025-02-11 PROCEDURE — 99232 SBSQ HOSP IP/OBS MODERATE 35: CPT | Performed by: PSYCHIATRY & NEUROLOGY

## 2025-02-11 PROCEDURE — 51702 INSERT TEMP BLADDER CATH: CPT

## 2025-02-11 PROCEDURE — 80048 BASIC METABOLIC PNL TOTAL CA: CPT

## 2025-02-11 PROCEDURE — 6370000000 HC RX 637 (ALT 250 FOR IP)

## 2025-02-11 PROCEDURE — 82947 ASSAY GLUCOSE BLOOD QUANT: CPT

## 2025-02-11 PROCEDURE — 6360000002 HC RX W HCPCS

## 2025-02-11 PROCEDURE — 36415 COLL VENOUS BLD VENIPUNCTURE: CPT

## 2025-02-11 PROCEDURE — 2580000003 HC RX 258

## 2025-02-11 PROCEDURE — 85025 COMPLETE CBC W/AUTO DIFF WBC: CPT

## 2025-02-11 PROCEDURE — 2500000003 HC RX 250 WO HCPCS

## 2025-02-11 PROCEDURE — 85014 HEMATOCRIT: CPT

## 2025-02-11 PROCEDURE — 83735 ASSAY OF MAGNESIUM: CPT

## 2025-02-11 RX ORDER — SODIUM CHLORIDE 9 MG/ML
INJECTION, SOLUTION INTRAVENOUS CONTINUOUS
Status: DISCONTINUED | OUTPATIENT
Start: 2025-02-11 | End: 2025-02-12

## 2025-02-11 RX ORDER — QUETIAPINE FUMARATE 25 MG/1
12.5 TABLET, FILM COATED ORAL NIGHTLY
Status: DISCONTINUED | OUTPATIENT
Start: 2025-02-11 | End: 2025-02-13 | Stop reason: HOSPADM

## 2025-02-11 RX ORDER — INSULIN LISPRO 100 [IU]/ML
0-8 INJECTION, SOLUTION INTRAVENOUS; SUBCUTANEOUS
Status: DISCONTINUED | OUTPATIENT
Start: 2025-02-11 | End: 2025-02-13 | Stop reason: HOSPADM

## 2025-02-11 RX ADMIN — METOPROLOL TARTRATE 5 MG: 1 INJECTION, SOLUTION INTRAVENOUS at 20:05

## 2025-02-11 RX ADMIN — SODIUM CHLORIDE: 9 INJECTION, SOLUTION INTRAVENOUS at 12:04

## 2025-02-11 RX ADMIN — ATORVASTATIN CALCIUM 40 MG: 40 TABLET, FILM COATED ORAL at 20:00

## 2025-02-11 RX ADMIN — WATER 1000 MG: 1 INJECTION INTRAMUSCULAR; INTRAVENOUS; SUBCUTANEOUS at 10:56

## 2025-02-11 RX ADMIN — SODIUM BICARBONATE: 84 INJECTION, SOLUTION INTRAVENOUS at 06:54

## 2025-02-11 RX ADMIN — Medication 3 MG: at 21:33

## 2025-02-11 RX ADMIN — METOPROLOL TARTRATE 5 MG: 1 INJECTION, SOLUTION INTRAVENOUS at 01:02

## 2025-02-11 RX ADMIN — INSULIN LISPRO 2 UNITS: 100 INJECTION, SOLUTION INTRAVENOUS; SUBCUTANEOUS at 11:03

## 2025-02-11 RX ADMIN — SODIUM CHLORIDE: 9 INJECTION, SOLUTION INTRAVENOUS at 21:37

## 2025-02-11 RX ADMIN — PANTOPRAZOLE SODIUM 8 MG/HR: 40 INJECTION, POWDER, LYOPHILIZED, FOR SOLUTION INTRAVENOUS at 08:46

## 2025-02-11 RX ADMIN — INSULIN LISPRO 2 UNITS: 100 INJECTION, SOLUTION INTRAVENOUS; SUBCUTANEOUS at 20:00

## 2025-02-11 RX ADMIN — INSULIN LISPRO 6 UNITS: 100 INJECTION, SOLUTION INTRAVENOUS; SUBCUTANEOUS at 16:10

## 2025-02-11 RX ADMIN — PANTOPRAZOLE SODIUM 8 MG/HR: 40 INJECTION, POWDER, LYOPHILIZED, FOR SOLUTION INTRAVENOUS at 17:39

## 2025-02-11 RX ADMIN — INSULIN GLARGINE 8 UNITS: 100 INJECTION, SOLUTION SUBCUTANEOUS at 20:00

## 2025-02-11 ASSESSMENT — PAIN SCALES - GENERAL: PAINLEVEL_OUTOF10: 0

## 2025-02-11 NOTE — CARE COORDINATION
ONGOING DISCHARGE PLAN:    Patient is alert and oriented to self.    Spoke with patient's sons at bedside regarding discharge plan and discussing home w/ VNS or SNF.   Sons are reviewing list.     They are concerned about patient's understanding of medications.     She has a CGM-Free Style Jeet.   Patient saw a Dr Siegel-neurologist Op for memory concerns.                        Post Acute Facility/Agency List     Provided child with the following list, the list includes the overall star ratings obtained from CMS per the Medicare Web site (www.Medicare.gov):     [] Long Term Acute Care Facilities  [] Acute Inpatient Rehabilitation Facilities  [x] Skilled Nursing Facilities  [] Hospice Facilities  [x] Home Care    Provided verbal instructions on how to utilize the QR Code to obtain additional detailed star ratings from www.Medicare.gov     offered to print and provide the detailed list:    [x]Accepted   []Declined      2/10 extubated  100% on room air    Patient in bilateral wrist restraints per nursing because of agitation and combative.     Nephro consult  Sodium bicarb drip    GI consult  Protonix drip  Hgb 10.8  Following for possible EGD    Neuro consult  MRI Brain-[facility], will need OP eval     PT/OT notes pending         Will continue to follow for additional discharge needs.    If patient is discharged prior to next notation, then this note serves as note for discharge by case management.    Electronically signed by Evi Clement RN on 2/11/2025 at 11:59 AM

## 2025-02-11 NOTE — CARE COORDINATION
Jameson, son, stated he would like: 1. [facility] 2. [facility]. Will send referrals.     Patient will need to be out of restraints and no telesitter prior to discharge.     Electronically signed by Evi Clement RN on 2/11/2025 at 4:39 PM

## 2025-02-12 LAB
ANION GAP SERPL CALCULATED.3IONS-SCNC: 11 MMOL/L (ref 9–16)
BASOPHILS # BLD: 0 K/UL (ref 0–0.2)
BASOPHILS NFR BLD: 0 % (ref 0–2)
BUN SERPL-MCNC: 20 MG/DL (ref 8–23)
CALCIUM SERPL-MCNC: 7.4 MG/DL (ref 8.6–10.4)
CHLORIDE SERPL-SCNC: 114 MMOL/L (ref 98–107)
CO2 SERPL-SCNC: 21 MMOL/L (ref 20–31)
CREAT SERPL-MCNC: 1.4 MG/DL (ref 0.7–1.2)
EOSINOPHIL # BLD: 0 K/UL (ref 0–0.4)
EOSINOPHILS RELATIVE PERCENT: 0 % (ref 0–4)
ERYTHROCYTE [DISTWIDTH] IN BLOOD BY AUTOMATED COUNT: 14.6 % (ref 11.5–14.9)
GFR, ESTIMATED: 38 ML/MIN/1.73M2
GLUCOSE BLD-MCNC: 102 MG/DL (ref 65–105)
GLUCOSE BLD-MCNC: 107 MG/DL (ref 65–105)
GLUCOSE BLD-MCNC: 213 MG/DL (ref 65–105)
GLUCOSE BLD-MCNC: 282 MG/DL (ref 65–105)
GLUCOSE BLD-MCNC: 299 MG/DL (ref 65–105)
GLUCOSE SERPL-MCNC: 91 MG/DL (ref 74–99)
HCT VFR BLD AUTO: 33.4 % (ref 36–46)
HCT VFR BLD AUTO: 34.2 % (ref 36–46)
HCT VFR BLD AUTO: 37.7 % (ref 36–46)
HGB BLD-MCNC: 11.1 G/DL (ref 12–16)
HGB BLD-MCNC: 11.2 G/DL (ref 12–16)
HGB BLD-MCNC: 12.3 G/DL (ref 12–16)
LYMPHOCYTES NFR BLD: 0.7 K/UL (ref 1–4.8)
LYMPHOCYTES RELATIVE PERCENT: 14 % (ref 24–44)
MCH RBC QN AUTO: 30.6 PG (ref 26–34)
MCHC RBC AUTO-ENTMCNC: 33.5 G/DL (ref 31–37)
MCV RBC AUTO: 91.3 FL (ref 80–100)
MONOCYTES NFR BLD: 0.2 K/UL (ref 0.1–1.3)
MONOCYTES NFR BLD: 4 % (ref 1–7)
NEUTROPHILS NFR BLD: 82 % (ref 36–66)
NEUTS SEG NFR BLD: 3.7 K/UL (ref 1.3–9.1)
PLATELET # BLD AUTO: 89 K/UL (ref 150–450)
PMV BLD AUTO: 7.8 FL (ref 6–12)
POTASSIUM SERPL-SCNC: 3.4 MMOL/L (ref 3.7–5.3)
RBC # BLD AUTO: 3.66 M/UL (ref 4–5.2)
SODIUM SERPL-SCNC: 146 MMOL/L (ref 136–145)
WBC OTHER # BLD: 4.6 K/UL (ref 3.5–11)

## 2025-02-12 PROCEDURE — 97162 PT EVAL MOD COMPLEX 30 MIN: CPT

## 2025-02-12 PROCEDURE — 2580000003 HC RX 258: Performed by: NURSE PRACTITIONER

## 2025-02-12 PROCEDURE — 1200000000 HC SEMI PRIVATE

## 2025-02-12 PROCEDURE — 97116 GAIT TRAINING THERAPY: CPT

## 2025-02-12 PROCEDURE — 2580000003 HC RX 258

## 2025-02-12 PROCEDURE — 97530 THERAPEUTIC ACTIVITIES: CPT

## 2025-02-12 PROCEDURE — 6360000002 HC RX W HCPCS: Performed by: INTERNAL MEDICINE

## 2025-02-12 PROCEDURE — 6360000002 HC RX W HCPCS

## 2025-02-12 PROCEDURE — 85025 COMPLETE CBC W/AUTO DIFF WBC: CPT

## 2025-02-12 PROCEDURE — 36415 COLL VENOUS BLD VENIPUNCTURE: CPT

## 2025-02-12 PROCEDURE — 99233 SBSQ HOSP IP/OBS HIGH 50: CPT | Performed by: INTERNAL MEDICINE

## 2025-02-12 PROCEDURE — 2500000003 HC RX 250 WO HCPCS: Performed by: INTERNAL MEDICINE

## 2025-02-12 PROCEDURE — 2580000003 HC RX 258: Performed by: INTERNAL MEDICINE

## 2025-02-12 PROCEDURE — 80048 BASIC METABOLIC PNL TOTAL CA: CPT

## 2025-02-12 PROCEDURE — 6360000002 HC RX W HCPCS: Performed by: NURSE PRACTITIONER

## 2025-02-12 PROCEDURE — 82947 ASSAY GLUCOSE BLOOD QUANT: CPT

## 2025-02-12 PROCEDURE — 6370000000 HC RX 637 (ALT 250 FOR IP)

## 2025-02-12 PROCEDURE — APPSS30 APP SPLIT SHARED TIME 16-30 MINUTES: Performed by: NURSE PRACTITIONER

## 2025-02-12 PROCEDURE — 85018 HEMOGLOBIN: CPT

## 2025-02-12 PROCEDURE — 97166 OT EVAL MOD COMPLEX 45 MIN: CPT

## 2025-02-12 PROCEDURE — 85014 HEMATOCRIT: CPT

## 2025-02-12 PROCEDURE — 2500000003 HC RX 250 WO HCPCS

## 2025-02-12 RX ORDER — POTASSIUM CHLORIDE 7.45 MG/ML
10 INJECTION INTRAVENOUS ONCE
Status: COMPLETED | OUTPATIENT
Start: 2025-02-12 | End: 2025-02-12

## 2025-02-12 RX ADMIN — INSULIN LISPRO 2 UNITS: 100 INJECTION, SOLUTION INTRAVENOUS; SUBCUTANEOUS at 11:23

## 2025-02-12 RX ADMIN — QUETIAPINE FUMARATE 12.5 MG: 25 TABLET ORAL at 20:29

## 2025-02-12 RX ADMIN — POTASSIUM CHLORIDE 10 MEQ: 7.46 INJECTION, SOLUTION INTRAVENOUS at 05:08

## 2025-02-12 RX ADMIN — INSULIN LISPRO 4 UNITS: 100 INJECTION, SOLUTION INTRAVENOUS; SUBCUTANEOUS at 16:47

## 2025-02-12 RX ADMIN — POTASSIUM CHLORIDE 20 MEQ: 149 INJECTION, SOLUTION, CONCENTRATE INTRAVENOUS at 16:46

## 2025-02-12 RX ADMIN — INSULIN GLARGINE 8 UNITS: 100 INJECTION, SOLUTION SUBCUTANEOUS at 20:28

## 2025-02-12 RX ADMIN — WATER 1000 MG: 1 INJECTION INTRAMUSCULAR; INTRAVENOUS; SUBCUTANEOUS at 10:44

## 2025-02-12 RX ADMIN — SODIUM CHLORIDE, PRESERVATIVE FREE 40 MG: 5 INJECTION INTRAVENOUS at 22:11

## 2025-02-12 RX ADMIN — SODIUM CHLORIDE, PRESERVATIVE FREE 10 ML: 5 INJECTION INTRAVENOUS at 20:29

## 2025-02-12 RX ADMIN — INSULIN LISPRO 4 UNITS: 100 INJECTION, SOLUTION INTRAVENOUS; SUBCUTANEOUS at 20:28

## 2025-02-12 RX ADMIN — PANTOPRAZOLE SODIUM 8 MG/HR: 40 INJECTION, POWDER, LYOPHILIZED, FOR SOLUTION INTRAVENOUS at 01:45

## 2025-02-12 RX ADMIN — SODIUM CHLORIDE, PRESERVATIVE FREE 40 MG: 5 INJECTION INTRAVENOUS at 10:37

## 2025-02-12 RX ADMIN — SODIUM CHLORIDE: 9 INJECTION, SOLUTION INTRAVENOUS at 05:06

## 2025-02-12 ASSESSMENT — PAIN SCALES - GENERAL
PAINLEVEL_OUTOF10: 0

## 2025-02-12 NOTE — CARE COORDINATION
DISCHARGE PLANNING NOTE:    Authorization submitted through [employer] portal.  Auth ID: [id number]     [employer] message placed to bedside RN Juli and charge RN Lina for update on accepting facility and the need for pt to be 24 hours telesitter free.  Electronically signed by CESARIO Orozco on 2/12/2025 at 4:37 PM

## 2025-02-12 NOTE — CARE COORDINATION
DISCHARGE PLANNING NOTE:    Writer met with pt in room, currently in chair, telesitter in place.     Call placed to Jeanna with [facility], facility is out of network.    Message placed to Samantha with [facility], [facility] denies this pt, no planned discharges at this time.    Call placed to pt spouse Theo for new choices, Theo instructs writer to call pt bailey Barba. Call placed to JamesonJameson requests writer to call to pt son Ashok. Call placed to Ashok Pulido states list is at home, agreeable to writer sending to [email or web address] for review. FOC list sent.  Electronically signed by CESARIO Orozco on 2/12/2025 at 12:38 PM

## 2025-02-12 NOTE — CONSULTS
[facility]    GASTROENTEROLOGY CONSULT    Patient:   Francia Ferguson   :    1945   Facility:   [facility]  Date:    2/10/2025  Admission Dx:  DKA, type 2, not at goal (HCC) [E11.10]  Acute respiratory failure, unspecified whether with hypoxia or hypercapnia [J96.00]  Altered mental status, unspecified altered mental status type [R41.82]  Diabetic ketoacidosis with coma associated with type 2 diabetes mellitus (HCC) [E11.11]  Requesting physician: Kel Navarro MD  Reason for consult:  Coffee ground emesis      SUBJECTIVE:  History of Present Illness:  This is a 79 y.o.  female who was admitted 2025 with DKA, type 2, not at goal (HCC) [E11.10]  Acute respiratory failure, unspecified whether with hypoxia or hypercapnia [J96.00]  Altered mental status, unspecified altered mental status type [R41.82]  Diabetic ketoacidosis with coma associated with type 2 diabetes mellitus (HCC) [E11.11]. We have been asked to see the patient in consultation by Kel Navarro MD for  coffee ground emesis. This is a 79 year old female with pmh of DM cad on aspirin and plavix htn hld renal insufficiency dementia who presented to the ED for AMS, she is being treated for DKA, afib with rvr and likely aspiration. The pt arrived hypotensive and hypothermic. The pt is intubated, sedated and no family at bedside hpi per rn and electronic record. Per the notes pt had coffee ground emesis pta and per ems she aspirated en route here. Pt hgb 14.9>11.6. ng currently with bile drainage. No bm overnight.  No record of previous endoscopy in [facility]. The pt was too unstable yesterday for abdominal imaging. The pt remains in DKA today.    Family hx unknown  Endoscopy hx unknown no records in epic    OBJECTIVE:    PAST MEDICAL/SURGICAL HISTORY  Past Medical History:   Diagnosis Date    Abnormal cardiovascular stress test 2020    no ischemia / infarction   there is septal hypokinesis   /  EF 
  NEPHROLOGY CONSULT     Patient :  Francia Ferguson; 79 y.o. MRN# [id number]  Location:  2009/2009-01  Attending:  Kel Navarro MD  Admit Date:  2/9/2025   Hospital Day: 1      Reason for Consult:  ANKUR      Chief Complaint:  AMS  History Obtained From:  electronic medical record,    History of Present Illness:    This is a 79 y.o. female with medical history of essential hypertension, type 2 diabetes, insulin-dependent diabetes, CAD s/p angiopasty and stenting in 2020, CKD 3 a with baseline serum creatinine of 1.0 to 1.2 mg/dL, history of dementia. She presented to the hospital on 2/9/2025 with decreased level of consciousness.patient was brought by EMS, it was reported that patient required  bag mask ventilations en route. Per EMS, the patient aspirated during an episode of coffee ground emesis. She came in to the ER in atrial fibrillation with RVR, was hypotensive, tachycardic, hypothermic, and tachypneic. pH was 6.78, she was in acute anion gap metabolic acidosis with respiratory compensation.  Patient was intubated and was placed on ventilator and was admitted to ICU patient was started on DKA protocol.  Labs showed serum creatinine on admission increased to 2.3 mg/dL which has peaked to 2.6 mg/dL.  Patient urine output has improved since morning.  Currently on insulin drip, and IV fluids per DKA protocol and also on Protonix drip.  Patient extubated this morning but she is not responsive to verbal stimuli.      Past Medical History:        Diagnosis Date    Abnormal cardiovascular stress test 06/2020    no ischemia / infarction   there is septal hypokinesis   /  EF 70%    CAD (coronary artery disease)     Chest pressure     Fatigue     History of pneumonia 03/2020    HTN (hypertension)     Hyperlipidemia     Pneumonia     Positive cardiac stress test     Renal insufficiency 8/18/2022    SOB (shortness of breath)     Type 2 diabetes mellitus without complication (HCC)        Past Surgical History:      
Date:   2/9/2025  Patient name: Francia Ferguson  Date of admission:  2/9/2025 10:36 AM  MRN:   [id number]  YOB: 1945  PCP: Jack Boone DO    Reason for Admission: DKA, type 2, not at goal (HCC) [E11.10]  Acute respiratory failure, unspecified whether with hypoxia or hypercapnia [J96.00]  Altered mental status, unspecified altered mental status type [R41.82]  Diabetic ketoacidosis with coma associated with type 2 diabetes mellitus (HCC) [E11.11]    Cardiology consult: Atrial fibrillation with RVR, abnormal high-sensitivity troponin         Referring physician: Dr Kel Navarro    Impression  Admission with altered mental status, hyperglycemia, severe acidosis  Atrial fibrillation with rapid ventricular response  Coronary artery disease  Hypertension  Hyperlipidemia  Type 2 diabetes mellitus      Past surgical history  Appendectomy, cardiac cath, cholecystectomy, colonoscopy, coronary angioplasty stent placement 2020 high-grade stenosis in the proximal RCA, eye surgery, hysterectomy vagina    Allergies  No known allergies    Never smoked, no drug abuse    History of present illness    79-year-old female with past medical history of CAD, status post angioplasty stent placement in the RCA 2020, hypertension, hyperlipidemia, CKD, diabetes mellitus type 2, on insulin, impaired memory presented to the ER 2/9/2025 with altered mental status.  EMS was called due to patient being in altered mental status at home.  EMS started bag mask ventilation en route.  Per EMS the patient aspirated during an episode of coffee-ground emesis.  When patient arrived in ER ECG showed A-fib with RVR and she was hypotensive, hypothermic and tachypneic.  pH was 6.78 metabolic acidosis.  Serum potassium was 6.1 glucose 741 lactic acid 6.1 and creatinine 2.3.  She was intubated started on insulin and IV fluids started bicarb, Cardizem infusion and also started on vancomycin and Zosyn    Labs on admission  High-sensitivity 
Year: Never true   Transportation Needs: No Transportation Needs (1/30/2025)    PRAPARE - Transportation     Lack of Transportation (Medical): No     Lack of Transportation (Non-Medical): No   Physical Activity: Insufficiently Active (5/23/2024)    Exercise Vital Sign     Days of Exercise per Week: 3 days     Minutes of Exercise per Session: 30 min   Stress: No Stress Concern Present (2/3/2022)    [employer] [place] [employer] - Occupational Stress Questionnaire     Feeling of Stress : Not at all   Social Connections: Moderately Integrated (2/3/2022)    Social Connection and Isolation Panel [NHANES]     Frequency of Communication with Friends and Family: More than three times a week     Frequency of Social Gatherings with Friends and Family: Twice a week     Attends Hoahaoism Services: 1 to 4 times per year     Active Member of Clubs or Organizations: No     Attends Club or Organization Meetings: Never     Marital Status: Living with partner   Intimate Partner Violence: Not At Risk (2/3/2022)    Humiliation, Afraid, Rape, and Kick questionnaire     Fear of Current or Ex-Partner: No     Emotionally Abused: No     Physically Abused: No     Sexually Abused: No   Housing Stability: Low Risk  (1/30/2025)    Housing Stability Vital Sign     Unable to Pay for Housing in the Last Year: No     Number of Times Moved in the Last Year: 0     Homeless in the Last Year: No       Family History:    Family History   Problem Relation Age of Onset    Heart Disease Mother     Diabetes Mother     Diabetes Father     Heart Disease Brother     Esophageal Cancer Brother     Diabetes Maternal Grandmother     Diabetes Maternal Aunt     Diabetes Maternal Uncle     Breast Cancer Neg Hx        Physical Exam:      This a 79 y.o. female   Patient Vitals for the past 24 hrs:   BP Temp Temp src Pulse Resp SpO2 Weight   02/11/25 1900 (!) 149/43 -- -- 86 22 97 % --   02/11/25 1800 133/66 -- -- 84 23 100 % --   02/11/25 1722 (!) 145/37 -- 
Stress Questionnaire     Feeling of Stress : Not at all   Social Connections: Moderately Integrated (2/3/2022)    Social Connection and Isolation Panel [NHANES]     Frequency of Communication with Friends and Family: More than three times a week     Frequency of Social Gatherings with Friends and Family: Twice a week     Attends Jew Services: 1 to 4 times per year     Active Member of Clubs or Organizations: No     Attends Club or Organization Meetings: Never     Marital Status: Living with partner   Intimate Partner Violence: Not At Risk (2/3/2022)    Humiliation, Afraid, Rape, and Kick questionnaire     Fear of Current or Ex-Partner: No     Emotionally Abused: No     Physically Abused: No     Sexually Abused: No   Housing Stability: Low Risk  (1/30/2025)    Housing Stability Vital Sign     Unable to Pay for Housing in the Last Year: No     Number of Times Moved in the Last Year: 0     Homeless in the Last Year: No       FAMILY HISTORY:  Family History   Problem Relation Age of Onset    Heart Disease Mother     Diabetes Mother     Diabetes Father     Heart Disease Brother     Esophageal Cancer Brother     Diabetes Maternal Grandmother     Diabetes Maternal Aunt     Diabetes Maternal Uncle     Breast Cancer Neg Hx        REVIEW OF SYSTEMS:  All other systems reviewed and are negative.  Unable to participate    PHYSICAL EXAM:  Vital Signs Blood pressure (!) 137/46, pulse 80, temperature 99.3 °F (37.4 °C), resp. rate 23, height 1.549 m (5' 1\"), weight 49.7 kg (109 lb 9.1 oz), SpO2 100%, not currently breastfeeding.  Oxygen Amount and Delivery: O2 Flow Rate (L/min): 15 L/min    Admission Weight Weight - Scale: 53.1 kg (117 lb)    General Appearance sedated currently unresponsive    Head  Normocephalic, without obvious abnormality, atraumatic    Eyes  conjunctivae/corneas clear. PERRL, EOM's intact. Fundi benign.    ENT NG tube with coffee-ground material, oral endotracheal tube in place  Neck  no adenopathy, no 
tried Namenda but stopped it due to dizziness. She had hypotension with Aricept. Patient and family identify problems with memory for couple of years but symptoms have become more noticeable over the past 4 to 5 weeks.  The  describes a significant deterioration in her mental capacity over the past couple of months. Family reports frequent forgetfulness. She repeats herself and misplaces items.   Mri brain on 1/25 showed prominent ventricles but no images can be seen. HCT shows ventriculomegaly. Upon personal review patient does have significant atrophy on imaging.     Plan:     Diagnosis of NPH has not been made as patient is intubated and sedated and has had a gradual course with significant worsening of memory changes in the setting of multiple and numerous metabolic derangements. Also does not appear to have triad of symptoms.   Recommend Mri brain and outpatient Neurosurgery eval for possible high volume tap if deemed appropriate when medically stable   Has tried and failed dementia meds due to side effects  Has had recent MRI but those images are not available to me   Currently priority is medically stabilizing the patient  Dementia follow-up can be done outpatient with established Neurologist Dr. Siegel  Discussed with RN at bedside        Thank you for this very interesting consultation.      Electronically signed by Pat Britt DO on 2/10/2025 at 10:56 AM      Pat Britt DO  [facility] [place]  Neurology

## 2025-02-12 NOTE — DISCHARGE INSTR - COC
Rehab): Good    Recommended Labs or Other Treatments After Discharge:     Physician Certification: I certify the above information and transfer of Francia Ferguson  is necessary for the continuing treatment of the diagnosis listed and that she requires Skilled Nursing Facility for greater 30 days.     Update Admission H&P: No change in H&P    PHYSICIAN SIGNATURE:  Electronically signed by Kel Navarro MD on 2/13/25 at 12:59 PM EST  
home

## 2025-02-12 NOTE — CARE COORDINATION
DISCHARGE PLANNING NOTE:    Call received from Edita with [facility]. Facility accepts this pt, must be 24 hours sitter/restraint free for admission.    Call placed to Ashok for update. No further questions at this time, confirms he will relay this information to family.  Electronically signed by CESARIO Orozco on 2/12/2025 at 3:36 PM

## 2025-02-12 NOTE — CARE COORDINATION
DISCHARGE PLANNING NOTE:    Call received from pt son Ashok. Agreeable to referral sent to [facility] at this time, still reviewing for more choices. Will place call back to writer with additional choices.  Electronically signed by CESARIO Orozco on 2/12/2025 at 2:28 PM

## 2025-02-13 VITALS
HEIGHT: 61 IN | SYSTOLIC BLOOD PRESSURE: 115 MMHG | OXYGEN SATURATION: 94 % | TEMPERATURE: 98.1 F | DIASTOLIC BLOOD PRESSURE: 87 MMHG | HEART RATE: 80 BPM | WEIGHT: 132.94 LBS | RESPIRATION RATE: 18 BRPM | BODY MASS INDEX: 25.1 KG/M2

## 2025-02-13 LAB
ANION GAP SERPL CALCULATED.3IONS-SCNC: 10 MMOL/L (ref 9–16)
BASOPHILS # BLD: 0 K/UL (ref 0–0.2)
BASOPHILS NFR BLD: 0 % (ref 0–2)
BUN SERPL-MCNC: 17 MG/DL (ref 8–23)
CALCIUM SERPL-MCNC: 7.2 MG/DL (ref 8.6–10.4)
CHLORIDE SERPL-SCNC: 110 MMOL/L (ref 98–107)
CO2 SERPL-SCNC: 21 MMOL/L (ref 20–31)
CREAT SERPL-MCNC: 1.3 MG/DL (ref 0.7–1.2)
EOSINOPHIL # BLD: 0.05 K/UL (ref 0–0.4)
EOSINOPHILS RELATIVE PERCENT: 1 % (ref 0–4)
ERYTHROCYTE [DISTWIDTH] IN BLOOD BY AUTOMATED COUNT: 14.1 % (ref 11.5–14.9)
GFR, ESTIMATED: 42 ML/MIN/1.73M2
GLUCOSE BLD-MCNC: 141 MG/DL (ref 65–105)
GLUCOSE BLD-MCNC: 214 MG/DL (ref 65–105)
GLUCOSE BLD-MCNC: 342 MG/DL (ref 65–105)
GLUCOSE SERPL-MCNC: 143 MG/DL (ref 74–99)
HCT VFR BLD AUTO: 32.9 % (ref 36–46)
HCT VFR BLD AUTO: 34.1 % (ref 36–46)
HGB BLD-MCNC: 10.8 G/DL (ref 12–16)
HGB BLD-MCNC: 11.6 G/DL (ref 12–16)
LYMPHOCYTES NFR BLD: 0.59 K/UL (ref 1–4.8)
LYMPHOCYTES RELATIVE PERCENT: 11 % (ref 24–44)
MCH RBC QN AUTO: 30.4 PG (ref 26–34)
MCHC RBC AUTO-ENTMCNC: 32.8 G/DL (ref 31–37)
MCV RBC AUTO: 92.8 FL (ref 80–100)
MONOCYTES NFR BLD: 0.32 K/UL (ref 0.1–1.3)
MONOCYTES NFR BLD: 6 % (ref 1–7)
MORPHOLOGY: NORMAL
NEUTROPHILS NFR BLD: 82 % (ref 36–66)
NEUTS SEG NFR BLD: 4.44 K/UL (ref 1.3–9.1)
PLATELET # BLD AUTO: 90 K/UL (ref 150–450)
PMV BLD AUTO: 8.1 FL (ref 6–12)
POTASSIUM SERPL-SCNC: 3.2 MMOL/L (ref 3.7–5.3)
POTASSIUM SERPL-SCNC: 4.4 MMOL/L (ref 3.7–5.3)
RBC # BLD AUTO: 3.55 M/UL (ref 4–5.2)
SODIUM SERPL-SCNC: 141 MMOL/L (ref 136–145)
WBC OTHER # BLD: 5.4 K/UL (ref 3.5–11)

## 2025-02-13 PROCEDURE — 6370000000 HC RX 637 (ALT 250 FOR IP): Performed by: INTERNAL MEDICINE

## 2025-02-13 PROCEDURE — 85025 COMPLETE CBC W/AUTO DIFF WBC: CPT

## 2025-02-13 PROCEDURE — 85014 HEMATOCRIT: CPT

## 2025-02-13 PROCEDURE — 97116 GAIT TRAINING THERAPY: CPT

## 2025-02-13 PROCEDURE — 51798 US URINE CAPACITY MEASURE: CPT

## 2025-02-13 PROCEDURE — 6360000002 HC RX W HCPCS: Performed by: INTERNAL MEDICINE

## 2025-02-13 PROCEDURE — 99232 SBSQ HOSP IP/OBS MODERATE 35: CPT | Performed by: INTERNAL MEDICINE

## 2025-02-13 PROCEDURE — 6360000002 HC RX W HCPCS

## 2025-02-13 PROCEDURE — 97110 THERAPEUTIC EXERCISES: CPT

## 2025-02-13 PROCEDURE — 85018 HEMOGLOBIN: CPT

## 2025-02-13 PROCEDURE — 2580000003 HC RX 258: Performed by: INTERNAL MEDICINE

## 2025-02-13 PROCEDURE — 82947 ASSAY GLUCOSE BLOOD QUANT: CPT

## 2025-02-13 PROCEDURE — 80048 BASIC METABOLIC PNL TOTAL CA: CPT

## 2025-02-13 PROCEDURE — 6370000000 HC RX 637 (ALT 250 FOR IP)

## 2025-02-13 PROCEDURE — 2500000003 HC RX 250 WO HCPCS

## 2025-02-13 PROCEDURE — 84132 ASSAY OF SERUM POTASSIUM: CPT

## 2025-02-13 PROCEDURE — 36415 COLL VENOUS BLD VENIPUNCTURE: CPT

## 2025-02-13 RX ORDER — POTASSIUM CHLORIDE 29.8 MG/ML
20 INJECTION INTRAVENOUS
Status: DISCONTINUED | OUTPATIENT
Start: 2025-02-13 | End: 2025-02-13 | Stop reason: CLARIF

## 2025-02-13 RX ORDER — FAMOTIDINE 20 MG/1
20 TABLET, FILM COATED ORAL 2 TIMES DAILY
Qty: 60 TABLET | Refills: 3 | Status: ON HOLD | OUTPATIENT
Start: 2025-02-13

## 2025-02-13 RX ORDER — POTASSIUM CHLORIDE 7.45 MG/ML
10 INJECTION INTRAVENOUS
Status: DISPENSED | OUTPATIENT
Start: 2025-02-13 | End: 2025-02-13

## 2025-02-13 RX ORDER — POTASSIUM CHLORIDE 1500 MG/1
20 TABLET, EXTENDED RELEASE ORAL ONCE
Qty: 1 TABLET | Refills: 0 | Status: ON HOLD | OUTPATIENT
Start: 2025-02-13 | End: 2025-02-13

## 2025-02-13 RX ORDER — MIDODRINE HYDROCHLORIDE 2.5 MG/1
2.5 TABLET ORAL
Status: DISCONTINUED | OUTPATIENT
Start: 2025-02-13 | End: 2025-02-13 | Stop reason: HOSPADM

## 2025-02-13 RX ADMIN — SODIUM CHLORIDE, PRESERVATIVE FREE 10 ML: 5 INJECTION INTRAVENOUS at 08:46

## 2025-02-13 RX ADMIN — INSULIN LISPRO 2 UNITS: 100 INJECTION, SOLUTION INTRAVENOUS; SUBCUTANEOUS at 17:03

## 2025-02-13 RX ADMIN — ISOSORBIDE MONONITRATE 120 MG: 60 TABLET, EXTENDED RELEASE ORAL at 08:46

## 2025-02-13 RX ADMIN — INSULIN LISPRO 6 UNITS: 100 INJECTION, SOLUTION INTRAVENOUS; SUBCUTANEOUS at 11:26

## 2025-02-13 RX ADMIN — ATORVASTATIN CALCIUM 40 MG: 40 TABLET, FILM COATED ORAL at 08:46

## 2025-02-13 RX ADMIN — POTASSIUM CHLORIDE 10 MEQ: 7.46 INJECTION, SOLUTION INTRAVENOUS at 09:49

## 2025-02-13 RX ADMIN — POTASSIUM CHLORIDE 10 MEQ: 7.46 INJECTION, SOLUTION INTRAVENOUS at 12:00

## 2025-02-13 RX ADMIN — POTASSIUM CHLORIDE 20 MEQ: 149 INJECTION, SOLUTION, CONCENTRATE INTRAVENOUS at 03:31

## 2025-02-13 RX ADMIN — Medication 1 TABLET: at 08:46

## 2025-02-13 RX ADMIN — MIDODRINE HYDROCHLORIDE 2.5 MG: 2.5 TABLET ORAL at 17:02

## 2025-02-13 RX ADMIN — SODIUM CHLORIDE, PRESERVATIVE FREE 40 MG: 5 INJECTION INTRAVENOUS at 08:46

## 2025-02-13 RX ADMIN — WATER 1000 MG: 1 INJECTION INTRAMUSCULAR; INTRAVENOUS; SUBCUTANEOUS at 11:26

## 2025-02-13 ASSESSMENT — ENCOUNTER SYMPTOMS
ABDOMINAL DISTENTION: 0
SHORTNESS OF BREATH: 0
ABDOMINAL PAIN: 0
CHEST TIGHTNESS: 0
STRIDOR: 0
WHEEZING: 0

## 2025-02-13 NOTE — PLAN OF CARE
Problem: Chronic Conditions and Co-morbidities  Goal: Patient's chronic conditions and co-morbidity symptoms are monitored and maintained or improved  Outcome: Progressing     Problem: Discharge Planning  Goal: Discharge to home or other facility with appropriate resources  Outcome: Progressing     Problem: Pain  Goal: Verbalizes/displays adequate comfort level or baseline comfort level  Outcome: Progressing     
  Problem: Safety - Adult  Goal: Free from fall injury  2/13/2025 1629 by Rosy Jernigan RN  Outcome: Adequate for Discharge     Problem: Chronic Conditions and Co-morbidities  Goal: Patient's chronic conditions and co-morbidity symptoms are monitored and maintained or improved  Outcome: Adequate for Discharge     Problem: Discharge Planning  Goal: Discharge to home or other facility with appropriate resources  2/13/2025 1629 by Rosy Jernigan RN  Outcome: Adequate for Discharge     
  Problem: Safety - Adult  Goal: Free from fall injury  Outcome: Progressing     Problem: Discharge Planning  Goal: Discharge to home or other facility with appropriate resources  2/13/2025 0253 by Daniela Gilliam RN  Outcome: Progressing  Note: Plan to d/c [facility]     Problem: Pain  Goal: Verbalizes/displays adequate comfort level or baseline comfort level  2/13/2025 0253 by Daniela Gilliam, RN  Outcome: Progressing     Problem: Skin/Tissue Integrity  Goal: Skin integrity remains intact  Description: 1.  Monitor for areas of redness and/or skin breakdown  2.  Assess vascular access sites hourly  3.  Every 4-6 hours minimum:  Change oxygen saturation probe site  4.  Every 4-6 hours:  If on nasal continuous positive airway pressure, respiratory therapy assess nares and determine need for appliance change or resting period  Outcome: Progressing     Problem: Skin/Tissue Integrity - Adult  Goal: Skin integrity remains intact  Description: 1.  Monitor for areas of redness and/or skin breakdown  2.  Assess vascular access sites hourly  3.  Every 4-6 hours minimum:  Change oxygen saturation probe site  4.  Every 4-6 hours:  If on nasal continuous positive airway pressure, respiratory therapy assess nares and determine need for appliance change or resting period  Outcome: Progressing     
  Problem: Safety - Medical Restraint  Goal: Remains free of injury from restraints (Restraint for Interference with Medical Device)  Description: INTERVENTIONS:  1. Determine that other, less restrictive measures have been tried or would not be effective before applying the restraint  2. Evaluate the patient's condition at the time of restraint application  3. Inform patient/family regarding the reason for restraint  4. Q2H: Monitor safety, psychosocial status, comfort, nutrition and hydration  Outcome: Progressing  Flowsheets  Taken 2/10/2025 0400  Remains free of injury from restraints (restraint for interference with medical device):   Determine that other, less restrictive measures have been tried or would not be effective before applying the restraint   Evaluate the patient's condition at the time of restraint application   Inform patient/family regarding the reason for restraint   Every 2 hours: Monitor safety, psychosocial status, comfort, nutrition and hydration  Taken 2/10/2025 0000  Remains free of injury from restraints (restraint for interference with medical device):   Evaluate the patient's condition at the time of restraint application   Determine that other, less restrictive measures have been tried or would not be effective before applying the restraint   Inform patient/family regarding the reason for restraint   Every 2 hours: Monitor safety, psychosocial status, comfort, nutrition and hydration  Taken 2/9/2025 2000  Remains free of injury from restraints (restraint for interference with medical device):   Determine that other, less restrictive measures have been tried or would not be effective before applying the restraint   Evaluate the patient's condition at the time of restraint application   Inform patient/family regarding the reason for restraint   Every 2 hours: Monitor safety, psychosocial status, comfort, nutrition and hydration     Problem: Safety - Adult  Goal: Free from fall 
  Pulmonary/CCM attending      Case discussed with ER staff.  Patient with significant acidosis due to DKA and severe dehydration.  Unclear the events of exactly what happened that cause this. I think her AFib with RVR and elevated troponins are most likely and reaction to the DKA /dehydration.  She is severely acidotic and needs aggressive hydration including bicarb drip.  Her chest x-ray is fairly clear and we will place her on the full ventilator protocol. Repeat ABG ordered.      Full consult to follow.  
redness and/or skin breakdown  Taken 2/11/2025 2000 by Tana Mckay RN  Skin Integrity Remains Intact: Monitor for areas of redness and/or skin breakdown  Note: Patient on pressure redistribution mattress.  Small abraision tip of nose. Pillow supprted.  2/11/2025 1521 by Sharon Romo RN  Outcome: Progressing  Flowsheets  Taken 2/11/2025 0700 by Sharon Romo RN  Skin Integrity Remains Intact: Monitor for areas of redness and/or skin breakdown  Taken 2/11/2025 0400 by Tana Mckay RN  Skin Integrity Remains Intact: Monitor for areas of redness and/or skin breakdown     Problem: ABCDS Injury Assessment  Goal: Absence of physical injury  2/12/2025 0131 by Kalyani Gunn RN  Outcome: Progressing  Flowsheets (Taken 2/11/2025 2032 by Tana Mckay RN)  Absence of Physical Injury: Implement safety measures based on patient assessment  2/11/2025 1521 by Sharon Romo RN  Outcome: Progressing  Flowsheets (Taken 2/11/2025 0700)  Absence of Physical Injury: Implement safety measures based on patient assessment     Problem: Neurosensory - Adult  Goal: Achieves stable or improved neurological status  2/12/2025 0131 by Kalyani Gunn RN  Outcome: Progressing  Flowsheets (Taken 2/11/2025 2000 by Tana Mckay RN)  Achieves stable or improved neurological status:   Assess for and report changes in neurological status   Initiate measures to prevent increased intracranial pressure   Maintain blood pressure and fluid volume within ordered parameters to optimize cerebral perfusion and minimize risk of hemorrhage   Monitor temperature, glucose, and sodium. Initiate appropriate interventions as ordered  2/11/2025 1521 by Sharon Romo RN  Outcome: Progressing  Flowsheets  Taken 2/11/2025 0700 by Sharon Romo RN  Achieves stable or improved neurological status: Assess for and report changes in neurological status  Taken 2/11/2025 0400 by Tana Mckay RN  Achieves stable or improved 
for signs and symptoms of hyperglycemia and hypoglycemia   Administer ordered medications to maintain glucose within target range   Assess barriers to adequate nutritional intake and initiate nutrition consult as needed   Instruct patient on self management of diabetes and initiate consult as needed    Problem: Hematologic - Adult  Goal: Maintains hematologic stability  Outcome: Progressing  Flowsheets  Taken 2/11/2025 0700 by Sharon Romo, RN  Maintains hematologic stability:   Assess for signs and symptoms of bleeding or hemorrhage   Monitor labs for bleeding or clotting disorders   Administer blood products/factors as ordered       
ordered   Assess for signs and symptoms of hyperglycemia and hypoglycemia   Administer ordered medications to maintain glucose within target range   Assess barriers to adequate nutritional intake and initiate nutrition consult as needed   Instruct patient on self management of diabetes and initiate consult as needed     Problem: Hematologic - Adult  Goal: Maintains hematologic stability  Outcome: Progressing  Flowsheets (Taken 2/10/2025 0800)  Maintains hematologic stability:   Assess for signs and symptoms of bleeding or hemorrhage   Monitor labs for bleeding or clotting disorders   Administer blood products/factors as ordered     
deficit   Monitor intake, output and patient weight     Problem: Metabolic/Fluid and Electrolytes - Adult  Goal: Glucose maintained within prescribed range  Recent Flowsheet Documentation  Taken 2/11/2025 0400 by Tana Mckay RN  Glucose maintained within prescribed range:   Monitor blood glucose as ordered   Assess for signs and symptoms of hyperglycemia and hypoglycemia  Taken 2/11/2025 0000 by Tana Mckay RN  Glucose maintained within prescribed range:   Monitor blood glucose as ordered   Assess for signs and symptoms of hyperglycemia and hypoglycemia  Taken 2/10/2025 2000 by Tana Mckay RN  Glucose maintained within prescribed range:   Monitor blood glucose as ordered   Assess for signs and symptoms of hyperglycemia and hypoglycemia     Problem: Hematologic - Adult  Goal: Maintains hematologic stability  Recent Flowsheet Documentation  Taken 2/11/2025 0400 by Tana Mckay RN  Maintains hematologic stability:   Assess for signs and symptoms of bleeding or hemorrhage   Monitor labs for bleeding or clotting disorders  Taken 2/11/2025 0000 by Tana Mckay RN  Maintains hematologic stability:   Assess for signs and symptoms of bleeding or hemorrhage   Monitor labs for bleeding or clotting disorders  Taken 2/10/2025 2000 by Tana Mckay RN  Maintains hematologic stability:   Assess for signs and symptoms of bleeding or hemorrhage   Monitor labs for bleeding or clotting disorders

## 2025-02-13 NOTE — PROGRESS NOTES
[facility]    Gastroenterology Daily Progress Note      Patient:   Francia Ferguson   :    1945   Facility:   [facility]   Date:     2025  Consultant:   ENRIQUE Cabrera CNP, CNP      SUBJECTIVE  79 y.o. female admitted 2025 with DKA, type 2, not at goal (HCC) [E11.10]  Acute respiratory failure, unspecified whether with hypoxia or hypercapnia [J96.00]  Altered mental status, unspecified altered mental status type [R41.82]  Diabetic ketoacidosis with coma associated with type 2 diabetes mellitus (HCC) [E11.11] and seen for coffee ground emesis. The pt was seen and examined. Hgb 11.2 having non bloody stools. No c/o abdominal pain no further emesis tolerating a diet. .        OBJECTIVE  Scheduled Meds:   insulin lispro  0-8 Units SubCUTAneous 4x Daily AC & HS    QUEtiapine  12.5 mg Oral Nightly    cefTRIAXone (ROCEPHIN) IV  1,000 mg IntraVENous Q24H    insulin glargine  8 Units SubCUTAneous Nightly    [Held by provider] aspirin  81 mg Oral Daily    [Held by provider] clopidogrel  75 mg Oral Daily    [Held by provider] atorvastatin  40 mg Oral Daily    [Held by provider] aspirin  81 mg Per NG tube Daily    atorvastatin  40 mg Per NG tube Nightly    [Held by provider] clopidogrel  75 mg Per NG tube Daily    [Held by provider] isosorbide mononitrate  120 mg Oral Daily    [Held by provider] therapeutic multivitamin-minerals  1 tablet Oral Daily    sodium chloride flush  5-40 mL IntraVENous 2 times per day       Vital Signs:  BP (!) 149/59   Pulse 79   Temp 97.8 °F (36.6 °C) (Oral)   Resp 16   Ht 1.55 m (5' 1.02\")   Wt 55 kg (121 lb 4.1 oz)   SpO2 94%   BMI 22.89 kg/m²    Review of Systems - History obtained from chart review and the patient  General ROS: negative  Respiratory ROS: no cough, shortness of breath, or wheezing  Cardiovascular ROS: no chest pain or dyspnea on exertion  Gastrointestinal ROS: no abdominal pain, change in bowel habits, or black or bloody 
  [facility]  IN-PATIENT SERVICE  [facility]    PROGRESS NOTE             2/12/2025    9:57 AM    Name:   Francia Ferguson  MRN:     [id number]     Acct:      [id number]   Room:   2123/2123-01   Day:  3  Admit Date:  2/9/2025 10:36 AM    PCP:  Jack Boone DO  Code Status:  Full Code    Subjective:     C/C:   Chief Complaint   Patient presents with    Altered Mental Status     Interval History:     -The patient was agitated and pulling out lines overnight. Olanzapine given.  -Urinary retention overnight. Intermittent catheter had to be placed; 550 mL of urine evacuated.  -Episodes of tachycardia overnight. Hemodynamically stable. Remained on room air.  -Na 147, K 3.9, P 2.5. Creatinine 2.0. Glucose 159. On SQ Lantus 8 units. Off continuous IV fluids.  -Hb 10.8. NPO. Pantoprazole drip. Possible EGD, GI reevaluation pending.  -A single blood culture showed Streptococcus viridans, likely a contaminant. On ceftriaxone.  -TTE pending. Estimated EF 60%. Cardiology onboard.  -Was extubated yesterday.    Brief History:     The patient is a 79 y.o. female with medical history of CAD s/p angiopasty and stenting in 2020, HTN, HLD, CKD, DM type 2 on insulin, dementia. She presented on 2/9/2025 with decreased level of consciousness. EMS were called due to the patient being altered at home, they started bag mask ventilations en route. Per EMS, the patient aspirated during an episode of coffee ground emesis. She came in to the ER in atrial fibrillation with RVR, was hypotensive, tachycardic, hypothermic, and tachypneic. pH was 6.78, she was in acute anion gap metabolic acidosis with respiratory compensation. K was 6.1, glucose 741, lactic acid 6.1, creatinine 2.3. SKG showed afib with RVR and ST changes.  She was intubated, insulin, IV fluids, NaHCO3, diltiazem, vancomycin and Zosyn started.  Admitted to ICU with DKA, likely MI, afib with RVR.    Medications:     Allergies:  No 
  [facility] PULMONARY,CRITICAL CARE & SLEEP   Shenlizeth Stinson MD/Sonny NUNEZ AGATHELMAP-BC, NP-C       Arely Shipley APRN NP-C      Wil NUNEZ NP-C                                  Pulmonary Critical Care Progress Note    Patient - Francia Ferguson   Age - 79 y.o.   - 1945  MRN - [id number]  Acct # - [id number]  Date of Admission - 2025 10:36 AM    Consulting Service/Physician:       Primary Care Physician: Jack Boone DO    SUBJECTIVE:     Chief Complaint:   Chief Complaint   Patient presents with    Altered Mental Status   DKA  Subjective:    Patient extubated yesterday doing well.  Blood culture positive for strep viridans.  Currently on antibiotics.  She really has no recollection on what transpired.  She denies missing any medication or insulin.  She denies shortness of breath.  She is currently on room air.  She denies abdominal pain nausea or vomiting.  She feels that she is doing well.  She was asking about going home but still seems encephalopathic and extremely weak    VITALS  BP (!) 156/48   Pulse 70   Temp 97.8 °F (36.6 °C) (Axillary)   Resp 20   Ht 1.55 m (5' 1.02\")   Wt 55 kg (121 lb 4.1 oz)   SpO2 99%   BMI 22.89 kg/m²   Wt Readings from Last 3 Encounters:   25 55 kg (121 lb 4.1 oz)   02/10/25 49.7 kg (109 lb 9.1 oz)   25 53.4 kg (117 lb 12.8 oz)     I/O (24 Hours)    Intake/Output Summary (Last 24 hours) at 2025 1710  Last data filed at 2025 1641  Gross per 24 hour   Intake 2059.94 ml   Output 4288 ml   Net -2228.06 ml     Ventilator:   Settings  FiO2 : 30 %  Exam:   Physical Exam   Constitutional: Frail, alert, cooperative, not in distress  HENT: Unremarkable small abrasion on her nose, currently on room air  Neck: Neck supple.   Cardiovascular: Regular without murmur  Pulmonary/Chest: Slightly diminished but otherwise  Abdominal: Soft nontender   Neurological: No focal deficit, mild confusion  Skin: Skin is 
  [facility]  IN-PATIENT SERVICE  [facility]    PROGRESS NOTE             2/10/2025    7:30 AM    Name:   Francia Ferguson  MRN:     [id number]     Acct:      [id number]   Room:   2009/2009-01   Day:  1  Admit Date:  2/9/2025 10:36 AM    PCP:  Jack Boone DO  Code Status:  Full Code    Subjective:     C/C:   Chief Complaint   Patient presents with    Altered Mental Status     Interval History:     -Episode of hypotension after metoprolol given. Metoprolol discontinues. The patient was briefly put on norepinephrine. This morning, the vitals are stable and the patient is off norepinephrine.  -The patient attempted to extubate herself this morning. Tube remained secured. Propofol increased.  -ABG show resolution of acidosis (pH 7.448), however, pCO2 26, pO2 144, HCO3 17.8. PRVC, FiO2 30%, rate 24, Vt 450, PIP 18, PEEP 7.  -Electrolyte disorder less prominent. Glucose 98. Na 148, K 3.8, P 16, Mg 1.6. creatinine 2.3. Anion gap closed on 2 consequent measurements. Nephrology contacted regarding electrolyte replacement given CKD.  -Hb 11.6. IV pantoprazole infusion.  -Blood culture positive for gram-positive cocci in in chains. On vancomycin and Zosyn.      Brief History:     The patient is a 79 y.o. female with medical history of CAD s/p angiopasty and stenting in 2020, HTN, HLD, CKD, DM type 2 on insulin, dementia. She presented on 2/9/2025 with decreased level of consciousness. EMS were called due to the patient being altered at home, they started bag mask ventilations en route. Per EMS, the patient aspirated during an episode of coffee ground emesis. She came in to the ER in atrial fibrillation with RVR, was hypotensive, tachycardic, hypothermic, and tachypneic. pH was 6.78, she was in acute anion gap metabolic acidosis with respiratory compensation. K was 6.1, glucose 741, lactic acid 6.1, creatinine 2.3. SKG showed afib with RVR and ST changes.  She was intubated, 
  NEPHROLOGY PROGRESS NOTE    Patient :  Francia Ferguson; 79 y.o. MRN# [id number]  Location:  2123/2123-01  Attending:  Kel Navarro MD  Admit Date:  2/9/2025   Hospital Day: 4    Reason for consultation: Management of acute kidney injury.    Requesting physician: Kel Navarro MD.    Chief Complaint:  AMS    Interval history: Patient was seen and examined today and she is awake, alert and fairly oriented.  She is not in acute distress.    History of Present Illness: This is a 79 y.o. female with medical history of essential hypertension, type 2 diabetes, insulin-dependent diabetes, CAD s/p angiopasty and stenting in 2020, CKD 3 a with baseline serum creatinine of 1.0 to 1.2 mg/dL, history of dementia. She presented to the hospital on 2/9/2025 with decreased level of consciousness.patient was brought by EMS, it was reported that patient required  bag mask ventilations en route. Per EMS, the patient aspirated during an episode of coffee ground emesis. She came in to the ER in atrial fibrillation with RVR, was hypotensive, tachycardic, hypothermic, and tachypneic. pH was 6.78, she was in acute anion gap metabolic acidosis with respiratory compensation.  Patient was intubated and was placed on ventilator and was admitted to ICU patient was started on DKA protocol.  Labs showed serum creatinine on admission increased to 2.3 mg/dL which has peaked to 2.6 mg/dL.  Patient urine output has improved since morning.  Currently on insulin drip, and IV fluids per DKA protocol and also on Protonix drip.  Patient extubated this morning but she is not responsive to verbal stimuli.    Current Medications:    potassium chloride 10 mEq/100 mL IVPB (Peripheral Line), Q1H  pantoprazole (PROTONIX) 40 mg in sodium chloride (PF) 0.9 % 10 mL injection, Q12H  potassium chloride 20 mEq in sodium chloride 0.45 % 1,000 mL infusion, Continuous  insulin lispro (HUMALOG,ADMELOG) injection vial 0-8 Units, 4x Daily AC & HS  melatonin tablet 3 
  NEPHROLOGY progress note    Patient :  Francia Ferguson; 79 y.o. MRN# [id number]  Location:  2009/2009-01  Attending:  Kel Navarro MD  Admit Date:  2/9/2025   Hospital Day: 2      Reason for Consult:  ANKUR      Chief Complaint:  AMS    Subjective/interval history.    Patient remains lethargic, off of DKA protocol blood sugar improved anion gap closed bicarb 19 serum creatinine improved to 2.0 mg/dL serum sodium 147, blood pressure stable.  Good urine output 2.2 L    History of Present Illness:    This is a 79 y.o. female with medical history of essential hypertension, type 2 diabetes, insulin-dependent diabetes, CAD s/p angiopasty and stenting in 2020, CKD 3 a with baseline serum creatinine of 1.0 to 1.2 mg/dL, history of dementia. She presented to the hospital on 2/9/2025 with decreased level of consciousness.patient was brought by EMS, it was reported that patient required  bag mask ventilations en route. Per EMS, the patient aspirated during an episode of coffee ground emesis. She came in to the ER in atrial fibrillation with RVR, was hypotensive, tachycardic, hypothermic, and tachypneic. pH was 6.78, she was in acute anion gap metabolic acidosis with respiratory compensation.  Patient was intubated and was placed on ventilator and was admitted to ICU patient was started on DKA protocol.  Labs showed serum creatinine on admission increased to 2.3 mg/dL which has peaked to 2.6 mg/dL.  Patient urine output has improved since morning.  Currently on insulin drip, and IV fluids per DKA protocol and also on Protonix drip.  Patient extubated this morning but she is not responsive to verbal stimuli.      Past Medical History:        Diagnosis Date    Abnormal cardiovascular stress test 06/2020    no ischemia / infarction   there is septal hypokinesis   /  EF 70%    CAD (coronary artery disease)     Chest pressure     Fatigue     History of pneumonia 03/2020    HTN (hypertension)     Hyperlipidemia     Pneumonia     
  [facility]  IN-PATIENT SERVICE  [facility]    PROGRESS NOTE             2/11/2025    7:14 AM    Name:   Francia Ferguson  MRN:     [id number]     Acct:      [id number]   Room:   2009/2009-01   Day:  2  Admit Date:  2/9/2025 10:36 AM    PCP:  Jack Boone DO  Code Status:  Full Code    Subjective:     C/C:   Chief Complaint   Patient presents with    Altered Mental Status     Interval History:     -The patient was agitated and pulling out lines overnight. Olanzapine given.  -Urinary retention overnight. Intermittent catheter had to be placed; 550 mL of urine evacuated.  -Episodes of tachycardia overnight. Hemodynamically stable. Remained on room air.  -Na 147, K 3.9, P 2.5. Creatinine 2.0. Glucose 159. On SQ Lantus 8 units. Off continuous IV fluids.  -Hb 10.8. NPO. Pantoprazole drip. Possible EGD, GI reevaluation pending.  -A single blood culture showed Streptococcus viridans, likely a contaminant. On ceftriaxone.  -TTE pending. Estimated EF 60%. Cardiology onboard.  -Was extubated yesterday.    Brief History:     The patient is a 79 y.o. female with medical history of CAD s/p angiopasty and stenting in 2020, HTN, HLD, CKD, DM type 2 on insulin, dementia. She presented on 2/9/2025 with decreased level of consciousness. EMS were called due to the patient being altered at home, they started bag mask ventilations en route. Per EMS, the patient aspirated during an episode of coffee ground emesis. She came in to the ER in atrial fibrillation with RVR, was hypotensive, tachycardic, hypothermic, and tachypneic. pH was 6.78, she was in acute anion gap metabolic acidosis with respiratory compensation. K was 6.1, glucose 741, lactic acid 6.1, creatinine 2.3. SKG showed afib with RVR and ST changes.  She was intubated, insulin, IV fluids, NaHCO3, diltiazem, vancomycin and Zosyn started.  Admitted to ICU with DKA, likely MI, afib with RVR.    Medications:     Allergies:  No 
  Physician Progress Note      PATIENT:               JOSE LAYNE  CSN #:                  [id number]  :                       1945  ADMIT DATE:       2025 10:36 AM  DISCH DATE:  RESPONDING  PROVIDER #:        Enrike Magdaleno          QUERY TEXT:    [facility]  IN-PATIENT SERVICE    Patient admitted with DKA. Per notes: Sepsis and SIRS-positive, but could be   from dehydration. On arrival: AMS, Lactic acid initially 6.1, procalcitonin   0.42, WBC 14.4. Initial pulse 113, /42. Treated with :0.9 NS 2593 ml   bolus, IV Vanc, Rocephin, & Intubation. If possible, please document in   progress notes and discharge summary if you are evaluating and /or treating   any of the following:    The medical record reflects the following:  Risk Factors: DKA  Clinical Indicators: Per notes: Sepsis and SIRS-positive, but could be from   dehydration. On arrival: AMS, Lactic acid initially 6.1, procalcitonin 0.42,   WBC 14.4. Initial pulse 113, /42.  Treatment: 0.9 NS 2593 ml bolus, IV Vanc, Rocephin, & Intubation.  Options provided:  -- Sepsis confirmed POA.  -- SIRS without organ dysfunction confirmed and Sepsis ruled out.  -- Other - I will add my own diagnosis  -- Disagree - Not applicable / Not valid  -- Disagree - Clinically unable to determine / Unknown  -- Refer to Clinical Documentation Reviewer    PROVIDER RESPONSE TEXT:    Sepsis unlikely.    Query created by: Mary Cristobal on 2025 10:45 AM      Electronically signed by:  Enrike Magdaleno 2025 10:47 AM          
  Physician Progress Note      PATIENT:               JOSE LAYNE  [facility] #:                  [id number]  :                       1945  ADMIT DATE:       2025 10:36 AM  DISCH DATE:  RESPONDING  PROVIDER #:        Ashley Yang MD          QUERY TEXT:    [facility]  IN-PATIENT SERVICE    Patient admitted with DKA. Per notes: Admitted to ICU with DKA, likely MI,   afib with RVR. Per Cardiology: Abnormal high-sensitivity troponin. If   possible, please document in progress notes and discharge summary if you are   evaluating and /or treating any of the following:    The medical record reflects the following:  Risk Factors: DKA, AFIB  Clinical Indicators: Per notes: Admitted to ICU with DKA, likely MI, afib with   RVR. Per Cardiology: Abnormal high-sensitivity troponin. Troponin 69.  Treatment: Cardio consult.  Options provided:  -- Elevated Troponin confirmed and MI ruled out  -- MI confirmed POA, please specify MI type  -- Other - I will add my own diagnosis  -- Disagree - Not applicable / Not valid  -- Disagree - Clinically unable to determine / Unknown  -- Refer to Clinical Documentation Reviewer    PROVIDER RESPONSE TEXT:    After study, Elevated Troponin confirmed and MI ruled out.    Query created by: Mary Cristobal on 2025 10:51 AM      QUERY TEXT:    [facility]  IN-PATIENT SERVICE    Pt admitted with DKA and meets [place] Malnutrition per dietician ASPEN   criteria. Please further specify type of malnutrition with documentation in   the medical record.    The medical record reflects the following:  Risk Factors: DKA  Clinical Indicators: ASPEN: Energy Intake:  Mild decrease in energy intake &   Weight Loss:   (12% wt loss over 1 year) & Body Fat Loss:  Mild body fat loss   Triceps & Muscle Mass Loss:  Mild muscle mass loss Temples (temporalis)  Treatment: 5 carbohydrate choices per tray when advanced to solid food.    ASPEN Criteria:  
ABG delayed by CT    Results given to Dr. Kate.    Orders increase RR to 20, titrate O2  
ANDRE Goodson NP notified of coffee ground emesis and NG output. Orders received to hold ASA and plavix. Notify if patient starts having bloody/black bowel movements.    Also requesting CT abd w/o contrast. Writer to check with critical care physician at this time due to pt being unstable.  
[facility]   Physical Therapy Treatment  Date: 25  Patient Name: Francia Ferguson       Room: -01  MRN: [id number]  Account: [id number]   : 1945  (79 y.o.) Gender: female     Discharge Recommendations:  Discharge Recommendations: Continue to assess pending progress, Patient would benefit from continued therapy after discharge, Therapy recommended at discharge     PT Equipment Recommendations  Equipment Needed:  (TBD- ? wheeled walker)    General  Chart Reviewed: Yes  Patient assessed for rehabilitation services?: Yes  Additional Pertinent Hx: Per H & P note:  The patient is a 79 y.o. female with medical history of CAD s/p angiopasty and stenting in , HTN, HLD, CKD, DM type 2 on insulin, dementia. She presented on 2025 with decreased level of consciousness. EMS were called due to the patient being altered at home, they started bag mask ventilations en route. Per EMS, the patient aspirated during an episode of coffee ground emesis. She came in to the ER in atrial fibrillation with RVR, was hypotensive, tachycardic, hypothermic, and tachypneic. pH was 6.78, she was in acute anion gap metabolic acidosis with respiratory compensation. K was 6.1, glucose 741, lactic acid 6.1, creatinine 2.3. SKG showed afib with RVR and ST changes.  She was intubated, insulin, IV fluids, NaHCO3, diltiazem, vancomycin and Zosyn started.  Admitted to ICU with DKA, likely MI, afib with RVR.  Response To Previous Treatment: Patient with no complaints from previous session.  Family/Caregiver Present: No  Referring Practitioner: Dr. MARK Magdaleno  Referral Date : 25  Diagnosis: DKA, AMS, acute respiratory failure, A-Fib  Follows Commands: Impaired  Other (Comment): OK per nurse Bustos to proceed w/ PT evaluation- pt intubated on 2025 and extbated on 2-.    Past Medical History:  has a past medical history of Abnormal cardiovascular stress test, CAD (coronary artery disease), Chest 
Bear hugger removed     
Bladder scan showing 509mL, pt still unable to void on her own despite urge to void.     SMITH Garcia places 1x order for straight cath and consult to urology.  
[facility]   OCCUPATIONAL THERAPY MISSED TREATMENT NOTE   INPATIENT   Date: 2/10/25  Patient Name: Francia Ferguson       Room:   MRN: [id number]   Account #: [id number]    : 1945  (79 y.o.)  Gender: female                 REASON FOR MISSED TREATMENT:  Pt not seen by OT this afternoon for evalution due to    -    at 1306 per nursing, pt was just extubated and still drowsy and not appropriate yet to be seen by OT.  OT will reattempt to see pt at a later time as schedule permits.    1306         Electronically signed by ADRIAN Rivera on 2/10/25 at 1:24 PM EST   
[facility]   OCCUPATIONAL THERAPY MISSED TREATMENT NOTE   INPATIENT   Date: 25  Patient Name: Francia Ferguson       Room:   MRN: [id number]   Account #: [id number]    : 1945  (79 y.o.)  Gender: female                 REASON FOR MISSED TREATMENT:  Patient not appropriate for OT evaluation per DEDRA Herrera due to current medical status. RN reports patient has been in restraints on/off today. OT will continue to follow and see patient when appropriate.    Electronically signed by ADRIAN Alex on 25 at 1:45 PM EST   
Date:   2/13/2025  Patient name: Francia Ferguson  Date of admission:  2/9/2025 10:36 AM  MRN:   [id number]  YOB: 1945  PCP: Jack Boone DO    Reason for Admission: DKA, type 2, not at goal (HCC) [E11.10]  Acute respiratory failure, unspecified whether with hypoxia or hypercapnia [J96.00]  Altered mental status, unspecified altered mental status type [R41.82]  Diabetic ketoacidosis with coma associated with type 2 diabetes mellitus (HCC) [E11.11]    Cardiology follow-up: Atrial fibrillation with RVR, abnormal high-sensitivity troponin        Referring physician: Dr Kel Navarro     Impression  Admission with altered mental status, hyperglycemia, severe acidosis acute respiratory failure, required intubation, got extubated at noon time 2/10/2025  Atrial fibrillation with rapid ventricular response, marked repolarization changes probably secondary to tachycardia  A-fib converted to sinus rhythm spontaneously early morning 2/10/2025, significant improvement in the repolarization changes with improvement in the heart rate and conversion of A-fib to sinus rhythm  2D echo 2/10/2025 normal LV size normal wall thickness and wall motions ejection fraction 55-60%, RVSP 29 mm Hg  Coronary artery disease RCA stent placement 2020  Hypertension  Hyperlipidemia  Type 2 diabetes mellitus  ANKUR on CKD on admission BUN 48 creatinine 2.3 and potassium 6.1  Dementia  Normal pressure hydrocephalus      Past surgical history  Appendectomy, cardiac cath 2020 and 2021,  coronary angioplasty stent placement 2020 high-grade stenosis in the proximal RCA, eye surgery, hysterectomy vagina, cholecystectomy    Allergies  No known allergies  Never smoked, no drug abuse    History of present illness     79-year-old female with past medical history of CAD, status post angioplasty stent placement in the RCA 2020, hypertension, hyperlipidemia, CKD, diabetes mellitus type 2, on insulin, impaired memory presented to the ER 2/9/2025 
Dr Navarro rounded and patient more coherent. Discontinued restraints and patient did agree not to pull any wires or lines.  
Dr. Beth notified of new consult, DKA patient needing continuous potassium/magnesium and sodium phos replacement, per pharmacy cannot use PRN order set due to decreased kidney function.     Per MD use sliding scale.     Writer will reach out to NP to see if we can have a one time dose per pharmacy for replacements    
Dr. Galeas states okay to leave NGT out as long as pt does not have nausea or vomiting.  
Dr. Louis calls unit and gives orders for continuous fluids and states standard sliding scale is okay for electrolyte replacement going forward.  
Dr. Louis notified of patient's BMP results. Awaiting a response.     0190 See new orders.   
Dr. Mata updated on patient's overnight straight caths, retention and post-void residual. Orders to place a meeks catheter.   
Dr. Rainey notified of arrival to unit. Updated on hypotension. RT attempting ABG. Dr. Rainey does not want CT scan at this time until she is more stable. Orders received for 1L NS bolus, 2 amps bicarb, and levophed if BP does not improve.  
Dr. Sherman at bedside. Labs and vitals reviewed. Orders received for scheduled IV lopressor 5mg q4h. 12 lead EKG ordered.  
Dr. Sherman notified of critical troponin. Orders to obtain an EKG and an echo.   
Dr. Torres sent secure message for new consult.   
[facility]   Pharmacy Pharmacokinetic Monitoring Service - Vancomycin     Francia Ferguson is a 79 y.o. female starting on vancomycin therapy for   Indication: Sepsis of unknown origin, empiric. Pharmacy consulted by Dr. Kate for monitoring and adjustment.    Target Concentration: Goal trough of 10-15 mg/L and AUC/CHELA <500 mg*hr/L    Additional Antimicrobials: zosyn    Pertinent Laboratory Values:   Wt Readings from Last 1 Encounters:   02/09/25 53.1 kg (117 lb)     Temp Readings from Last 1 Encounters:   02/09/25 (!) 94.3 °F (34.6 °C) (Bladder)     Estimated Creatinine Clearance: 15 mL/min (A) (based on SCr of 2.3 mg/dL (H)).  Recent Labs     02/09/25  1042 02/09/25  1203   CREATININE  --  2.3*   BUN  --  48*   WBC 14.4*  --        Pertinent Cultures: see micro  MRSA Nasal Swab: N/A. Non-respiratory infection.    Plan:  Concentration-guided dosing due to renal impairment/insufficiency  Start vancomycin 1250 mg x1  Renal labs as indicated   Vancomycin concentration ordered for 2/11 @ 0600   Pharmacy will continue to monitor patient and adjust therapy as indicated    Thank you for the consult,  Dhruv Reddy [facility]  2/9/2025 12:58 PM   
[facility]   Pharmacy Pharmacokinetic Monitoring Service - Vancomycin    Consulting Provider: NEDRA Magdaleno   Indication: sepsis  Target Concentration: Pre-Dialysis Concentration 15-20 mg/L  Day of Therapy: 2  Additional Antimicrobials: Zosyn    Pertinent Laboratory Values:   Wt Readings from Last 1 Encounters:   02/10/25 49.7 kg (109 lb 9.1 oz)     Temp Readings from Last 1 Encounters:   02/10/25 99.3 °F (37.4 °C)     Recent Labs     02/09/25  1042 02/09/25  1203 02/10/25  0011 02/10/25  0422 02/10/25  0423   CREATININE  --    < > 2.3* 2.4* 2.3*   BUN  --    < > 44* 41*  --    WBC 14.4*  --   --   --  9.0    < > = values in this interval not displayed.     Procalcitonin: 0.42 on 2/9    Pertinent Cultures:  Culture Date Source Results   2/9 blood Gram pos cocci chains   MRSA Nasal Swab: N/A. Non-respiratory infection.    Recent vancomycin administrations                     vancomycin (VANCOCIN) 1250 mg in 250 mL IVPB (mg) 1,250 mg New Bag 02/09/25 1222                    Assessment:    Plan:  Concentration-guided dosing due to renal impairment and intermittent hemodialysis .   Current dosing regimen of 1250 mg loading dose  Last dose administered on 2/9 @ 1222,   Vancomycin concentration ordered for 2/11 @ 0600, prior to HD session   Pharmacy will continue to monitor patient and adjust therapy as indicated    Thank you for the consult,NEDRA Granado [facility]  2/10/2025 7:47 AM    
[facility]   Pharmacy Pharmacokinetic Monitoring Service - Vancomycin    Consulting Provider: NEDRA Magdaleno   Indication: sepsis  Target Concentration: Pre-Dialysis Concentration 15-20 mg/L  Day of Therapy: 2  Additional Antimicrobials: Zosyn    Pertinent Laboratory Values:   Wt Readings from Last 1 Encounters:   02/10/25 49.7 kg (109 lb 9.1 oz)     Temp Readings from Last 1 Encounters:   02/10/25 99.3 °F (37.4 °C)     Recent Labs     02/09/25  1042 02/09/25  1203 02/10/25  0011 02/10/25  0422 02/10/25  0423   CREATININE  --    < > 2.3* 2.4* 2.3*   BUN  --    < > 44* 41*  --    WBC 14.4*  --   --   --  9.0    < > = values in this interval not displayed.     Procalcitonin: 0.42 on 2/9    Pertinent Cultures:  Culture Date Source Results   2/9 blood Gram pos cocci chains   MRSA Nasal Swab: N/A. Non-respiratory infection.    Recent vancomycin administrations                     vancomycin (VANCOCIN) 1250 mg in 250 mL IVPB (mg) 1,250 mg New Bag 02/09/25 1222                    Assessment:    Plan:  Concentration-guided dosing due to renal impairment and intermittent hemodialysis .   Current dosing regimen of 1250 mg loading dose  Last dose administered on 2/9 @ 1222,   Vancomycin concentration ordered for 2/11 @ 0600, prior to HD session   Pharmacy will continue to monitor patient and adjust therapy as indicated    Thank you for the consult,NEDRA Granado [facility]  2/10/2025 7:47 AM    
Fast patches applied     
Informed dr sorto pt has 20 meq K with .45% NS at 100 ml/hr continuous. i need to give her potassium replacement but its burning so i need to dilute it with saline. am i ok to run 0.9% NS while infusing her potassium replacement? her NA today is 141 .    He stated yes   
Medicine residents notified of patient's most recent BMP results. Orders to switch patient to subQ insulin and discontinue gtt 2 hours after administration.   
Message left with office staff to give dr marinelli a message about pt. Consult. They said they would give message to dr marinelli concerning urology consult.  
[facility]   Occupational Therapy Evaluation  Date: 25  Patient Name: Francia Ferguson       Room: -  MRN: [id number]  Account: [id number]   : 1945  (79 y.o.) Gender: female     Discharge Recommendations:  Further Occupational Therapy is recommended upon facility discharge.    OT Equipment Recommendations  Other: CTA    Referring Practitioner: Enrike Magdaleno MD  Diagnosis: Diabetic Ketoacidosis with coma associated with type 2 diabetes mellitus.        Treatment Diagnosis: impaired self care status    Past Medical History:  has a past medical history of Abnormal cardiovascular stress test, CAD (coronary artery disease), Chest pressure, Fatigue, History of pneumonia, HTN (hypertension), Hyperlipidemia, Pneumonia, Positive cardiac stress test, Renal insufficiency, SOB (shortness of breath), and Type 2 diabetes mellitus without complication (HCC).    Past Surgical History:   has a past surgical history that includes Cholecystectomy; Colonoscopy; Hysterectomy, vaginal; Coronary angioplasty with stent (2020); Hysterectomy; Appendectomy; Cardiac catheterization (2021); and eye surgery.    Restrictions  Restrictions/Precautions  Restrictions/Precautions: Fall Risk, Bed Alarm (telesitter, urinary catheter, IV left forearm and left antecubital, troponins 433 on 2-)  Required Braces or Orthoses?: No  Implants Present? : Metal implants (cardiac stent)      Vitals  Vitals  O2 Device: None (Room air)     Subjective  Subjective: pt agreeable to participate.  Comments: DEDRA hester'alejandra pt to be seen by OT this am.  Pain  Pre-Pain: 0  Post-Pain: 0    Social/Functional History  Social/Functional History  Lives With: Spouse  Type of Home: House  Home Layout: One level (has basement)  Home Access: Stairs to enter without rails  Entrance Stairs - Number of Steps: 2 TINA through garage without rail  Entrance Stairs - Rails: None  Bathroom Shower/Tub: Tub/Shower unit, 
Order obtained for extubation.  SpO2 of 100 on 30% FiO2.   Patient extubated and placed on room air.   Post extubation SpO2 is 96% with HR  48 bpm and RR 20 breaths/min.    Patient had mild cough that was non-productive.  Extubation Well tolerated by patient..   Breath Sounds: diminishd    Alana Emmanuel RCP   11:50 AM  
Patient HR 57 and blood pressure 67/37 Manual pressure 83/43. Pulse thready     Dr. Sherman called and notified of significant change after give IV lopressor. MD wants to DC lopressor and continue to monitor. Levophed started and increased to 7 mcg.   
Patient attempted to self extubate with writer at bedside, wiggled arm pillow to the side and bent over to grab her ETT. Vent disconnected from patient.     Writer reconnected vent and Xray at bedside to verify tube is secure. ETT showing 25 at the teeth still     
Patient becoming increasingly agitated, cussing, kicking and swinging at staff. Orders for bilateral wrist restraints.   
Patient cooperative and resting comfortably. Not pulling on monitors or IV access. Restraints removed at this time  
Patient received by bed to room 2123 per bed.  VS taken and telemetry applied.  Oriented to room.  Bed alarm set and telesitter continues at bedside.  
Patient resting quietly in bed now with both eyes closed.    
Patient restless.  Trying to get out of bed, Yelling at telesitter. Haritha HOUSTON notified and order received.  
Patient transported to ICU 2009 from ER on ventilator with RN and RT. Monitors placed and assessment completed.  
Patient was extubated on 2/10.  Has been stable on room air per charting.  No reported acute pulmonary or critical care issues.  Now out of ICU on progressive.  Pulmonary/critical care will sign off at this time.  Please reconsult for any issues or concerns.    Arely Shipley, NP-C  [facility]  
Per dr Hernadez, ok for void trial from meeks.   Bladder scan twice this shift and if over 400 ml, straight cath pt.   
Per medicine residents, okay to pause continuous fluids and electrolyte replacement for patient's MRI.   
Phlebotomist at the bedside to attempt patient's H&H draw. Patient refusing, pulling her arm away and yelling at staff. Patient becoming aggressive. Writer requested lab to come back and try again in a couple hours.   
Physical Therapy        Physical Therapy Cancel Note      DATE: 2/10/2025    NAME: Francia Ferguson  MRN: [id number]   : 1945      Patient not seen this date for Physical Therapy due to:    Pt extubated recently, deferred per RN due to pt drowsy.  Later on 2:30 P:M pt off the floor for testing.  Will attempt tomorrow.       Electronically signed by Anthony Roman, PT on 2/10/2025 at 2:37 PM      
Physical Therapy        Physical Therapy Cancel Note      DATE: 2025    NAME: Francia Ferguson  MRN: [id number]   : 1945      Patient not seen this date for Physical Therapy due to:    Patient not appropriate for PT evaluation per DEDRA Herrera due to pt's current cognition/confusion with agitation/paranoia and for these reasons pt is unable to safely participate in PT assessments. RN reports that patient has been in restraints on/off today. PT will continue to follow and see patient when appropriate.       Electronically signed by Micaela Mcmullen PT on 2025 at 2:08 PM      
Physical Therapy  [facility]   Physical Therapy Evaluation  Date: 25  Patient Name: Francia Ferguson       Room:   MRN: [id number]  Account: [id number]   : 1945  (79 y.o.) Gender: female     Discharge Recommendations:  Discharge Recommendations: Continue to assess pending progress, Patient would benefit from continued therapy after discharge, Therapy recommended at discharge     PT Equipment Recommendations  Equipment Needed:  (TBD- ? wheeled walker)     Past Medical History:  has a past medical history of Abnormal cardiovascular stress test, CAD (coronary artery disease), Chest pressure, Fatigue, History of pneumonia, HTN (hypertension), Hyperlipidemia, Pneumonia, Positive cardiac stress test, Renal insufficiency, SOB (shortness of breath), and Type 2 diabetes mellitus without complication (HCC).  Past Surgical History:   has a past surgical history that includes Cholecystectomy; Colonoscopy; Hysterectomy, vaginal; Coronary angioplasty with stent (2020); Hysterectomy; Appendectomy; Cardiac catheterization (2021); and eye surgery.    Subjective  Subjective  Subjective: pt in good spirits and wants to get out of bed and walk.     General  Patient assessed for rehabilitation services?: Yes  Additional Pertinent Hx: Per H & P note:  The patient is a 79 y.o. female with medical history of CAD s/p angiopasty and stenting in , HTN, HLD, CKD, DM type 2 on insulin, dementia. She presented on 2025 with decreased level of consciousness. EMS were called due to the patient being altered at home, they started bag mask ventilations en route. Per EMS, the patient aspirated during an episode of coffee ground emesis. She came in to the ER in atrial fibrillation with RVR, was hypotensive, tachycardic, hypothermic, and tachypneic. pH was 6.78, she was in acute anion gap metabolic acidosis with respiratory compensation. K was 6.1, glucose 741, lactic acid 6.1, creatinine 
Providers,    The potassium/magnesium/phospate sliding scale has been automatically discontinued per Pharmacy and Therapeutic Committee approved policy because the patient has decreased renal function (CrCl<30 ml/min).  The patient's current K/Mag levels are currently:    Recent Labs     02/10/25  1219 02/10/25  1600 02/10/25  1939   K 4.3 4.5 4.7   MG 1.8 1.8 1.9       Estimated Creatinine Clearance: 15 mL/min (A) (based on SCr of 2.3 mg/dL (H)).  For patients with decreased renal function (below 30ml/min) needing potassium/magnesium supplementation, please order individual bolus doses with appropriate monitoring.      Please contact the inpatient pharmacy at [phone] with any concerns.  Thank you.    Mannie Gunn RPH  2/10/2025  9:31 PM   
Providers,  Per the Pharmacy and Therapeutic Committee approved policy because the patient has decreased renal function (CrCl<30 ml/min).    Estimated Creatinine Clearance: 15 mL/min (A) (based on SCr of 2.3 mg/dL (H)).  For patients with decreased renal function (below 30ml/min) needing potassium/magnesium/phosphate supplementation, please order individual bolus doses with appropriate monitoring.      Please contact the inpatient pharmacy at [phone] with any concerns.  Thank you.    Mannie Gunn RPH  2/10/2025  1:04 AM   
Pt again ripped off konrad and began pulling at lines/ monitors. Writer and two other RNs replace konrad, pt attempting to hit staff and very agitated.   
Pt becoming increasing more restless/ agitated and pulling on medical equipment.    Telesitter initiated for safety.  
Pt managed to pull out peripheral IV despite use of bilateral mits and telesitter.   
Pt placed on cpap/ps  
Pt pulled out NGT and is pulling on CVC lines.     Small abrasion noted to tip of nose from where pt ripped off adhesive based moctezuma for NGT.    Verbal order received for bilateral mits from SMITH Garcia.  
Pt saturated bed pad, post void bladder scan showed 566mL.     Pt straight cathed, 550mL out.  
Pt's bladder scan shows 529mL and pt still feels like she can't void and is getting restless.    Writer notified SMITH K Ehret of this. NP places order to straight cath.  
Report called to Lina COLMENARES on PCU, all questions answered.   
Report called to nelson carrion at [phone]  
Resident notified pt lost iv access and still needs 2 bags 10 meq potassium (20 MEQ total still needed). He stated he will change it to PO since pt has dc orders.    
Results of patient's MRI of the brain sent to Dr. Britt. No new orders at this time.   
Scheduled lopressor given  blood pressure 118/54  
Sedation paused for sedation vacation.   
Spiritual Health History and Assessment/Progress Note  [facility]    (P) Crisis, (P) Family Care,  ,      Name: Francia Ferguson MRN: [id number]    Age: 79 y.o.     Sex: female   Language: English   Nondenominational: Pentecostal   <principal problem not specified>     Date: 2/9/2025            Total Time Calculated: (P) 50 min        Writer paged via [email or web address] to see family of patient who came in and was unresponsive. Went to waiting room to meet patient's son and , both were nervous but doing overall well. Writer went back to see patient and check on if family could see patient, told they would wait around 10 minutes to go back.    Once family went back writer joined them as staff gave updates.           Spiritual Assessment began in [facility] EMERGENCY DEPARTMENT        Referral/Consult From: (P) Nurse   Encounter Overview/Reason: (P) Crisis  Service Provided For: (P) Family    [place], Belief, Meaning:   Patient unable to assess at this time  Family/Friends identify as spiritual      Importance and Influence:  Patient unable to assess at this time  Family/Friends have no beliefs influential to healthcare decision-making identified during this visit    Community:  Patient Other: unable to communicate  Family/Friends feel well-supported. Support system includes: Extended family    Assessment and Plan of Care:     Patient Interventions include: Other: unable to communicate  Family/Friends Interventions include: Facilitated expression of thoughts and feelings, Explored spiritual coping/struggle/distress, and Affirmed coping skills/support systems    Patient Plan of Care: Spiritual Care available upon further referral  Family/Friends Plan of Care: Spiritual Care available upon further referral    Electronically signed by Chaplain Lul on 2/9/2025 at 12:22 PM    
Tele-sitter pulled. Bed alarm remains in use.   
Telesitter alarms. Staff and second RN enter room. Pt had pulled off knorad and is pulling at CVC lines.    Writer and second RN put mitt back on but pt hits staff and is very agitated while replacing konrad.   
Vancomycin load dose tubed to ER @ 10:55  
Writer inquired about starting sliding scale insulin for pt. NP K Ehret places order, see EMAR.  
Writer messaged Dr. Louis to inquire about orders for continuous fluids/ electrolyte replacement now that DKA protocol is d/c.  
Writer notified Dr. Galeas that pt has pulled out her NGT and inquired if he would like it to be replaced.  
Writer notified SMITH Dee of pt's bladder scan volume of 412mL and that pt is restless/ feeling urge to void but has been unable to void since meeks was removed at 1400.     NP places orders for straight cath.  
Writer reach out to ANDRE Goodson N.P to verify protonix gtt can be paused for patient's MRI. Okay to pause per NP.   
Writer updated NP K Ehret that pt has become increasingly agitated, restless, pulling off mits, and hitting staff.     NP places order for 1x dose Zyprexa. See EMAR  
as identified in malnutrition assessment    Nutrition Interventions:   Food and/or Nutrient Delivery: Start Oral Diet  Nutrition Education/Counseling: No recommendation at this time  Coordination of Nutrition Care: Continue to monitor while inpatient       Goals:  Goals: Initiation of nutrition  Type of Goal: New goal  Previous Goal Met: New Goal    Nutrition Monitoring and Evaluation:      Food/Nutrient Intake Outcomes: Diet Advancement/Tolerance  Physical Signs/Symptoms Outcomes: Biochemical Data, GI Status, Fluid Status or Edema, Skin, Weight    Discharge Planning:    Too soon to determine     Maricarmen Mclaughlin RD, LD  Contact: [phone]    
for fatigue. Negative for chills and fever.   Respiratory:  Negative for chest tightness, shortness of breath, wheezing and stridor.    Cardiovascular:  Negative for chest pain, palpitations and leg swelling.   Gastrointestinal:  Negative for abdominal distention and abdominal pain.   Genitourinary:  Negative for difficulty urinating, flank pain, frequency and urgency.   Skin:  Negative for wound.   Neurological:  Negative for dizziness, seizures, weakness, light-headedness, numbness and headaches.         Medications:     Allergies:  No Known Allergies    Current Meds:   Scheduled Meds:    pantoprazole (PROTONIX) 40 mg in sodium chloride (PF) 0.9 % 10 mL injection  40 mg IntraVENous Q12H    insulin lispro  0-8 Units SubCUTAneous 4x Daily AC & HS    QUEtiapine  12.5 mg Oral Nightly    cefTRIAXone (ROCEPHIN) IV  1,000 mg IntraVENous Q24H    insulin glargine  8 Units SubCUTAneous Nightly    [Held by provider] aspirin  81 mg Oral Daily    [Held by provider] clopidogrel  75 mg Oral Daily    atorvastatin  40 mg Oral Daily    [Held by provider] aspirin  81 mg Per NG tube Daily    [Held by provider] clopidogrel  75 mg Per NG tube Daily    isosorbide mononitrate  120 mg Oral Daily    therapeutic multivitamin-minerals  1 tablet Oral Daily    sodium chloride flush  5-40 mL IntraVENous 2 times per day     Continuous Infusions:    potassium chloride 20 mEq in sodium chloride 0.45 % 1,000 mL infusion 20 mEq (02/13/25 0331)    dextrose      sodium chloride       PRN Meds: melatonin, metoprolol, sulfur hexafluoride microspheres, glucose, dextrose bolus **OR** dextrose bolus, glucagon (rDNA), dextrose, sodium chloride flush, sodium chloride, ondansetron **OR** ondansetron, polyethylene glycol, acetaminophen **OR** acetaminophen    Data:     Past Medical History:   has a past medical history of Abnormal cardiovascular stress test, CAD (coronary artery disease), Chest pressure, Fatigue, History of pneumonia, HTN (hypertension), 
sinuses and mastoid air cells demonstrate no acute abnormality. SOFT TISSUES/SKULL:  No acute abnormality of the visualized skull or soft tissues.      1. No acute intracranial abnormality. 2. Ventriculomegaly.  No prior exams are available for comparison.  Findings can be correlated with any clinical history of normal pressure hydrocephalus.      XR CHEST PORTABLE     Result Date: 2/9/2025  EXAMINATION: ONE XRAY VIEW OF THE CHEST 2/9/2025 11:02 am COMPARISON:  FINDINGS: The heart size is within normal limits. The pulmonary vasculature is also within normal limits. No acute infiltrates are seen. No pneumothoraces are noted.There is an endotracheal tube with its tip above the nanette. Nasogastric tube courses below the diaphragm.      1. No acute cardiopulmonary process. 2. Endotracheal tube and nasogastric tube in good position.       ASSESSMENT/plan   Acute blood loss anemia, coffee ground emesis per EMS improved, no further emesis or bm  Trend hh q 6  Hold the AC  Continue ppi  Hold on egd for now    2.DKA-resolved  Mgt per crit care     3.afib with rvr  Mgt per cardiology     4.AMS     5.respiratory failure possible aspiration of emesis  (per ems notes)-improved extubated yesterday  Mgt per crit care  Abx     6.SIRS     7.ANKUR      This plan was formulated in collaboration with Dr. jaramillo .    Electronically signed by: ENRIQUE Cabrera - CNP on 2/11/2025 at 9:49 AM         
Normal   BILIRUBINUR NEGATIVE   GLUCOSEU LARGE*   [facility]     Assessment/plan:    1.  Acute kidney injury - most consistent with prerenal azotemia from osmotic diuresis.  Peak serum creatinine was 2.6 mg/dL but serum creatinine today is improved to 1.4 mg/dL.  Plan: Change IV fluid to 0.45 normal saline at 100 mL/h.  Monitor urine output closely.  Basic metabolic profile daily.    2.  Systemic hypertension - blood pressure control is adequate.    3.  Hypernatremia - estimated free water deficit is 2 L.    4.  Hypokalemia - will supplement potassium.    Prognosis is guarded.    Electronically signed by Esau Beth MD on 2/12/2025 at 8:36 AM  
abnormality.    SINUSES: The visualized paranasal sinuses and mastoid air cells demonstrate  no acute abnormality.    BONES/SOFT TISSUES: The bone marrow signal intensity appears normal. The soft  tissues demonstrate no acute abnormality.    Impression  Atrophy without acute intracranial abnormality.    Symmetric prominence of the ventricular system and correlation with  symptomatology for normal pressure hydrocephalus is needed.    No results found for this or any previous visit.    No results found for this or any previous visit.    Results for orders placed during the hospital encounter of 02/09/25    CT Head W/O Contrast    Narrative  EXAMINATION:  CT OF THE HEAD WITHOUT CONTRAST  2/9/2025 11:08 am    TECHNIQUE:  CT of the head was performed without the administration of intravenous  contrast. Automated exposure control, iterative reconstruction, and/or weight  based adjustment of the mA/kV was utilized to reduce the radiation dose to as  low as reasonably achievable.    COMPARISON:  None.    HISTORY:  ORDERING SYSTEM PROVIDED HISTORY: [facility]  TECHNOLOGIST PROVIDED HISTORY:    [facility]  Decision Support Exception - unselect if not a suspected or confirmed  emergency medical condition->Emergency Medical Condition (MA)  Reason for Exam: AMS  Additional signs and symptoms: found unresponsive at home. hx of hydrocephalus    FINDINGS:  BRAIN/VENTRICLES: There is no acute intracranial hemorrhage, mass effect or  midline shift.  No abnormal extra-axial fluid collection.  The gray-white  differentiation is maintained without evidence of an acute infarct.  Ventriculomegaly.  Mild periventricular low-attenuation, nonspecific.    ORBITS: The visualized portion of the orbits demonstrate no acute abnormality.    SINUSES: The visualized paranasal sinuses and mastoid air cells demonstrate  no acute abnormality.    SOFT TISSUES/SKULL:  No acute abnormality of the visualized skull or soft  tissues.    Impression  1. No acute 
aspirin  81 mg Per NG tube Daily    atorvastatin  40 mg Per NG tube Nightly    [Held by provider] clopidogrel  75 mg Per NG tube Daily    [Held by provider] isosorbide mononitrate  120 mg Oral Daily    [Held by provider] therapeutic multivitamin-minerals  1 tablet Oral Daily    sodium chloride flush  5-40 mL IntraVENous 2 times per day     Continuous Infusions:   sodium chloride 125 mL/hr at 02/11/25 1204    dextrose      [Held by provider] insulin Stopped (02/10/25 1851)    sodium chloride      pantoprazole 8 mg/hr (02/11/25 0846)     CBC:   Recent Labs     02/09/25  1042 02/10/25  0423 02/10/25  1939 02/11/25  0349   WBC 14.4* 9.0  --  6.9   HGB 14.9 11.6* 12.4 10.8*    152  --  110*     BMP:    Recent Labs     02/10/25  1600 02/10/25  1939 02/11/25  0349   * 147* 147*   K 4.5 4.7 3.9   * 117* 116*   CO2 17* 16* 19*   BUN 35* 33* 27*   CREATININE 2.4* 2.3* 2.0*   GLUCOSE 121* 144* 149*     Hepatic:   Recent Labs     02/09/25  1203   AST 22   ALT 16   BILITOT 0.2   ALKPHOS 81     Troponin: No results for input(s): \"TROPONINI\" in the last 72 hours.  BNP: No results for input(s): \"BNP\" in the last 72 hours.  Lipids: No results for input(s): \"CHOL\", \"HDL\" in the last 72 hours.    Invalid input(s): \"LDLCALCU\"  INR:   Recent Labs     02/09/25  1203   INR 1.2       Objective:   Vitals: BP (!) 156/48   Pulse 70   Temp 97.8 °F (36.6 °C) (Axillary)   Resp 20   Ht 1.55 m (5' 1.02\")   Wt 55 kg (121 lb 4.1 oz)   SpO2 99%   BMI 22.89 kg/m²   General appearance: alert and cooperative with exam  HEENT: Head: Normal, normocephalic, atraumatic.  Neck: no JVD and supple, symmetrical, trachea midline  Lungs: diminished breath sounds bibasilar  Heart: regular rate and rhythm  Abdomen:  Soft bowel sounds present  Extremities: Homans sign is negative, no sign of DVT  Neurologic: Mental status: Awake follow verbal command    EKG: On admission atrial fibrillation with RVR, marked ST-T abnormalities  ECG 2/10/2025 
mEq 150 mL/hr at 02/10/25 1039    sodium chloride      pantoprazole 8 mg/hr (02/10/25 1307)     CBC:   Recent Labs     02/09/25  1042 02/10/25  0423   WBC 14.4* 9.0   HGB 14.9 11.6*    152     BMP:    Recent Labs     02/10/25  0011 02/10/25  0422 02/10/25  0423 02/10/25  0424 02/10/25  1219   * 148*  --   --  147*   K 3.4* 3.8  --  3.8 4.3   * 117*  --   --  116*   CO2 19* 18*  --   --  17*   BUN 44* 41*  --   --  38*   CREATININE 2.3* 2.4* 2.3*  --  2.6*   GLUCOSE 187* 77  --   --  246*     Hepatic:   Recent Labs     02/09/25  1203   AST 22   ALT 16   BILITOT 0.2   ALKPHOS 81     Troponin: No results for input(s): \"TROPONINI\" in the last 72 hours.  BNP: No results for input(s): \"BNP\" in the last 72 hours.  Lipids: No results for input(s): \"CHOL\", \"HDL\" in the last 72 hours.    Invalid input(s): \"LDLCALCU\"  INR:   Recent Labs     02/09/25  1203   INR 1.2       Objective:   Vitals: BP (!) 126/42   Pulse 84   Temp 99.5 °F (37.5 °C)   Resp 18   Ht 1.549 m (5' 1\")   Wt 49.7 kg (109 lb 9.1 oz)   SpO2 100%   BMI 20.70 kg/m²   General appearance: Tired looking female  HEENT: Head: Normal, normocephalic, atraumatic.  Neck: no JVD and supple, symmetrical, trachea midline  Lungs: diminished breath sounds bibasilar  Heart: regular rate and rhythm  Abdomen:  Bowel sounds hypoactive  Extremities: Homans sign is negative, no sign of DVT  Neurologic: Mental status: Drowsy    EKG: On admission atrial fibrillation with RVR, marked ST-T abnormalities  ECG 2/10/2025 sinus rhythm, borderline T wave abnormalities significant improvement in the repolarization abnormality  ECHO: reviewed.   Ejection fraction: >55%  Stress Test: not obtained.  Cardiac Angiography: not obtained.        Assessment / Acute Cardiac Problems:   Patient admitted with altered mental status, severe hyperglycemia, diabetic ketoacidosis, respiratory failure required intubation significant improvement in the pH from 6.7-7.4 today,

## 2025-02-13 NOTE — CARE COORDINATION
ONGOING DISCHARGE  NOTE:      Writer reviewed notes, Patient is agreeable to discharge to [facility]  [address] [place] [address] [place] [address]  [phone]  [phone]       [id number]      [facility]  02/13/2025-02/17/2025 Approved     Facility can accept patient once pt is sitter free/restraint free for 24 hours      Will continue to follow for additional discharge needs.     If patient is discharged prior to next notation, then this note serves as note for discharge by case management.    Electronically signed by India Simons on 2/13/2025 at 8:28 AM

## 2025-02-13 NOTE — CARE COORDINATION
Patient will discharge to [facility]  [address] [place] [address] [place] [address]  [phone]  [phone]    At 1830 via [employer]   SUZY faxed to facility.  Patient/Family informed of discharge and is agreeable.  Bedside RN notified, Call report to [phone].        Son, spouse and patient informed of discharge. All are agreeable to discharge

## 2025-02-14 LAB
MICROORGANISM SPEC CULT: NORMAL
SERVICE CMNT-IMP: NORMAL
SPECIMEN DESCRIPTION: NORMAL

## 2025-02-15 LAB
MICROORGANISM SPEC CULT: NORMAL
SERVICE CMNT-IMP: NORMAL
SPECIMEN DESCRIPTION: NORMAL

## 2025-02-16 ENCOUNTER — APPOINTMENT (OUTPATIENT)
Dept: CT IMAGING | Age: 80
DRG: 637 | End: 2025-02-16
Payer: MEDICARE

## 2025-02-16 ENCOUNTER — APPOINTMENT (OUTPATIENT)
Dept: GENERAL RADIOLOGY | Age: 80
DRG: 637 | End: 2025-02-16
Payer: MEDICARE

## 2025-02-16 ENCOUNTER — HOSPITAL ENCOUNTER (INPATIENT)
Age: 80
LOS: 2 days | Discharge: NURSING, REHAB OR LONG-TERM CARE | DRG: 637 | End: 2025-02-18
Attending: STUDENT IN AN ORGANIZED HEALTH CARE EDUCATION/TRAINING PROGRAM | Admitting: INTERNAL MEDICINE
Payer: MEDICARE

## 2025-02-16 DIAGNOSIS — R91.8 PULMONARY NODULES: ICD-10-CM

## 2025-02-16 DIAGNOSIS — J90 PLEURAL EFFUSION, BILATERAL: ICD-10-CM

## 2025-02-16 DIAGNOSIS — I21.4 NSTEMI (NON-ST ELEVATED MYOCARDIAL INFARCTION) (HCC): ICD-10-CM

## 2025-02-16 DIAGNOSIS — R73.9 HYPERGLYCEMIA: ICD-10-CM

## 2025-02-16 DIAGNOSIS — R79.89 ELEVATED BRAIN NATRIURETIC PEPTIDE (BNP) LEVEL: ICD-10-CM

## 2025-02-16 DIAGNOSIS — I48.91 ATRIAL FIBRILLATION WITH RVR (HCC): ICD-10-CM

## 2025-02-16 DIAGNOSIS — R41.82 ALTERED MENTAL STATUS, UNSPECIFIED ALTERED MENTAL STATUS TYPE: Primary | ICD-10-CM

## 2025-02-16 PROBLEM — N18.9 CHRONIC KIDNEY DISEASE: Status: ACTIVE | Noted: 2022-08-18

## 2025-02-16 PROBLEM — G93.41 METABOLIC ENCEPHALOPATHY: Status: ACTIVE | Noted: 2025-02-16

## 2025-02-16 PROBLEM — E11.65 UNCONTROLLED TYPE 2 DIABETES MELLITUS WITH HYPERGLYCEMIA (HCC): Status: ACTIVE | Noted: 2025-02-16

## 2025-02-16 PROBLEM — I95.9 HYPOTENSION: Status: ACTIVE | Noted: 2025-02-16

## 2025-02-16 PROBLEM — E87.29 HIGH ANION GAP METABOLIC ACIDOSIS: Status: ACTIVE | Noted: 2025-02-16

## 2025-02-16 PROBLEM — I5A MYOCARDIAL INJURY: Status: ACTIVE | Noted: 2025-02-16

## 2025-02-16 LAB
ALBUMIN SERPL-MCNC: 3 G/DL (ref 3.5–5.2)
ALBUMIN/GLOB SERPL: 1.1 {RATIO} (ref 1–2.5)
ALP SERPL-CCNC: 72 U/L (ref 35–104)
ALT SERPL-CCNC: 19 U/L (ref 5–33)
AMMONIA PLAS-SCNC: 20 UMOL/L (ref 11–51)
ANION GAP SERPL CALCULATED.3IONS-SCNC: 14 MMOL/L (ref 9–17)
ANION GAP SERPL CALCULATED.3IONS-SCNC: 22 MMOL/L (ref 9–17)
AST SERPL-CCNC: 25 U/L
B-OH-BUTYR SERPL-MCNC: 2.43 MMOL/L (ref 0.02–0.27)
BACTERIA URNS QL MICRO: ABNORMAL
BASOPHILS # BLD: 0 K/UL (ref 0–0.2)
BASOPHILS # BLD: 0 K/UL (ref 0–0.2)
BASOPHILS NFR BLD: 0 % (ref 0–2)
BASOPHILS NFR BLD: 0 % (ref 0–2)
BILIRUB DIRECT SERPL-MCNC: 0.2 MG/DL
BILIRUB INDIRECT SERPL-MCNC: 0.2 MG/DL (ref 0–1)
BILIRUB SERPL-MCNC: 0.4 MG/DL (ref 0.3–1.2)
BILIRUB UR QL STRIP: NEGATIVE
BNP SERPL-MCNC: 4329 PG/ML
BUN SERPL-MCNC: 16 MG/DL (ref 8–23)
BUN SERPL-MCNC: 19 MG/DL (ref 8–23)
CALCIUM SERPL-MCNC: 8.3 MG/DL (ref 8.6–10.4)
CALCIUM SERPL-MCNC: 8.6 MG/DL (ref 8.6–10.4)
CHLORIDE SERPL-SCNC: 105 MMOL/L (ref 98–107)
CHLORIDE SERPL-SCNC: 99 MMOL/L (ref 98–107)
CLARITY UR: CLEAR
CO2 SERPL-SCNC: 14 MMOL/L (ref 20–31)
CO2 SERPL-SCNC: 21 MMOL/L (ref 20–31)
COLOR UR: YELLOW
CREAT SERPL-MCNC: 1.4 MG/DL (ref 0.5–0.9)
CREAT SERPL-MCNC: 1.5 MG/DL (ref 0.5–0.9)
EOSINOPHIL # BLD: 0.06 K/UL (ref 0–0.4)
EOSINOPHIL # BLD: 0.1 K/UL (ref 0–0.4)
EOSINOPHILS RELATIVE PERCENT: 1 % (ref 1–4)
EOSINOPHILS RELATIVE PERCENT: 1 % (ref 1–4)
EPI CELLS #/AREA URNS HPF: ABNORMAL /HPF (ref 0–5)
ERYTHROCYTE [DISTWIDTH] IN BLOOD BY AUTOMATED COUNT: 13.7 % (ref 12.5–15.4)
ERYTHROCYTE [DISTWIDTH] IN BLOOD BY AUTOMATED COUNT: 14.2 % (ref 12.5–15.4)
FIO2: 21
FLUAV AG SPEC QL: NEGATIVE
FLUBV AG SPEC QL: NEGATIVE
GFR, ESTIMATED: 35 ML/MIN/1.73M2
GFR, ESTIMATED: 38 ML/MIN/1.73M2
GLUCOSE BLD-MCNC: 106 MG/DL (ref 65–105)
GLUCOSE BLD-MCNC: 125 MG/DL (ref 65–105)
GLUCOSE BLD-MCNC: 128 MG/DL (ref 65–105)
GLUCOSE BLD-MCNC: 140 MG/DL (ref 65–105)
GLUCOSE BLD-MCNC: 219 MG/DL (ref 65–105)
GLUCOSE BLD-MCNC: 323 MG/DL (ref 65–105)
GLUCOSE SERPL-MCNC: 128 MG/DL (ref 70–99)
GLUCOSE SERPL-MCNC: 451 MG/DL (ref 70–99)
GLUCOSE UR STRIP-MCNC: ABNORMAL MG/DL
HCO3 VENOUS: 15.9 MMOL/L (ref 22–29)
HCO3 VENOUS: 23 MMOL/L (ref 22–29)
HCT VFR BLD AUTO: 27.4 % (ref 36–46)
HCT VFR BLD AUTO: 31.7 % (ref 36–46)
HGB BLD-MCNC: 10.6 G/DL (ref 12–16)
HGB BLD-MCNC: 9.2 G/DL (ref 12–16)
HGB UR QL STRIP.AUTO: ABNORMAL
KETONES UR STRIP-MCNC: ABNORMAL MG/DL
LACTATE BLDV-SCNC: 1.9 MMOL/L (ref 0.5–2.2)
LACTATE BLDV-SCNC: 2.5 MMOL/L (ref 0.5–2.2)
LEUKOCYTE ESTERASE UR QL STRIP: NEGATIVE
LYMPHOCYTES NFR BLD: 0.67 K/UL (ref 1–4.8)
LYMPHOCYTES NFR BLD: 0.7 K/UL (ref 1–4.8)
LYMPHOCYTES RELATIVE PERCENT: 12 % (ref 24–44)
LYMPHOCYTES RELATIVE PERCENT: 13 % (ref 24–44)
MAGNESIUM SERPL-MCNC: 1.6 MG/DL (ref 1.6–2.6)
MCH RBC QN AUTO: 30.5 PG (ref 26–34)
MCH RBC QN AUTO: 30.5 PG (ref 26–34)
MCHC RBC AUTO-ENTMCNC: 33.5 G/DL (ref 31–37)
MCHC RBC AUTO-ENTMCNC: 33.6 G/DL (ref 31–37)
MCV RBC AUTO: 90.9 FL (ref 80–100)
MCV RBC AUTO: 91 FL (ref 80–100)
MONOCYTES NFR BLD: 0.28 K/UL (ref 0.1–0.8)
MONOCYTES NFR BLD: 0.8 K/UL (ref 0.1–1.2)
MONOCYTES NFR BLD: 15 % (ref 2–11)
MONOCYTES NFR BLD: 5 % (ref 1–7)
MORPHOLOGY: NORMAL
NEGATIVE BASE EXCESS, VEN: 4.3 MMOL/L (ref 0–2)
NEGATIVE BASE EXCESS, VEN: 9.5 MMOL/L (ref 0–2)
NEUTROPHILS NFR BLD: 71 % (ref 36–66)
NEUTROPHILS NFR BLD: 82 % (ref 36–66)
NEUTS SEG NFR BLD: 3.9 K/UL (ref 1.8–7.7)
NEUTS SEG NFR BLD: 4.59 K/UL (ref 1.8–7.7)
NITRITE UR QL STRIP: NEGATIVE
O2 SAT, VEN: 88.9 % (ref 60–85)
O2 SAT, VEN: 94.7 % (ref 60–85)
PARTIAL THROMBOPLASTIN TIME: 25.9 SEC (ref 24–36)
PARTIAL THROMBOPLASTIN TIME: 93.7 SEC (ref 24–36)
PCO2 VENOUS: 32.1 MM HG (ref 41–51)
PCO2 VENOUS: 52 MM HG (ref 41–51)
PH UR STRIP: 5.5 [PH] (ref 5–8)
PH VENOUS: 7.25 (ref 7.32–7.43)
PH VENOUS: 7.3 (ref 7.32–7.43)
PLATELET # BLD AUTO: 170 K/UL (ref 140–450)
PLATELET # BLD AUTO: 179 K/UL (ref 140–450)
PMV BLD AUTO: 8.1 FL (ref 6–12)
PMV BLD AUTO: 8.2 FL (ref 6–12)
PO2 VENOUS: 65.7 MM HG (ref 30–50)
PO2 VENOUS: 80.1 MM HG (ref 30–50)
POTASSIUM SERPL-SCNC: 3.2 MMOL/L (ref 3.7–5.3)
POTASSIUM SERPL-SCNC: 3.8 MMOL/L (ref 3.7–5.3)
PROT SERPL-MCNC: 5.8 G/DL (ref 6.4–8.3)
PROT UR STRIP-MCNC: ABNORMAL MG/DL
RBC # BLD AUTO: 3.01 M/UL (ref 4–5.2)
RBC # BLD AUTO: 3.49 M/UL (ref 4–5.2)
RBC #/AREA URNS HPF: ABNORMAL /HPF (ref 0–2)
SARS-COV-2 RDRP RESP QL NAA+PROBE: NOT DETECTED
SODIUM SERPL-SCNC: 135 MMOL/L (ref 135–144)
SODIUM SERPL-SCNC: 140 MMOL/L (ref 135–144)
SP GR UR STRIP: 1.01 (ref 1–1.03)
SPECIMEN DESCRIPTION: NORMAL
TROPONIN I SERPL HS-MCNC: 124 NG/L (ref 0–14)
TROPONIN I SERPL HS-MCNC: 126 NG/L (ref 0–14)
TROPONIN I SERPL HS-MCNC: 130 NG/L (ref 0–14)
UROBILINOGEN UR STRIP-ACNC: NORMAL EU/DL (ref 0–1)
WBC #/AREA URNS HPF: ABNORMAL /HPF (ref 0–5)
WBC OTHER # BLD: 5.6 K/UL (ref 3.5–11)
WBC OTHER # BLD: 5.6 K/UL (ref 3.5–11)

## 2025-02-16 PROCEDURE — 83605 ASSAY OF LACTIC ACID: CPT

## 2025-02-16 PROCEDURE — 73502 X-RAY EXAM HIP UNI 2-3 VIEWS: CPT

## 2025-02-16 PROCEDURE — 82010 KETONE BODYS QUAN: CPT

## 2025-02-16 PROCEDURE — 96374 THER/PROPH/DIAG INJ IV PUSH: CPT

## 2025-02-16 PROCEDURE — 2500000003 HC RX 250 WO HCPCS: Performed by: INTERNAL MEDICINE

## 2025-02-16 PROCEDURE — 85730 THROMBOPLASTIN TIME PARTIAL: CPT

## 2025-02-16 PROCEDURE — 99223 1ST HOSP IP/OBS HIGH 75: CPT | Performed by: INTERNAL MEDICINE

## 2025-02-16 PROCEDURE — 82248 BILIRUBIN DIRECT: CPT

## 2025-02-16 PROCEDURE — 6370000000 HC RX 637 (ALT 250 FOR IP)

## 2025-02-16 PROCEDURE — 82140 ASSAY OF AMMONIA: CPT

## 2025-02-16 PROCEDURE — 80053 COMPREHEN METABOLIC PANEL: CPT

## 2025-02-16 PROCEDURE — 83735 ASSAY OF MAGNESIUM: CPT

## 2025-02-16 PROCEDURE — 87635 SARS-COV-2 COVID-19 AMP PRB: CPT

## 2025-02-16 PROCEDURE — 6360000002 HC RX W HCPCS

## 2025-02-16 PROCEDURE — 36415 COLL VENOUS BLD VENIPUNCTURE: CPT

## 2025-02-16 PROCEDURE — 93005 ELECTROCARDIOGRAM TRACING: CPT | Performed by: STUDENT IN AN ORGANIZED HEALTH CARE EDUCATION/TRAINING PROGRAM

## 2025-02-16 PROCEDURE — 6360000002 HC RX W HCPCS: Performed by: STUDENT IN AN ORGANIZED HEALTH CARE EDUCATION/TRAINING PROGRAM

## 2025-02-16 PROCEDURE — 6370000000 HC RX 637 (ALT 250 FOR IP): Performed by: INTERNAL MEDICINE

## 2025-02-16 PROCEDURE — 70450 CT HEAD/BRAIN W/O DYE: CPT

## 2025-02-16 PROCEDURE — 87086 URINE CULTURE/COLONY COUNT: CPT

## 2025-02-16 PROCEDURE — 99285 EMERGENCY DEPT VISIT HI MDM: CPT

## 2025-02-16 PROCEDURE — 2580000003 HC RX 258: Performed by: INTERNAL MEDICINE

## 2025-02-16 PROCEDURE — 83880 ASSAY OF NATRIURETIC PEPTIDE: CPT

## 2025-02-16 PROCEDURE — 81001 URINALYSIS AUTO W/SCOPE: CPT

## 2025-02-16 PROCEDURE — 85025 COMPLETE CBC W/AUTO DIFF WBC: CPT

## 2025-02-16 PROCEDURE — 2500000003 HC RX 250 WO HCPCS

## 2025-02-16 PROCEDURE — 80048 BASIC METABOLIC PNL TOTAL CA: CPT

## 2025-02-16 PROCEDURE — 74176 CT ABD & PELVIS W/O CONTRAST: CPT

## 2025-02-16 PROCEDURE — 99223 1ST HOSP IP/OBS HIGH 75: CPT | Performed by: NURSE PRACTITIONER

## 2025-02-16 PROCEDURE — 2060000000 HC ICU INTERMEDIATE R&B

## 2025-02-16 PROCEDURE — 82803 BLOOD GASES ANY COMBINATION: CPT

## 2025-02-16 PROCEDURE — 82947 ASSAY GLUCOSE BLOOD QUANT: CPT

## 2025-02-16 PROCEDURE — 84484 ASSAY OF TROPONIN QUANT: CPT

## 2025-02-16 PROCEDURE — 87040 BLOOD CULTURE FOR BACTERIA: CPT

## 2025-02-16 PROCEDURE — 2580000003 HC RX 258: Performed by: STUDENT IN AN ORGANIZED HEALTH CARE EDUCATION/TRAINING PROGRAM

## 2025-02-16 PROCEDURE — 6370000000 HC RX 637 (ALT 250 FOR IP): Performed by: NURSE PRACTITIONER

## 2025-02-16 PROCEDURE — 87804 INFLUENZA ASSAY W/OPTIC: CPT

## 2025-02-16 PROCEDURE — 6370000000 HC RX 637 (ALT 250 FOR IP): Performed by: STUDENT IN AN ORGANIZED HEALTH CARE EDUCATION/TRAINING PROGRAM

## 2025-02-16 RX ORDER — SODIUM CHLORIDE 0.9 % (FLUSH) 0.9 %
5-40 SYRINGE (ML) INJECTION EVERY 12 HOURS SCHEDULED
Status: DISCONTINUED | OUTPATIENT
Start: 2025-02-16 | End: 2025-02-18 | Stop reason: HOSPADM

## 2025-02-16 RX ORDER — METOPROLOL TARTRATE 50 MG
50 TABLET ORAL 2 TIMES DAILY
Status: DISCONTINUED | OUTPATIENT
Start: 2025-02-16 | End: 2025-02-18 | Stop reason: HOSPADM

## 2025-02-16 RX ORDER — METOPROLOL SUCCINATE 25 MG/1
25 TABLET, EXTENDED RELEASE ORAL DAILY
Status: DISCONTINUED | OUTPATIENT
Start: 2025-02-16 | End: 2025-02-16

## 2025-02-16 RX ORDER — ACETAMINOPHEN 650 MG/1
650 SUPPOSITORY RECTAL EVERY 6 HOURS PRN
Status: DISCONTINUED | OUTPATIENT
Start: 2025-02-16 | End: 2025-02-16

## 2025-02-16 RX ORDER — POTASSIUM CHLORIDE 7.45 MG/ML
10 INJECTION INTRAVENOUS PRN
Status: DISCONTINUED | OUTPATIENT
Start: 2025-02-16 | End: 2025-02-18 | Stop reason: HOSPADM

## 2025-02-16 RX ORDER — PANTOPRAZOLE SODIUM 40 MG/1
40 TABLET, DELAYED RELEASE ORAL
Status: DISCONTINUED | OUTPATIENT
Start: 2025-02-16 | End: 2025-02-18 | Stop reason: HOSPADM

## 2025-02-16 RX ORDER — ASPIRIN 81 MG/1
81 TABLET, CHEWABLE ORAL DAILY
Status: DISCONTINUED | OUTPATIENT
Start: 2025-02-16 | End: 2025-02-16

## 2025-02-16 RX ORDER — INSULIN GLARGINE 100 [IU]/ML
10 INJECTION, SOLUTION SUBCUTANEOUS DAILY
Status: DISCONTINUED | OUTPATIENT
Start: 2025-02-16 | End: 2025-02-18 | Stop reason: HOSPADM

## 2025-02-16 RX ORDER — SODIUM CHLORIDE, SODIUM LACTATE, POTASSIUM CHLORIDE, AND CALCIUM CHLORIDE .6; .31; .03; .02 G/100ML; G/100ML; G/100ML; G/100ML
500 INJECTION, SOLUTION INTRAVENOUS ONCE
Status: COMPLETED | OUTPATIENT
Start: 2025-02-16 | End: 2025-02-16

## 2025-02-16 RX ORDER — INSULIN LISPRO 100 [IU]/ML
8 INJECTION, SOLUTION INTRAVENOUS; SUBCUTANEOUS ONCE
Status: COMPLETED | OUTPATIENT
Start: 2025-02-16 | End: 2025-02-16

## 2025-02-16 RX ORDER — SODIUM CHLORIDE 0.9 % (FLUSH) 0.9 %
5-40 SYRINGE (ML) INJECTION PRN
Status: DISCONTINUED | OUTPATIENT
Start: 2025-02-16 | End: 2025-02-18 | Stop reason: HOSPADM

## 2025-02-16 RX ORDER — INSULIN LISPRO 100 [IU]/ML
4 INJECTION, SOLUTION INTRAVENOUS; SUBCUTANEOUS ONCE
Status: COMPLETED | OUTPATIENT
Start: 2025-02-16 | End: 2025-02-16

## 2025-02-16 RX ORDER — ONDANSETRON 2 MG/ML
4 INJECTION INTRAMUSCULAR; INTRAVENOUS EVERY 6 HOURS PRN
Status: DISCONTINUED | OUTPATIENT
Start: 2025-02-16 | End: 2025-02-18 | Stop reason: HOSPADM

## 2025-02-16 RX ORDER — FAMOTIDINE 20 MG/1
20 TABLET, FILM COATED ORAL DAILY
Status: DISCONTINUED | OUTPATIENT
Start: 2025-02-16 | End: 2025-02-16

## 2025-02-16 RX ORDER — ONDANSETRON 4 MG/1
4 TABLET, ORALLY DISINTEGRATING ORAL EVERY 8 HOURS PRN
Status: DISCONTINUED | OUTPATIENT
Start: 2025-02-16 | End: 2025-02-18 | Stop reason: HOSPADM

## 2025-02-16 RX ORDER — POLYETHYLENE GLYCOL 3350 17 G/17G
17 POWDER, FOR SOLUTION ORAL DAILY PRN
Status: DISCONTINUED | OUTPATIENT
Start: 2025-02-16 | End: 2025-02-16

## 2025-02-16 RX ORDER — INSULIN LISPRO 100 [IU]/ML
0-4 INJECTION, SOLUTION INTRAVENOUS; SUBCUTANEOUS
Status: DISCONTINUED | OUTPATIENT
Start: 2025-02-16 | End: 2025-02-18 | Stop reason: HOSPADM

## 2025-02-16 RX ORDER — METOPROLOL TARTRATE 25 MG/1
25 TABLET, FILM COATED ORAL 2 TIMES DAILY
Status: DISCONTINUED | OUTPATIENT
Start: 2025-02-16 | End: 2025-02-16

## 2025-02-16 RX ORDER — POLYETHYLENE GLYCOL 3350 17 G/17G
17 POWDER, FOR SOLUTION ORAL DAILY PRN
Status: DISCONTINUED | OUTPATIENT
Start: 2025-02-16 | End: 2025-02-18 | Stop reason: HOSPADM

## 2025-02-16 RX ORDER — CLOPIDOGREL BISULFATE 75 MG/1
75 TABLET ORAL DAILY
Status: DISCONTINUED | OUTPATIENT
Start: 2025-02-16 | End: 2025-02-18 | Stop reason: HOSPADM

## 2025-02-16 RX ORDER — HEPARIN SODIUM 1000 [USP'U]/ML
30 INJECTION, SOLUTION INTRAVENOUS; SUBCUTANEOUS PRN
Status: DISCONTINUED | OUTPATIENT
Start: 2025-02-16 | End: 2025-02-16

## 2025-02-16 RX ORDER — GLUCAGON 1 MG/ML
1 KIT INJECTION PRN
Status: DISCONTINUED | OUTPATIENT
Start: 2025-02-16 | End: 2025-02-18 | Stop reason: HOSPADM

## 2025-02-16 RX ORDER — HEPARIN SODIUM 10000 [USP'U]/100ML
5-30 INJECTION, SOLUTION INTRAVENOUS CONTINUOUS
Status: DISCONTINUED | OUTPATIENT
Start: 2025-02-16 | End: 2025-02-16

## 2025-02-16 RX ORDER — MAGNESIUM SULFATE IN WATER 40 MG/ML
2000 INJECTION, SOLUTION INTRAVENOUS PRN
Status: DISCONTINUED | OUTPATIENT
Start: 2025-02-16 | End: 2025-02-18 | Stop reason: HOSPADM

## 2025-02-16 RX ORDER — GLUCAGON 1 MG/ML
1 KIT INJECTION PRN
Status: DISCONTINUED | OUTPATIENT
Start: 2025-02-16 | End: 2025-02-16

## 2025-02-16 RX ORDER — ACETAMINOPHEN 325 MG/1
650 TABLET ORAL EVERY 6 HOURS PRN
Status: DISCONTINUED | OUTPATIENT
Start: 2025-02-16 | End: 2025-02-16

## 2025-02-16 RX ORDER — DEXTROSE MONOHYDRATE 100 MG/ML
INJECTION, SOLUTION INTRAVENOUS CONTINUOUS PRN
Status: DISCONTINUED | OUTPATIENT
Start: 2025-02-16 | End: 2025-02-18 | Stop reason: HOSPADM

## 2025-02-16 RX ORDER — HEPARIN SODIUM 1000 [USP'U]/ML
60 INJECTION, SOLUTION INTRAVENOUS; SUBCUTANEOUS PRN
Status: DISCONTINUED | OUTPATIENT
Start: 2025-02-16 | End: 2025-02-16

## 2025-02-16 RX ORDER — SODIUM CHLORIDE 9 MG/ML
INJECTION, SOLUTION INTRAVENOUS PRN
Status: DISCONTINUED | OUTPATIENT
Start: 2025-02-16 | End: 2025-02-18 | Stop reason: HOSPADM

## 2025-02-16 RX ORDER — ATORVASTATIN CALCIUM 40 MG/1
40 TABLET, FILM COATED ORAL DAILY
Status: DISCONTINUED | OUTPATIENT
Start: 2025-02-16 | End: 2025-02-18 | Stop reason: HOSPADM

## 2025-02-16 RX ORDER — HEPARIN SODIUM 1000 [USP'U]/ML
60 INJECTION, SOLUTION INTRAVENOUS; SUBCUTANEOUS ONCE
Status: COMPLETED | OUTPATIENT
Start: 2025-02-16 | End: 2025-02-16

## 2025-02-16 RX ORDER — SODIUM CHLORIDE 0.9 % (FLUSH) 0.9 %
10 SYRINGE (ML) INJECTION PRN
Status: DISCONTINUED | OUTPATIENT
Start: 2025-02-16 | End: 2025-02-18 | Stop reason: HOSPADM

## 2025-02-16 RX ORDER — SODIUM CHLORIDE, SODIUM LACTATE, POTASSIUM CHLORIDE, AND CALCIUM CHLORIDE .6; .31; .03; .02 G/100ML; G/100ML; G/100ML; G/100ML
1000 INJECTION, SOLUTION INTRAVENOUS ONCE
Status: DISCONTINUED | OUTPATIENT
Start: 2025-02-16 | End: 2025-02-16

## 2025-02-16 RX ORDER — ISOSORBIDE MONONITRATE 60 MG/1
120 TABLET, EXTENDED RELEASE ORAL DAILY
Status: DISCONTINUED | OUTPATIENT
Start: 2025-02-16 | End: 2025-02-18 | Stop reason: HOSPADM

## 2025-02-16 RX ORDER — DEXTROSE MONOHYDRATE 100 MG/ML
INJECTION, SOLUTION INTRAVENOUS CONTINUOUS PRN
Status: DISCONTINUED | OUTPATIENT
Start: 2025-02-16 | End: 2025-02-16

## 2025-02-16 RX ORDER — QUETIAPINE FUMARATE 25 MG/1
12.5 TABLET, FILM COATED ORAL NIGHTLY
Status: DISCONTINUED | OUTPATIENT
Start: 2025-02-16 | End: 2025-02-17

## 2025-02-16 RX ORDER — POTASSIUM CHLORIDE 1500 MG/1
40 TABLET, EXTENDED RELEASE ORAL PRN
Status: DISCONTINUED | OUTPATIENT
Start: 2025-02-16 | End: 2025-02-18 | Stop reason: HOSPADM

## 2025-02-16 RX ORDER — MIDODRINE HYDROCHLORIDE 2.5 MG/1
2.5 TABLET ORAL 3 TIMES DAILY PRN
Status: DISCONTINUED | OUTPATIENT
Start: 2025-02-16 | End: 2025-02-18 | Stop reason: HOSPADM

## 2025-02-16 RX ORDER — DILTIAZEM HYDROCHLORIDE 5 MG/ML
10 INJECTION INTRAVENOUS ONCE
Status: DISCONTINUED | OUTPATIENT
Start: 2025-02-16 | End: 2025-02-16

## 2025-02-16 RX ORDER — METOPROLOL TARTRATE 25 MG/1
25 TABLET, FILM COATED ORAL ONCE
Status: COMPLETED | OUTPATIENT
Start: 2025-02-16 | End: 2025-02-16

## 2025-02-16 RX ORDER — ACETAMINOPHEN 325 MG/1
650 TABLET ORAL EVERY 6 HOURS PRN
Status: DISCONTINUED | OUTPATIENT
Start: 2025-02-16 | End: 2025-02-18 | Stop reason: HOSPADM

## 2025-02-16 RX ORDER — ACETAMINOPHEN 650 MG/1
650 SUPPOSITORY RECTAL EVERY 6 HOURS PRN
Status: DISCONTINUED | OUTPATIENT
Start: 2025-02-16 | End: 2025-02-18 | Stop reason: HOSPADM

## 2025-02-16 RX ADMIN — PANTOPRAZOLE SODIUM 40 MG: 40 TABLET, DELAYED RELEASE ORAL at 16:01

## 2025-02-16 RX ADMIN — INSULIN GLARGINE 10 UNITS: 100 INJECTION, SOLUTION SUBCUTANEOUS at 10:35

## 2025-02-16 RX ADMIN — HEPARIN SODIUM 3600 UNITS: 1000 INJECTION INTRAVENOUS; SUBCUTANEOUS at 06:43

## 2025-02-16 RX ADMIN — SODIUM CHLORIDE, POTASSIUM CHLORIDE, SODIUM LACTATE AND CALCIUM CHLORIDE 1000 ML: 600; 310; 30; 20 INJECTION, SOLUTION INTRAVENOUS at 05:20

## 2025-02-16 RX ADMIN — HEPARIN SODIUM 12 UNITS/KG/HR: 10000 INJECTION, SOLUTION INTRAVENOUS at 06:45

## 2025-02-16 RX ADMIN — SODIUM CHLORIDE, POTASSIUM CHLORIDE, SODIUM LACTATE AND CALCIUM CHLORIDE 250 ML: 600; 310; 30; 20 INJECTION, SOLUTION INTRAVENOUS at 09:02

## 2025-02-16 RX ADMIN — INSULIN LISPRO 4 UNITS: 100 INJECTION, SOLUTION INTRAVENOUS; SUBCUTANEOUS at 09:20

## 2025-02-16 RX ADMIN — METOPROLOL TARTRATE 50 MG: 50 TABLET, FILM COATED ORAL at 16:01

## 2025-02-16 RX ADMIN — SODIUM CHLORIDE, PRESERVATIVE FREE 7 ML: 5 INJECTION INTRAVENOUS at 21:00

## 2025-02-16 RX ADMIN — INSULIN LISPRO 8 UNITS: 100 INJECTION, SOLUTION INTRAVENOUS; SUBCUTANEOUS at 05:14

## 2025-02-16 RX ADMIN — POTASSIUM BICARBONATE 40 MEQ: 782 TABLET, EFFERVESCENT ORAL at 16:01

## 2025-02-16 RX ADMIN — METOPROLOL TARTRATE 25 MG: 25 TABLET, FILM COATED ORAL at 06:31

## 2025-02-16 RX ADMIN — ZIPRASIDONE MESYLATE 20 MG: 20 INJECTION, POWDER, LYOPHILIZED, FOR SOLUTION INTRAMUSCULAR at 20:25

## 2025-02-16 ASSESSMENT — PAIN SCALES - GENERAL: PAINLEVEL_OUTOF10: 0

## 2025-02-16 NOTE — PLAN OF CARE
Problem: Safety - Adult  Goal: Free from fall injury  Outcome: Progressing     Problem: Chronic Conditions and Co-morbidities  Goal: Patient's chronic conditions and co-morbidity symptoms are monitored and maintained or improved  Outcome: Progressing     Problem: Discharge Planning  Goal: Discharge to home or other facility with appropriate resources  Outcome: Progressing     Problem: Skin/Tissue Integrity  Goal: Skin integrity remains intact  Description: 1.  Monitor for areas of redness and/or skin breakdown  2.  Assess vascular access sites hourly  3.  Every 4-6 hours minimum:  Change oxygen saturation probe site  4.  Every 4-6 hours:  If on nasal continuous positive airway pressure, respiratory therapy assess nares and determine need for appliance change or resting period  Outcome: Progressing  Flowsheets (Taken 2/16/2025 0950)  Skin Integrity Remains Intact: Monitor for areas of redness and/or skin breakdown     Problem: ABCDS Injury Assessment  Goal: Absence of physical injury  Outcome: Progressing     Problem: Risk for Elopement  Goal: Patient will not exit the unit/facility without proper excort  Outcome: Progressing  Flowsheets (Taken 2/16/2025 0900)  Nursing Interventions for Elopement Risk: Communicate to physician the risk for elopement

## 2025-02-16 NOTE — H&P
and was eventually extubated and transferred to an acute rehab facility.  Patient was seen by cardiology at that time for atrial fibrillation with RVR and had her medications adjusted.  Patient now presents with similar complaints.  She does have underlying history of dementia and is a poor historian.  On admission patient was in rapid ventricular response and had elevated troponins.  She was started on oral beta-blocker and taken off of anticoagulation by cardiology.  Labs were significant for high anion gap metabolic acidosis with a glucose of 451.  Patient did not get her Lantus yesterday.  Bit hydroxybutyrate 2.43.  Her  does accompany her at bedside.  He is also a poor historian.  Patient denies any fevers, chills, shortness of breath, cough, congestion  Past Medical History:     Past Medical History:   Diagnosis Date    Abnormal cardiovascular stress test 06/2020    no ischemia / infarction   there is septal hypokinesis   /  EF 70%    CAD (coronary artery disease)     Chest pressure     Fatigue     History of pneumonia 03/2020    HTN (hypertension)     Hyperlipidemia     Pneumonia     Positive cardiac stress test     Renal insufficiency 8/18/2022    SOB (shortness of breath)     Type 2 diabetes mellitus without complication (HCC)         Past Surgical History:     Past Surgical History:   Procedure Laterality Date    APPENDECTOMY      CARDIAC CATHETERIZATION  03/09/2021    CHOLECYSTECTOMY      COLONOSCOPY      CORONARY ANGIOPLASTY WITH STENT PLACEMENT  08/14/2020    High grade stenosis in Proximal RCA s/p successful PCI with DELIA (3.25 X 12 mm)    EYE SURGERY      HYSTERECTOMY (CERVIX STATUS UNKNOWN)      HYSTERECTOMY, VAGINAL      left both ovaries in        Medications Prior to Admission:     Prior to Admission medications    Medication Sig Start Date End Date Taking? Authorizing Provider   famotidine (PEPCID) 20 MG tablet Take 1 tablet by mouth 2 times daily 2/13/25   Steven Orourke MD   potassium

## 2025-02-16 NOTE — ED NOTES
ED to inpatient nurses report      Chief Complaint:  Chief Complaint   Patient presents with    Tachycardia     Pt. Brought in via EMS from [place]. Per facility patient was found in another client room. Per ems patient hr 140s with ECG showing afib with RVR     Present to ED from: Nursing Skilled Facility     MOA:     LOC: alert and orientated to name and place follows commands   Mobility: Requires assistance * 2  Oxygen Baseline: Room Air     Current needs required:    Pending ED orders: None   Present condition: Stable     Why did the patient come to the ED? Confusion, new onset Afib with RVR.   What is the plan? Heparin drip monitor PTT blood draw   Any procedures or intervention ? Elevated Troponin new set AFIB elevated BNP, CT head.    Any safety concerns??Fall Risk, Risk for bleeding   CODE STATUS Prior  Diet No diet orders on file    Mental Status:        Psych Assessment:   Psychosocial  Psychosocial (WDL): Within Defined Limits  Vital signs   Vitals:    02/16/25 0347 02/16/25 0626 02/16/25 0627 02/16/25 0631   BP: 109/72  (!) 120/51 (!) 110/51   Pulse: (!) 146 90 100 100   Resp: 21 20 17    Temp:       TempSrc:       SpO2:  98% 98%         Vitals:  Patient Vitals for the past 24 hrs:   BP Temp Temp src Pulse Resp SpO2   02/16/25 0631 (!) 110/51 -- -- 100 -- --   02/16/25 0627 (!) 120/51 -- -- 100 17 98 %   02/16/25 0626 -- -- -- 90 20 98 %   02/16/25 0347 109/72 -- -- (!) 146 21 --   02/16/25 0332 -- -- -- -- -- 99 %   02/16/25 0330 -- 97.8 °F (36.6 °C) Oral -- -- --   02/16/25 0325 (!) 154/137 -- -- (!) 156 21 99 %      Visit Vitals  BP (!) 110/51   Pulse 100   Temp 97.8 °F (36.6 °C) (Oral)   Resp 17   SpO2 98%        LDAs:   Peripheral IV 02/16/25 Left Antecubital (Active)       Meds:  Medications   heparin (porcine) injection 3,600 Units (has no administration in time range)   heparin (porcine) injection 1,800 Units (has no administration in time range)   heparin 25,000 units in dextrose 5% 250 mL

## 2025-02-16 NOTE — CONSULTS
[facility]    Consult                        Today's Date: 2/16/2025  Patient Name: Francia Ferguson  Date of admission: 2/16/2025  3:20 AM  Patient's age: 79 y.o., 1945  Admission Dx: Hyperglycemia [R73.9]  Pulmonary nodules [R91.8]  NSTEMI (non-ST elevated myocardial infarction) (HCC) [I21.4]  Pleural effusion, bilateral [J90]  Atrial fibrillation with RVR (HCC) [I48.91]  Elevated brain natriuretic peptide (BNP) level [R79.89]  Altered mental status, unspecified altered mental status type [R41.82]    Reason for Consult:  Cardiac evaluation    Requesting Physician: Shelly TATE DO    CHIEF COMPLAINT:  afib with RVR     History Obtained From:   Pt and EHR     HISTORY OF PRESENT ILLNESS:      Pt. Brought in via EMS from [place]. Per facility patient was found in another client room. Per ems patient hr 140s with ECG showing afib with RVR    PT seen and examined in the room.  Pt denies any cP or sob  Pt afib, rate stable.  Pt has dementia.  Family in room.  Pt was just discharged from [place].     Past Medical History:   has a past medical history of Abnormal cardiovascular stress test, CAD (coronary artery disease), Chest pressure, Fatigue, History of pneumonia, HTN (hypertension), Hyperlipidemia, Pneumonia, Positive cardiac stress test, Renal insufficiency, SOB (shortness of breath), and Type 2 diabetes mellitus without complication (HCC).    Past Surgical History:   has a past surgical history that includes Cholecystectomy; Colonoscopy; Hysterectomy, vaginal; Coronary angioplasty with stent (08/14/2020); Hysterectomy; Appendectomy; Cardiac catheterization (03/09/2021); and eye surgery.     Home Medications:    Prior to Admission medications    Medication Sig Start Date End Date Taking? Authorizing Provider   famotidine (PEPCID) 20 MG tablet Take 1 tablet by mouth 2 times daily 2/13/25   Steven Orourke MD   potassium chloride (KLOR-CON M) 20 MEQ extended release tablet Take 1

## 2025-02-16 NOTE — ED PROVIDER NOTES
[address]  Emergency Department Encounter  [facility]       Pt Name:Francia Ferguson  MRN: [id number]  Birthdate 1945  Date of evaluation: 2/16/25  PCP:  Jack Boone DO      CHIEF COMPLAINT       Chief Complaint   Patient presents with    Tachycardia     Pt. Brought in via EMS from [place]. Per facility patient was found in another client room. Per ems patient hr 140s with ECG showing afib with RVR       HISTORY OF PRESENT ILLNESS     Francia Ferguson is a 79 y.o. female who presents via EMS with reports of altered mental status and tachycardia.  Reportedly patient was found in someone else's room.  History is limited as patient is not providing much history besides \"my butt hurts\" as she is reporting a possible fall that was not reported by facility or EMS.  Patient is alert and oriented to person not to place or time.    PAST MEDICAL / SURGICAL / SOCIAL / FAMILY HISTORY      has a past medical history of Abnormal cardiovascular stress test, CAD (coronary artery disease), Chest pressure, Fatigue, History of pneumonia, HTN (hypertension), Hyperlipidemia, Pneumonia, Positive cardiac stress test, Renal insufficiency, SOB (shortness of breath), and Type 2 diabetes mellitus without complication (HCC).       has a past surgical history that includes Cholecystectomy; Colonoscopy; Hysterectomy, vaginal; Coronary angioplasty with stent (08/14/2020); Hysterectomy; Appendectomy; Cardiac catheterization (03/09/2021); and eye surgery.      Social History     Socioeconomic History    Marital status:      Spouse name: Theo Ferguson     Number of children: 2    Years of education: 12    Highest education level: Not on file   Occupational History    Not on file   Tobacco Use    Smoking status: Never     Passive exposure: Current    Smokeless tobacco: Never   Vaping Use    Vaping status: Never Used   Substance and Sexual Activity    Alcohol use: No     Alcohol/week: 0.0 standard drinks of

## 2025-02-17 LAB
ALBUMIN SERPL-MCNC: 3 G/DL (ref 3.5–5.2)
ALBUMIN/GLOB SERPL: 1.1 {RATIO} (ref 1–2.5)
ALLEN TEST: ABNORMAL
ALP SERPL-CCNC: 70 U/L (ref 35–104)
ALT SERPL-CCNC: 19 U/L (ref 5–33)
ANION GAP SERPL CALCULATED.3IONS-SCNC: 10 MMOL/L (ref 9–17)
ANION GAP SERPL CALCULATED.3IONS-SCNC: 10 MMOL/L (ref 9–17)
ANION GAP SERPL CALCULATED.3IONS-SCNC: 14 MMOL/L (ref 9–17)
AST SERPL-CCNC: 33 U/L
BASOPHILS # BLD: 0 K/UL (ref 0–0.2)
BASOPHILS NFR BLD: 0 % (ref 0–2)
BILIRUB SERPL-MCNC: 0.4 MG/DL (ref 0.3–1.2)
BNP SERPL-MCNC: 8670 PG/ML
BUN SERPL-MCNC: 11 MG/DL (ref 8–23)
BUN SERPL-MCNC: 11 MG/DL (ref 8–23)
BUN SERPL-MCNC: 12 MG/DL (ref 8–23)
CALCIUM SERPL-MCNC: 7.9 MG/DL (ref 8.6–10.4)
CALCIUM SERPL-MCNC: 8.1 MG/DL (ref 8.6–10.4)
CALCIUM SERPL-MCNC: 8.5 MG/DL (ref 8.6–10.4)
CHLORIDE SERPL-SCNC: 105 MMOL/L (ref 98–107)
CHLORIDE SERPL-SCNC: 107 MMOL/L (ref 98–107)
CHLORIDE SERPL-SCNC: 108 MMOL/L (ref 98–107)
CO2 SERPL-SCNC: 23 MMOL/L (ref 20–31)
CO2 SERPL-SCNC: 24 MMOL/L (ref 20–31)
CO2 SERPL-SCNC: 24 MMOL/L (ref 20–31)
CREAT SERPL-MCNC: 1.3 MG/DL (ref 0.5–0.9)
CREAT SERPL-MCNC: 1.4 MG/DL (ref 0.5–0.9)
CREAT SERPL-MCNC: 1.4 MG/DL (ref 0.5–0.9)
EKG ATRIAL RATE: 150 BPM
EKG Q-T INTERVAL: 296 MS
EKG QRS DURATION: 74 MS
EKG QTC CALCULATION (BAZETT): 472 MS
EKG R AXIS: 49 DEGREES
EKG T AXIS: 175 DEGREES
EKG VENTRICULAR RATE: 153 BPM
EOSINOPHIL # BLD: 0 K/UL (ref 0–0.4)
EOSINOPHILS RELATIVE PERCENT: 1 % (ref 1–4)
ERYTHROCYTE [DISTWIDTH] IN BLOOD BY AUTOMATED COUNT: 13.8 % (ref 12.5–15.4)
EST. AVERAGE GLUCOSE BLD GHB EST-MCNC: 180 MG/DL
GFR, ESTIMATED: 38 ML/MIN/1.73M2
GFR, ESTIMATED: 38 ML/MIN/1.73M2
GFR, ESTIMATED: 42 ML/MIN/1.73M2
GLUCOSE BLD-MCNC: 113 MG/DL (ref 65–105)
GLUCOSE BLD-MCNC: 145 MG/DL (ref 65–105)
GLUCOSE BLD-MCNC: 155 MG/DL (ref 65–105)
GLUCOSE BLD-MCNC: 215 MG/DL (ref 65–105)
GLUCOSE BLD-MCNC: 225 MG/DL (ref 65–105)
GLUCOSE BLD-MCNC: 266 MG/DL (ref 65–105)
GLUCOSE SERPL-MCNC: 127 MG/DL (ref 70–99)
GLUCOSE SERPL-MCNC: 150 MG/DL (ref 70–99)
GLUCOSE SERPL-MCNC: 224 MG/DL (ref 70–99)
HBA1C MFR BLD: 7.9 % (ref 4–6)
HCO3 VENOUS: 20.3 MMOL/L (ref 22–29)
HCT VFR BLD AUTO: 30.1 % (ref 36–46)
HGB BLD-MCNC: 10.2 G/DL (ref 12–16)
LYMPHOCYTES NFR BLD: 0.5 K/UL (ref 1–4.8)
LYMPHOCYTES RELATIVE PERCENT: 11 % (ref 24–44)
MAGNESIUM SERPL-MCNC: 1.5 MG/DL (ref 1.6–2.6)
MAGNESIUM SERPL-MCNC: 1.6 MG/DL (ref 1.6–2.6)
MCH RBC QN AUTO: 30.3 PG (ref 26–34)
MCHC RBC AUTO-ENTMCNC: 34 G/DL (ref 31–37)
MCV RBC AUTO: 89.3 FL (ref 80–100)
MICROORGANISM SPEC CULT: NORMAL
MONOCYTES NFR BLD: 0.5 K/UL (ref 0.1–1.2)
MONOCYTES NFR BLD: 11 % (ref 2–11)
NEGATIVE BASE EXCESS, VEN: 1.9 MMOL/L (ref 0–2)
NEUTROPHILS NFR BLD: 77 % (ref 36–66)
NEUTS SEG NFR BLD: 3.6 K/UL (ref 1.8–7.7)
O2 SAT, VEN: 68.7 % (ref 60–85)
PCO2 VENOUS: 26.1 MM HG (ref 41–51)
PH VENOUS: 7.5 (ref 7.32–7.43)
PLATELET # BLD AUTO: 205 K/UL (ref 140–450)
PMV BLD AUTO: 7.6 FL (ref 6–12)
PO2 VENOUS: 31.6 MM HG (ref 30–50)
POTASSIUM SERPL-SCNC: 3.2 MMOL/L (ref 3.7–5.3)
POTASSIUM SERPL-SCNC: 3.3 MMOL/L (ref 3.7–5.3)
POTASSIUM SERPL-SCNC: 3.7 MMOL/L (ref 3.7–5.3)
PROT SERPL-MCNC: 5.8 G/DL (ref 6.4–8.3)
RBC # BLD AUTO: 3.37 M/UL (ref 4–5.2)
SERVICE CMNT-IMP: NORMAL
SODIUM SERPL-SCNC: 139 MMOL/L (ref 135–144)
SODIUM SERPL-SCNC: 141 MMOL/L (ref 135–144)
SODIUM SERPL-SCNC: 145 MMOL/L (ref 135–144)
SPECIMEN DESCRIPTION: NORMAL
WBC OTHER # BLD: 4.7 K/UL (ref 3.5–11)

## 2025-02-17 PROCEDURE — 80048 BASIC METABOLIC PNL TOTAL CA: CPT

## 2025-02-17 PROCEDURE — 36415 COLL VENOUS BLD VENIPUNCTURE: CPT

## 2025-02-17 PROCEDURE — 97162 PT EVAL MOD COMPLEX 30 MIN: CPT

## 2025-02-17 PROCEDURE — 97535 SELF CARE MNGMENT TRAINING: CPT

## 2025-02-17 PROCEDURE — 6370000000 HC RX 637 (ALT 250 FOR IP): Performed by: NURSE PRACTITIONER

## 2025-02-17 PROCEDURE — 97166 OT EVAL MOD COMPLEX 45 MIN: CPT

## 2025-02-17 PROCEDURE — 83735 ASSAY OF MAGNESIUM: CPT

## 2025-02-17 PROCEDURE — 2500000003 HC RX 250 WO HCPCS: Performed by: INTERNAL MEDICINE

## 2025-02-17 PROCEDURE — 99233 SBSQ HOSP IP/OBS HIGH 50: CPT | Performed by: NURSE PRACTITIONER

## 2025-02-17 PROCEDURE — 83036 HEMOGLOBIN GLYCOSYLATED A1C: CPT

## 2025-02-17 PROCEDURE — 99232 SBSQ HOSP IP/OBS MODERATE 35: CPT | Performed by: INTERNAL MEDICINE

## 2025-02-17 PROCEDURE — 80053 COMPREHEN METABOLIC PANEL: CPT

## 2025-02-17 PROCEDURE — 2500000003 HC RX 250 WO HCPCS

## 2025-02-17 PROCEDURE — 93010 ELECTROCARDIOGRAM REPORT: CPT | Performed by: INTERNAL MEDICINE

## 2025-02-17 PROCEDURE — 82803 BLOOD GASES ANY COMBINATION: CPT

## 2025-02-17 PROCEDURE — 97116 GAIT TRAINING THERAPY: CPT

## 2025-02-17 PROCEDURE — 85025 COMPLETE CBC W/AUTO DIFF WBC: CPT

## 2025-02-17 PROCEDURE — 2060000000 HC ICU INTERMEDIATE R&B

## 2025-02-17 PROCEDURE — 83880 ASSAY OF NATRIURETIC PEPTIDE: CPT

## 2025-02-17 PROCEDURE — 6370000000 HC RX 637 (ALT 250 FOR IP): Performed by: INTERNAL MEDICINE

## 2025-02-17 PROCEDURE — 82947 ASSAY GLUCOSE BLOOD QUANT: CPT

## 2025-02-17 PROCEDURE — 6370000000 HC RX 637 (ALT 250 FOR IP)

## 2025-02-17 RX ORDER — QUETIAPINE FUMARATE 25 MG/1
25 TABLET, FILM COATED ORAL NIGHTLY
Status: DISCONTINUED | OUTPATIENT
Start: 2025-02-17 | End: 2025-02-18 | Stop reason: HOSPADM

## 2025-02-17 RX ORDER — LORAZEPAM 2 MG/ML
0.5 INJECTION INTRAMUSCULAR ONCE
Status: DISCONTINUED | OUTPATIENT
Start: 2025-02-17 | End: 2025-02-18 | Stop reason: HOSPADM

## 2025-02-17 RX ADMIN — INSULIN GLARGINE 10 UNITS: 100 INJECTION, SOLUTION SUBCUTANEOUS at 08:42

## 2025-02-17 RX ADMIN — INSULIN LISPRO 1 UNITS: 100 INJECTION, SOLUTION INTRAVENOUS; SUBCUTANEOUS at 17:32

## 2025-02-17 RX ADMIN — POTASSIUM CHLORIDE 40 MEQ: 1500 TABLET, EXTENDED RELEASE ORAL at 21:13

## 2025-02-17 RX ADMIN — METOPROLOL TARTRATE 50 MG: 50 TABLET, FILM COATED ORAL at 20:24

## 2025-02-17 RX ADMIN — CLOPIDOGREL BISULFATE 75 MG: 75 TABLET ORAL at 08:44

## 2025-02-17 RX ADMIN — SODIUM CHLORIDE, PRESERVATIVE FREE 10 ML: 5 INJECTION INTRAVENOUS at 21:19

## 2025-02-17 RX ADMIN — ATORVASTATIN CALCIUM 40 MG: 40 TABLET, FILM COATED ORAL at 08:43

## 2025-02-17 RX ADMIN — SODIUM CHLORIDE, PRESERVATIVE FREE 7 ML: 5 INJECTION INTRAVENOUS at 21:00

## 2025-02-17 RX ADMIN — INSULIN LISPRO 2 UNITS: 100 INJECTION, SOLUTION INTRAVENOUS; SUBCUTANEOUS at 12:48

## 2025-02-17 RX ADMIN — ACETAMINOPHEN 650 MG: 325 TABLET ORAL at 17:30

## 2025-02-17 RX ADMIN — QUETIAPINE FUMARATE 25 MG: 25 TABLET ORAL at 20:24

## 2025-02-17 RX ADMIN — METOPROLOL TARTRATE 50 MG: 50 TABLET, FILM COATED ORAL at 08:42

## 2025-02-17 RX ADMIN — PANTOPRAZOLE SODIUM 40 MG: 40 TABLET, DELAYED RELEASE ORAL at 17:30

## 2025-02-17 RX ADMIN — ISOSORBIDE MONONITRATE 120 MG: 60 TABLET, EXTENDED RELEASE ORAL at 08:43

## 2025-02-17 ASSESSMENT — PAIN DESCRIPTION - LOCATION: LOCATION: BACK

## 2025-02-17 NOTE — ACP (ADVANCE CARE PLANNING)
Advance Care Planning     Advance Care Planning Activator (Inpatient)  Conversation Note      Date of ACP Conversation: 2/17/2025     Conversation Conducted with: Legal next of kin    ACP Activator: Padmini Smith      Health Care Decision Maker:     Current Designated Health Care Decision Maker:     Primary Decision Maker: AdrianoakTheo - Spouse - [phone]    Today we documented Decision Maker(s) consistent with Legal Next of Kin hierarchy.    Care Preferences    Ventilation:  \"If you were in your present state of health and suddenly became very ill and were unable to breathe on your own, what would your preference be about the use of a ventilator (breathing machine) if it were available to you?\"      Would the patient desire the use of ventilator (breathing machine)?: yes    \"If your health worsens and it becomes clear that your chance of recovery is unlikely, what would your preference be about the use of a ventilator (breathing machine) if it were available to you?\"     Would the patient desire the use of ventilator (breathing machine)?: Yes      Resuscitation  \"CPR works best to restart the heart when there is a sudden event, like a heart attack, in someone who is otherwise healthy. Unfortunately, CPR does not typically restart the heart for people who have serious health conditions or who are very sick.\"    \"In the event your heart stopped as a result of an underlying serious health condition, would you want attempts to be made to restart your heart (answer \"yes\" for attempt to resuscitate) or would you prefer a natural death (answer \"no\" for do not attempt to resuscitate)?\" yes       [] Yes   [] No   Educated Patient / Decision Maker regarding differences between Advance Directives and portable DNR orders.    Length of ACP Conversation in minutes:      Conversation Outcomes:  ACP discussion completed    Follow-up plan:    [] Schedule follow-up conversation to continue planning  [] Referred individual to

## 2025-02-17 NOTE — PLAN OF CARE
Problem: Safety - Adult  Goal: Free from fall injury  Outcome: Progressing     Problem: Chronic Conditions and Co-morbidities  Goal: Patient's chronic conditions and co-morbidity symptoms are monitored and maintained or improved  Outcome: Progressing     Problem: Discharge Planning  Goal: Discharge to home or other facility with appropriate resources  Outcome: Progressing     Problem: Skin/Tissue Integrity  Goal: Skin integrity remains intact  Description: 1.  Monitor for areas of redness and/or skin breakdown  2.  Assess vascular access sites hourly  3.  Every 4-6 hours minimum:  Change oxygen saturation probe site  4.  Every 4-6 hours:  If on nasal continuous positive airway pressure, respiratory therapy assess nares and determine need for appliance change or resting period  Outcome: Progressing  Flowsheets (Taken 2/17/2025 1733)  Skin Integrity Remains Intact:   Monitor for areas of redness and/or skin breakdown   Assess vascular access sites hourly   Every 4-6 hours minimum: Change oxygen saturation probe site   Every 4-6 hours: If on nasal continuous positive airway pressure, respiratory therapy assesses nares and determine need for appliance change or resting period     Problem: ABCDS Injury Assessment  Goal: Absence of physical injury  Outcome: Progressing     Problem: Risk for Elopement  Goal: Patient will not exit the unit/facility without proper excort  Outcome: Progressing     Problem: Safety - Medical Restraint  Goal: Remains free of injury from restraints (Restraint for Interference with Medical Device)  Description: INTERVENTIONS:  1. Determine that other, less restrictive measures have been tried or would not be effective before applying the restraint  2. Evaluate the patient's condition at the time of restraint application  3. Inform patient/family regarding the reason for restraint  4. Q2H: Monitor safety, psychosocial status, comfort, nutrition and hydration  Outcome:

## 2025-02-17 NOTE — CARE COORDINATION
Case Management Assessment  Initial Evaluation    Date/Time of Evaluation: 2/17/2025 12:14 PM  Assessment Completed by: Padmini Smith    If patient is discharged prior to next notation, then this note serves as note for discharge by case management.    Patient Name: Francia Ferguson                   YOB: 1945  Diagnosis: Hyperglycemia [R73.9]  Pulmonary nodules [R91.8]  NSTEMI (non-ST elevated myocardial infarction) (HCC) [I21.4]  Pleural effusion, bilateral [J90]  Atrial fibrillation with RVR (HCC) [I48.91]  Elevated brain natriuretic peptide (BNP) level [R79.89]  Altered mental status, unspecified altered mental status type [R41.82]                   Date / Time: 2/16/2025  3:20 AM    Patient Admission Status: Inpatient   Readmission Risk (Low < 19, Mod (19-27), High > 27): Readmission Risk Score: 22.3    Current PCP: Jack Boone, DO  PCP verified by CM? (P) Yes    Chart Reviewed: Yes      History Provided by: (P) Child/Family, Significant Other  Patient Orientation: Person, Place, Situation, Unable to Assess    Patient Cognition: (P) Other (see comment) (confused)    Hospitalization in the last 30 days (Readmission):  Yes    If yes, Readmission Assessment in CM Navigator will be completed.    Advance Directives:      Code Status: Full Code   Patient's Primary Decision Maker is: (P) Legal Next of Kin    Primary Decision Maker: Theo Ferguson - Spouse - [phone]    Discharge Planning:    Patient lives with: (P) Other (Comment) (SNF) Type of Home: (P) Skilled Nursing Facility  Primary Care Giver: (P) Other (Comment) (currently at [facility])  Patient Support Systems include: (P) Spouse/Significant Other, Family Members, Children   Current Financial resources: (P) Medicare  Current community resources: (P) None  Current services prior to admission: (P) Skilled Nursing Facility            Current DME: (P) Wheelchair, Glucometer, Walker            Type of Home Care services:  (P)

## 2025-02-17 NOTE — PLAN OF CARE
Problem: Safety - Adult  Goal: Free from fall injury  2/17/2025 0252 by Linn Yañez RN  Outcome: Progressing  2/16/2025 1544 by Jill Márquez RN  Outcome: Progressing     Problem: Chronic Conditions and Co-morbidities  Goal: Patient's chronic conditions and co-morbidity symptoms are monitored and maintained or improved  2/17/2025 0252 by Linn Yañez RN  Outcome: Progressing  Flowsheets (Taken 2/16/2025 2100)  Care Plan - Patient's Chronic Conditions and Co-Morbidity Symptoms are Monitored and Maintained or Improved: Monitor and assess patient's chronic conditions and comorbid symptoms for stability, deterioration, or improvement  2/16/2025 1544 by Jill Márquez RN  Outcome: Progressing     Problem: Discharge Planning  Goal: Discharge to home or other facility with appropriate resources  2/17/2025 0252 by Linn Yañez RN  Outcome: Progressing  Flowsheets (Taken 2/16/2025 2100)  Discharge to home or other facility with appropriate resources: Identify barriers to discharge with patient and caregiver  2/16/2025 1544 by Jill Márquez RN  Outcome: Progressing     Problem: Skin/Tissue Integrity  Goal: Skin integrity remains intact  Description: 1.  Monitor for areas of redness and/or skin breakdown  2.  Assess vascular access sites hourly  3.  Every 4-6 hours minimum:  Change oxygen saturation probe site  4.  Every 4-6 hours:  If on nasal continuous positive airway pressure, respiratory therapy assess nares and determine need for appliance change or resting period  2/17/2025 0252 by Linn Yañez RN  Outcome: Progressing  Flowsheets (Taken 2/16/2025 2100)  Skin Integrity Remains Intact: Monitor for areas of redness and/or skin breakdown  2/16/2025 1544 by Jill Márquez RN  Outcome: Progressing  Flowsheets (Taken 2/16/2025 0950)  Skin Integrity Remains Intact: Monitor for areas of redness and/or skin breakdown     Problem: ABCDS Injury Assessment  Goal: Absence of physical injury  2/17/2025 0252 by

## 2025-02-18 VITALS
TEMPERATURE: 98.1 F | HEART RATE: 66 BPM | WEIGHT: 134.48 LBS | SYSTOLIC BLOOD PRESSURE: 126 MMHG | OXYGEN SATURATION: 91 % | BODY MASS INDEX: 25.39 KG/M2 | DIASTOLIC BLOOD PRESSURE: 48 MMHG | RESPIRATION RATE: 16 BRPM | HEIGHT: 61 IN

## 2025-02-18 LAB
ANION GAP SERPL CALCULATED.3IONS-SCNC: 12 MMOL/L (ref 9–17)
ANION GAP SERPL CALCULATED.3IONS-SCNC: 14 MMOL/L (ref 9–17)
BASOPHILS # BLD: 0 K/UL (ref 0–0.2)
BASOPHILS NFR BLD: 1 % (ref 0–2)
BUN SERPL-MCNC: 11 MG/DL (ref 8–23)
BUN SERPL-MCNC: 11 MG/DL (ref 8–23)
CALCIUM SERPL-MCNC: 7.9 MG/DL (ref 8.6–10.4)
CALCIUM SERPL-MCNC: 8 MG/DL (ref 8.6–10.4)
CHLORIDE SERPL-SCNC: 107 MMOL/L (ref 98–107)
CHLORIDE SERPL-SCNC: 108 MMOL/L (ref 98–107)
CO2 SERPL-SCNC: 20 MMOL/L (ref 20–31)
CO2 SERPL-SCNC: 23 MMOL/L (ref 20–31)
CREAT SERPL-MCNC: 1.4 MG/DL (ref 0.5–0.9)
CREAT SERPL-MCNC: 1.4 MG/DL (ref 0.5–0.9)
EOSINOPHIL # BLD: 0.1 K/UL (ref 0–0.4)
EOSINOPHILS RELATIVE PERCENT: 1 % (ref 1–4)
ERYTHROCYTE [DISTWIDTH] IN BLOOD BY AUTOMATED COUNT: 13.8 % (ref 12.5–15.4)
GFR, ESTIMATED: 38 ML/MIN/1.73M2
GFR, ESTIMATED: 38 ML/MIN/1.73M2
GLUCOSE BLD-MCNC: 260 MG/DL (ref 65–105)
GLUCOSE SERPL-MCNC: 176 MG/DL (ref 70–99)
GLUCOSE SERPL-MCNC: 279 MG/DL (ref 70–99)
HCT VFR BLD AUTO: 26.7 % (ref 36–46)
HGB BLD-MCNC: 9.1 G/DL (ref 12–16)
LYMPHOCYTES NFR BLD: 0.9 K/UL (ref 1–4.8)
LYMPHOCYTES RELATIVE PERCENT: 20 % (ref 24–44)
MCH RBC QN AUTO: 30.7 PG (ref 26–34)
MCHC RBC AUTO-ENTMCNC: 34.1 G/DL (ref 31–37)
MCV RBC AUTO: 90 FL (ref 80–100)
MONOCYTES NFR BLD: 0.3 K/UL (ref 0.1–1.2)
MONOCYTES NFR BLD: 8 % (ref 2–11)
NEUTROPHILS NFR BLD: 70 % (ref 36–66)
NEUTS SEG NFR BLD: 3.2 K/UL (ref 1.8–7.7)
PLATELET # BLD AUTO: 176 K/UL (ref 140–450)
PMV BLD AUTO: 7.6 FL (ref 6–12)
POTASSIUM SERPL-SCNC: 3.8 MMOL/L (ref 3.7–5.3)
POTASSIUM SERPL-SCNC: 3.8 MMOL/L (ref 3.7–5.3)
RBC # BLD AUTO: 2.97 M/UL (ref 4–5.2)
SODIUM SERPL-SCNC: 141 MMOL/L (ref 135–144)
SODIUM SERPL-SCNC: 143 MMOL/L (ref 135–144)
WBC OTHER # BLD: 4.6 K/UL (ref 3.5–11)

## 2025-02-18 PROCEDURE — 85025 COMPLETE CBC W/AUTO DIFF WBC: CPT

## 2025-02-18 PROCEDURE — 6370000000 HC RX 637 (ALT 250 FOR IP): Performed by: NURSE PRACTITIONER

## 2025-02-18 PROCEDURE — 80048 BASIC METABOLIC PNL TOTAL CA: CPT

## 2025-02-18 PROCEDURE — 6370000000 HC RX 637 (ALT 250 FOR IP): Performed by: INTERNAL MEDICINE

## 2025-02-18 PROCEDURE — 99233 SBSQ HOSP IP/OBS HIGH 50: CPT | Performed by: NURSE PRACTITIONER

## 2025-02-18 PROCEDURE — 36415 COLL VENOUS BLD VENIPUNCTURE: CPT

## 2025-02-18 PROCEDURE — 6370000000 HC RX 637 (ALT 250 FOR IP)

## 2025-02-18 PROCEDURE — 99239 HOSP IP/OBS DSCHRG MGMT >30: CPT | Performed by: STUDENT IN AN ORGANIZED HEALTH CARE EDUCATION/TRAINING PROGRAM

## 2025-02-18 PROCEDURE — 82947 ASSAY GLUCOSE BLOOD QUANT: CPT

## 2025-02-18 RX ORDER — METOPROLOL TARTRATE 50 MG
50 TABLET ORAL 2 TIMES DAILY
DISCHARGE
Start: 2025-02-18

## 2025-02-18 RX ORDER — MIDODRINE HYDROCHLORIDE 2.5 MG/1
2.5 TABLET ORAL 3 TIMES DAILY PRN
DISCHARGE
Start: 2025-02-18

## 2025-02-18 RX ORDER — QUETIAPINE FUMARATE 25 MG/1
25 TABLET, FILM COATED ORAL NIGHTLY
DISCHARGE
Start: 2025-02-18

## 2025-02-18 RX ORDER — INSULIN GLARGINE 100 [IU]/ML
10 INJECTION, SOLUTION SUBCUTANEOUS DAILY
DISCHARGE
Start: 2025-02-19

## 2025-02-18 RX ORDER — INSULIN LISPRO 100 [IU]/ML
0-4 INJECTION, SOLUTION INTRAVENOUS; SUBCUTANEOUS
DISCHARGE
Start: 2025-02-18

## 2025-02-18 RX ADMIN — INSULIN LISPRO 2 UNITS: 100 INJECTION, SOLUTION INTRAVENOUS; SUBCUTANEOUS at 13:28

## 2025-02-18 RX ADMIN — ACETAMINOPHEN 650 MG: 325 TABLET ORAL at 11:52

## 2025-02-18 RX ADMIN — METOPROLOL TARTRATE 50 MG: 50 TABLET, FILM COATED ORAL at 08:57

## 2025-02-18 RX ADMIN — PANTOPRAZOLE SODIUM 40 MG: 40 TABLET, DELAYED RELEASE ORAL at 06:23

## 2025-02-18 RX ADMIN — CLOPIDOGREL BISULFATE 75 MG: 75 TABLET ORAL at 08:58

## 2025-02-18 RX ADMIN — INSULIN GLARGINE 10 UNITS: 100 INJECTION, SOLUTION SUBCUTANEOUS at 08:56

## 2025-02-18 RX ADMIN — ATORVASTATIN CALCIUM 40 MG: 40 TABLET, FILM COATED ORAL at 08:56

## 2025-02-18 RX ADMIN — ISOSORBIDE MONONITRATE 120 MG: 60 TABLET, EXTENDED RELEASE ORAL at 08:56

## 2025-02-18 ASSESSMENT — PAIN DESCRIPTION - LOCATION: LOCATION: BUTTOCKS

## 2025-02-18 NOTE — DISCHARGE SUMMARY
100 UNIT/ML injection vial  Commonly known as: LANTUS  Inject 10 Units into the skin daily  Start taking on: February 19, 2025  Replaces: Toujeo Max SoloStar 300 UNIT/ML concentrated injection pen     insulin lispro 100 UNIT/ML Soln injection vial  Commonly known as: HUMALOG,ADMELOG  Inject 0-4 Units into the skin 4 times daily (before meals and nightly)  Replaces: insulin lispro (1 Unit Dial) 100 UNIT/ML Sopn     metoprolol tartrate 50 MG tablet  Commonly known as: LOPRESSOR  Take 1 tablet by mouth 2 times daily     midodrine 2.5 MG tablet  Commonly known as: PROAMATINE  Take 1 tablet by mouth 3 times daily as needed (sbp < 100)     QUEtiapine 25 MG tablet  Commonly known as: SEROQUEL  Take 1 tablet by mouth nightly            CONTINUE taking these medications      aspirin 81 MG chewable tablet  Take 1 tablet by mouth daily     atorvastatin 40 MG tablet  Commonly known as: LIPITOR  Take 1 tablet by mouth daily     B-D UF III MINI PEN NEEDLES 31G X 5 MM Misc  Generic drug: Insulin Pen Needle  Inject 1 each as directed daily     clopidogrel 75 MG tablet  Commonly known as: PLAVIX  Take 1 tablet by mouth daily     Co Q-10 100 MG Caps     famotidine 20 MG tablet  Commonly known as: PEPCID  Take 1 tablet by mouth 2 times daily     FreeStyle Jeet 2 Sensor Misc     isosorbide mononitrate 120 MG extended release tablet  Commonly known as: IMDUR  TAKE 1 TABLET BY MOUTH DAILY     Magnesium 300 MG Caps     multivitamin Tabs tablet     potassium chloride 20 MEQ extended release tablet  Commonly known as: KLOR-CON M  Take 1 tablet by mouth 1 time for 1 dose     UNABLE TO FIND            STOP taking these medications      insulin lispro (1 Unit Dial) 100 UNIT/ML Sopn  Commonly known as: HUMALOG/ADMELOG  Replaced by: insulin lispro 100 UNIT/ML Soln injection vial     memantine 5 MG tablet  Commonly known as: NAMENDA     metoprolol succinate 25 MG extended release tablet  Commonly known as: TOPROL XL     Toujeo Max SoloStar 300

## 2025-02-18 NOTE — CARE COORDINATION
[facility] authorization to return to [facility] received, review on 2/20. Patient has an active discharge order. Patient has done well over the last 24 hours. She has been restraint and sitter free. Spoke with Corinna at [facility] and updated her on authorization and plans to discharge today. They are able to accept her back this afternoon. Transport request sent to [facility].    Transport set for 3pm with [facility]    Nursing to call report to [phone]

## 2025-02-18 NOTE — DISCHARGE INSTR - COC
Continuity of Care Form    Patient Name: Francia Ferguson   :  1945  MRN:  [id number]    Admit date:  2025  Discharge date:  2025    Code Status Order: Full Code   Advance Directives:   Advance Care Flowsheet Documentation             Admitting Physician:  Shelly TATE DO  PCP: Jack Boone DO    Discharging Nurse: Richa Pedroza Rn    Discharging Hospital Unit/Room#: 324/324-01  Discharging Unit Phone Number: [phone]    Emergency Contact:   Extended Emergency Contact Information  Primary Emergency Contact: Theo Ferguson  Address: [address] [place] [address]  Home Phone: [phone]  Mobile Phone: [phone]  Relation: Spouse  Hearing or visual needs: None  Other needs: None  Preferred language: English   needed? No  Secondary Emergency Contact: Jameson Triplett  Mobile Phone: [phone]  Relation: Child   needed? 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 s/p successful PCI with DELIA (3.25 X 12 mm)    EYE SURGERY      HYSTERECTOMY (CERVIX STATUS UNKNOWN)      HYSTERECTOMY, VAGINAL      left both ovaries in       Immunization History:   Immunization History   Administered Date(s) Administered    COVID-19, MODERNA BLUE border, Primary or Immunocompromised, (age 12y+), IM, 100 mcg/0.5mL 2021, 2021, 10/28/2021    Influenza Whole 10/20/2015    Influenza, FLUAD, (age 65 y+), IM, Quadv, 0.5mL 2020, 2021, 10/11/2023    Influenza, FLUAD, (age 65 y+), IM, Trivalent PF, 0.5mL 10/19/2017, 2018, 10/08/2019, 2024    Influenza, FLUARIX, FLULAVAL, FLUZONE (age 6 mo+) and AFLURIA, (age 3 y+), Quadv PF, 0.5mL 2020    Influenza, FLUZONE High Dose (age 65 y+), IM, Quadv, 0.7mL

## 2025-02-18 NOTE — PLAN OF CARE
Problem: Safety - Adult  Goal: Free from fall injury  2/18/2025 1210 by Richa Pedroza RN  Outcome: Adequate for Discharge  2/18/2025 0008 by Linn Yañez RN  Outcome: Progressing     Problem: Chronic Conditions and Co-morbidities  Goal: Patient's chronic conditions and co-morbidity symptoms are monitored and maintained or improved  2/18/2025 1210 by Richa Pedroza RN  Outcome: Adequate for Discharge  Flowsheets (Taken 2/18/2025 0802)  Care Plan - Patient's Chronic Conditions and Co-Morbidity Symptoms are Monitored and Maintained or Improved:   Monitor and assess patient's chronic conditions and comorbid symptoms for stability, deterioration, or improvement   Collaborate with multidisciplinary team to address chronic and comorbid conditions and prevent exacerbation or deterioration   Update acute care plan with appropriate goals if chronic or comorbid symptoms are exacerbated and prevent overall improvement and discharge  2/18/2025 0008 by Linn Yañez RN  Outcome: Progressing  Flowsheets (Taken 2/17/2025 2030)  Care Plan - Patient's Chronic Conditions and Co-Morbidity Symptoms are Monitored and Maintained or Improved: Monitor and assess patient's chronic conditions and comorbid symptoms for stability, deterioration, or improvement     Problem: Discharge Planning  Goal: Discharge to home or other facility with appropriate resources  2/18/2025 1210 by Richa Pedroza RN  Outcome: Adequate for Discharge  Flowsheets (Taken 2/18/2025 0802)  Discharge to home or other facility with appropriate resources:   Identify barriers to discharge with patient and caregiver   Arrange for needed discharge resources and transportation as appropriate   Identify discharge learning needs (meds, wound care, etc)   Arrange for interpreters to assist at discharge as needed   Refer to discharge planning if patient needs post-hospital services based on physician order or complex needs related to functional status, cognitive

## 2025-02-18 NOTE — PLAN OF CARE
Problem: Safety - Adult  Goal: Free from fall injury  2/18/2025 0008 by Linn Yañez RN  Outcome: Progressing  2/17/2025 1733 by Richa Pedroza RN  Outcome: Progressing     Problem: Chronic Conditions and Co-morbidities  Goal: Patient's chronic conditions and co-morbidity symptoms are monitored and maintained or improved  2/18/2025 0008 by Linn Yañez RN  Outcome: Progressing  Flowsheets (Taken 2/17/2025 2030)  Care Plan - Patient's Chronic Conditions and Co-Morbidity Symptoms are Monitored and Maintained or Improved: Monitor and assess patient's chronic conditions and comorbid symptoms for stability, deterioration, or improvement  2/17/2025 1733 by Richa Pedroza RN  Outcome: Progressing     Problem: Discharge Planning  Goal: Discharge to home or other facility with appropriate resources  2/18/2025 0008 by Linn Yañez RN  Outcome: Progressing  Flowsheets (Taken 2/17/2025 2030)  Discharge to home or other facility with appropriate resources: Identify barriers to discharge with patient and caregiver  2/17/2025 1733 by Richa Pedroza RN  Outcome: Progressing     Problem: Skin/Tissue Integrity  Goal: Skin integrity remains intact  Description: 1.  Monitor for areas of redness and/or skin breakdown  2.  Assess vascular access sites hourly  3.  Every 4-6 hours minimum:  Change oxygen saturation probe site  4.  Every 4-6 hours:  If on nasal continuous positive airway pressure, respiratory therapy assess nares and determine need for appliance change or resting period  2/18/2025 0008 by Linn Yañez RN  Outcome: Progressing  Flowsheets (Taken 2/17/2025 2030)  Skin Integrity Remains Intact: Monitor for areas of redness and/or skin breakdown  2/17/2025 1733 by Richa Pedroza RN  Outcome: Progressing  Flowsheets (Taken 2/17/2025 1733)  Skin Integrity Remains Intact:   Monitor for areas of redness and/or skin breakdown   Assess vascular access sites hourly   Every 4-6 hours minimum: Change oxygen

## 2025-02-18 NOTE — PROGRESS NOTES
Pharmacy Note     Renal Dose Adjustment    Francia Ferguson is a 79 y.o. female. Pharmacist assessment of renally cleared medications.    Recent Labs     02/16/25  0327   BUN 19       Recent Labs     02/16/25  0327   CREATININE 1.5*       Estimated Creatinine Clearance: 25 mL/min (A) (based on SCr of 1.5 mg/dL (H)).  Estimated CrCl using Ideal Body Weight: 23 mL/min (based on IBW 47.8 kg)    Height:   Ht Readings from Last 1 Encounters:   02/10/25 1.55 m (5' 1.02\")     Weight:  Wt Readings from Last 1 Encounters:   02/13/25 60.3 kg (132 lb 15 oz)       The following medication dose has been adjusted based upon renal function per P&T Guidelines:             Famotidine dose adjusted from 20 mg po bid to 20 mg po daily.      Flo Miller Pharm.D.    2/16/2025  7:26 AM        
  Physician Progress Note      PATIENT:               JOSE LAYNE  [facility] #:                  [id number]  :                       1945  ADMIT DATE:       2025 3:20 AM  DISCH DATE:        2025 3:49 PM  RESPONDING  PROVIDER #:        Maik See DO          QUERY TEXT:    Patient admitted with DKA and AMS was found wandering in other patients rooms   at [facility].. Noted documentation of metabolic encephalopathy on active problem   list along with noted dementia with delirium.patient agitated requiring a   bedside sitter, restraints and Geodon.noted oriented to person only . Please   clarify one of the following .    The medical record reflects the following:  Risk Factors:age of 79 from [facility] DKA  Clinical Indicators:admitted with DKA and AMS was found wandering in other   patients rooms at [facility].. Noted documentation of metabolic encephalopathy on   active problem list along with noted dementia with delirium.patient agitated   requiring a bedside sitter, restraints and Geodon.noted oriented to person   only  Treatment: bedside sitter, soft restraints, Geodon, glucose monitoring    Thank You Mal COLMENARES BSN CCDS  Options provided:  -- metabolic encephalopathy with underlying dementia confirmed  -- Metabolic encephalopathy ruled out, dementia with behavioral disturbance   confirmed  -- Other - I will add my own diagnosis  -- Disagree - Not applicable / Not valid  -- Disagree - Clinically unable to determine / Unknown  -- Refer to Clinical Documentation Reviewer    PROVIDER RESPONSE TEXT:    After study, Metabolic encephalopathy with underlying dementia confirmed    Query created by: Tresa Anthony on 2025 12:52 PM      QUERY TEXT:    Patient admitted with DKA and AMS.  Noted documentation of type II MI   inprogress notes on . cardiology consult notes troponins trending down   from previous admission and myocardial injury in active problem list. In order   to support the diagnosis of type II MI, 
Bedside sitter removed, family at bedside.     
Called Bill [phone] to give report.   Forwarded to ail.   Transport here to pick pt up - gave report   Pt stable/dressed. Iv removed.   Warm blankets given.     
[facility]   Progress Note                   Date:   2/17/2025  Patient name: Francia Ferguson  Date of admission:  2/16/2025  3:20 AM  MRN:   [id number]  YOB: 1945  PCP: Jack Boone DO    Reason for Admission: Hyperglycemia [R73.9]  Pulmonary nodules [R91.8]  NSTEMI (non-ST elevated myocardial infarction) (HCC) [I21.4]  Pleural effusion, bilateral [J90]  Atrial fibrillation with RVR (HCC) [I48.91]  Elevated brain natriuretic peptide (BNP) level [R79.89]  Altered mental status, unspecified altered mental status type [R41.82]    Subjective:       Clinical Changes / Abnormalities: Pt seen and examined in the room.  Patient resting in bed with sitter at bedside. Pt sleeping and did not wake up during assessment.  Labs, vitals and tele reviewed- SR with APBs 73    Medications:   Scheduled Meds:   LORazepam  0.5 mg IntraVENous Once    atorvastatin  40 mg Oral Daily    clopidogrel  75 mg Oral Daily    isosorbide mononitrate  120 mg Oral Daily    sodium chloride flush  5-40 mL IntraVENous 2 times per day    sodium chloride flush  5-40 mL IntraVENous 2 times per day    insulin lispro  0-4 Units SubCUTAneous 4x Daily AC & HS    insulin glargine  10 Units SubCUTAneous Daily    QUEtiapine  12.5 mg Oral Nightly    pantoprazole  40 mg Oral BID AC    metoprolol tartrate  50 mg Oral BID     Continuous Infusions:   sodium chloride      sodium chloride      dextrose       CBC:   Recent Labs     02/16/25  0327 02/16/25  0728 02/17/25  0502   WBC 5.6 5.6 4.7   HGB 10.6* 9.2* 10.2*    170 205     BMP:    Recent Labs     02/16/25  0327 02/16/25  1502 02/17/25  0502    140 145*   K 3.8 3.2* 3.7   CL 99 105 108*   CO2 14* 21 23   BUN 19 16 12   CREATININE 1.5* 1.4* 1.3*   GLUCOSE 451* 128* 127*     Hepatic:   Recent Labs     02/16/25  0327 02/17/25  0502   AST 25 33*   ALT 19 19   BILITOT 0.4 0.4   ALKPHOS 72 70     Troponin:   Recent Labs     02/16/25  0327 02/16/25  0523 02/16/25  0728 
[facility]   Progress Note                   Date:   2/18/2025  Patient name: Francia Ferguson  Date of admission:  2/16/2025  3:20 AM  MRN:   [id number]  YOB: 1945  PCP: Jack Boone DO    Reason for Admission: Hyperglycemia [R73.9]  Pulmonary nodules [R91.8]  NSTEMI (non-ST elevated myocardial infarction) (HCC) [I21.4]  Pleural effusion, bilateral [J90]  Atrial fibrillation with RVR (HCC) [I48.91]  Elevated brain natriuretic peptide (BNP) level [R79.89]  Altered mental status, unspecified altered mental status type [R41.82]    Subjective:       Clinical Changes / Abnormalities: Pt seen and examined in the room.  Patient resting in bed with family at bedside. Pt denies any chest pain and shortness of breath.  Labs, vitals and tele reviewed- SR 75    Medications:   Scheduled Meds:   LORazepam  0.5 mg IntraVENous Once    QUEtiapine  25 mg Oral Nightly    atorvastatin  40 mg Oral Daily    clopidogrel  75 mg Oral Daily    isosorbide mononitrate  120 mg Oral Daily    sodium chloride flush  5-40 mL IntraVENous 2 times per day    sodium chloride flush  5-40 mL IntraVENous 2 times per day    insulin lispro  0-4 Units SubCUTAneous 4x Daily AC & HS    insulin glargine  10 Units SubCUTAneous Daily    pantoprazole  40 mg Oral BID AC    metoprolol tartrate  50 mg Oral BID     Continuous Infusions:   sodium chloride      sodium chloride      dextrose       CBC:   Recent Labs     02/16/25  0728 02/17/25  0502 02/18/25  0322   WBC 5.6 4.7 4.6   HGB 9.2* 10.2* 9.1*    205 176     BMP:    Recent Labs     02/17/25  1511 02/17/25 2014 02/18/25  0322    141 143   K 3.3* 3.2* 3.8    107 108*   CO2 24 24 23   BUN 11 11 11   CREATININE 1.4* 1.4* 1.4*   GLUCOSE 224* 150* 176*     Hepatic:   Recent Labs     02/16/25  0327 02/17/25  0502   AST 25 33*   ALT 19 19   BILITOT 0.4 0.4   ALKPHOS 72 70     Troponin:   Recent Labs     02/16/25  0327 02/16/25  0523 02/16/25  0728   TROPHS 124* 
Occupational Therapy  Occupational Therapy Initial Evaluation  Facility/Department: [address]   Patient Name: Francia Ferguson        MRN: [id number]    : 1945    Date of Service: 2025    Chief Complaint   Patient presents with    Tachycardia     Pt. Brought in via EMS from [place]. Per facility patient was found in another client room. Per ems patient hr 140s with ECG showing afib with RVR     Past Medical History:  has a past medical history of Abnormal cardiovascular stress test, CAD (coronary artery disease), Chest pressure, Fatigue, History of pneumonia, HTN (hypertension), Hyperlipidemia, Pneumonia, Positive cardiac stress test, Renal insufficiency, SOB (shortness of breath), and Type 2 diabetes mellitus without complication (HCC).  Past Surgical History:  has a past surgical history that includes Cholecystectomy; Colonoscopy; Hysterectomy, vaginal; Coronary angioplasty with stent (2020); Hysterectomy; Appendectomy; Cardiac catheterization (2021); and eye surgery.    Discharge Recommendations  Discharge Recommendations: Patient would benefit from continued therapy after discharge  OT Equipment Recommendations  Other: continue to assess/monitor    Assessment  Performance deficits / Impairments: Decreased functional mobility ;Decreased ADL status;Decreased balance;Decreased safe awareness  Assessment: Patient demonstrated decreased ADLs, balance and functional mobility following hospitalization due to mental status changes, tachycardia. Patient prior to admit was independent with ADLs/IADLs and ambulated without device. At this time patient is completing functional transfers with SBA-CGA, ambulating with CGA and SBA-CGA for ADLs. Patient would benefit from skilled OT services addressing above deficits while here at hospital to maximize independence in prep for eventual return to prior living arrangements.  Prognosis: Good  Decision Making: Medium Complexity  REQUIRES OT FOLLOW-UP: 
Physical Therapy  Facility/Department: [address]   Physical Therapy Initial Evaluation    Patient Name: Francia Ferguson        MRN: [id number]    : 1945    Date of Service: 2025    Chief Complaint   Patient presents with    Tachycardia     Pt. Brought in via EMS from [facility]. Per facility patient was found in another client room. Per ems patient hr 140s with ECG showing afib with RVR     Past Medical History:  has a past medical history of Abnormal cardiovascular stress test, CAD (coronary artery disease), Chest pressure, Fatigue, History of pneumonia, HTN (hypertension), Hyperlipidemia, Pneumonia, Positive cardiac stress test, Renal insufficiency, SOB (shortness of breath), and Type 2 diabetes mellitus without complication (HCC).  Past Surgical History:  has a past surgical history that includes Cholecystectomy; Colonoscopy; Hysterectomy, vaginal; Coronary angioplasty with stent (2020); Hysterectomy; Appendectomy; Cardiac catheterization (2021); and eye surgery.    Discharge Recommendations  Discharge Recommendations: Patient would benefit from continued therapy after discharge  PT Equipment Recommendations  Equipment Needed: No    Assessment  Body Structures, Functions, Activity Limitations Requiring Skilled Therapeutic Intervention: Decreased functional mobility , Decreased cognition, Decreased posture, Decreased balance, Decreased safe awareness    Assessment: Pt presents from SNF with tachycardia and AMS (dementia at baseline). Pt recently at [facility] for DKA, MI and aspiration. At baseline, pt lives with spouse, ind gait no device, ind ADL's. Pt currently requiring SBA for bed mobility, CGA transfers, pt ambulated 120ft no device CGA for slight unsteadiness. Pt will require 24 hour assist at discharge. Pt will benefit from continued skilled PT for balance, posture and functional mobility training while in the hospital and at discharge.    Therapy Prognosis: Good    Decision Making: 
Prior to shift change, patient attempting to get out of bed, Rn entered room to bed alarm, and was attempting to assist patient to be the bathroom, when patient got mad that Rn \"was in her house\". Rn attempted to orient patient that she was in the hospital and we needed to use the walker, due to prior trips to the bathroom and her being unsteady. Pt kicking and hitting Rn. Pt removed tele wires and was trying to hit Rn.Assistance required in room and patient screaming hitting, biting and kicking staff.   Pt was placed in bilateral soft wrist restraints, House supervisor at bedside Bisi MTZ notified and coming to bedside for assessment. Patient son Ashok notified of patient being placed in restraints, per Ashok he will let patient spouse know.     Electronically signed by Jill Márquez RN on 2/16/25 at 8:07 PM EST   
Pt admitted to room 324, assessment completed. Pt spouse at bedside, Pt is A+Ox2. Hx of dementia, Purewic applied per patient she hasn't urinated in days bladder scan shows 344. Vital signs obtained. Admission competed. Bed low position, bed alarm applied. Call light with in reach     
[facility]  Office: [phone]  Butch Gaxiola DO, Ivan Guido, DO, Jim Mcdowell DO, Travis Major, DO, Taryn Bingham MD, Jillian Torres MD, Leighann Galeas MD, Pinky Zhang MD,  Odin Brennan MD, Breonna Christian MD, Claritza Boo MD,  Shelly Rodrgiez DO, Alise Hernandez MD, David Juarez MD, Jameson Gaxiola DO, Carleen Hollingsworth MD,  Gil Barba DO, Radha Kraft MD, Liz Robin MD, Stefania Orellana MD, Roberto Sanchez MD,  Rodney Willis MD, Shawn Malhotra MD, Gill Lee MD, Joy Wheatley MD, Bandar Ann MD, Cresencio Mcintyre MD, Maik See DO, Vicente Chun MD, Shelly Hall MD, Mohsin Reza, MD, Shirley Waterhouse, CNP,  Tena Holcomb CNP, Maik Palacio, CNP,  Margoth Gregory, DNP, Regine Slade, CNP, Elsa Dowd, CNP, Jeannette Luna, CNP, Kady Guzman CNP, Zaida Saenz PA-C, Sue Tripp, CNP, Yayo Mcintosh, CNP,  Stephanie Márquez, CNP, Bisi Quintanilla, CNP, Anamaria [facility], CNP,  Mariangel Jarvis, CNS, Maine Barker CNP, Luiza Leahy CNP,   Yuli Burroughs, CNP         [facility]   IN-PATIENT SERVICE   [facility]    Second Visit Note  For more detailed information please refer to the progress note of the day      2/16/2025    8:06 PM    Name:   Francia Ferguson  MRN:     [id number]     Acct:      [id number]   Room:   324/324-01   Day:  0  Admit Date:  2/16/2025  3:20 AM    PCP:   Jack Boone DO  Code Status:  Full Code      Pt vitals were reviewed   New labs were reviewed   Patient was seen    Patient very agitated and hitting and kicking the staff members. Placed in bilateral soft wrist restraints. Will order a one time dose of Geodon. Patient is very confused and thinks that she is at home and verbally aggressive towards the staff.     Updated plan :     Geodon ordered  Restraints ordered and sitter at bedside.         ENRIQUE Reynolds - CNP  2/16/2025  8:06 PM    
[facility]  Office: [phone]  Butch Gaxiola DO, Ivan Guido DO, Jim Mcdowell DO, Travis Major DO, Taryn Bingham MD, Jillian Torres MD, Leighann Galeas MD, Pinky Zhang MD,  Odin Brennan MD, Breonna Christian MD, Claritza Boo MD,  Shelly Rodrigez DO, Alise Hernandez MD, David Juarze MD, Jameson Gaxiola DO, Carleen Hollingsworth MD,  Gil Barba DO, Radha Kraft MD, Liz Robin MD, Stefania Orellana MD, Roberto Sanchez MD,  Rodney Willis MD, Shawn Malhotra MD, Gill Lee MD, Joy Wheatley MD, Bandar Ann MD, Cresencio Mcintyre MD, Maik See DO, Vicente Chun MD, Shelly Hall MD, Mohsin Reza, MD, Shirley Waterhouse, CNP,  Tena Holcomb CNP, Maik Palacio, CNP,  Margoth Gregory, DNP, Regine Slade, CNP, Elsa Dowd, CNP, Jeannette Luna, CNP, Kady Guzman CNP, Zaida Saenz PA-C, Sue Tripp, CNP, Yayo Mcintosh, CNP,  Stehpanie Márquez, CNP, Bisi Quintanilla, CNP, Anamaria [facility], CNP,  Mariangel Jarvis, CNS, Maine Barker CNP, Luiza Leahy CNP,   Yuli Burroughs, CNP         [facility]   IN-PATIENT SERVICE   [facility]    Progress Note    2/17/2025    10:58 AM    Name:   Francia Ferguson  MRN:     [id number]     Acct:      [id number]   Room:   324/324-01   Day:  1  Admit Date:  2/16/2025  3:20 AM    PCP:   Jack Boone DO  Code Status:  Full Code    Subjective:     Patient had a difficult night, was agitated and confused.  This morning she appears much more comfortable.   is at bedside and able to assist.  Glucose was 145, she is on room air and rate control.      Brief History:     This is a 79-year-old female with a history of dementia, atrial fibrillation with rapid ventricular response, heart failure who presents to the hospital for evaluation of atrial fibrillation.  Patient was admitted to the hospital, started on metoprolol 50 mg twice daily with good rate control.  Patient was acidotic on admission with low bicarb and positive beta 
left lung.. 4.  Hypoattenuating area in the liver along the falciform ligament can be seen the setting of focal fatty metamorphosis but with limited detail on the current exam. 5.  Edema of the body wall fat suggesting anasarca. 6.  Small amount of gas in the bladder lumen and correlate for recent catheterization.  Bladder wall is not thickened.     CT HEAD WO CONTRAST    Result Date: 2/16/2025  1. No acute intracranial abnormality. 2. Chronic microvascular ischemic changes. 3. Marked enlargement of the ventricular system and CSF spaces most likely due to cerebral atrophy.       Physical Examination:        General: Alert and oriented to self and location, not year, no acute distress  Neurologic: Awake, alert, and oriented X3, no focal weakness  Lungs: Clear to auscultation, no wheezing, non-labored respiration  Heart: Normal rate, regular rhythm, no murmur, gallop or edema  Abdomen: Soft, non-tender, non-distended, normal bowel sounds  Musculoskeletal: Normal strength, no tenderness or swelling     Assessment:        Hospital Problems             Last Modified POA    * (Principal) Diabetic ketoacidosis without coma associated with type 2 diabetes mellitus (HCC) 2/16/2025 Yes    Chronic kidney disease 2/16/2025 Yes    Atrial fibrillation with RVR ([facility]) 2/16/2025 Yes    High anion gap metabolic acidosis 2/16/2025 Yes    Myocardial injury 2/16/2025 Yes    Uncontrolled type 2 diabetes mellitus with hyperglycemia (HCC) 2/16/2025 Yes    Hypotension 2/16/2025 Yes    Metabolic encephalopathy 2/16/2025 Yes       Plan:        A-fib with RVR  -Continue metoprolol titrate 50 mg twice daily  -Not on anticoagulation due to history of coffee-ground emesis, needs follow-up with cardiology outpatient    DKA  -Mild DKA based on labs on admission  -Resolved with treatment with home insulin    CAD status post PCI 2020  Ischemic cardiomyopathy  -Continue aspirin and Plavix per cardiology recommendations    Hypertension  -On metoprolol

## 2025-02-21 ENCOUNTER — TELEPHONE (OUTPATIENT)
Dept: NEUROLOGY | Age: 80
End: 2025-02-21

## 2025-02-21 LAB
MICROORGANISM SPEC CULT: NORMAL
MICROORGANISM SPEC CULT: NORMAL
SERVICE CMNT-IMP: NORMAL
SERVICE CMNT-IMP: NORMAL
SPECIMEN DESCRIPTION: NORMAL
SPECIMEN DESCRIPTION: NORMAL

## 2025-02-21 NOTE — TELEPHONE ENCOUNTER
Patient's son Ashok (HIPAA) called the office yesterday.  Son stated that Dr. Siegel had ordered neuropsychological testing for his mother and they haven't gotten a call to schedule.  Son states that since the appointment with Dr. Siegel his mother has had quite a bit more confusion / agitation, she's been in and out of the hospital and not taking her medication.  Writer advised that I would forward this message to both Dr. Siegel and Dr. Alberts.  Son can be reached at [phone].

## 2025-03-17 ENCOUNTER — APPOINTMENT (OUTPATIENT)
Dept: GENERAL RADIOLOGY | Age: 80
DRG: 638 | End: 2025-03-17
Payer: MEDICARE

## 2025-03-17 ENCOUNTER — APPOINTMENT (OUTPATIENT)
Dept: CT IMAGING | Age: 80
DRG: 638 | End: 2025-03-17
Payer: MEDICARE

## 2025-03-17 ENCOUNTER — HOSPITAL ENCOUNTER (INPATIENT)
Age: 80
LOS: 2 days | Discharge: NURSING, REHAB OR LONG-TERM CARE | DRG: 638 | End: 2025-03-19
Attending: STUDENT IN AN ORGANIZED HEALTH CARE EDUCATION/TRAINING PROGRAM | Admitting: INTERNAL MEDICINE
Payer: MEDICARE

## 2025-03-17 DIAGNOSIS — E16.2 HYPOGLYCEMIA: Primary | ICD-10-CM

## 2025-03-17 LAB
ALBUMIN SERPL-MCNC: 3.2 G/DL (ref 3.5–5.2)
ALP SERPL-CCNC: 80 U/L (ref 35–104)
ALT SERPL-CCNC: 11 U/L (ref 10–35)
ANION GAP SERPL CALCULATED.3IONS-SCNC: 9 MMOL/L (ref 9–16)
AST SERPL-CCNC: 23 U/L (ref 10–35)
BACTERIA URNS QL MICRO: ABNORMAL
BASOPHILS # BLD: 0 K/UL (ref 0–0.2)
BASOPHILS NFR BLD: 1 % (ref 0–2)
BILIRUB SERPL-MCNC: 0.2 MG/DL (ref 0–1.2)
BILIRUB UR QL STRIP: NEGATIVE
BUN SERPL-MCNC: 25 MG/DL (ref 8–23)
CALCIUM SERPL-MCNC: 8.7 MG/DL (ref 8.6–10.4)
CASTS #/AREA URNS LPF: ABNORMAL /LPF
CHLORIDE SERPL-SCNC: 105 MMOL/L (ref 98–107)
CHP ED QC CHECK: YES
CLARITY UR: CLEAR
CO2 SERPL-SCNC: 25 MMOL/L (ref 20–31)
COLOR UR: YELLOW
CREAT SERPL-MCNC: 1 MG/DL (ref 0.7–1.2)
EOSINOPHIL # BLD: 0.1 K/UL (ref 0–0.4)
EOSINOPHILS RELATIVE PERCENT: 3 % (ref 0–4)
EPI CELLS #/AREA URNS HPF: ABNORMAL /HPF
ERYTHROCYTE [DISTWIDTH] IN BLOOD BY AUTOMATED COUNT: 16.1 % (ref 11.5–14.9)
EST. AVERAGE GLUCOSE BLD GHB EST-MCNC: 166 MG/DL
GFR, ESTIMATED: 57 ML/MIN/1.73M2
GLUCOSE BLD-MCNC: 119 MG/DL
GLUCOSE BLD-MCNC: 133 MG/DL (ref 65–105)
GLUCOSE BLD-MCNC: 156 MG/DL
GLUCOSE BLD-MCNC: 156 MG/DL (ref 65–105)
GLUCOSE BLD-MCNC: 193 MG/DL (ref 65–105)
GLUCOSE BLD-MCNC: 196 MG/DL (ref 65–105)
GLUCOSE BLD-MCNC: 64 MG/DL (ref 65–105)
GLUCOSE BLD-MCNC: 76 MG/DL (ref 65–105)
GLUCOSE BLD-MCNC: 89 MG/DL
GLUCOSE BLD-MCNC: 92 MG/DL
GLUCOSE BLD-MCNC: 92 MG/DL (ref 65–105)
GLUCOSE SERPL-MCNC: 71 MG/DL (ref 74–99)
GLUCOSE UR STRIP-MCNC: ABNORMAL MG/DL
HBA1C MFR BLD: 7.4 % (ref 4–6)
HCT VFR BLD AUTO: 37.7 % (ref 36–46)
HGB BLD-MCNC: 12.1 G/DL (ref 12–16)
HGB UR QL STRIP.AUTO: NEGATIVE
INR PPP: 1
KETONES UR STRIP-MCNC: NEGATIVE MG/DL
LEUKOCYTE ESTERASE UR QL STRIP: NEGATIVE
LYMPHOCYTES NFR BLD: 1.1 K/UL (ref 1–4.8)
LYMPHOCYTES RELATIVE PERCENT: 30 % (ref 24–44)
MAGNESIUM SERPL-MCNC: 2.1 MG/DL (ref 1.6–2.4)
MCH RBC QN AUTO: 30.2 PG (ref 26–34)
MCHC RBC AUTO-ENTMCNC: 32.2 G/DL (ref 31–37)
MCV RBC AUTO: 93.8 FL (ref 80–100)
MONOCYTES NFR BLD: 0.3 K/UL (ref 0.1–1.3)
MONOCYTES NFR BLD: 8 % (ref 1–7)
NEUTROPHILS NFR BLD: 58 % (ref 36–66)
NEUTS SEG NFR BLD: 2.1 K/UL (ref 1.3–9.1)
NITRITE UR QL STRIP: NEGATIVE
PH UR STRIP: 6 [PH] (ref 5–8)
PLATELET # BLD AUTO: 213 K/UL (ref 150–450)
PMV BLD AUTO: 7.6 FL (ref 6–12)
POTASSIUM SERPL-SCNC: 4.4 MMOL/L (ref 3.7–5.3)
PROT SERPL-MCNC: 5.9 G/DL (ref 6.6–8.7)
PROT UR STRIP-MCNC: ABNORMAL MG/DL
PROTHROMBIN TIME: 13.7 SEC (ref 11.8–14.6)
RBC # BLD AUTO: 4.02 M/UL (ref 4–5.2)
RBC #/AREA URNS HPF: ABNORMAL /HPF
SODIUM SERPL-SCNC: 139 MMOL/L (ref 136–145)
SP GR UR STRIP: 1.02 (ref 1–1.03)
TROPONIN I SERPL HS-MCNC: 33 NG/L (ref 0–14)
UROBILINOGEN UR STRIP-ACNC: NORMAL EU/DL (ref 0–1)
WBC #/AREA URNS HPF: ABNORMAL /HPF
WBC OTHER # BLD: 3.5 K/UL (ref 3.5–11)

## 2025-03-17 PROCEDURE — 51701 INSERT BLADDER CATHETER: CPT

## 2025-03-17 PROCEDURE — 70450 CT HEAD/BRAIN W/O DYE: CPT

## 2025-03-17 PROCEDURE — 84484 ASSAY OF TROPONIN QUANT: CPT

## 2025-03-17 PROCEDURE — 99285 EMERGENCY DEPT VISIT HI MDM: CPT

## 2025-03-17 PROCEDURE — 93005 ELECTROCARDIOGRAM TRACING: CPT | Performed by: STUDENT IN AN ORGANIZED HEALTH CARE EDUCATION/TRAINING PROGRAM

## 2025-03-17 PROCEDURE — 82947 ASSAY GLUCOSE BLOOD QUANT: CPT

## 2025-03-17 PROCEDURE — 99223 1ST HOSP IP/OBS HIGH 75: CPT | Performed by: INTERNAL MEDICINE

## 2025-03-17 PROCEDURE — 83036 HEMOGLOBIN GLYCOSYLATED A1C: CPT

## 2025-03-17 PROCEDURE — 2580000003 HC RX 258

## 2025-03-17 PROCEDURE — 80053 COMPREHEN METABOLIC PANEL: CPT

## 2025-03-17 PROCEDURE — 85025 COMPLETE CBC W/AUTO DIFF WBC: CPT

## 2025-03-17 PROCEDURE — 71045 X-RAY EXAM CHEST 1 VIEW: CPT

## 2025-03-17 PROCEDURE — 83735 ASSAY OF MAGNESIUM: CPT

## 2025-03-17 PROCEDURE — 6370000000 HC RX 637 (ALT 250 FOR IP)

## 2025-03-17 PROCEDURE — 6360000002 HC RX W HCPCS

## 2025-03-17 PROCEDURE — 85610 PROTHROMBIN TIME: CPT

## 2025-03-17 PROCEDURE — 2580000003 HC RX 258: Performed by: STUDENT IN AN ORGANIZED HEALTH CARE EDUCATION/TRAINING PROGRAM

## 2025-03-17 PROCEDURE — 81001 URINALYSIS AUTO W/SCOPE: CPT

## 2025-03-17 PROCEDURE — 2060000000 HC ICU INTERMEDIATE R&B

## 2025-03-17 PROCEDURE — 36415 COLL VENOUS BLD VENIPUNCTURE: CPT

## 2025-03-17 RX ORDER — POTASSIUM CHLORIDE 7.45 MG/ML
10 INJECTION INTRAVENOUS PRN
Status: DISCONTINUED | OUTPATIENT
Start: 2025-03-17 | End: 2025-03-19 | Stop reason: HOSPADM

## 2025-03-17 RX ORDER — DIVALPROEX SODIUM 125 MG/1
250 CAPSULE, COATED PELLETS ORAL 2 TIMES DAILY
COMMUNITY

## 2025-03-17 RX ORDER — DEXTROSE MONOHYDRATE AND SODIUM CHLORIDE 5; .45 G/100ML; G/100ML
INJECTION, SOLUTION INTRAVENOUS CONTINUOUS
Status: ACTIVE | OUTPATIENT
Start: 2025-03-17 | End: 2025-03-18

## 2025-03-17 RX ORDER — SODIUM CHLORIDE 0.9 % (FLUSH) 0.9 %
5-40 SYRINGE (ML) INJECTION EVERY 12 HOURS SCHEDULED
Status: DISCONTINUED | OUTPATIENT
Start: 2025-03-17 | End: 2025-03-19 | Stop reason: HOSPADM

## 2025-03-17 RX ORDER — CLOPIDOGREL BISULFATE 75 MG/1
75 TABLET ORAL DAILY
Status: DISCONTINUED | OUTPATIENT
Start: 2025-03-17 | End: 2025-03-19 | Stop reason: HOSPADM

## 2025-03-17 RX ORDER — ATORVASTATIN CALCIUM 40 MG/1
40 TABLET, FILM COATED ORAL DAILY
Status: DISCONTINUED | OUTPATIENT
Start: 2025-03-17 | End: 2025-03-19 | Stop reason: HOSPADM

## 2025-03-17 RX ORDER — ASPIRIN 81 MG/1
81 TABLET, CHEWABLE ORAL DAILY
Status: DISCONTINUED | OUTPATIENT
Start: 2025-03-17 | End: 2025-03-17

## 2025-03-17 RX ORDER — INSULIN LISPRO 100 [IU]/ML
0-4 INJECTION, SOLUTION INTRAVENOUS; SUBCUTANEOUS
Status: DISCONTINUED | OUTPATIENT
Start: 2025-03-17 | End: 2025-03-19 | Stop reason: HOSPADM

## 2025-03-17 RX ORDER — POTASSIUM CHLORIDE 1500 MG/1
40 TABLET, EXTENDED RELEASE ORAL PRN
Status: DISCONTINUED | OUTPATIENT
Start: 2025-03-17 | End: 2025-03-19 | Stop reason: HOSPADM

## 2025-03-17 RX ORDER — SODIUM CHLORIDE 0.9 % (FLUSH) 0.9 %
5-40 SYRINGE (ML) INJECTION PRN
Status: DISCONTINUED | OUTPATIENT
Start: 2025-03-17 | End: 2025-03-19 | Stop reason: HOSPADM

## 2025-03-17 RX ORDER — DEXTROSE MONOHYDRATE 100 MG/ML
INJECTION, SOLUTION INTRAVENOUS CONTINUOUS PRN
Status: DISCONTINUED | OUTPATIENT
Start: 2025-03-17 | End: 2025-03-19 | Stop reason: HOSPADM

## 2025-03-17 RX ORDER — ACETAMINOPHEN 650 MG/1
650 SUPPOSITORY RECTAL EVERY 6 HOURS PRN
Status: DISCONTINUED | OUTPATIENT
Start: 2025-03-17 | End: 2025-03-19 | Stop reason: HOSPADM

## 2025-03-17 RX ORDER — ONDANSETRON 2 MG/ML
4 INJECTION INTRAMUSCULAR; INTRAVENOUS EVERY 6 HOURS PRN
Status: DISCONTINUED | OUTPATIENT
Start: 2025-03-17 | End: 2025-03-19 | Stop reason: HOSPADM

## 2025-03-17 RX ORDER — INSULIN GLARGINE 100 [IU]/ML
10 INJECTION, SOLUTION SUBCUTANEOUS DAILY
Status: DISCONTINUED | OUTPATIENT
Start: 2025-03-17 | End: 2025-03-19 | Stop reason: HOSPADM

## 2025-03-17 RX ORDER — MIDODRINE HYDROCHLORIDE 2.5 MG/1
2.5 TABLET ORAL 3 TIMES DAILY PRN
Status: DISCONTINUED | OUTPATIENT
Start: 2025-03-17 | End: 2025-03-19 | Stop reason: HOSPADM

## 2025-03-17 RX ORDER — ONDANSETRON 4 MG/1
4 TABLET, ORALLY DISINTEGRATING ORAL EVERY 8 HOURS PRN
Status: DISCONTINUED | OUTPATIENT
Start: 2025-03-17 | End: 2025-03-19 | Stop reason: HOSPADM

## 2025-03-17 RX ORDER — MAGNESIUM SULFATE HEPTAHYDRATE 40 MG/ML
2000 INJECTION, SOLUTION INTRAVENOUS PRN
Status: DISCONTINUED | OUTPATIENT
Start: 2025-03-17 | End: 2025-03-19 | Stop reason: HOSPADM

## 2025-03-17 RX ORDER — POLYETHYLENE GLYCOL 3350 17 G/17G
17 POWDER, FOR SOLUTION ORAL DAILY PRN
Status: DISCONTINUED | OUTPATIENT
Start: 2025-03-17 | End: 2025-03-19 | Stop reason: HOSPADM

## 2025-03-17 RX ORDER — ACETAMINOPHEN 325 MG/1
650 TABLET ORAL EVERY 6 HOURS PRN
Status: DISCONTINUED | OUTPATIENT
Start: 2025-03-17 | End: 2025-03-19 | Stop reason: HOSPADM

## 2025-03-17 RX ORDER — FAMOTIDINE 20 MG/1
20 TABLET, FILM COATED ORAL DAILY
Status: DISCONTINUED | OUTPATIENT
Start: 2025-03-18 | End: 2025-03-19 | Stop reason: HOSPADM

## 2025-03-17 RX ORDER — FAMOTIDINE 20 MG/1
20 TABLET, FILM COATED ORAL 2 TIMES DAILY
Status: DISCONTINUED | OUTPATIENT
Start: 2025-03-17 | End: 2025-03-17

## 2025-03-17 RX ORDER — ENOXAPARIN SODIUM 100 MG/ML
40 INJECTION SUBCUTANEOUS DAILY
Status: DISCONTINUED | OUTPATIENT
Start: 2025-03-17 | End: 2025-03-19 | Stop reason: HOSPADM

## 2025-03-17 RX ORDER — ISOSORBIDE MONONITRATE 60 MG/1
120 TABLET, EXTENDED RELEASE ORAL DAILY
Status: DISCONTINUED | OUTPATIENT
Start: 2025-03-17 | End: 2025-03-19 | Stop reason: HOSPADM

## 2025-03-17 RX ORDER — SODIUM CHLORIDE 9 MG/ML
INJECTION, SOLUTION INTRAVENOUS PRN
Status: DISCONTINUED | OUTPATIENT
Start: 2025-03-17 | End: 2025-03-19 | Stop reason: HOSPADM

## 2025-03-17 RX ORDER — QUETIAPINE FUMARATE 50 MG/1
25 TABLET, FILM COATED ORAL NIGHTLY
Status: DISCONTINUED | OUTPATIENT
Start: 2025-03-17 | End: 2025-03-17

## 2025-03-17 RX ORDER — METOPROLOL TARTRATE 50 MG
50 TABLET ORAL 2 TIMES DAILY
Status: DISCONTINUED | OUTPATIENT
Start: 2025-03-17 | End: 2025-03-19 | Stop reason: HOSPADM

## 2025-03-17 RX ADMIN — ATORVASTATIN CALCIUM 40 MG: 40 TABLET, FILM COATED ORAL at 16:11

## 2025-03-17 RX ADMIN — ISOSORBIDE MONONITRATE 120 MG: 60 TABLET, EXTENDED RELEASE ORAL at 16:11

## 2025-03-17 RX ADMIN — ENOXAPARIN SODIUM 40 MG: 100 INJECTION SUBCUTANEOUS at 16:11

## 2025-03-17 RX ADMIN — CLOPIDOGREL BISULFATE 75 MG: 75 TABLET ORAL at 16:10

## 2025-03-17 RX ADMIN — DEXTROSE MONOHYDRATE 125 ML: 100 INJECTION, SOLUTION INTRAVENOUS at 18:09

## 2025-03-17 RX ADMIN — MIDODRINE HYDROCHLORIDE 2.5 MG: 2.5 TABLET ORAL at 16:10

## 2025-03-17 RX ADMIN — DEXTROSE MONOHYDRATE 125 ML: 100 INJECTION, SOLUTION INTRAVENOUS at 06:52

## 2025-03-17 RX ADMIN — DEXTROSE AND SODIUM CHLORIDE: 5; 450 INJECTION, SOLUTION INTRAVENOUS at 10:45

## 2025-03-17 RX ADMIN — METOPROLOL TARTRATE 50 MG: 50 TABLET, FILM COATED ORAL at 16:10

## 2025-03-17 RX ADMIN — ASPIRIN 81 MG: 81 TABLET, CHEWABLE ORAL at 16:10

## 2025-03-17 ASSESSMENT — PAIN SCALES - WONG BAKER: WONGBAKER_NUMERICALRESPONSE: NO HURT

## 2025-03-17 ASSESSMENT — PAIN - FUNCTIONAL ASSESSMENT: PAIN_FUNCTIONAL_ASSESSMENT: NONE - DENIES PAIN

## 2025-03-17 NOTE — ED NOTES
Report given to DEDRA Stevenson from ED.   Report method in person   The following was reviewed with receiving RN:   Current vital signs:  BP (!) 149/94   Pulse 66   Temp 97.4 °F (36.3 °C) (Axillary)   Resp 16   Ht 1.549 m (5' 1\")   Wt 59 kg (130 lb)   SpO2 98%   BMI 24.56 kg/m²                MEWS Score: 3     Any medication or safety alerts were reviewed. Any pending diagnostics and notifications were also reviewed, as well as any safety concerns or issues, abnormal labs, abnormal imaging, and abnormal assessment findings. Questions were answered.

## 2025-03-17 NOTE — H&P
mediastinal and hilar lymph nodes do not appear enlarged. Lungs/pleura: Small bilateral pleural effusions are present with mild thickening of the fissures.  There is a 1.5 x 1.2 x 1.9 cm area of focal dense opacification in the posteroinferior right upper lobe bordering the edge of the major fissure.  There is a slight distortion on the major fissure.  There are several small nodules and nodular opacities in the middle lobe parenchyma and bordering the edge of the fissures.  Few small nodules in the right lower lobe and atelectatic changes along the posteroinferior edge of the right lower lobe with the adjacent effusion. Few scattered 3 mm nodules in the left lower lobe more than the left upper lobe but also has calcified granulomas in the left lung.  There is atelectatic change along the posterior edge of the left lung with the left pleural effusion.  No pneumothorax on either side. Soft Tissues/Bones: No acute fractures at the ribs.  There is no soft tissue emphysema in the chest wall.  Degenerative changes at the thoracic spine but without collapse or subluxation.  There is edema in the body wall fat of the chest and into the abdomen. Abdomen/Pelvis: Organs: Lack of IV contrast reduces evaluation of the organs and vasculature. Some hypoattenuating areas are suggested in the liver near the margin of the falciform ligament.  This can be seen in the setting of focal fatty metamorphosis but the details are limited with the current exam.  The spleen is not enlarged.  Previous cholecystectomy is noted.  No pancreatic calcification or ductal dilatation.  The adrenal glands are not focally enlarged.  Atherosclerotic calcifications in the renal arteries but without significant focal renal calculus.  There is no hydronephrosis or proximal hydroureter on either side. GI/Bowel: The stomach, small bowel, and colon are not dilated.  Mixed fluid and fecal material in the right hemicolon and small amount of solid fecal material

## 2025-03-17 NOTE — ED TRIAGE NOTES
Mode of arrival (squad #, walk in, police, etc) : Medic 41        Chief complaint(s): Hypoglycemia        Arrival Note (brief scenario, treatment PTA, etc).: Pt resides at [facility]. EMS reports staff has had problems with pt's blood sugar the past week. Today, staff has struggled with keeping pt's sugar above 60s. Pt is alert but confused which is baseline d/t hx of Alzheimer's. Pt has no complaints at this time. Pt given 150mL of D10 by EMS.        C= \"Have you ever felt that you should Cut down on your drinking?\"  No  A= \"Have people Annoyed you by criticizing your drinking?\"  No  G= \"Have you ever felt bad or Guilty about your drinking?\"  No  E= \"Have you ever had a drink as an Eye-opener first thing in the morning to steady your nerves or to help a hangover?\"  No      Deferred []      Reason for deferring: N/A    *If yes to two or more: probable alcohol abuse.*

## 2025-03-17 NOTE — ED NOTES
Writer given verbal order by Dr. Smiley to finish the rest of the 250mL bag of D10 that was started by EMS.

## 2025-03-17 NOTE — ED PROVIDER NOTES
EMERGENCY DEPARTMENT ENCOUNTER    Pt Name: Francia Ferguson  MRN: [id number]  Birthdate 1945  Date of evaluation: 3/17/25  CHIEF COMPLAINT       Chief Complaint   Patient presents with    Hypoglycemia     HISTORY OF PRESENT ILLNESS   This is a 79-year-old woman she has got a history of hypertension, hyperlipidemia, diabetes insulin-dependent, CAD with stent, Alzheimer's dementia living in a memory care unit presenting after they are having some issues with low blood sugar    Apparently having low blood sugar throughout the week and today having difficulty keeping the blood sugar above 60 has been consistently in the 50s reportedly    Patient has dementia and is confused as to why she is here cannot really provide any further history denies any sort of pain              REVIEW OF SYSTEMS     Review of Systems   Unable to perform ROS: Dementia     PASTMEDICAL HISTORY     Past Medical History:   Diagnosis Date    Abnormal cardiovascular stress test 06/2020    no ischemia / infarction   there is septal hypokinesis   /  EF 70%    CAD (coronary artery disease)     Chest pressure     Fatigue     History of pneumonia 03/2020    HTN (hypertension)     Hyperlipidemia     Pneumonia     Positive cardiac stress test     Renal insufficiency 8/18/2022    SOB (shortness of breath)     Type 2 diabetes mellitus without complication (HCC)      Past Problem List  Patient Active Problem List   Diagnosis Code    Diabetic retinopathy (HCC) E11.319    Hyperlipidemia E78.5    Osteoporosis M81.0    Type 2 diabetes mellitus without complication, with long-term current use of insulin (HCC) E11.9, Z79.4    Allergic rhinitis J30.9    Arteriosclerosis of coronary artery I25.10    Vitamin D deficiency E55.9    Polyneuropathy G62.9    Elevated liver enzymes R74.8    Primary hypertension I10    Type 2 diabetes mellitus with diabetic neuropathy E11.40    Chronic kidney disease N18.9    Diabetic ketoacidosis with coma associated with type 2 diabetes

## 2025-03-17 NOTE — ED NOTES
Completed form mailed to home address.   Writer given verbal order by Dr. Smiley to administer 125mL bolus of D10.

## 2025-03-18 VITALS
DIASTOLIC BLOOD PRESSURE: 54 MMHG | OXYGEN SATURATION: 96 % | BODY MASS INDEX: 24.55 KG/M2 | HEIGHT: 61 IN | SYSTOLIC BLOOD PRESSURE: 129 MMHG | RESPIRATION RATE: 18 BRPM | HEART RATE: 99 BPM | WEIGHT: 130 LBS | TEMPERATURE: 98.4 F

## 2025-03-18 LAB
ANION GAP SERPL CALCULATED.3IONS-SCNC: 11 MMOL/L (ref 9–16)
BASOPHILS # BLD: 0 K/UL (ref 0–0.2)
BASOPHILS NFR BLD: 1 % (ref 0–2)
BUN SERPL-MCNC: 16 MG/DL (ref 8–23)
CALCIUM SERPL-MCNC: 8.3 MG/DL (ref 8.6–10.4)
CHLORIDE SERPL-SCNC: 105 MMOL/L (ref 98–107)
CO2 SERPL-SCNC: 22 MMOL/L (ref 20–31)
CREAT SERPL-MCNC: 0.9 MG/DL (ref 0.7–1.2)
EOSINOPHIL # BLD: 0.1 K/UL (ref 0–0.4)
EOSINOPHILS RELATIVE PERCENT: 2 % (ref 0–4)
ERYTHROCYTE [DISTWIDTH] IN BLOOD BY AUTOMATED COUNT: 15.7 % (ref 11.5–14.9)
GFR, ESTIMATED: 65 ML/MIN/1.73M2
GLUCOSE BLD-MCNC: 138 MG/DL (ref 65–105)
GLUCOSE BLD-MCNC: 143 MG/DL (ref 65–105)
GLUCOSE BLD-MCNC: 253 MG/DL (ref 65–105)
GLUCOSE BLD-MCNC: 410 MG/DL (ref 65–105)
GLUCOSE BLD-MCNC: 72 MG/DL (ref 65–105)
GLUCOSE SERPL-MCNC: 71 MG/DL (ref 74–99)
HCT VFR BLD AUTO: 30.9 % (ref 36–46)
HGB BLD-MCNC: 10.7 G/DL (ref 12–16)
LYMPHOCYTES NFR BLD: 1.1 K/UL (ref 1–4.8)
LYMPHOCYTES RELATIVE PERCENT: 23 % (ref 24–44)
MCH RBC QN AUTO: 30.9 PG (ref 26–34)
MCHC RBC AUTO-ENTMCNC: 34.6 G/DL (ref 31–37)
MCV RBC AUTO: 89.3 FL (ref 80–100)
MONOCYTES NFR BLD: 0.5 K/UL (ref 0.1–1.3)
MONOCYTES NFR BLD: 10 % (ref 1–7)
NEUTROPHILS NFR BLD: 64 % (ref 36–66)
NEUTS SEG NFR BLD: 3.1 K/UL (ref 1.3–9.1)
PLATELET # BLD AUTO: 175 K/UL (ref 150–450)
PMV BLD AUTO: 7.4 FL (ref 6–12)
POTASSIUM SERPL-SCNC: 4.2 MMOL/L (ref 3.7–5.3)
RBC # BLD AUTO: 3.46 M/UL (ref 4–5.2)
SODIUM SERPL-SCNC: 138 MMOL/L (ref 136–145)
WBC OTHER # BLD: 4.8 K/UL (ref 3.5–11)

## 2025-03-18 PROCEDURE — 82947 ASSAY GLUCOSE BLOOD QUANT: CPT

## 2025-03-18 PROCEDURE — 6370000000 HC RX 637 (ALT 250 FOR IP): Performed by: INTERNAL MEDICINE

## 2025-03-18 PROCEDURE — 6370000000 HC RX 637 (ALT 250 FOR IP)

## 2025-03-18 PROCEDURE — 97166 OT EVAL MOD COMPLEX 45 MIN: CPT

## 2025-03-18 PROCEDURE — 97535 SELF CARE MNGMENT TRAINING: CPT

## 2025-03-18 PROCEDURE — 6360000002 HC RX W HCPCS

## 2025-03-18 PROCEDURE — 99233 SBSQ HOSP IP/OBS HIGH 50: CPT

## 2025-03-18 PROCEDURE — 36415 COLL VENOUS BLD VENIPUNCTURE: CPT

## 2025-03-18 PROCEDURE — 97161 PT EVAL LOW COMPLEX 20 MIN: CPT

## 2025-03-18 PROCEDURE — 99212 OFFICE O/P EST SF 10 MIN: CPT

## 2025-03-18 PROCEDURE — 2060000000 HC ICU INTERMEDIATE R&B

## 2025-03-18 PROCEDURE — 85025 COMPLETE CBC W/AUTO DIFF WBC: CPT

## 2025-03-18 PROCEDURE — 97530 THERAPEUTIC ACTIVITIES: CPT

## 2025-03-18 PROCEDURE — 80048 BASIC METABOLIC PNL TOTAL CA: CPT

## 2025-03-18 RX ORDER — INSULIN GLARGINE 100 [IU]/ML
5 INJECTION, SOLUTION SUBCUTANEOUS DAILY
Qty: 10 ML | Refills: 3 | Status: SHIPPED | OUTPATIENT
Start: 2025-03-18

## 2025-03-18 RX ORDER — INSULIN GLARGINE 100 [IU]/ML
5 INJECTION, SOLUTION SUBCUTANEOUS DAILY
Qty: 10 ML | Refills: 3 | Status: SHIPPED | OUTPATIENT
Start: 2025-03-18 | End: 2025-03-18

## 2025-03-18 RX ADMIN — CLOPIDOGREL BISULFATE 75 MG: 75 TABLET ORAL at 09:04

## 2025-03-18 RX ADMIN — METOPROLOL TARTRATE 50 MG: 50 TABLET, FILM COATED ORAL at 21:49

## 2025-03-18 RX ADMIN — ATORVASTATIN CALCIUM 40 MG: 40 TABLET, FILM COATED ORAL at 09:04

## 2025-03-18 RX ADMIN — METOPROLOL TARTRATE 50 MG: 50 TABLET, FILM COATED ORAL at 00:18

## 2025-03-18 RX ADMIN — FAMOTIDINE 20 MG: 20 TABLET, FILM COATED ORAL at 09:04

## 2025-03-18 RX ADMIN — ISOSORBIDE MONONITRATE 120 MG: 60 TABLET, EXTENDED RELEASE ORAL at 09:04

## 2025-03-18 RX ADMIN — METOPROLOL TARTRATE 50 MG: 50 TABLET, FILM COATED ORAL at 09:04

## 2025-03-18 RX ADMIN — INSULIN LISPRO 4 UNITS: 100 INJECTION, SOLUTION INTRAVENOUS; SUBCUTANEOUS at 19:20

## 2025-03-18 RX ADMIN — ENOXAPARIN SODIUM 40 MG: 100 INJECTION SUBCUTANEOUS at 09:04

## 2025-03-18 ASSESSMENT — PAIN SCALES - WONG BAKER
WONGBAKER_NUMERICALRESPONSE: NO HURT

## 2025-03-18 NOTE — CARE COORDINATION
Case Management Assessment  Initial Evaluation    Date/Time of Evaluation: 3/18/2025 12:50 PM  Assessment Completed by: India Simons    If patient is discharged prior to next notation, then this note serves as note for discharge by case management.    Patient Name: Francia Ferguson                   YOB: 1945  Diagnosis: Hypoglycemia [E16.2]                   Date / Time: 3/17/2025  4:49 AM    Patient Admission Status: Inpatient   Readmission Risk (Low < 19, Mod (19-27), High > 27): Readmission Risk Score: 22.3    Current PCP: Jack Boone, DO  PCP verified by CM? Yes     Chart Reviewed: Yes      History Provided by: Significant Other, Medical Record  Patient Orientation: Other (see comment) (recently extubated)    Patient Cognition: Other (see comment) (recently extubated)     Hospitalization in the last 30 days (Readmission):  No    If yes, Readmission Assessment in CM Navigator will be completed.     Advance Directives:       Code Status: Full Code   Patient's Primary Decision Maker is: Legal Next of Kin    Primary Decision Maker: Theo Ferguson - Spouse - [phone]     Discharge Planning:     Patient lives with: Spouse/Significant Other Type of Home: House  Primary Care Giver: Family  Patient Support Systems include: Spouse/Significant Other, Children, Family Members   Current Financial resources: Medicare  Current community resources:    Current services prior to admission: Durable Medical Equipment            Current DME: Wheelchair, Glucometer            Type of Home Care services:  PT, OT, Nursing Services     ADLS  Prior functional level: Assistance with the following:, Mobility, Shopping, Housework, Cooking  Current functional level: Assistance with the following:, Bathing, Dressing, Toileting, Feeding, Cooking, Housework, Shopping, Mobility     PT AM-PAC:   /24  OT AM-PAC:   /24     Family can provide assistance at DC: Yes  Would you like Case Management to discuss the discharge plan

## 2025-03-18 NOTE — CARE COORDINATION
Arin from [facility] called to jd Whitman to transport back to facility at 7:30 pm.  India COLMENARES notified.    Electronically signed by Evi Clement RN on 3/18/2025 at 2:00 PM

## 2025-03-18 NOTE — CARE COORDINATION
Patient will discharge to [facility] at  via ***.   SUZY faxed to facility.  Patient/Family informed of discharge and is agreeable.  Bedside RN notified, Call report to ***.

## 2025-03-18 NOTE — DISCHARGE INSTR - COC
List:[id number]}    Rehab Therapies: {THERAPEUTIC INTERVENTION:3639252336}  Weight Bearing Status/Restrictions: { CC Weight Bearin}  Other Medical Equipment (for information only, NOT a DME order):  {EQUIPMENT:220755088}  Other Treatments: ***    Patient's personal belongings (please select all that are sent with patient):  {CHP DME Belongings:680027306}    RN SIGNATURE:  {Esignature:345209789}    CASE MANAGEMENT/SOCIAL WORK SECTION    Inpatient Status Date: ***    Readmission Risk Assessment Score:  [facility] RISK OF UNPLANNED READMISSION 2.0             22.6 Total Score        Discharging to Facility/ Agency   Name:   Address:  Phone:  Fax:    Dialysis Facility (if applicable)   Name:  Address:  Dialysis Schedule:  Phone:  Fax:    / signature: {Esignature:962375850}    PHYSICIAN SECTION    Prognosis: {Prognosis:5717032617}    Condition at Discharge: { Patient Condition:691135836}    Rehab Potential (if transferring to Rehab): {Prognosis:8455714666}    Recommended Labs or Other Treatments After Discharge: ***    Physician Certification: I certify the above information and transfer of Francia Ferguson  is necessary for the continuing treatment of the diagnosis listed and that she requires {Admit to Appropriate Level of Care:73577} for {GREATER/LESS:974321719} 30 days.     Update Admission H&P: {CHP DME Changes in HandP:917133246}    PHYSICIAN SIGNATURE:  Electronically signed by Kiera Singleton MD on 3/18/25 at 10:42 AM EDT

## 2025-03-18 NOTE — CONSULTS
[facility]  Consult Note       NAME:  Francia Ferguson  MEDICAL RECORD NUMBER:  [id number]  AGE: 79 y.o.   GENDER: female  : 1945  TODAY'S DATE:  3/18/2025    Subjective:      Francia Ferguson is a 79 y.o. female with inpatient referral to [facility] for:  Left great toe      Wound Identification:  Wound Type: undetermined- trauma vs pressure  Contributing Factors: diabetes and anticoagulation therapy    Wound History: Wound present on admission, patient does not know how she got it. She states that she may have hit it on something but is unsure.  She does have a few scattered small abrasions to her feet/lower legs.   Current Wound Care Treatment: Open to air    Patient Goal of Care:  [x] Wound Healing  [] Odor Control  [] Palliative Care  [] Pain Control   [] Other:         PAST MEDICAL HISTORY        Diagnosis Date    Abnormal cardiovascular stress test 2020    no ischemia / infarction   there is septal hypokinesis   /  EF 70%    CAD (coronary artery disease)     Chest pressure     Fatigue     History of pneumonia 2020    HTN (hypertension)     Hyperlipidemia     Pneumonia     Positive cardiac stress test     Renal insufficiency 2022    SOB (shortness of breath)     Type 2 diabetes mellitus without complication (HCC)        PAST SURGICAL HISTORY    Past Surgical History:   Procedure Laterality Date    APPENDECTOMY      CARDIAC CATHETERIZATION  2021    CHOLECYSTECTOMY      COLONOSCOPY      CORONARY ANGIOPLASTY WITH STENT PLACEMENT  2020    High grade stenosis in Proximal RCA s/p successful PCI with DELIA (3.25 X 12 mm)    EYE SURGERY      HYSTERECTOMY (CERVIX STATUS UNKNOWN)      HYSTERECTOMY, VAGINAL      left both ovaries in       FAMILY HISTORY    Family History   Problem Relation Age of Onset    Heart Disease Mother     Diabetes Mother     Diabetes Father     Heart Disease Brother     Esophageal Cancer Brother     Diabetes Maternal

## 2025-03-19 ENCOUNTER — TELEPHONE (OUTPATIENT)
Dept: NEUROLOGY | Age: 80
End: 2025-03-19

## 2025-03-19 LAB
EKG ATRIAL RATE: 97 BPM
EKG P AXIS: 95 DEGREES
EKG P-R INTERVAL: 184 MS
EKG Q-T INTERVAL: 336 MS
EKG QRS DURATION: 62 MS
EKG QTC CALCULATION (BAZETT): 426 MS
EKG R AXIS: 75 DEGREES
EKG T AXIS: 87 DEGREES
EKG VENTRICULAR RATE: 97 BPM

## 2025-03-19 PROCEDURE — 93010 ELECTROCARDIOGRAM REPORT: CPT | Performed by: INTERNAL MEDICINE

## 2025-03-19 NOTE — PLAN OF CARE
Problem: Chronic Conditions and Co-morbidities  Goal: Patient's chronic conditions and co-morbidity symptoms are monitored and maintained or improved  Outcome: Progressing     Problem: Discharge Planning  Goal: Discharge to home or other facility with appropriate resources  Outcome: Progressing     Problem: Skin/Tissue Integrity  Goal: Skin integrity remains intact  Description: 1.  Monitor for areas of redness and/or skin breakdown  2.  Assess vascular access sites hourly  3.  Every 4-6 hours minimum:  Change oxygen saturation probe site  4.  Every 4-6 hours:  If on nasal continuous positive airway pressure, respiratory therapy assess nares and determine need for appliance change or resting period  Outcome: Progressing     Problem: Safety - Adult  Goal: Free from fall injury  Outcome: Progressing     Problem: Pain  Goal: Verbalizes/displays adequate comfort level or baseline comfort level  Outcome: Progressing     
Ms. Fracnia Ferguson is a 79-year-old female patient with a past medical history dementia, CAD status post RCA stent placement 2020, hypertension, hyperlipidemia, and insulin-dependent type 2 diabetes mellitus who presented following hypoglycemic episodes at an outside facility.  Patient currently resides in a memory care unit and has severe dementia at baseline.  Most of the history is obtained from the patient's son who stated that the facility had contacted him earlier in the morning on 3/17 stating that she had episodes of hypoglycemia, into the 50s and that they were having difficulties keeping her sugar normal.  Patient was given 2 boluses of D10 after which her sugar improved to 133.  Patient was minimally responsive but per son was not significantly altered from her baseline.  She was hemodynamically stable.  After completing medication reconciliation, it was discovered that patient's most recent dispense report for Lantus was written for 20 units twice daily.  She was at a minimum receiving 20 units daily, however her most recent prescription our records was for 10 units daily.  Patient was started on continuous D5 infusion and her sugars improved.  We held her Lantus dose and gave low-dose sliding scale.  She was somnolent so head CT was ordered which showed some ventriculomegaly but no acute intracranial abnormality.  Patient did have increasing agitation and attempted to elope, so soft restraints were utilized.  Anticipate discharge once blood sugar is consistently normal off of continuous infusion and appropriate Lantus dose is determined.  
Refill per VO of Dr. Melissa Purvis:    Last appt: 9/16/2021    Future Appointments   Date Time Provider David Albertoi   9/28/2022  4:20 PM Tamra Vasquez MD [facility]       Requested Prescriptions     Pending Prescriptions Disp Refills    lisinopriL (PRINIVIL, ZESTRIL) 10 mg tablet [Pharmacy Med Name: LISINOPRIL 10 MG TABLET] 45 Tablet 4     Sig: TAKE 1/2 A TABLETS BY MOUTH TWO (2) TIMES A DAY.
CNS and Spiritual Care consult, as indicated  3/19/2025 0022 by Sangeeta Bell RN  Outcome: Adequate for Discharge     Problem: Safety - Medical Restraint  Goal: Remains free of injury from restraints (Restraint for Interference with Medical Device)  Description: INTERVENTIONS:  1. Determine that other, less restrictive measures have been tried or would not be effective before applying the restraint  2. Evaluate the patient's condition at the time of restraint application  3. Inform patient/family regarding the reason for restraint  4. Q2H: Monitor safety, psychosocial status, comfort, nutrition and hydration  3/19/2025 0022 by Sangeeta Bell, RN  Outcome: Adequate for Discharge     Problem: Pain  Goal: Verbalizes/displays adequate comfort level or baseline comfort level  3/19/2025 0022 by Sangeeta Bell RN  Outcome: Adequate for Discharge     
Specialist and Spiritual Care consult, as indicated     Problem: Safety - Medical Restraint  Goal: Remains free of injury from restraints (Restraint for Interference with Medical Device)  Description: INTERVENTIONS:  1. Determine that other, less restrictive measures have been tried or would not be effective before applying the restraint  2. Evaluate the patient's condition at the time of restraint application  3. Inform patient/family regarding the reason for restraint  4. Q2H: Monitor safety, psychosocial status, comfort, nutrition and hydration  Outcome: Adequate for Discharge  Flowsheets  Taken 3/18/2025 0800  Remains free of injury from restraints (restraint for interference with medical device):   Determine that other, less restrictive measures have been tried or would not be effective before applying the restraint   Evaluate the patient's condition at the time of restraint application   Inform patient/family regarding the reason for restraint   Every 2 hours: Monitor safety, psychosocial status, comfort, nutrition and hydration  Taken 3/18/2025 0700  Remains free of injury from restraints (restraint for interference with medical device):   Determine that other, less restrictive measures have been tried or would not be effective before applying the restraint   Evaluate the patient's condition at the time of restraint application   Inform patient/family regarding the reason for restraint   Every 2 hours: Monitor safety, psychosocial status, comfort, nutrition and hydration     Problem: Pain  Goal: Verbalizes/displays adequate comfort level or baseline comfort level  Outcome: Adequate for Discharge     
high risk fall precautions, as indicated   Provide frequent short contacts to provide reality reorientation, refocusing and direction

## 2025-03-19 NOTE — ADT AUTH CERT
Utilization Reviews       PA Recommends IP by Sunshine Renteria LPN   Last updated by Sunshine Renteria LPN on 3/19/2025 0853     Review Status Created By   In Primary Sunshine Renteria LPN       Review Type   --      Criteria Review   Name: Francia Ferguson  : 1945  CSN: [id number]  INSURANCE: [employer] Medicare    Date of admission: 25  Date of discharge:    Current status: Inpatient    > RECOMMENDATION:    KEEP CURRENT STATUS. No status change indicated.    > RATIONALE:  Anticipation of care to cross 2 midnights . Meets [facility]pt [facility] Criteria Review for Diabetes, Hypoglycemia.    > CLINICAL SUMMARY    79 y.o. female with PMHx of dementia, CAD s/p RCA stent placement , hypertension, hyperlipidemia, and insulin-dependent type 2 diabetes mellitus who presents following hypoglycemic episodes at outside facility.      Remove restraints, DC IV fluids  Continue diet.  Will hold off on Lantus on discharge    This chart was reviewed by Beverley Rich MD  [facility] Physician Advisor  [email or web address] at 12:18 PM EST on 3/18/2025    Status decision based on clinical judgment and Traditional Medicare or Medicare Advantage: Two-Midnight-Rule applies    This chart was reviewed at 12:18 PM on 3/18/2025 by Beverley Collins MD, Physician Advisor. [employer]. If you have any questions regarding this case, please contact Dr. Polina Caballero at [phone] or Dr. MOHAN Rick at [phone] or [phone].         3/18  by Sunshine Renteria LPN   Last updated by Sunshine Renteria LPN on 3/19/2025 0852     Review Status Created By   In Primary Sunshine Renteria LPN       Review Type   --      Criteria Review   DATE: 3/18/25  TYPE OF BED: PROGRESSIVE CARE   Patient has or is expected to exceed 2 midnights of medically necessary care.      RELEVANT BASELINES: (lab values, vitals, o2 amount/delivery, etc.)  Alzheimer's dementia      VITALS:  98.8 R16 P62 151/60 96% RA      ABNL/PERTINENT

## 2025-03-19 NOTE — PROGRESS NOTES
4 eyes done on admission with bedside RN. Pictures taken of the pt's left foot. Abrasion on top of the foot and scabbed over, Another on top of the pt's left great toe black spot noted abrasion vs ulcer. Both pictures linked into the chart under LDA's  
As required by CMS, I notified the attending physician restraints were ordered on 3/17 for Francia Ferguson. Acknowledgement of this order by the attending physician was confirmed.   
Contacted [facility] to inform them of patient is discharging at this hour and will be arriving back to the facility soon.   
[facility] called to advise that  changed to 11:45pm. Bedside RN informed of same.  
Physical Therapy  [facility]   Physical Therapy Evaluation  Date: 3/18/25  Patient Name: Francia Ferguson       Room: 7-  MRN: [id number]  Account: [id number]   : 1945  (79 y.o.) Gender: female     Discharge Recommendations:  Discharge Recommendations: 24 hour supervision or assist     PT Equipment Recommendations  Equipment Needed: No     Past Medical History:  has a past medical history of Abnormal cardiovascular stress test, CAD (coronary artery disease), Chest pressure, Fatigue, History of pneumonia, HTN (hypertension), Hyperlipidemia, Pneumonia, Positive cardiac stress test, Renal insufficiency, SOB (shortness of breath), and Type 2 diabetes mellitus without complication (HCC).  Past Surgical History:   has a past surgical history that includes Cholecystectomy; Colonoscopy; Hysterectomy, vaginal; Coronary angioplasty with stent (2020); Hysterectomy; Appendectomy; Cardiac catheterization (2021); and eye surgery.    Subjective  Subjective  Subjective: pt with no verbal responses     General  Family/Caregiver Present: No  Follows Commands: Impaired           Social/Functional History  Social/Functional History  Lives With: Other (Comment)  Type of Home: Facility ([facility])  Has the patient had two or more falls in the past year or any fall with injury in the past year?: Unknown  Additional Comments: Per chart review, pt is recently from [facility]. Hx of severe dementia; pt unable to answer any social/functional history questions.    Restrictions  Restrictions/Precautions  Restrictions/Precautions: Fall Risk, General Precautions  Required Braces or Orthoses?:  (unknown)  Implants Present? :  (unknown)     Objective    Transfers  Transfers  Sit to Stand: 2 Person Assistance, Substantial/Maximal assistance  Stand to Sit: 2 Person Assistance, Substantial/Maximal assistance  Comment: pt stood bedside with significant posterior lean and feet 
Rechecked patient acccheck after bolus and it was 193.  
Upon staff entering room patient found trying to get out of bed. IV pulled out and bleeding at site, pulled gown off, telemetry off. When staff was attempting to get patient cleaned up patient became agitated and swinging fists toward staff. Patient not able to be re directed.     Order for soft wrist restraints.   
Writer called [facility] [phone] and got a update on patient and last does of meds. Was informed by nurse Sunshine that patient takes all her meds crushed in applesauce or pudding and she can be a feeder at times.  
Writer contacted , Theo , to inform him of patient being placed in soft wrist restraints due to pulling out IV, agitation and aggression toward staff. Theo will be in today to visit. No further questions at this time.   
secondary to decreased safety and independence requiring 2 skilled therapy professionals to address individual discipline's goals. OT addressing preparation for ADL transfer, sitting balance for increased ADL performance, sitting/activity tolerance, environmental safety/scanning, fall prevention, functional mobility for ADL transfers, ability to sequence and follow directions, and bed mobility tech.     Electronically signed by TANIKA Knutson/L on 3/18/25 at 3:12 PM EDT       
other form of stimulus is on during the day and off at night. Ensure lights are on during the day , off at night. Make sure patient has hearing aids/glasses. Ensure team information is written clearly on patient board. Arrange for family to visit as frequently as possible. Reorient patient as frequently as possible  Monitor for urinary retention, aspiration events and falls.   Minimize night time awakenings, middle of the night med dosing, lab draws between 12am and 6 am if medically safe  If patient is having difficulty sleeping can use Melatonin 6 mg QHS  If having behavioral disturbance and poses a harm to self or others can try oral low dose Seroquel or IM zyprexa if Qtc stable. Avoid physical restraints is possible.  Avoid benzodiazepines for delirium. Avoid anticholinergics.   Recommend Dietary consult to help optimize nutrition  Needs aggressive PTOT- Recommend turning patient per protocol to avoid wounds. Maintain fall precautions.   Monitor for urinary retention.    CAD s/p RCA stent placement in 2020  -Takes aspirin and Plavix daily  -Last echo in February normal ejection fraction  -Stress test in 2022 showed no ischemia or infarct, low risk  -Continue Imdur    Atrial Fibrillation  -History of recurrent A-fib with RVR  -Rate control is optimal on beta-blocker 50 twice daily of metoprolol  -At discharge in February plan was to follow-up with cardiology for discussion of watchman's as outpatient  -Sinus rhythm on admission today, continue to monitor    Hypertension  -Continue metoprolol  -Has had episodes previously hypotension, has midodrine as needed    Hyperlipidemia  -Continue home atorvastatin    3/18  Ms. Francia Ferguson is a 79-year-old female patient with a past medical history dementia, CAD status post RCA stent placement 2020, hypertension, hyperlipidemia, and insulin-dependent type 2 diabetes mellitus who presented following hypoglycemic episodes at an outside facility.  Patient currently resides in

## 2025-03-25 ENCOUNTER — HOSPITAL ENCOUNTER (INPATIENT)
Age: 80
LOS: 8 days | Discharge: NURSING, REHAB OR LONG-TERM CARE | DRG: 956 | End: 2025-04-03
Attending: EMERGENCY MEDICINE | Admitting: ORTHOPAEDIC SURGERY
Payer: MEDICARE

## 2025-03-25 ENCOUNTER — APPOINTMENT (OUTPATIENT)
Dept: GENERAL RADIOLOGY | Age: 80
DRG: 956 | End: 2025-03-25
Payer: MEDICARE

## 2025-03-25 ENCOUNTER — APPOINTMENT (OUTPATIENT)
Dept: CT IMAGING | Age: 80
DRG: 956 | End: 2025-03-25
Payer: MEDICARE
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
## 2025-03-25 DIAGNOSIS — S32.10XA CLOSED FRACTURE OF SACRUM, UNSPECIFIED PORTION OF SACRUM, INITIAL ENCOUNTER (HCC): ICD-10-CM

## 2025-03-25 DIAGNOSIS — N30.00 ACUTE CYSTITIS WITHOUT HEMATURIA: ICD-10-CM

## 2025-03-25 DIAGNOSIS — S72.91XA CLOSED FRACTURE OF RIGHT FEMUR, UNSPECIFIED FRACTURE MORPHOLOGY, UNSPECIFIED PORTION OF FEMUR, INITIAL ENCOUNTER: ICD-10-CM

## 2025-03-25 LAB
ALBUMIN SERPL-MCNC: 3.5 G/DL (ref 3.5–5.2)
ALP SERPL-CCNC: 72 U/L (ref 35–104)
ALT SERPL-CCNC: 12 U/L (ref 10–35)
ANION GAP SERPL CALCULATED.3IONS-SCNC: 12 MMOL/L (ref 9–16)
AST SERPL-CCNC: 29 U/L (ref 10–35)
BACTERIA URNS QL MICRO: ABNORMAL
BASOPHILS # BLD: 0 K/UL (ref 0–0.2)
BASOPHILS NFR BLD: 1 % (ref 0–2)
BILIRUB DIRECT SERPL-MCNC: 0.1 MG/DL (ref 0–0.3)
BILIRUB INDIRECT SERPL-MCNC: 0.1 MG/DL (ref 0–1)
BILIRUB SERPL-MCNC: 0.2 MG/DL (ref 0–1.2)
BILIRUB UR QL STRIP: NEGATIVE
BUN SERPL-MCNC: 31 MG/DL (ref 8–23)
CALCIUM SERPL-MCNC: 8.8 MG/DL (ref 8.6–10.4)
CASTS #/AREA URNS LPF: ABNORMAL /LPF
CHLORIDE SERPL-SCNC: 103 MMOL/L (ref 98–107)
CK SERPL-CCNC: 71 U/L (ref 26–192)
CLARITY UR: CLEAR
CO2 SERPL-SCNC: 23 MMOL/L (ref 20–31)
COLOR UR: YELLOW
CREAT SERPL-MCNC: 1 MG/DL (ref 0.7–1.2)
EOSINOPHIL # BLD: 0.1 K/UL (ref 0–0.4)
EOSINOPHILS RELATIVE PERCENT: 2 % (ref 0–4)
EPI CELLS #/AREA URNS HPF: ABNORMAL /HPF
ERYTHROCYTE [DISTWIDTH] IN BLOOD BY AUTOMATED COUNT: 15.9 % (ref 11.5–14.9)
GFR, ESTIMATED: 57 ML/MIN/1.73M2
GLUCOSE SERPL-MCNC: 85 MG/DL (ref 74–99)
GLUCOSE UR STRIP-MCNC: ABNORMAL MG/DL
HCT VFR BLD AUTO: 34 % (ref 36–46)
HGB BLD-MCNC: 11.4 G/DL (ref 12–16)
HGB UR QL STRIP.AUTO: ABNORMAL
INR PPP: 0.9
KETONES UR STRIP-MCNC: ABNORMAL MG/DL
LEUKOCYTE ESTERASE UR QL STRIP: ABNORMAL
LIPASE SERPL-CCNC: 34 U/L (ref 13–60)
LYMPHOCYTES NFR BLD: 1.1 K/UL (ref 1–4.8)
LYMPHOCYTES RELATIVE PERCENT: 23 % (ref 24–44)
MAGNESIUM SERPL-MCNC: 2.2 MG/DL (ref 1.6–2.4)
MCH RBC QN AUTO: 30.5 PG (ref 26–34)
MCHC RBC AUTO-ENTMCNC: 33.4 G/DL (ref 31–37)
MCV RBC AUTO: 91.2 FL (ref 80–100)
MONOCYTES NFR BLD: 0.5 K/UL (ref 0.1–1.3)
MONOCYTES NFR BLD: 10 % (ref 1–7)
NEUTROPHILS NFR BLD: 64 % (ref 36–66)
NEUTS SEG NFR BLD: 3 K/UL (ref 1.3–9.1)
NITRITE UR QL STRIP: POSITIVE
PARTIAL THROMBOPLASTIN TIME: 28.1 SEC (ref 24–36)
PH UR STRIP: 6.5 [PH] (ref 5–8)
PHOSPHATE SERPL-MCNC: 3.3 MG/DL (ref 2.5–4.5)
PLATELET # BLD AUTO: 255 K/UL (ref 150–450)
PMV BLD AUTO: 7.3 FL (ref 6–12)
POTASSIUM SERPL-SCNC: 4.2 MMOL/L (ref 3.7–5.3)
PROT SERPL-MCNC: 6.5 G/DL (ref 6.6–8.7)
PROT UR STRIP-MCNC: ABNORMAL MG/DL
PROTHROMBIN TIME: 13.1 SEC (ref 11.8–14.6)
RBC # BLD AUTO: 3.73 M/UL (ref 4–5.2)
RBC #/AREA URNS HPF: ABNORMAL /HPF
SODIUM SERPL-SCNC: 138 MMOL/L (ref 136–145)
SP GR UR STRIP: 1.02 (ref 1–1.03)
TROPONIN I SERPL HS-MCNC: 27 NG/L (ref 0–14)
UROBILINOGEN UR STRIP-ACNC: NORMAL EU/DL (ref 0–1)
WBC #/AREA URNS HPF: ABNORMAL /HPF
WBC OTHER # BLD: 4.6 K/UL (ref 3.5–11)

## 2025-03-25 PROCEDURE — 51701 INSERT BLADDER CATHETER: CPT

## 2025-03-25 PROCEDURE — 84484 ASSAY OF TROPONIN QUANT: CPT

## 2025-03-25 PROCEDURE — 87184 SC STD DISK METHOD PER PLATE: CPT

## 2025-03-25 PROCEDURE — 36415 COLL VENOUS BLD VENIPUNCTURE: CPT

## 2025-03-25 PROCEDURE — 72170 X-RAY EXAM OF PELVIS: CPT

## 2025-03-25 PROCEDURE — 87086 URINE CULTURE/COLONY COUNT: CPT

## 2025-03-25 PROCEDURE — 99285 EMERGENCY DEPT VISIT HI MDM: CPT

## 2025-03-25 PROCEDURE — 87186 SC STD MICRODIL/AGAR DIL: CPT

## 2025-03-25 PROCEDURE — 6360000002 HC RX W HCPCS: Performed by: EMERGENCY MEDICINE

## 2025-03-25 PROCEDURE — 83690 ASSAY OF LIPASE: CPT

## 2025-03-25 PROCEDURE — 85610 PROTHROMBIN TIME: CPT

## 2025-03-25 PROCEDURE — 85730 THROMBOPLASTIN TIME PARTIAL: CPT

## 2025-03-25 PROCEDURE — 96374 THER/PROPH/DIAG INJ IV PUSH: CPT

## 2025-03-25 PROCEDURE — 82550 ASSAY OF CK (CPK): CPT

## 2025-03-25 PROCEDURE — 87077 CULTURE AEROBIC IDENTIFY: CPT

## 2025-03-25 PROCEDURE — 85025 COMPLETE CBC W/AUTO DIFF WBC: CPT

## 2025-03-25 PROCEDURE — 80076 HEPATIC FUNCTION PANEL: CPT

## 2025-03-25 PROCEDURE — 83735 ASSAY OF MAGNESIUM: CPT

## 2025-03-25 PROCEDURE — 93005 ELECTROCARDIOGRAM TRACING: CPT | Performed by: EMERGENCY MEDICINE

## 2025-03-25 PROCEDURE — 84100 ASSAY OF PHOSPHORUS: CPT

## 2025-03-25 PROCEDURE — 81001 URINALYSIS AUTO W/SCOPE: CPT

## 2025-03-25 PROCEDURE — 71045 X-RAY EXAM CHEST 1 VIEW: CPT

## 2025-03-25 PROCEDURE — 80048 BASIC METABOLIC PNL TOTAL CA: CPT

## 2025-03-25 RX ORDER — LORAZEPAM 2 MG/ML
0.5 INJECTION INTRAMUSCULAR ONCE
Status: COMPLETED | OUTPATIENT
Start: 2025-03-26 | End: 2025-03-25

## 2025-03-25 RX ORDER — SODIUM CHLORIDE 0.9 % (FLUSH) 0.9 %
10 SYRINGE (ML) INJECTION ONCE
Status: COMPLETED | OUTPATIENT
Start: 2025-03-25 | End: 2025-03-26

## 2025-03-25 RX ORDER — 0.9 % SODIUM CHLORIDE 0.9 %
100 INTRAVENOUS SOLUTION INTRAVENOUS ONCE
Status: COMPLETED | OUTPATIENT
Start: 2025-03-25 | End: 2025-03-26

## 2025-03-25 RX ORDER — IOPAMIDOL 755 MG/ML
100 INJECTION, SOLUTION INTRAVASCULAR
Status: COMPLETED | OUTPATIENT
Start: 2025-03-25 | End: 2025-03-26

## 2025-03-25 RX ADMIN — LORAZEPAM 0.5 MG: 2 INJECTION INTRAMUSCULAR; INTRAVENOUS at 23:52

## 2025-03-25 NOTE — TELEPHONE ENCOUNTER
Called patient's son to discuss utility of cognitive testing. Jameson noted the patient has moved to a memory care unit and was not certain regarding the need for comprehensive testing. He requested I contact his brother, Ashok. LM for Ashok to contact this provider to discuss interest in NP testing given the above.

## 2025-03-26 ENCOUNTER — ANESTHESIA (OUTPATIENT)
Dept: OPERATING ROOM | Age: 80
End: 2025-03-26
Payer: MEDICARE

## 2025-03-26 ENCOUNTER — ANESTHESIA EVENT (OUTPATIENT)
Dept: OPERATING ROOM | Age: 80
End: 2025-03-26
Payer: MEDICARE

## 2025-03-26 ENCOUNTER — APPOINTMENT (OUTPATIENT)
Dept: CT IMAGING | Age: 80
DRG: 956 | End: 2025-03-26
Payer: MEDICARE

## 2025-03-26 ENCOUNTER — APPOINTMENT (OUTPATIENT)
Dept: GENERAL RADIOLOGY | Age: 80
DRG: 956 | End: 2025-03-26
Attending: ORTHOPAEDIC SURGERY
Payer: MEDICARE
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
## 2025-03-26 LAB
GLUCOSE BLD-MCNC: 187 MG/DL (ref 65–105)
GLUCOSE BLD-MCNC: 240 MG/DL (ref 65–105)
GLUCOSE BLD-MCNC: 268 MG/DL (ref 65–105)
GLUCOSE BLD-MCNC: 326 MG/DL (ref 65–105)
GLUCOSE BLD-MCNC: 388 MG/DL (ref 65–105)
TROPONIN I SERPL HS-MCNC: 27 NG/L (ref 0–14)

## 2025-03-26 PROCEDURE — 6360000002 HC RX W HCPCS: Performed by: ORTHOPAEDIC SURGERY

## 2025-03-26 PROCEDURE — 2709999900 HC NON-CHARGEABLE SUPPLY: Performed by: ORTHOPAEDIC SURGERY

## 2025-03-26 PROCEDURE — 1200000000 HC SEMI PRIVATE

## 2025-03-26 PROCEDURE — 2580000003 HC RX 258: Performed by: ANESTHESIOLOGY

## 2025-03-26 PROCEDURE — 2720000010 HC SURG SUPPLY STERILE: Performed by: ORTHOPAEDIC SURGERY

## 2025-03-26 PROCEDURE — 7100000001 HC PACU RECOVERY - ADDTL 15 MIN: Performed by: ORTHOPAEDIC SURGERY

## 2025-03-26 PROCEDURE — 84484 ASSAY OF TROPONIN QUANT: CPT

## 2025-03-26 PROCEDURE — 3700000001 HC ADD 15 MINUTES (ANESTHESIA): Performed by: ORTHOPAEDIC SURGERY

## 2025-03-26 PROCEDURE — 6370000000 HC RX 637 (ALT 250 FOR IP): Performed by: ORTHOPAEDIC SURGERY

## 2025-03-26 PROCEDURE — 6360000002 HC RX W HCPCS: Performed by: EMERGENCY MEDICINE

## 2025-03-26 PROCEDURE — 6370000000 HC RX 637 (ALT 250 FOR IP)

## 2025-03-26 PROCEDURE — 27245 TREAT THIGH FRACTURE: CPT | Performed by: ORTHOPAEDIC SURGERY

## 2025-03-26 PROCEDURE — 2500000003 HC RX 250 WO HCPCS: Performed by: NURSE ANESTHETIST, CERTIFIED REGISTERED

## 2025-03-26 PROCEDURE — 96375 TX/PRO/DX INJ NEW DRUG ADDON: CPT

## 2025-03-26 PROCEDURE — C1713 ANCHOR/SCREW BN/BN,TIS/BN: HCPCS | Performed by: ORTHOPAEDIC SURGERY

## 2025-03-26 PROCEDURE — 99223 1ST HOSP IP/OBS HIGH 75: CPT

## 2025-03-26 PROCEDURE — C1769 GUIDE WIRE: HCPCS | Performed by: ORTHOPAEDIC SURGERY

## 2025-03-26 PROCEDURE — 2580000003 HC RX 258: Performed by: EMERGENCY MEDICINE

## 2025-03-26 PROCEDURE — 6360000004 HC RX CONTRAST MEDICATION: Performed by: EMERGENCY MEDICINE

## 2025-03-26 PROCEDURE — 2500000003 HC RX 250 WO HCPCS: Performed by: ORTHOPAEDIC SURGERY

## 2025-03-26 PROCEDURE — 82947 ASSAY GLUCOSE BLOOD QUANT: CPT

## 2025-03-26 PROCEDURE — 3700000000 HC ANESTHESIA ATTENDED CARE: Performed by: ORTHOPAEDIC SURGERY

## 2025-03-26 PROCEDURE — 3600000014 HC SURGERY LEVEL 4 ADDTL 15MIN: Performed by: ORTHOPAEDIC SURGERY

## 2025-03-26 PROCEDURE — 36415 COLL VENOUS BLD VENIPUNCTURE: CPT

## 2025-03-26 PROCEDURE — 7100000000 HC PACU RECOVERY - FIRST 15 MIN: Performed by: ORTHOPAEDIC SURGERY

## 2025-03-26 PROCEDURE — 2500000003 HC RX 250 WO HCPCS: Performed by: EMERGENCY MEDICINE

## 2025-03-26 PROCEDURE — 0QS636Z REPOSITION RIGHT UPPER FEMUR WITH INTRAMEDULLARY INTERNAL FIXATION DEVICE, PERCUTANEOUS APPROACH: ICD-10-PCS | Performed by: ORTHOPAEDIC SURGERY

## 2025-03-26 PROCEDURE — 71260 CT THORAX DX C+: CPT

## 2025-03-26 PROCEDURE — 2780000010 HC IMPLANT OTHER: Performed by: ORTHOPAEDIC SURGERY

## 2025-03-26 PROCEDURE — 72125 CT NECK SPINE W/O DYE: CPT

## 2025-03-26 PROCEDURE — 2500000003 HC RX 250 WO HCPCS: Performed by: NURSE PRACTITIONER

## 2025-03-26 PROCEDURE — 99222 1ST HOSP IP/OBS MODERATE 55: CPT | Performed by: ORTHOPAEDIC SURGERY

## 2025-03-26 PROCEDURE — 70450 CT HEAD/BRAIN W/O DYE: CPT

## 2025-03-26 PROCEDURE — 74177 CT ABD & PELVIS W/CONTRAST: CPT

## 2025-03-26 PROCEDURE — 51798 US URINE CAPACITY MEASURE: CPT

## 2025-03-26 PROCEDURE — 3600000004 HC SURGERY LEVEL 4 BASE: Performed by: ORTHOPAEDIC SURGERY

## 2025-03-26 PROCEDURE — 6360000002 HC RX W HCPCS: Performed by: NURSE ANESTHETIST, CERTIFIED REGISTERED

## 2025-03-26 DEVICE — SCREW BNE L36MM DIA5MM NONSTERILE TIB LT GRN TI ST CANN LOK: Type: IMPLANTABLE DEVICE | Site: HIP | Status: FUNCTIONAL

## 2025-03-26 DEVICE — IMPLANTABLE DEVICE: Type: IMPLANTABLE DEVICE | Site: HIP | Status: FUNCTIONAL

## 2025-03-26 DEVICE — IMPL BLADE HELICAL TFNA FEN 85MM NONST: Type: IMPLANTABLE DEVICE | Site: HIP | Status: FUNCTIONAL

## 2025-03-26 RX ORDER — ONDANSETRON 2 MG/ML
INJECTION INTRAMUSCULAR; INTRAVENOUS
Status: DISCONTINUED | OUTPATIENT
Start: 2025-03-26 | End: 2025-03-26 | Stop reason: SDUPTHER

## 2025-03-26 RX ORDER — SODIUM CHLORIDE 9 MG/ML
INJECTION, SOLUTION INTRAVENOUS PRN
Status: DISCONTINUED | OUTPATIENT
Start: 2025-03-26 | End: 2025-03-26 | Stop reason: HOSPADM

## 2025-03-26 RX ORDER — EPHEDRINE SULFATE/0.9% NACL/PF 25 MG/5 ML
SYRINGE (ML) INTRAVENOUS
Status: DISCONTINUED | OUTPATIENT
Start: 2025-03-26 | End: 2025-03-26 | Stop reason: SDUPTHER

## 2025-03-26 RX ORDER — INSULIN GLARGINE 100 [IU]/ML
5 INJECTION, SOLUTION SUBCUTANEOUS DAILY
Status: DISCONTINUED | OUTPATIENT
Start: 2025-03-26 | End: 2025-03-28

## 2025-03-26 RX ORDER — ATORVASTATIN CALCIUM 40 MG/1
40 TABLET, FILM COATED ORAL DAILY
Status: DISCONTINUED | OUTPATIENT
Start: 2025-03-26 | End: 2025-04-03 | Stop reason: HOSPADM

## 2025-03-26 RX ORDER — SODIUM CHLORIDE 0.9 % (FLUSH) 0.9 %
5-40 SYRINGE (ML) INJECTION PRN
Status: DISCONTINUED | OUTPATIENT
Start: 2025-03-26 | End: 2025-03-26 | Stop reason: HOSPADM

## 2025-03-26 RX ORDER — INSULIN LISPRO 100 [IU]/ML
0-4 INJECTION, SOLUTION INTRAVENOUS; SUBCUTANEOUS
Status: DISCONTINUED | OUTPATIENT
Start: 2025-03-26 | End: 2025-04-02

## 2025-03-26 RX ORDER — FENTANYL CITRATE 50 UG/ML
INJECTION, SOLUTION INTRAMUSCULAR; INTRAVENOUS
Status: DISCONTINUED | OUTPATIENT
Start: 2025-03-26 | End: 2025-03-26 | Stop reason: SDUPTHER

## 2025-03-26 RX ORDER — ACETAMINOPHEN 325 MG/1
650 TABLET ORAL
Status: DISCONTINUED | OUTPATIENT
Start: 2025-03-26 | End: 2025-03-26 | Stop reason: HOSPADM

## 2025-03-26 RX ORDER — BUPIVACAINE HYDROCHLORIDE AND EPINEPHRINE 5; 5 MG/ML; UG/ML
INJECTION, SOLUTION EPIDURAL; INTRACAUDAL; PERINEURAL PRN
Status: DISCONTINUED | OUTPATIENT
Start: 2025-03-26 | End: 2025-03-26 | Stop reason: ALTCHOICE

## 2025-03-26 RX ORDER — SODIUM CHLORIDE, SODIUM LACTATE, POTASSIUM CHLORIDE, CALCIUM CHLORIDE 600; 310; 30; 20 MG/100ML; MG/100ML; MG/100ML; MG/100ML
INJECTION, SOLUTION INTRAVENOUS CONTINUOUS
Status: DISCONTINUED | OUTPATIENT
Start: 2025-03-26 | End: 2025-03-26 | Stop reason: HOSPADM

## 2025-03-26 RX ORDER — FENTANYL CITRATE 0.05 MG/ML
25 INJECTION, SOLUTION INTRAMUSCULAR; INTRAVENOUS EVERY 5 MIN PRN
Status: DISCONTINUED | OUTPATIENT
Start: 2025-03-26 | End: 2025-03-26 | Stop reason: HOSPADM

## 2025-03-26 RX ORDER — ONDANSETRON 2 MG/ML
4 INJECTION INTRAMUSCULAR; INTRAVENOUS
Status: DISCONTINUED | OUTPATIENT
Start: 2025-03-26 | End: 2025-03-26 | Stop reason: HOSPADM

## 2025-03-26 RX ORDER — LIDOCAINE HYDROCHLORIDE 20 MG/ML
INJECTION, SOLUTION EPIDURAL; INFILTRATION; INTRACAUDAL; PERINEURAL
Status: DISCONTINUED | OUTPATIENT
Start: 2025-03-26 | End: 2025-03-26 | Stop reason: SDUPTHER

## 2025-03-26 RX ORDER — ROCURONIUM BROMIDE 10 MG/ML
INJECTION, SOLUTION INTRAVENOUS
Status: DISCONTINUED | OUTPATIENT
Start: 2025-03-26 | End: 2025-03-26 | Stop reason: SDUPTHER

## 2025-03-26 RX ORDER — DIPHENHYDRAMINE HYDROCHLORIDE 50 MG/ML
12.5 INJECTION, SOLUTION INTRAMUSCULAR; INTRAVENOUS
Status: DISCONTINUED | OUTPATIENT
Start: 2025-03-26 | End: 2025-03-26 | Stop reason: HOSPADM

## 2025-03-26 RX ORDER — DEXTROSE MONOHYDRATE 100 MG/ML
INJECTION, SOLUTION INTRAVENOUS CONTINUOUS PRN
Status: DISCONTINUED | OUTPATIENT
Start: 2025-03-26 | End: 2025-04-03 | Stop reason: HOSPADM

## 2025-03-26 RX ORDER — LABETALOL HYDROCHLORIDE 5 MG/ML
10 INJECTION, SOLUTION INTRAVENOUS
Status: DISCONTINUED | OUTPATIENT
Start: 2025-03-26 | End: 2025-03-26 | Stop reason: HOSPADM

## 2025-03-26 RX ORDER — SODIUM CHLORIDE 0.9 % (FLUSH) 0.9 %
5-40 SYRINGE (ML) INJECTION EVERY 12 HOURS SCHEDULED
Status: DISCONTINUED | OUTPATIENT
Start: 2025-03-26 | End: 2025-03-26 | Stop reason: HOSPADM

## 2025-03-26 RX ORDER — TRANEXAMIC ACID 100 MG/ML
INJECTION, SOLUTION INTRAVENOUS
Status: DISCONTINUED | OUTPATIENT
Start: 2025-03-26 | End: 2025-03-26 | Stop reason: SDUPTHER

## 2025-03-26 RX ORDER — NALOXONE HYDROCHLORIDE 0.4 MG/ML
INJECTION, SOLUTION INTRAMUSCULAR; INTRAVENOUS; SUBCUTANEOUS PRN
Status: DISCONTINUED | OUTPATIENT
Start: 2025-03-26 | End: 2025-03-26 | Stop reason: HOSPADM

## 2025-03-26 RX ORDER — METOPROLOL TARTRATE 50 MG
50 TABLET ORAL 2 TIMES DAILY
Status: DISCONTINUED | OUTPATIENT
Start: 2025-03-26 | End: 2025-04-03 | Stop reason: HOSPADM

## 2025-03-26 RX ORDER — ISOSORBIDE MONONITRATE 60 MG/1
120 TABLET, EXTENDED RELEASE ORAL DAILY
Status: DISCONTINUED | OUTPATIENT
Start: 2025-03-27 | End: 2025-04-03 | Stop reason: HOSPADM

## 2025-03-26 RX ORDER — CLOPIDOGREL BISULFATE 75 MG/1
75 TABLET ORAL DAILY
Status: DISCONTINUED | OUTPATIENT
Start: 2025-03-26 | End: 2025-03-30

## 2025-03-26 RX ORDER — ESMOLOL HYDROCHLORIDE 10 MG/ML
INJECTION INTRAVENOUS
Status: DISCONTINUED | OUTPATIENT
Start: 2025-03-26 | End: 2025-03-26 | Stop reason: SDUPTHER

## 2025-03-26 RX ORDER — CEFAZOLIN SODIUM 1 G/3ML
INJECTION, POWDER, FOR SOLUTION INTRAMUSCULAR; INTRAVENOUS
Status: DISCONTINUED | OUTPATIENT
Start: 2025-03-26 | End: 2025-03-26 | Stop reason: SDUPTHER

## 2025-03-26 RX ORDER — METOCLOPRAMIDE HYDROCHLORIDE 5 MG/ML
10 INJECTION INTRAMUSCULAR; INTRAVENOUS
Status: DISCONTINUED | OUTPATIENT
Start: 2025-03-26 | End: 2025-03-26 | Stop reason: HOSPADM

## 2025-03-26 RX ORDER — HYDRALAZINE HYDROCHLORIDE 20 MG/ML
10 INJECTION INTRAMUSCULAR; INTRAVENOUS
Status: DISCONTINUED | OUTPATIENT
Start: 2025-03-26 | End: 2025-03-26 | Stop reason: HOSPADM

## 2025-03-26 RX ORDER — FAMOTIDINE 20 MG/1
20 TABLET, FILM COATED ORAL 2 TIMES DAILY
Status: DISCONTINUED | OUTPATIENT
Start: 2025-03-26 | End: 2025-03-28

## 2025-03-26 RX ORDER — HYDROCODONE BITARTRATE AND ACETAMINOPHEN 5; 325 MG/1; MG/1
1 TABLET ORAL EVERY 6 HOURS PRN
Refills: 0 | Status: DISCONTINUED | OUTPATIENT
Start: 2025-03-26 | End: 2025-04-03 | Stop reason: HOSPADM

## 2025-03-26 RX ORDER — QUETIAPINE FUMARATE 50 MG/1
25 TABLET, FILM COATED ORAL NIGHTLY
Status: DISCONTINUED | OUTPATIENT
Start: 2025-03-26 | End: 2025-04-01

## 2025-03-26 RX ORDER — PROPOFOL 10 MG/ML
INJECTION, EMULSION INTRAVENOUS
Status: DISCONTINUED | OUTPATIENT
Start: 2025-03-26 | End: 2025-03-26 | Stop reason: SDUPTHER

## 2025-03-26 RX ORDER — METOPROLOL TARTRATE 1 MG/ML
5 INJECTION, SOLUTION INTRAVENOUS EVERY 6 HOURS PRN
Status: DISCONTINUED | OUTPATIENT
Start: 2025-03-26 | End: 2025-04-03 | Stop reason: HOSPADM

## 2025-03-26 RX ORDER — ASPIRIN 81 MG/1
81 TABLET, CHEWABLE ORAL DAILY
Status: DISCONTINUED | OUTPATIENT
Start: 2025-03-26 | End: 2025-04-03 | Stop reason: HOSPADM

## 2025-03-26 RX ORDER — DEXAMETHASONE SODIUM PHOSPHATE 4 MG/ML
INJECTION, SOLUTION INTRA-ARTICULAR; INTRALESIONAL; INTRAMUSCULAR; INTRAVENOUS; SOFT TISSUE
Status: DISCONTINUED | OUTPATIENT
Start: 2025-03-26 | End: 2025-03-26 | Stop reason: SDUPTHER

## 2025-03-26 RX ORDER — MORPHINE SULFATE 2 MG/ML
2 INJECTION, SOLUTION INTRAMUSCULAR; INTRAVENOUS EVERY 4 HOURS PRN
Refills: 0 | Status: DISCONTINUED | OUTPATIENT
Start: 2025-03-26 | End: 2025-04-01

## 2025-03-26 RX ORDER — MIDODRINE HYDROCHLORIDE 2.5 MG/1
2.5 TABLET ORAL 3 TIMES DAILY PRN
Status: DISCONTINUED | OUTPATIENT
Start: 2025-03-26 | End: 2025-04-03 | Stop reason: HOSPADM

## 2025-03-26 RX ORDER — MEPERIDINE HYDROCHLORIDE 25 MG/ML
12.5 INJECTION INTRAMUSCULAR; INTRAVENOUS; SUBCUTANEOUS EVERY 5 MIN PRN
Status: DISCONTINUED | OUTPATIENT
Start: 2025-03-26 | End: 2025-03-26 | Stop reason: HOSPADM

## 2025-03-26 RX ADMIN — WATER 1000 MG: 1 INJECTION INTRAMUSCULAR; INTRAVENOUS; SUBCUTANEOUS at 01:50

## 2025-03-26 RX ADMIN — PROPOFOL 80 MG: 10 INJECTION, EMULSION INTRAVENOUS at 15:21

## 2025-03-26 RX ADMIN — IOPAMIDOL 100 ML: 755 INJECTION, SOLUTION INTRAVENOUS at 00:16

## 2025-03-26 RX ADMIN — EPHEDRINE SULFATE 15 MG: 5 INJECTION INTRAVENOUS at 16:31

## 2025-03-26 RX ADMIN — ESMOLOL HYDROCHLORIDE 20 MG: 10 INJECTION INTRAVENOUS at 16:35

## 2025-03-26 RX ADMIN — SODIUM CHLORIDE 100 ML: 9 INJECTION, SOLUTION INTRAVENOUS at 00:36

## 2025-03-26 RX ADMIN — FENTANYL CITRATE 75 MCG: 50 INJECTION INTRAMUSCULAR; INTRAVENOUS at 15:19

## 2025-03-26 RX ADMIN — METOPROLOL TARTRATE 5 MG: 1 INJECTION, SOLUTION INTRAVENOUS at 21:53

## 2025-03-26 RX ADMIN — ONDANSETRON 4 MG: 2 INJECTION, SOLUTION INTRAMUSCULAR; INTRAVENOUS at 15:30

## 2025-03-26 RX ADMIN — SUGAMMADEX 200 MG: 100 INJECTION, SOLUTION INTRAVENOUS at 16:44

## 2025-03-26 RX ADMIN — CEFAZOLIN 1 G: 1 INJECTION, POWDER, FOR SOLUTION INTRAMUSCULAR; INTRAVENOUS at 15:34

## 2025-03-26 RX ADMIN — MORPHINE SULFATE 2 MG: 2 INJECTION, SOLUTION INTRAMUSCULAR; INTRAVENOUS at 21:16

## 2025-03-26 RX ADMIN — LIDOCAINE HYDROCHLORIDE 60 MG: 20 INJECTION, SOLUTION EPIDURAL; INFILTRATION; INTRACAUDAL; PERINEURAL at 15:21

## 2025-03-26 RX ADMIN — SODIUM CHLORIDE, PRESERVATIVE FREE 10 ML: 5 INJECTION INTRAVENOUS at 00:16

## 2025-03-26 RX ADMIN — FENTANYL CITRATE 25 MCG: 50 INJECTION INTRAMUSCULAR; INTRAVENOUS at 16:01

## 2025-03-26 RX ADMIN — INSULIN LISPRO 4 UNITS: 100 INJECTION, SOLUTION INTRAVENOUS; SUBCUTANEOUS at 20:38

## 2025-03-26 RX ADMIN — SODIUM CHLORIDE, POTASSIUM CHLORIDE, SODIUM LACTATE AND CALCIUM CHLORIDE: 600; 310; 30; 20 INJECTION, SOLUTION INTRAVENOUS at 14:54

## 2025-03-26 RX ADMIN — ROCURONIUM BROMIDE 50 MG: 10 INJECTION, SOLUTION INTRAVENOUS at 15:22

## 2025-03-26 RX ADMIN — DEXAMETHASONE SODIUM PHOSPHATE 4 MG: 4 INJECTION INTRA-ARTICULAR; INTRALESIONAL; INTRAMUSCULAR; INTRAVENOUS; SOFT TISSUE at 15:30

## 2025-03-26 RX ADMIN — EPHEDRINE SULFATE 10 MG: 5 INJECTION INTRAVENOUS at 16:27

## 2025-03-26 RX ADMIN — TRANEXAMIC ACID 1 MG: 100 INJECTION, SOLUTION INTRAVENOUS at 15:40

## 2025-03-26 ASSESSMENT — PAIN - FUNCTIONAL ASSESSMENT
PAIN_FUNCTIONAL_ASSESSMENT: ADULT NONVERBAL PAIN SCALE (NPVS)
PAIN_FUNCTIONAL_ASSESSMENT: CRITICAL CARE PAIN OBSERVATION TOOL (CPOT)

## 2025-03-26 ASSESSMENT — HEART SCORE: ECG: NORMAL

## 2025-03-26 ASSESSMENT — PAIN SCALES - PAIN ASSESSMENT IN ADVANCED DEMENTIA (PAINAD)
NEGVOCALIZATION: OCCASIONAL MOAN/GROAN, LOW SPEECH, NEGATIVE/DISAPPROVING QUALITY
BODYLANGUAGE: TENSE, DISTRESSED PACING, FIDGETING

## 2025-03-26 ASSESSMENT — ENCOUNTER SYMPTOMS: SHORTNESS OF BREATH: 1

## 2025-03-26 NOTE — ED NOTES
Report given to DEDRA Wood from [facility].   Report method by phone   The following was reviewed with receiving RN:   Current vital signs:  BP (!) 156/77   Pulse 71   Temp 97.8 °F (36.6 °C) (Oral)   Resp 17   SpO2 100%                      Any medication or safety alerts were reviewed. Any pending diagnostics and notifications were also reviewed, as well as any safety concerns or issues, abnormal labs, abnormal imaging, and abnormal assessment findings. Questions were answered.

## 2025-03-26 NOTE — ANESTHESIA PRE PROCEDURE
Department of Anesthesiology  Preprocedure Note       Name:  Francia Ferguson   Age:  79 y.o.  :  1945                                          MRN:  [id number]         Date:  3/26/2025      Surgeon: Surgeon(s):  Quin Ya MD    Procedure: Procedure(s):  HIP IM NAIL RIGHT    Medications prior to admission:   Prior to Admission medications    Medication Sig Start Date End Date Taking? Authorizing Provider   atorvastatin (LIPITOR) 40 MG tablet TAKE 1 TABLET BY MOUTH DAILY 3/20/25  Yes Abraham Fraire DO   divalproex (DEPAKOTE SPRINKLE) 125 MG DR capsule Take 2 capsules by mouth 2 times daily   Yes Devendra Portillo MD   metoprolol tartrate (LOPRESSOR) 50 MG tablet Take 1 tablet by mouth 2 times daily 25  Yes Maik See DO   midodrine (PROAMATINE) 2.5 MG tablet Take 1 tablet by mouth 3 times daily as needed (sbp < 100) 25  Yes Maik See DO   famotidine (PEPCID) 20 MG tablet Take 1 tablet by mouth 2 times daily 25  Yes Steven Orourke MD   isosorbide mononitrate (IMDUR) 120 MG extended release tablet TAKE 1 TABLET BY MOUTH DAILY 24  Yes Abraham Fraire DO   clopidogrel (PLAVIX) 75 MG tablet Take 1 tablet by mouth daily 24  Yes Abraham Fraire DO   Magnesium 300 MG CAPS Take 2 capsules by mouth at bedtime   Yes Devendra Portillo MD   Multiple Vitamins TABS Take 1 tablet by mouth daily   Yes Tonia Ochoa MD   insulin glargine (LANTUS) 100 UNIT/ML injection vial Inject 5 Units into the skin daily 3/18/25   Kiera Singleton MD   insulin lispro (HUMALOG,ADMELOG) 100 UNIT/ML SOLN injection vial Inject 0-4 Units into the skin 4 times daily (before meals and nightly) 25   Maik See DO   QUEtiapine (SEROQUEL) 25 MG tablet Take 1 tablet by mouth nightly  Patient not taking: Reported on 3/17/2025 2/18/25   Maik See DO   potassium chloride (KLOR-CON M) 20 MEQ extended release tablet Take 1 tablet by mouth 1 time for 1 dose  Patient taking

## 2025-03-26 NOTE — PROGRESS NOTES
Patient arrived to room 2068 via stretcher from ED, patient has history of dementia unable to answer any admission question. Bed in lowest position, bed alarm on, VSS.

## 2025-03-26 NOTE — PLAN OF CARE
Problem: Safety - Adult  Goal: Free from fall injury  Outcome: Progressing  Flowsheets (Taken 3/26/2025 0916)  Free From Fall Injury: Instruct family/caregiver on patient safety     Problem: Chronic Conditions and Co-morbidities  Goal: Patient's chronic conditions and co-morbidity symptoms are monitored and maintained or improved  Outcome: Progressing  Flowsheets (Taken 3/26/2025 0924)  Care Plan - Patient's Chronic Conditions and Co-Morbidity Symptoms are Monitored and Maintained or Improved: Monitor and assess patient's chronic conditions and comorbid symptoms for stability, deterioration, or improvement     Problem: Discharge Planning  Goal: Discharge to home or other facility with appropriate resources  Outcome: Progressing  Flowsheets (Taken 3/26/2025 0924)  Discharge to home or other facility with appropriate resources:   Identify barriers to discharge with patient and caregiver   Arrange for needed discharge resources and transportation as appropriate   Identify discharge learning needs (meds, wound care, etc)   Refer to discharge planning if patient needs post-hospital services based on physician order or complex needs related to functional status, cognitive ability or social support system     Problem: Skin/Tissue Integrity  Goal: Skin integrity remains intact  Description: 1.  Monitor for areas of redness and/or skin breakdown  2.  Assess vascular access sites hourly  3.  Every 4-6 hours minimum:  Change oxygen saturation probe site  4.  Every 4-6 hours:  If on nasal continuous positive airway pressure, respiratory therapy assess nares and determine need for appliance change or resting period  Outcome: Progressing  Flowsheets  Taken 3/26/2025 0924  Skin Integrity Remains Intact: Monitor for areas of redness and/or skin breakdown  Taken 3/26/2025 0916  Skin Integrity Remains Intact: Monitor for areas of redness and/or skin breakdown     Problem: ABCDS Injury Assessment  Goal: Absence of physical

## 2025-03-26 NOTE — CONSULTS
IN-PATIENT SERVICE  [facility] Internal Medicine  [facility] Internal Medicine   Jovan Calvillo MD; New Sloan MD; Kel Navarro MD; Rei Rivas MD,   Jeaneth Henao MD; Kathryn Gonzalez MD; Ashley Yang MD; Kiera Singleton MD    CONSULTATION / HISTORY AND PHYSICAL EXAMINATION            Date:   3/26/2025  Patient name:  Francia Ferguson  Date of admission:  3/25/2025 10:40 PM  MRN:   [id number]  Account:  [id number]  YOB: 1945  PCP:    Jack Boone DO  Room:   2068/2068-01  Code Status:    Full Code    Physician Requesting Consult: Quin Ya MD    Reason for Consult:  Medical management    Chief Complaint:     Chief Complaint   Patient presents with    Fall        History Obtained From:     Patient, EMR, nursing staff     History of Present Illness:     79-year-old female presented to the emergency department after she was found down in her room.  She lives in a memory care unit.  He recently discharged from the facility for hypoglycemia.  Noted to have acute right intertrochanteric fracture and subacute S4 fracture    Past Medical History:     Past Medical History:   Diagnosis Date    Abnormal cardiovascular stress test 06/2020    no ischemia / infarction   there is septal hypokinesis   /  EF 70%    CAD (coronary artery disease)     Chest pressure     Fatigue     History of pneumonia 03/2020    HTN (hypertension)     Hyperlipidemia     Pneumonia     Positive cardiac stress test     Renal insufficiency 8/18/2022    SOB (shortness of breath)     Type 2 diabetes mellitus without complication (HCC)         Past Surgical History:     Past Surgical History:   Procedure Laterality Date    APPENDECTOMY      CARDIAC CATHETERIZATION  03/09/2021    CHOLECYSTECTOMY      COLONOSCOPY      CORONARY ANGIOPLASTY WITH STENT PLACEMENT  08/14/2020    High grade stenosis in Proximal RCA s/p successful PCI with DELIA (3.25 X 12 mm)    EYE SURGERY      HYSTERECTOMY (CERVIX STATUS

## 2025-03-26 NOTE — ED NOTES
Writer unable to complete a full head-to-toe assessment on patient d/t patient being unable to respond appropriately to questions. Patient cannot localize one area of pain at this time, however, does favor her right side. Patient does have a history of alzheimer's, so this is baseline for her per her son.

## 2025-03-26 NOTE — H&P
ORTHOPAEDIC H and P      HPI / Chief Complaint  Francia Ferguson is a 79 y.o. old female who presented to the emergency department on 3/25/2025 after she was found down in her room.  She resides in the memory care unit at [facility].  Due to her advanced dementia history was obtained from her son.  He indicates that over the past several months she has been mostly wheelchair-bound sporadically using a walker.  He states that she must of tried to get up and out of bed on her own last night resulting in the fall.  On presentation in emergency department she was diagnosed with an acute right intertrochanteric femur fracture and subacute S4 fracture.    Past Medical History  Francia  has a past medical history of Abnormal cardiovascular stress test, CAD (coronary artery disease), Chest pressure, Fatigue, History of pneumonia, HTN (hypertension), Hyperlipidemia, Pneumonia, Positive cardiac stress test, Renal insufficiency, SOB (shortness of breath), and Type 2 diabetes mellitus without complication (HCC).    Past Surgical History  Francia  has a past surgical history that includes Cholecystectomy; Colonoscopy; Hysterectomy, vaginal; Coronary angioplasty with stent (08/14/2020); Hysterectomy; Appendectomy; Cardiac catheterization (03/09/2021); and eye surgery.    Current Medications  Reviewed. See EMR for details.    Allergies  Allergies have been reviewed.  Francia has no known allergies.    Social History  Francia  reports that she has never smoked. She has been exposed to tobacco smoke. She has never used smokeless tobacco. She reports that she does not drink alcohol and does not use drugs.    Family History  Fracnia's family history includes Diabetes in her father, maternal aunt, maternal grandmother, maternal uncle, and mother; Esophageal Cancer in her brother; Heart Disease in her brother and mother.      Review of Systems   History obtained from the patient.   Constitution: no fever or chills  Musculoskeletal: As

## 2025-03-26 NOTE — CARE COORDINATION
Case Management Assessment  Initial Evaluation    Date/Time of Evaluation: 3/26/2025 3:05 PM  Assessment Completed by: Annie Fernandes RN    If patient is discharged prior to next notation, then this note serves as note for discharge by case management.    Patient Name: Francia Ferguson                   YOB: 1945  Diagnosis: Acute cystitis without hematuria [N30.00]  Fall, initial encounter [W19.XXXA]  Fall at nursing home, initial encounter [W19.XXXA, Y92.129]  Closed fracture of right femur, unspecified fracture morphology, unspecified portion of femur, initial encounter [S72.91XA]  Closed fracture of sacrum, unspecified portion of sacrum, initial encounter ([facility]) [S32.10XA]                   Date / Time: 3/25/2025 10:40 PM    Patient Admission Status: Inpatient   Readmission Risk (Low < 19, Mod (19-27), High > 27): Readmission Risk Score: 21.9    Current PCP: Jack Boone, DO  PCP verified by CM? Yes    Chart Reviewed: Yes      History Provided by: Child/Family (sonAshok)  Patient Orientation: Alert and Oriented, Person    Patient Cognition: Alert    Hospitalization in the last 30 days (Readmission):  Yes    If yes, Readmission Assessment in CM Navigator will be completed.    Readmission Assessment  Number of Days since last admission?: 1-7 days  Previous Disposition: Long Term Care  Who is being Interviewed: Caregiver (Ashok avalos)  Did you visit your Primary Care Physician after you left the hospital, before you returned this time?: No  Why weren't you able to visit your PCP?: Did not have an appointment  Did you see a specialist, such as Cardiac, Pulmonary, Orthopedic Physician, etc. after you left the hospital?: No  Who advised the patient to return to the hospital?: Other (Comment) (LTC unit)  Does the patient report anything that got in the way of taking their medications?: No  In our efforts to provide the best possible care to you and others like you, can you think of anything that we

## 2025-03-26 NOTE — OP NOTE
OPERATIVE REPORT    Date of Surgery: 3/26/2025     Pre-operative Diagnosis: Right femur intertrochanteric fracture (S72.141)    Post-operative Diagnosis: Right femur intertrochanteric fracture (S72.141)    Procedure: Right femur intertrochanteric fracture surgical stabilization with intramedullary nail (20775)    Surgeon(s): Quin Ya MD    Anesthesia: General    Fluids: See anesthesia record    Urine output: See anesthesia record    Estimated blood loss: 50 mL    Findings: See note below    Specimen: None    Tourniquet time: Not applicable    Implants:   Implant Name Type Inv. Item Serial No.  Lot No. LRB No. Used Action   NAIL IM L170MM [id number] 130DEG SHT FEM GRN TI [employer] NEREIDA - [id number]  NAIL IM L170MM [id number] 130DEG SHT FEM GRN TI PITO NEREIDA  DEPUY [employer]-WD [id number] Right 1 Implanted   IMPL BLADE HELICAL TFNA FEN 85MM NONST - [id number] Screw/Plate/Nail/[employer]son IMPL BLADE HELICAL TFNA FEN 85MM NONST  SYNTHES-PMM  Right 1 Implanted   SCREW BNE L36MM DIA5MM NONSTERILE TIB LT GRN TI ST [employer] PRATIBHA - [id number]  SCREW BNE L36MM DIA5MM NONSTERILE TIB LT GRN TI ST PITO PRATIBHA  DEPUY SYNTH[employer]-WD  Right 1 Implanted       Surgical Indications:  Francia Ferguson is a 79 y.o. old female who presented with a closed right femur intertrochanteric fracture. Following a discussion with the patient regarding both non-operative and operative treatment options, she consented to proceed with surgical stabilization of his fracture using an intramedullary nail. she came to this decision after demonstrating an understanding of our discussion regarding details of the procedure, risks and benefits, expected outcome, and postoperative course.    Operative technique:     Following appropriate identification of the patient and her operative extremity, consent was reviewed with the patient and her operative extremity was signed. she was wheeled to the operating room where she finished a course of pre-operative antibiotic

## 2025-03-26 NOTE — PROGRESS NOTES
Pt had a no blood transfusion on her chart. Son, Ashok, her HCPOA, stated that pt can have a blood transfusion if needed. Information changed on chart to indicate acceptance of blood transfusion.

## 2025-03-26 NOTE — ED PROVIDER NOTES
EMERGENCY DEPARTMENT ENCOUNTER    Pt Name: Francia Ferguson  MRN: [id number]  Birthdate 1945  Date of evaluation: 3/25/25  CHIEF COMPLAINT       Chief Complaint   Patient presents with    Fall     HISTORY OF PRESENT ILLNESS   79-year-old female presenting to the ER after being found on the floor.  Patient does have an underlying history of dementia and is coming from a memory care unit.  The patient is a full code per EMS.  EMS did not note any obvious injuries.    The history is provided by the EMS personnel.   Fall            REVIEW OF SYSTEMS     Review of Systems   Unable to perform ROS: Dementia     PASTMEDICAL HISTORY     Past Medical History:   Diagnosis Date    Abnormal cardiovascular stress test 06/2020    no ischemia / infarction   there is septal hypokinesis   /  EF 70%    CAD (coronary artery disease)     Chest pressure     Fatigue     History of pneumonia 03/2020    HTN (hypertension)     Hyperlipidemia     Pneumonia     Positive cardiac stress test     Renal insufficiency 8/18/2022    SOB (shortness of breath)     Type 2 diabetes mellitus without complication (HCC)      Past Problem List  Patient Active Problem List   Diagnosis Code    Diabetic retinopathy (HCC) E11.319    Hyperlipidemia E78.5    Osteoporosis M81.0    Type 2 diabetes mellitus without complication, with long-term current use of insulin (HCC) E11.9, Z79.4    Allergic rhinitis J30.9    Arteriosclerosis of coronary artery I25.10    Vitamin D deficiency E55.9    Polyneuropathy G62.9    Elevated liver enzymes R74.8    Primary hypertension I10    Type 2 diabetes mellitus with diabetic neuropathy E11.40    Chronic kidney disease N18.9    Diabetic ketoacidosis with coma associated with type 2 diabetes mellitus (HCC) E11.11    Coffee ground emesis K92.0    Anemia D64.9    Altered mental status R41.82    Acute respiratory failure (HCC) J96.00    Atrial fibrillation (HCC) I48.91    Diabetic ketoacidosis without coma associated with type 2 diabetes      DISPOSITION/PLAN   DISPOSITION Admitted 03/26/2025 02:23:53 AM   DISPOSITION CONDITION Stable           OUTPATIENT FOLLOW UP THE PATIENT:  No follow-up provider specified.    DO Shila Marc Sami, DO  03/26/25 0224

## 2025-03-27 LAB
ANION GAP SERPL CALCULATED.3IONS-SCNC: 12 MMOL/L (ref 9–16)
BASOPHILS # BLD: 0 K/UL (ref 0–0.2)
BASOPHILS NFR BLD: 0 % (ref 0–2)
BUN SERPL-MCNC: 34 MG/DL (ref 8–23)
CALCIUM SERPL-MCNC: 8.6 MG/DL (ref 8.6–10.4)
CHLORIDE SERPL-SCNC: 105 MMOL/L (ref 98–107)
CO2 SERPL-SCNC: 25 MMOL/L (ref 20–31)
CREAT SERPL-MCNC: 1.3 MG/DL (ref 0.7–1.2)
EKG ATRIAL RATE: 63 BPM
EKG P AXIS: 82 DEGREES
EKG P-R INTERVAL: 174 MS
EKG Q-T INTERVAL: 394 MS
EKG QRS DURATION: 70 MS
EKG QTC CALCULATION (BAZETT): 403 MS
EKG R AXIS: 77 DEGREES
EKG T AXIS: 99 DEGREES
EKG VENTRICULAR RATE: 63 BPM
EOSINOPHIL # BLD: 0 K/UL (ref 0–0.4)
EOSINOPHILS RELATIVE PERCENT: 0 % (ref 0–4)
ERYTHROCYTE [DISTWIDTH] IN BLOOD BY AUTOMATED COUNT: 15.7 % (ref 11.5–14.9)
FERRITIN SERPL-MCNC: 113 NG/ML
FOLATE SERPL-MCNC: 20.9 NG/ML (ref 4.8–24.2)
GFR, ESTIMATED: 42 ML/MIN/1.73M2
GLUCOSE BLD-MCNC: 256 MG/DL (ref 65–105)
GLUCOSE BLD-MCNC: 281 MG/DL (ref 65–105)
GLUCOSE BLD-MCNC: 307 MG/DL (ref 65–105)
GLUCOSE BLD-MCNC: 323 MG/DL (ref 65–105)
GLUCOSE BLD-MCNC: 363 MG/DL (ref 65–105)
GLUCOSE SERPL-MCNC: 229 MG/DL (ref 74–99)
HCT VFR BLD AUTO: 21.8 % (ref 36–46)
HCT VFR BLD AUTO: 23.2 % (ref 36–46)
HGB BLD-MCNC: 7.1 G/DL (ref 12–16)
HGB BLD-MCNC: 7.7 G/DL (ref 12–16)
IRON SATN MFR SERPL: 4 % (ref 20–55)
IRON SERPL-MCNC: 11 UG/DL (ref 37–145)
LYMPHOCYTES NFR BLD: 0.4 K/UL (ref 1–4.8)
LYMPHOCYTES RELATIVE PERCENT: 7 % (ref 24–44)
MCH RBC QN AUTO: 30.7 PG (ref 26–34)
MCHC RBC AUTO-ENTMCNC: 33 G/DL (ref 31–37)
MCV RBC AUTO: 92.9 FL (ref 80–100)
MONOCYTES NFR BLD: 0.6 K/UL (ref 0.1–1.3)
MONOCYTES NFR BLD: 10 % (ref 1–7)
NEUTROPHILS NFR BLD: 83 % (ref 36–66)
NEUTS SEG NFR BLD: 4.8 K/UL (ref 1.3–9.1)
PLATELET # BLD AUTO: 188 K/UL (ref 150–450)
PMV BLD AUTO: 7.1 FL (ref 6–12)
POTASSIUM SERPL-SCNC: 4.3 MMOL/L (ref 3.7–5.3)
RBC # BLD AUTO: 2.49 M/UL (ref 4–5.2)
SODIUM SERPL-SCNC: 142 MMOL/L (ref 136–145)
TIBC SERPL-MCNC: 255 UG/DL (ref 250–450)
UNSATURATED IRON BINDING CAPACITY: 244 UG/DL (ref 112–347)
VIT B12 SERPL-MCNC: 1323 PG/ML (ref 232–1245)
WBC OTHER # BLD: 5.9 K/UL (ref 3.5–11)

## 2025-03-27 PROCEDURE — 85014 HEMATOCRIT: CPT

## 2025-03-27 PROCEDURE — 2580000003 HC RX 258

## 2025-03-27 PROCEDURE — 83550 IRON BINDING TEST: CPT

## 2025-03-27 PROCEDURE — 86901 BLOOD TYPING SEROLOGIC RH(D): CPT

## 2025-03-27 PROCEDURE — 85018 HEMOGLOBIN: CPT

## 2025-03-27 PROCEDURE — 6360000002 HC RX W HCPCS: Performed by: ORTHOPAEDIC SURGERY

## 2025-03-27 PROCEDURE — 97166 OT EVAL MOD COMPLEX 45 MIN: CPT

## 2025-03-27 PROCEDURE — 6360000002 HC RX W HCPCS

## 2025-03-27 PROCEDURE — 2500000003 HC RX 250 WO HCPCS: Performed by: ORTHOPAEDIC SURGERY

## 2025-03-27 PROCEDURE — 82728 ASSAY OF FERRITIN: CPT

## 2025-03-27 PROCEDURE — 82746 ASSAY OF FOLIC ACID SERUM: CPT

## 2025-03-27 PROCEDURE — 86920 COMPATIBILITY TEST SPIN: CPT

## 2025-03-27 PROCEDURE — 2580000003 HC RX 258: Performed by: INTERNAL MEDICINE

## 2025-03-27 PROCEDURE — 85025 COMPLETE CBC W/AUTO DIFF WBC: CPT

## 2025-03-27 PROCEDURE — 36415 COLL VENOUS BLD VENIPUNCTURE: CPT

## 2025-03-27 PROCEDURE — 6370000000 HC RX 637 (ALT 250 FOR IP): Performed by: ORTHOPAEDIC SURGERY

## 2025-03-27 PROCEDURE — G0545 PR INHERENT VISIT TO INPT: HCPCS | Performed by: INTERNAL MEDICINE

## 2025-03-27 PROCEDURE — 6360000002 HC RX W HCPCS: Performed by: INTERNAL MEDICINE

## 2025-03-27 PROCEDURE — 6370000000 HC RX 637 (ALT 250 FOR IP): Performed by: NURSE PRACTITIONER

## 2025-03-27 PROCEDURE — 97535 SELF CARE MNGMENT TRAINING: CPT

## 2025-03-27 PROCEDURE — 6370000000 HC RX 637 (ALT 250 FOR IP)

## 2025-03-27 PROCEDURE — 1200000000 HC SEMI PRIVATE

## 2025-03-27 PROCEDURE — 82947 ASSAY GLUCOSE BLOOD QUANT: CPT

## 2025-03-27 PROCEDURE — 93005 ELECTROCARDIOGRAM TRACING: CPT

## 2025-03-27 PROCEDURE — 51798 US URINE CAPACITY MEASURE: CPT

## 2025-03-27 PROCEDURE — 99222 1ST HOSP IP/OBS MODERATE 55: CPT | Performed by: INTERNAL MEDICINE

## 2025-03-27 PROCEDURE — 82607 VITAMIN B-12: CPT

## 2025-03-27 PROCEDURE — 83540 ASSAY OF IRON: CPT

## 2025-03-27 PROCEDURE — 99233 SBSQ HOSP IP/OBS HIGH 50: CPT

## 2025-03-27 PROCEDURE — 97530 THERAPEUTIC ACTIVITIES: CPT

## 2025-03-27 PROCEDURE — 2500000003 HC RX 250 WO HCPCS: Performed by: NURSE PRACTITIONER

## 2025-03-27 PROCEDURE — 86850 RBC ANTIBODY SCREEN: CPT

## 2025-03-27 PROCEDURE — 97161 PT EVAL LOW COMPLEX 20 MIN: CPT

## 2025-03-27 PROCEDURE — 86900 BLOOD TYPING SEROLOGIC ABO: CPT

## 2025-03-27 PROCEDURE — 93010 ELECTROCARDIOGRAM REPORT: CPT | Performed by: INTERNAL MEDICINE

## 2025-03-27 PROCEDURE — 80048 BASIC METABOLIC PNL TOTAL CA: CPT

## 2025-03-27 RX ORDER — INSULIN LISPRO 100 [IU]/ML
4 INJECTION, SOLUTION INTRAVENOUS; SUBCUTANEOUS ONCE
Status: COMPLETED | OUTPATIENT
Start: 2025-03-27 | End: 2025-03-27

## 2025-03-27 RX ORDER — FLUCONAZOLE 100 MG/1
200 TABLET ORAL DAILY
Status: DISCONTINUED | OUTPATIENT
Start: 2025-03-27 | End: 2025-03-27

## 2025-03-27 RX ORDER — SODIUM CHLORIDE 9 MG/ML
INJECTION, SOLUTION INTRAVENOUS CONTINUOUS
Status: DISCONTINUED | OUTPATIENT
Start: 2025-03-27 | End: 2025-03-30

## 2025-03-27 RX ADMIN — MORPHINE SULFATE 2 MG: 2 INJECTION, SOLUTION INTRAMUSCULAR; INTRAVENOUS at 04:15

## 2025-03-27 RX ADMIN — SODIUM CHLORIDE: 9 INJECTION, SOLUTION INTRAVENOUS at 13:47

## 2025-03-27 RX ADMIN — WATER 1000 MG: 1 INJECTION INTRAMUSCULAR; INTRAVENOUS; SUBCUTANEOUS at 02:31

## 2025-03-27 RX ADMIN — PIPERACILLIN AND TAZOBACTAM 3375 MG: 3; .375 INJECTION, POWDER, LYOPHILIZED, FOR SOLUTION INTRAVENOUS at 21:13

## 2025-03-27 RX ADMIN — METOPROLOL TARTRATE 5 MG: 1 INJECTION, SOLUTION INTRAVENOUS at 03:54

## 2025-03-27 RX ADMIN — INSULIN LISPRO 2 UNITS: 100 INJECTION, SOLUTION INTRAVENOUS; SUBCUTANEOUS at 16:26

## 2025-03-27 RX ADMIN — INSULIN LISPRO 2 UNITS: 100 INJECTION, SOLUTION INTRAVENOUS; SUBCUTANEOUS at 10:49

## 2025-03-27 RX ADMIN — INSULIN LISPRO 3 UNITS: 100 INJECTION, SOLUTION INTRAVENOUS; SUBCUTANEOUS at 21:09

## 2025-03-27 RX ADMIN — INSULIN LISPRO 4 UNITS: 100 INJECTION, SOLUTION INTRAVENOUS; SUBCUTANEOUS at 06:39

## 2025-03-27 RX ADMIN — CEFTRIAXONE SODIUM 2000 MG: 2 INJECTION, POWDER, FOR SOLUTION INTRAMUSCULAR; INTRAVENOUS at 23:22

## 2025-03-27 RX ADMIN — PIPERACILLIN AND TAZOBACTAM 3375 MG: 3; .375 INJECTION, POWDER, LYOPHILIZED, FOR SOLUTION INTRAVENOUS at 13:48

## 2025-03-27 RX ADMIN — INSULIN LISPRO 4 UNITS: 100 INJECTION, SOLUTION INTRAVENOUS; SUBCUTANEOUS at 06:40

## 2025-03-27 ASSESSMENT — PAIN SCALES - PAIN ASSESSMENT IN ADVANCED DEMENTIA (PAINAD)
TOTALSCORE: 2
BODYLANGUAGE: TENSE, DISTRESSED PACING, FIDGETING
NEGVOCALIZATION: OCCASIONAL MOAN/GROAN, LOW SPEECH, NEGATIVE/DISAPPROVING QUALITY
FACIALEXPRESSION: SMILING OR INEXPRESSIVE
BODYLANGUAGE: RIGID, FISTS CLENCHED, KNEES UP, PUSHING/PULLING AWAY, STRIKES OUT
CONSOLABILITY: NO NEED TO CONSOLE
CONSOLABILITY: NO NEED TO CONSOLE
BREATHING: NORMAL

## 2025-03-27 NOTE — PROGRESS NOTES
Writer notified SMITH Gallegos due to patients BS being 388, coverage given and no new orders at this time.     Patient refusing oral meds, tachycardic, IV metoprolol ordered.

## 2025-03-27 NOTE — PROGRESS NOTES
Physical Therapy  [facility] [place]   Physical Therapy Evaluation  Date: 3/27/25  Patient Name: Francia Ferguson       Room:   MRN: [id number]  Account: [id number]   : 1945  (79 y.o.) Gender: female     Discharge Recommendations:  Discharge Recommendations: Long Term Care with PT     PT Equipment Recommendations  Equipment Needed: No     Past Medical History:  has a past medical history of Abnormal cardiovascular stress test, CAD (coronary artery disease), Chest pressure, Fatigue, History of pneumonia, HTN (hypertension), Hyperlipidemia, Pneumonia, Positive cardiac stress test, Renal insufficiency, SOB (shortness of breath), and Type 2 diabetes mellitus without complication (HCC).  Past Surgical History:   has a past surgical history that includes Cholecystectomy; Colonoscopy; Hysterectomy, vaginal; Coronary angioplasty with stent (2020); Hysterectomy; Appendectomy; Cardiac catheterization (2021); eye surgery; and Femur Surgery (Right, 3/26/2025).    Subjective  Subjective  Subjective: pt not verbal at this time     General  Family/Caregiver Present: No  Follows Commands: Impaired           Social/Functional History  Social/Functional History  Lives With: Other (Comment) ([facility])  Type of Home: Facility  Additional Comments: Per chart review, pt from a memory care unit. Pt unable to provide home info due to severe dementia    Restrictions  Restrictions/Precautions  Restrictions/Precautions: Weight Bearing, Fall Risk, General Precautions  Implants Present? : Metal implants (R femur IM nail)  Lower Extremity Weight Bearing Restrictions  Right Lower Extremity Weight Bearing: Weight Bearing As Tolerated     Objective    Transfers  Transfers  Comment: not safe to attempt at this time       Bed Mobility  Bed mobility  Supine to Sit: 2 Person assistance, Dependent/Total  Sit to Supine: 2 Person assistance, Dependent/Total  Scooting: Dependent/Total  Bed Mobility

## 2025-03-27 NOTE — PROGRESS NOTES
To patient's bedside per nurse request for CODE STATUS discussion with family.  Writer thoroughly explained CODE STATUS options available in the state [facility] to patient's  and son.  They verbalized understanding and request patient's CODE STATUS be changed to DNR CCA with no intubation, in keeping with her wishes expressed on her living will.  DNR form completed and order updated in epic.

## 2025-03-27 NOTE — CARE COORDINATION
DISCHARGE PLANNING NOTE:    Writer received call back from Francisca with [facility]. Pt would only receive therapy 2 times a week with home health at facility, facility does not feel this is enough therapy for this pt versus a skilled facility.     Writer spoke to pt son Jameson to discuss discharge plans. Jameson agreeable to list e-mailed to [email or web address] to discuss options with brother Ashok. FOC list sent via e-mail.     Electronically signed by CESARIO Orozco on 3/27/2025 at 4:33 PM

## 2025-03-27 NOTE — PROGRESS NOTES
IN-PATIENT SERVICE  [facility] Internal Medicine  [facility] Internal Medicine   Jovan Calvillo MD; New Sloan MD; Kel Navarro MD; Rei Rivas MD,   Jeaneth Henao MD; Kathryn Gonzalez MD; Ashley Yang MD; Kiera Singleton MD    Progress Note            Date:   3/27/2025  Patient name:  Francia Ferguson  Date of admission:  3/25/2025 10:40 PM  MRN:   [id number]  Account:  [id number]  YOB: 1945  PCP:    Jack Boone DO  Room:   2068/2068-01  Code Status:    Full Code    Physician Requesting Consult: Quin Ya MD    Reason for Consult:  Medical management    Chief Complaint:     Chief Complaint   Patient presents with    Fall        History Obtained From:     Patient, EMR, nursing staff     History of Present Illness:     79-year-old female presented to the emergency department after she was found down in her room.  She lives in a memory care unit.  He recently discharged from the facility for hypoglycemia.  Noted to have acute right intertrochanteric fracture and subacute S4 fracture    Past Medical History:     Past Medical History:   Diagnosis Date    Abnormal cardiovascular stress test 06/2020    no ischemia / infarction   there is septal hypokinesis   /  EF 70%    CAD (coronary artery disease)     Chest pressure     Fatigue     History of pneumonia 03/2020    HTN (hypertension)     Hyperlipidemia     Pneumonia     Positive cardiac stress test     Renal insufficiency 8/18/2022    SOB (shortness of breath)     Type 2 diabetes mellitus without complication (HCC)         Past Surgical History:     Past Surgical History:   Procedure Laterality Date    APPENDECTOMY      CARDIAC CATHETERIZATION  03/09/2021    CHOLECYSTECTOMY      COLONOSCOPY      CORONARY ANGIOPLASTY WITH STENT PLACEMENT  08/14/2020    High grade stenosis in Proximal RCA s/p successful PCI with DELIA (3.25 X 12 mm)    EYE SURGERY      FEMUR SURGERY Right 3/26/2025    HIP IM NAIL RIGHT performed by

## 2025-03-27 NOTE — PROGRESS NOTES
Pharmacy Note - Extended Infusion Beta-Lactam Adjustment    Piperacillin/Tazobactam 3375mg Q6h for treatment of Urinary tract infection. Per [facility] Extended Infusion Beta-Lactam Policy, piperacillin/tazobactam will be changed to 3375mg Q8h extended infusion    Estimated Creatinine Clearance: CrCl cannot be calculated (Unknown ideal weight.).    BMI: There is no height or weight on file to calculate BMI.    Please call with any questions.    Thank you,    Luisana Sloan, [facility]

## 2025-03-27 NOTE — CONSULTS
[facility]  Initial Consult Note  Date: 3/27/2025    Hospital day :1     Chief complaint/reason for consultation:   UTI    Assessment/Impression:   UTI/Enterobacter cloacae, Klebsiella oxytoca and Candida glabrata growth on urine culture  Status post fall   Encephalopathy with baseline dementia  Acute right femur fracture status post surgical stabilization with intramedullary nail 3/26/2025  Diabetes mellitus  Coronary artery disease  A-fib  Hypertension  Hyperlipidemia    Plan/Recommendations   Discontinue IV Zosyn and Diflucan  IV ceftriaxone and micafungin  Follow sensitivity and adjust antibiotics as needed  Follow CBC and renal function  Infection Control Recommendations   [place] Precautions      Antimicrobial Stewardship Recommendations   Simplification of therapy  Targeted therapy      History of Present Illness:   Francia Ferguson is a 79 y.o.-year-old female who was initially admitted on 3/25/2025.  The patient was brought to the hospital after a fall was trying to get out of the bed, was found to have acute right intertrochanteric femur fracture and subacute S4 fracture.  Status post fracture repair 3/26/2025.  The patient is nonverbal, unable to provide history that was obtained from chart review and nursing staff.  The patient had a fever with a temperature max of 100.3 on 3/27/2025.  Initial urinalysis suggestive of UTI, urine culture grew Enterobacter cloacae, Klebsiella oxytoca and Latesha glabrata .  External urinary catheter in place with small amount of cloudy urine.  She is tachycardic  Initial WBC 4.6, creatinine 1    Interval changes  3/27/2025   Creatinine up to 1.3 today  Patient Vitals for the past 8 hrs:   BP Temp Temp src Pulse Resp SpO2   03/27/25 1130 (!) 120/54 97.7 °F (36.5 °C) Oral (!) 116 18 98 %   03/27/25 0845 (!) 117/98 97.3 °F (36.3 °C) Oral (!) 118 16 --   03/27/25 0758 (!) 125/52 97.9 °F (36.6 °C) Oral (!) 115 19 100 %     I have personally reviewed the

## 2025-03-27 NOTE — CARE COORDINATION
DISCHARGE PLANNING NOTE:    Call placed to Francisca with [facility] to confirm therapy evaluations are complete and to verify pt return.     No answer, voicemail left for return call.    Call placed to pt spouse Theo for update. Theo requests writer to place call to son Jameson for update. Call placed to Jameson, no answer, voicemail left for return call.    Electronically signed by CESARIO Orozco on 3/27/2025 at 4:11 PM

## 2025-03-27 NOTE — PLAN OF CARE
Problem: Safety - Adult  Goal: Free from fall injury  3/27/2025 1602 by Susan Felipe RN  Outcome: Progressing  Flowsheets (Taken 3/27/2025 1146)  Free From Fall Injury: Instruct family/caregiver on patient safety  3/27/2025 0331 by Fe Centeno RN  Outcome: Progressing     Problem: Chronic Conditions and Co-morbidities  Goal: Patient's chronic conditions and co-morbidity symptoms are monitored and maintained or improved  3/27/2025 1602 by Susan Felipe RN  Outcome: Progressing  3/27/2025 0331 by Fe Centeno RN  Outcome: Progressing  Flowsheets (Taken 3/26/2025 2030)  Care Plan - Patient's Chronic Conditions and Co-Morbidity Symptoms are Monitored and Maintained or Improved: Monitor and assess patient's chronic conditions and comorbid symptoms for stability, deterioration, or improvement     Problem: Discharge Planning  Goal: Discharge to home or other facility with appropriate resources  3/27/2025 1602 by Susan Felipe RN  Outcome: Progressing  3/27/2025 0331 by Fe Centeno RN  Outcome: Progressing     Problem: Skin/Tissue Integrity  Goal: Skin integrity remains intact  Description: 1.  Monitor for areas of redness and/or skin breakdown  2.  Assess vascular access sites hourly  3.  Every 4-6 hours minimum:  Change oxygen saturation probe site  4.  Every 4-6 hours:  If on nasal continuous positive airway pressure, respiratory therapy assess nares and determine need for appliance change or resting period  3/27/2025 1602 by Susan Felipe RN  Outcome: Progressing  Flowsheets (Taken 3/27/2025 1146)  Skin Integrity Remains Intact: Monitor for areas of redness and/or skin breakdown  3/27/2025 0331 by Fe Centeno RN  Outcome: Progressing  Flowsheets (Taken 3/26/2025 2233)  Skin Integrity Remains Intact: Monitor for areas of redness and/or skin breakdown     Problem: ABCDS Injury Assessment  Goal: Absence of physical injury  Outcome: Progressing  Flowsheets (Taken 3/27/2025 1146)  Absence of Physical

## 2025-03-27 NOTE — PLAN OF CARE
Problem: Safety - Adult  Goal: Free from fall injury  3/27/2025 0331 by Fe Centeno RN  Outcome: Progressing     Problem: Chronic Conditions and Co-morbidities  Goal: Patient's chronic conditions and co-morbidity symptoms are monitored and maintained or improved  3/27/2025 0331 by Fe Centeno RN  Outcome: Progressing  Flowsheets (Taken 3/26/2025 2030)  Care Plan - Patient's Chronic Conditions and Co-Morbidity Symptoms are Monitored and Maintained or Improved: Monitor and assess patient's chronic conditions and comorbid symptoms for stability, deterioration, or improvement     Problem: Discharge Planning  Goal: Discharge to home or other facility with appropriate resources  3/27/2025 0331 by Fe Centeno RN  Outcome: Progressing     Problem: Skin/Tissue Integrity  Goal: Skin integrity remains intact  Description: 1.  Monitor for areas of redness and/or skin breakdown  2.  Assess vascular access sites hourly  3.  Every 4-6 hours minimum:  Change oxygen saturation probe site  4.  Every 4-6 hours:  If on nasal continuous positive airway pressure, respiratory therapy assess nares and determine need for appliance change or resting period  3/27/2025 0331 by Fe Centeno, RN  Outcome: Progressing  Flowsheets (Taken 3/26/2025 2233)  Skin Integrity Remains Intact: Monitor for areas of redness and/or skin breakdown     Problem: Pain  Goal: Verbalizes/displays adequate comfort level or baseline comfort level  3/27/2025 0331 by Fe Centeno, RN  Outcome: Progressing

## 2025-03-27 NOTE — PROGRESS NOTES
[facility]   Occupational Therapy Evaluation  Date: 3/27/25  Patient Name: Francia Ferguson       Room: -  MRN: [id number]  Account: [id number]   : 1945  (79 y.o.) Gender: female     Discharge Recommendations:  Further Occupational Therapy is recommended upon facility discharge.    OT Equipment Recommendations  Other: TBD    Referring Practitioner: Quin Ya MD  Diagnosis: Fall at nursing home        Treatment Diagnosis: Impaired self care status    Past Medical History:  has a past medical history of Abnormal cardiovascular stress test, CAD (coronary artery disease), Chest pressure, Fatigue, History of pneumonia, HTN (hypertension), Hyperlipidemia, Pneumonia, Positive cardiac stress test, Renal insufficiency, SOB (shortness of breath), and Type 2 diabetes mellitus without complication (HCC).    Past Surgical History:   has a past surgical history that includes Cholecystectomy; Colonoscopy; Hysterectomy, vaginal; Coronary angioplasty with stent (2020); Hysterectomy; Appendectomy; Cardiac catheterization (2021); eye surgery; and Femur Surgery (Right, 3/26/2025).    Restrictions  Restrictions/Precautions  Restrictions/Precautions: Weight Bearing, Fall Risk, General Precautions  Implants Present? : Metal implants (R femur IM nail)  Lower Extremity Weight Bearing Restrictions  Right Lower Extremity Weight Bearing: Weight Bearing As Tolerated      Vitals  Vitals  O2 Device: None (Room air)     Subjective  Subjective: Pt non-verbal throughout session  Comments: Ok per DEDRA Davis for OT/PT eval  Pain  Post-Pain:  (Pt did not report pain but demonstrated grimicing with movement on RLE)    Social/Functional History  Social/Functional History  Lives With: Other (Comment) ([facility])  Additional Comments: Per chart review, pt from a memory care unit. Pt unable to provide home info due to severe dementia    Objective    ADL  Feeding: Dependent/Total  Feeding

## 2025-03-27 NOTE — PLAN OF CARE
Problem: Safety - Adult  Goal: Free from fall injury  3/27/2025 1603 by Susan Felipe RN  Outcome: Progressing  3/27/2025 1602 by Susan Felipe RN  Outcome: Progressing  Flowsheets (Taken 3/27/2025 1146)  Free From Fall Injury: Instruct family/caregiver on patient safety  3/27/2025 0331 by Fe Centeno RN  Outcome: Progressing     Problem: Chronic Conditions and Co-morbidities  Goal: Patient's chronic conditions and co-morbidity symptoms are monitored and maintained or improved  3/27/2025 1603 by Susan Felipe RN  Outcome: Progressing  3/27/2025 1602 by Susan Felipe RN  Outcome: Progressing  3/27/2025 0331 by Fe Centeno RN  Outcome: Progressing  Flowsheets (Taken 3/26/2025 2030)  Care Plan - Patient's Chronic Conditions and Co-Morbidity Symptoms are Monitored and Maintained or Improved: Monitor and assess patient's chronic conditions and comorbid symptoms for stability, deterioration, or improvement     Problem: Discharge Planning  Goal: Discharge to home or other facility with appropriate resources  3/27/2025 1603 by Susan Felipe RN  Outcome: Progressing  3/27/2025 1602 by Susan Felipe RN  Outcome: Progressing  3/27/2025 0331 by Fe Centeno RN  Outcome: Progressing     Problem: Skin/Tissue Integrity  Goal: Skin integrity remains intact  Description: 1.  Monitor for areas of redness and/or skin breakdown  2.  Assess vascular access sites hourly  3.  Every 4-6 hours minimum:  Change oxygen saturation probe site  4.  Every 4-6 hours:  If on nasal continuous positive airway pressure, respiratory therapy assess nares and determine need for appliance change or resting period  3/27/2025 1603 by Susan Felipe RN  Outcome: Progressing  3/27/2025 1602 by Susan Felipe RN  Outcome: Progressing  Flowsheets (Taken 3/27/2025 1146)  Skin Integrity Remains Intact: Monitor for areas of redness and/or skin breakdown  3/27/2025 0331 by Fe Centeno RN  Outcome: Progressing  Flowsheets (Taken 3/26/2025

## 2025-03-28 LAB
ANION GAP SERPL CALCULATED.3IONS-SCNC: 27 MMOL/L (ref 9–16)
ANION GAP SERPL CALCULATED.3IONS-SCNC: 29 MMOL/L (ref 9–16)
BASOPHILS # BLD: 0 K/UL (ref 0–0.2)
BASOPHILS NFR BLD: 0 % (ref 0–2)
BUN SERPL-MCNC: 36 MG/DL (ref 8–23)
BUN SERPL-MCNC: 37 MG/DL (ref 8–23)
CALCIUM SERPL-MCNC: 8.7 MG/DL (ref 8.6–10.4)
CALCIUM SERPL-MCNC: 8.9 MG/DL (ref 8.6–10.4)
CHLORIDE SERPL-SCNC: 107 MMOL/L (ref 98–107)
CHLORIDE SERPL-SCNC: 108 MMOL/L (ref 98–107)
CO2 SERPL-SCNC: 10 MMOL/L (ref 20–31)
CO2 SERPL-SCNC: 11 MMOL/L (ref 20–31)
CREAT SERPL-MCNC: 1.4 MG/DL (ref 0.7–1.2)
CREAT SERPL-MCNC: 1.4 MG/DL (ref 0.7–1.2)
EKG ATRIAL RATE: 124 BPM
EKG P AXIS: -9 DEGREES
EKG P-R INTERVAL: 166 MS
EKG Q-T INTERVAL: 304 MS
EKG QRS DURATION: 72 MS
EKG QTC CALCULATION (BAZETT): 436 MS
EKG R AXIS: 62 DEGREES
EKG T AXIS: 178 DEGREES
EKG VENTRICULAR RATE: 124 BPM
EOSINOPHIL # BLD: 0 K/UL (ref 0–0.4)
EOSINOPHILS RELATIVE PERCENT: 0 % (ref 0–4)
ERYTHROCYTE [DISTWIDTH] IN BLOOD BY AUTOMATED COUNT: 16.3 % (ref 11.5–14.9)
GFR, ESTIMATED: 38 ML/MIN/1.73M2
GFR, ESTIMATED: 38 ML/MIN/1.73M2
GLUCOSE BLD-MCNC: 174 MG/DL (ref 65–105)
GLUCOSE BLD-MCNC: 252 MG/DL (ref 65–105)
GLUCOSE BLD-MCNC: 301 MG/DL (ref 65–105)
GLUCOSE BLD-MCNC: 473 MG/DL (ref 65–105)
GLUCOSE SERPL-MCNC: 466 MG/DL (ref 74–99)
GLUCOSE SERPL-MCNC: 559 MG/DL (ref 74–99)
HCT VFR BLD AUTO: 22 % (ref 36–46)
HCT VFR BLD AUTO: 22.3 % (ref 36–46)
HCT VFR BLD AUTO: 25 % (ref 36–46)
HCT VFR BLD AUTO: 25.8 % (ref 36–46)
HGB BLD-MCNC: 6.8 G/DL (ref 12–16)
HGB BLD-MCNC: 7 G/DL (ref 12–16)
HGB BLD-MCNC: 8.4 G/DL (ref 12–16)
HGB BLD-MCNC: 8.5 G/DL (ref 12–16)
LYMPHOCYTES NFR BLD: 0.31 K/UL (ref 1–4.8)
LYMPHOCYTES RELATIVE PERCENT: 5 % (ref 24–44)
MCH RBC QN AUTO: 30.8 PG (ref 26–34)
MCHC RBC AUTO-ENTMCNC: 30.8 G/DL (ref 31–37)
MCV RBC AUTO: 100.1 FL (ref 80–100)
MICROORGANISM SPEC CULT: ABNORMAL
MONOCYTES NFR BLD: 0.24 K/UL (ref 0.1–1.3)
MONOCYTES NFR BLD: 4 % (ref 1–7)
MORPHOLOGY: ABNORMAL
MORPHOLOGY: ABNORMAL
NEUTROPHILS NFR BLD: 91 % (ref 36–66)
NEUTS SEG NFR BLD: 5.55 K/UL (ref 1.3–9.1)
PLATELET # BLD AUTO: 171 K/UL (ref 150–450)
PMV BLD AUTO: 7.9 FL (ref 6–12)
POTASSIUM SERPL-SCNC: 4.6 MMOL/L (ref 3.7–5.3)
POTASSIUM SERPL-SCNC: 5.6 MMOL/L (ref 3.7–5.3)
RBC # BLD AUTO: 2.22 M/UL (ref 4–5.2)
SODIUM SERPL-SCNC: 146 MMOL/L (ref 136–145)
SODIUM SERPL-SCNC: 146 MMOL/L (ref 136–145)
SPECIMEN DESCRIPTION: ABNORMAL
WBC OTHER # BLD: 6.1 K/UL (ref 3.5–11)

## 2025-03-28 PROCEDURE — 99232 SBSQ HOSP IP/OBS MODERATE 35: CPT | Performed by: INTERNAL MEDICINE

## 2025-03-28 PROCEDURE — 6360000002 HC RX W HCPCS: Performed by: ORTHOPAEDIC SURGERY

## 2025-03-28 PROCEDURE — 85018 HEMOGLOBIN: CPT

## 2025-03-28 PROCEDURE — 93010 ELECTROCARDIOGRAM REPORT: CPT | Performed by: INTERNAL MEDICINE

## 2025-03-28 PROCEDURE — 85014 HEMATOCRIT: CPT

## 2025-03-28 PROCEDURE — 30233N1 TRANSFUSION OF NONAUTOLOGOUS RED BLOOD CELLS INTO PERIPHERAL VEIN, PERCUTANEOUS APPROACH: ICD-10-PCS | Performed by: ORTHOPAEDIC SURGERY

## 2025-03-28 PROCEDURE — 80048 BASIC METABOLIC PNL TOTAL CA: CPT

## 2025-03-28 PROCEDURE — 6370000000 HC RX 637 (ALT 250 FOR IP)

## 2025-03-28 PROCEDURE — 6360000002 HC RX W HCPCS: Performed by: INTERNAL MEDICINE

## 2025-03-28 PROCEDURE — 2580000003 HC RX 258: Performed by: INTERNAL MEDICINE

## 2025-03-28 PROCEDURE — 82947 ASSAY GLUCOSE BLOOD QUANT: CPT

## 2025-03-28 PROCEDURE — 36430 TRANSFUSION BLD/BLD COMPNT: CPT

## 2025-03-28 PROCEDURE — 99233 SBSQ HOSP IP/OBS HIGH 50: CPT | Performed by: INTERNAL MEDICINE

## 2025-03-28 PROCEDURE — P9016 RBC LEUKOCYTES REDUCED: HCPCS

## 2025-03-28 PROCEDURE — 85025 COMPLETE CBC W/AUTO DIFF WBC: CPT

## 2025-03-28 PROCEDURE — 1200000000 HC SEMI PRIVATE

## 2025-03-28 PROCEDURE — 36415 COLL VENOUS BLD VENIPUNCTURE: CPT

## 2025-03-28 PROCEDURE — G0545 PR INHERENT VISIT TO INPT: HCPCS | Performed by: INTERNAL MEDICINE

## 2025-03-28 PROCEDURE — 6370000000 HC RX 637 (ALT 250 FOR IP): Performed by: INTERNAL MEDICINE

## 2025-03-28 PROCEDURE — 6370000000 HC RX 637 (ALT 250 FOR IP): Performed by: ORTHOPAEDIC SURGERY

## 2025-03-28 RX ORDER — INSULIN GLARGINE 100 [IU]/ML
5 INJECTION, SOLUTION SUBCUTANEOUS ONCE
Status: COMPLETED | OUTPATIENT
Start: 2025-03-28 | End: 2025-03-28

## 2025-03-28 RX ORDER — SODIUM CHLORIDE 9 MG/ML
INJECTION, SOLUTION INTRAVENOUS PRN
Status: DISCONTINUED | OUTPATIENT
Start: 2025-03-28 | End: 2025-04-03

## 2025-03-28 RX ORDER — INSULIN GLARGINE 100 [IU]/ML
10 INJECTION, SOLUTION SUBCUTANEOUS DAILY
Status: DISCONTINUED | OUTPATIENT
Start: 2025-03-29 | End: 2025-04-01

## 2025-03-28 RX ORDER — INSULIN LISPRO 100 [IU]/ML
6 INJECTION, SOLUTION INTRAVENOUS; SUBCUTANEOUS ONCE
Status: COMPLETED | OUTPATIENT
Start: 2025-03-28 | End: 2025-03-28

## 2025-03-28 RX ADMIN — INSULIN LISPRO 6 UNITS: 100 INJECTION, SOLUTION INTRAVENOUS; SUBCUTANEOUS at 08:32

## 2025-03-28 RX ADMIN — INSULIN GLARGINE 5 UNITS: 100 INJECTION, SOLUTION SUBCUTANEOUS at 08:32

## 2025-03-28 RX ADMIN — INSULIN LISPRO 2 UNITS: 100 INJECTION, SOLUTION INTRAVENOUS; SUBCUTANEOUS at 16:07

## 2025-03-28 RX ADMIN — PANTOPRAZOLE SODIUM 40 MG: 40 INJECTION, POWDER, LYOPHILIZED, FOR SOLUTION INTRAVENOUS at 10:02

## 2025-03-28 RX ADMIN — INSULIN LISPRO 4 UNITS: 100 INJECTION, SOLUTION INTRAVENOUS; SUBCUTANEOUS at 06:28

## 2025-03-28 RX ADMIN — SODIUM CHLORIDE: 9 INJECTION, SOLUTION INTRAVENOUS at 12:52

## 2025-03-28 RX ADMIN — MICAFUNGIN SODIUM 100 MG: 100 INJECTION, POWDER, LYOPHILIZED, FOR SOLUTION INTRAVENOUS at 08:44

## 2025-03-28 RX ADMIN — MORPHINE SULFATE 2 MG: 2 INJECTION, SOLUTION INTRAMUSCULAR; INTRAVENOUS at 16:57

## 2025-03-28 RX ADMIN — CEFTRIAXONE SODIUM 2000 MG: 2 INJECTION, POWDER, FOR SOLUTION INTRAMUSCULAR; INTRAVENOUS at 22:35

## 2025-03-28 RX ADMIN — INSULIN LISPRO 3 UNITS: 100 INJECTION, SOLUTION INTRAVENOUS; SUBCUTANEOUS at 11:56

## 2025-03-28 RX ADMIN — INSULIN GLARGINE 5 UNITS: 100 INJECTION, SOLUTION SUBCUTANEOUS at 08:59

## 2025-03-28 ASSESSMENT — PAIN SCALES - PAIN ASSESSMENT IN ADVANCED DEMENTIA (PAINAD)
BODYLANGUAGE: TENSE, DISTRESSED PACING, FIDGETING
FACIALEXPRESSION: SMILING OR INEXPRESSIVE
BREATHING: NORMAL
TOTALSCORE: 1
CONSOLABILITY: NO NEED TO CONSOLE

## 2025-03-28 NOTE — PROGRESS NOTES
and Kick questionnaire     Fear of Current or Ex-Partner: No     Emotionally Abused: No     Physically Abused: No     Sexually Abused: No   Housing Stability: Low Risk  (3/17/2025)    Housing Stability Vital Sign     Unable to Pay for Housing in the Last Year: No     Number of Times Moved in the Last Year: 1     Homeless in the Last Year: No     Family History:     Family History   Problem Relation Age of Onset    Heart Disease Mother     Diabetes Mother     Diabetes Father     Heart Disease Brother     Esophageal Cancer Brother     Diabetes Maternal Grandmother     Diabetes Maternal Aunt     Diabetes Maternal Uncle     Breast Cancer Neg Hx       Allergies:   Patient has no known allergies.     Review of Systems:   Review of Systems  Unable to provide due to encephalopathy  Physical Examination :   Physical Exam  Constitutional:       Appearance: She is not ill-appearing.   HENT:      Head: Normocephalic and atraumatic.      Right Ear: External ear normal.      Left Ear: External ear normal.   Eyes:      General: No scleral icterus.     Conjunctiva/sclera: Conjunctivae normal.   Cardiovascular:      Rate and Rhythm: Tachycardia present. Rhythm irregular.   Pulmonary:      Effort: Pulmonary effort is normal. No respiratory distress.   Abdominal:      General: There is no distension.      Palpations: Abdomen is soft.   Musculoskeletal:      Cervical back: Neck supple. No rigidity.      Right lower leg: No edema.      Left lower leg: No edema.   Skin:     Coloration: Skin is not jaundiced.      Findings: No erythema.      Comments: Right lateral hip dressing   Neurological:      Mental Status: She is alert.      Comments: Awake, mild agitation, no obvious focal signs         Medical Decision Making:   I have independently reviewed/ordered the following labs:  CBC with Differential:   Recent Labs     03/27/25  1026 03/27/25  1814 03/28/25  0152 03/28/25  0648   WBC 5.9  --   --  6.1   HGB 7.7*   < > 7.0* 6.8*   HCT  ONE XRAY VIEW OF THE CHEST 3/25/2025 11:06 pm COMPARISON: March 17, 2025 HISTORY: ORDERING SYSTEM PROVIDED HISTORY: fall TECHNOLOGIST PROVIDED HISTORY: fall Reason for Exam: Fall - confusion Additional signs and symptoms: Fall - confusion Relevant Medical/Surgical History: Fall - confusion FINDINGS: The cardiomediastinal silhouette is normal size. No consolidation or venous congestion. No pleural effusion or pneumothorax identified.     No acute cardiopulmonary disease.       Cultures:     Results       Procedure Component Value Units Date/Time    Culture, Urine [[id number]]  (Abnormal)  (Susceptibility) Collected: 03/25/25 7256    Order Status: Completed Specimen: Urine, straight catheter Updated: 03/28/25 0972     Specimen Description .URINE,STRAIGHT CATHETER     Culture ENTEROBACTER CLOACAE COMPLEX >100,000 CFU/ML Identification by MALDI-TOF      KLEBSIELLA OXYTOCA 50 ,000 CFU/ML Identification by MALDI-TOF      CANDIDA GLABRATA >100,000 CFU/ML Identification by MALDI-TOF    Susceptibility        Enterobacter cloacae complex      BACTERIAL SUSCEPTIBILITY PANEL CHELA BACTERIAL SUSCEPTIBILITY PANEL QUINTERO FARLEY      cefTRIAXone   Sensitive      gentamicin <=1  Sensitive       levofloxacin <=0.12  Sensitive       nitrofurantoin 32  Sensitive       piperacillin-tazobactam <=4  Sensitive       tobramycin <=1  Sensitive       trimethoprim-sulfamethoxazole <=20  Sensitive                          Susceptibility        Klebsiella oxytoca      BACTERIAL SUSCEPTIBILITY PANEL CHELA      ampicillin >=32  Resistant      cefTRIAXone <=0.25  Sensitive      Confirmatory Extended Spectrum Beta-Lactamase NEGATIVE  Sensitive      gentamicin <=1  Sensitive      levofloxacin <=0.12  Sensitive      nitrofurantoin <=16  Sensitive      piperacillin-tazobactam <=4  Sensitive      tobramycin <=1  Sensitive      trimethoprim-sulfamethoxazole <=20  Sensitive                                   Electronically signed by Erik Miguel MD on

## 2025-03-28 NOTE — PROGRESS NOTES
Physical Therapy  DATE: 3/28/2025    NAME: Francia Ferguson  MRN: [id number]   : 1945    Patient not seen this date for Physical Therapy due to:      [] Cancel by RN or physician due to:    [] Hemodialysis    [] Critical Lab Value Level     [] Blood transfusion in progress    [] Acute or unstable cardiovascular status   _MAP < 55 or more than >115  _HR < 40 or > 130    [] Acute or unstable pulmonary status   -FiO2 > 60%   _RR < 5 or >40    _O2 sats < 85%    [] Strict Bedrest    [] Off Unit for surgery or procedure    [] Off Unit for testing       [] Pending imaging to R/O fracture    [x] Refusal by Patient; checked on pt @ 4136-1249. pt lying in bed upon arrival. Pt is non-verbal. Pt cries out and very resistant to touch when trying to transfer to EOB. RN aware. Will continue to follow.         [] Other      [] PT being discontinued at this time. Patient independent. No further needs.     [] PT being discontinued at this time as the patient has been transferred to hospice care. No further needs.    Electronically signed by Nani Reynolds PTA on 3/28/25 at 3:27 PM EDT

## 2025-03-28 NOTE — PROGRESS NOTES
Dr. Rivas notified of critical BS of 559,Hmg 6.8 and K+ 5.6 . Dr. Ya notified of Hmg. Per Dr. Rivas order repeat BMP, Give 6 units Humalog now and recheck BS in 2 hrs. Per Dr. Ya order 1 unit PRBC.

## 2025-03-28 NOTE — PROGRESS NOTES
Received p/c from Lab regarding patient's hemoglobin of 7.0. PerfectServe sent to NP. No new orders at this time.

## 2025-03-28 NOTE — CONSENT
Informed Consent for Blood Component Transfusion Note    I have discussed with the spouse the rationale for blood component transfusion; its benefits in treating or preventing fatigue, organ damage, or death; and its risk which includes mild transfusion reactions, rare risk of blood borne infection, or more serious but rare reactions. I have discussed the alternatives to transfusion, including the risk and consequences of not receiving transfusion. The spouse had an opportunity to ask questions and had agreed to proceed with transfusion of blood components.    Electronically signed by Rei Rivas MD on 3/28/25 at 8:59 AM EDT

## 2025-03-28 NOTE — CARE COORDINATION
DISCHARGE PLANNING NOTE:    Writer is following for potential discharge placement.    Call placed to pt son Jameson and Ashok to discuss discharge plans. Ashok does not understand why pt would need to go somewhere for therapy, pt has been in a wheelchair. Writer explained D.VELMA Flores stated pt would only receive therapy 2 days a week with VNS at facility and could benefit from more skilled therapy. Ashok states [facility] was supposed to schedule therapy for this pt and pt has not yet received it, that two days would be better than none.    Ashok wishes to call D.O.BIA at [facility] to discuss pt return and will place call back to writer.   Electronically signed by CESARIO Orozco on 3/28/2025 at 11:51 AM

## 2025-03-28 NOTE — CARE COORDINATION
DISCHARGE PLANNING NOTE:    Call back received from Ashok. Ashok states he spoke to [facility] staff and understands why pt needs rehab. Agreeable to referrals sent to [facility], [facility]. Ashok requests call regarding FOC, he will make decisions for pt spouse.    Referrals sent.  Electronically signed by CESARIO Orozco on 3/28/2025 at 4:46 PM

## 2025-03-28 NOTE — CARE COORDINATION
DISCHARGE PLANNING NOTE:    Writer placed call to pt son Ashok for follow up. No answer, voicemail left for return call.    Call placed to pt son Jameson for update. Jameson has not heard further update from Ashok. Jameson is agreeable to attempting to reach Ashok and requesting a call back placed to writewen.    Electronically signed by CESARIO Orozco on 3/28/2025 at 3:34 PM

## 2025-03-28 NOTE — PLAN OF CARE
Problem: Safety - Adult  Goal: Free from fall injury  3/28/2025 0318 by Gary Yang RN  Outcome: Progressing  Flowsheets (Taken 3/28/2025 0315)  Free From Fall Injury: Instruct family/caregiver on patient safety  3/27/2025 1603 by Susan Felipe RN  Outcome: Progressing  3/27/2025 1602 by Susan Felipe RN  Outcome: Progressing  Flowsheets (Taken 3/27/2025 1146)  Free From Fall Injury: Instruct family/caregiver on patient safety     Problem: Chronic Conditions and Co-morbidities  Goal: Patient's chronic conditions and co-morbidity symptoms are monitored and maintained or improved  3/28/2025 0318 by Gary Yang RN  Outcome: Progressing  Flowsheets (Taken 3/27/2025 1954)  Care Plan - Patient's Chronic Conditions and Co-Morbidity Symptoms are Monitored and Maintained or Improved: Monitor and assess patient's chronic conditions and comorbid symptoms for stability, deterioration, or improvement  3/27/2025 1603 by Susan Felipe RN  Outcome: Progressing  3/27/2025 1602 by Susan Felipe RN  Outcome: Progressing     Problem: Discharge Planning  Goal: Discharge to home or other facility with appropriate resources  3/28/2025 0318 by Gary Yang RN  Outcome: Progressing  Flowsheets (Taken 3/27/2025 1954)  Discharge to home or other facility with appropriate resources:   Identify barriers to discharge with patient and caregiver   Identify discharge learning needs (meds, wound care, etc)  3/27/2025 1603 by Susan Felipe RN  Outcome: Progressing  3/27/2025 1602 by Susan Felipe RN  Outcome: Progressing     Problem: Skin/Tissue Integrity  Goal: Skin integrity remains intact  Description: 1.  Monitor for areas of redness and/or skin breakdown  2.  Assess vascular access sites hourly  3.  Every 4-6 hours minimum:  Change oxygen saturation probe site  4.  Every 4-6 hours:  If on nasal continuous positive airway pressure, respiratory therapy assess nares and determine need for appliance change or resting

## 2025-03-28 NOTE — PROGRESS NOTES
Sandy sent to NP regarding patient's blood sugar of 473. Advised to administer scheduled Humalog. No additional new orders.

## 2025-03-28 NOTE — CARE COORDINATION
DISCHARGE PLANNING NOTE:    Call received from Coral with [facility]. Facility accepts this pt.  Electronically signed by CESARIO Orozco on 3/28/2025 at 5:19 PM

## 2025-03-28 NOTE — PLAN OF CARE
Problem: Safety - Adult  Goal: Free from fall injury  3/28/2025 1626 by Jodie Pearson RN  Outcome: Progressing, Patient remains free of incidence/ injury. Bed remains in low position. Side rails up x2.       Problem: Skin/Tissue Integrity  Goal: Skin integrity remains intact  Description: 1.  Monitor for areas of redness and/or skin breakdown  2.  Assess vascular access sites hourly  3.  Every 4-6 hours minimum:  Change oxygen saturation probe site  4.  Every 4-6 hours:  If on nasal continuous positive airway pressure, respiratory therapy assess nares and determine need for appliance change or resting period  3/28/2025 1626 by Jodie Pearson, RN  Outcome: Progressing, Pt remain free of skin breakdown. Educated on the importance of turning and alternating position.     Problem: Pain  Goal: Verbalizes/displays adequate comfort level or baseline comfort level  3/28/2025 1626 by Jodie Pearson, RN  Outcome: Progressing, Pt educated on non-pharmacological pain interventions. PRN pain medications administered.

## 2025-03-28 NOTE — PROGRESS NOTES
Occupational Therapy  [facility]   OCCUPATIONAL THERAPY MISSED TREATMENT NOTE   INPATIENT   Date: 3/28/25  Patient Name: Francia Ferguson       Room:   MRN: [id number]   Account #: [id number]    : 1945  (79 y.o.)  Gender: female   Referring Practitioner: Quin Ya MD  Diagnosis: Fall at nursing home             REASON FOR MISSED TREATMENT:  Patient declined   -    pt lying in bed upon arrival. Pt is non-verbal. Pt cries out and very resistant to touch when trying to transfer to EOB. RN aware.         Electronically signed by GHAZAL Contreras on 3/28/25 at 2:05 PM EDT

## 2025-03-28 NOTE — PROGRESS NOTES
IN-PATIENT SERVICE  [facility] Internal Medicine  [facility] Internal Medicine   Jovan Calvillo MD; New Sloan MD; Kel Navarro MD; Rei Rivas MD,   Jeaneth Henao MD; Kathryn Gonzalez MD; Ashley Yang MD; Kiera Singleton MD    Progress Note            Date:   3/28/2025  Patient name:  Francia Ferguson  Date of admission:  3/25/2025 10:40 PM  MRN:   [id number]  Account:  [id number]  YOB: 1945  PCP:    Jack Boone DO  Room:   2068/2068-01  Code Status:    DNR-CCA    Physician Requesting Consult: Quin Ya MD    Reason for Consult:  Medical management    Chief Complaint:     Chief Complaint   Patient presents with    Fall        History Obtained From:     Patient, EMR, nursing staff     History of Present Illness:     79-year-old female presented to the emergency department after she was found down in her room.  She lives in a memory care unit.  He recently discharged from the facility for hypoglycemia.  Noted to have acute right intertrochanteric fracture and subacute S4 fracture  3/28   Patient, has advanced dementia, had a fall, admitted with fever fracture, underwent surgery  Patient urine culture growing Candida and Klebsiella on antibiotics per ID  Hemoglobin dropped to 6.8, no obvious signs of bleeding  Likely dilutional, postsurgical losses  Very poor oral intake  Past Medical History:     Past Medical History:   Diagnosis Date    Abnormal cardiovascular stress test 06/2020    no ischemia / infarction   there is septal hypokinesis   /  EF 70%    CAD (coronary artery disease)     Chest pressure     Fatigue     History of pneumonia 03/2020    HTN (hypertension)     Hyperlipidemia     Pneumonia     Positive cardiac stress test     Renal insufficiency 8/18/2022    SOB (shortness of breath)     Type 2 diabetes mellitus without complication (HCC)         Past Surgical History:     Past Surgical History:   Procedure Laterality Date    APPENDECTOMY      CARDIAC

## 2025-03-28 NOTE — PROGRESS NOTES
Pulled out IV line O/N. Resting comfortably this AM    /72   Pulse 72   Temp 98.9 °F (37.2 °C) (Axillary)   Resp 16   SpO2 92%   Right hip dressings are clean, dry, and intact. 1+ pedal pulses. Brisk capillary refill in toes    Impression and plan: Ms. Ferguson is a 80 yo lady sp right hip IT fracture IMN POD 2    - Hgb 6.8 this AM. Transfuse 1 U pRBC  - Pain control  - Mobilize with PT if possible; WBAT  - Dressing change tomorrow

## 2025-03-29 LAB
GLUCOSE BLD-MCNC: 111 MG/DL (ref 65–105)
GLUCOSE BLD-MCNC: 202 MG/DL (ref 65–105)
GLUCOSE BLD-MCNC: 257 MG/DL (ref 65–105)
GLUCOSE BLD-MCNC: 286 MG/DL (ref 65–105)
HCT VFR BLD AUTO: 21.5 % (ref 36–46)
HCT VFR BLD AUTO: 24.9 % (ref 36–46)
HCT VFR BLD AUTO: 25.2 % (ref 36–46)
HCT VFR BLD AUTO: 26 % (ref 36–46)
HGB BLD-MCNC: 7.2 G/DL (ref 12–16)
HGB BLD-MCNC: 7.9 G/DL (ref 12–16)
HGB BLD-MCNC: 8.1 G/DL (ref 12–16)
HGB BLD-MCNC: 8.8 G/DL (ref 12–16)

## 2025-03-29 PROCEDURE — 36415 COLL VENOUS BLD VENIPUNCTURE: CPT

## 2025-03-29 PROCEDURE — 1200000000 HC SEMI PRIVATE

## 2025-03-29 PROCEDURE — 6370000000 HC RX 637 (ALT 250 FOR IP): Performed by: INTERNAL MEDICINE

## 2025-03-29 PROCEDURE — 6360000002 HC RX W HCPCS

## 2025-03-29 PROCEDURE — 85014 HEMATOCRIT: CPT

## 2025-03-29 PROCEDURE — G0545 PR INHERENT VISIT TO INPT: HCPCS | Performed by: INTERNAL MEDICINE

## 2025-03-29 PROCEDURE — 99233 SBSQ HOSP IP/OBS HIGH 50: CPT

## 2025-03-29 PROCEDURE — 2580000003 HC RX 258: Performed by: INTERNAL MEDICINE

## 2025-03-29 PROCEDURE — 6360000002 HC RX W HCPCS: Performed by: INTERNAL MEDICINE

## 2025-03-29 PROCEDURE — 99232 SBSQ HOSP IP/OBS MODERATE 35: CPT | Performed by: INTERNAL MEDICINE

## 2025-03-29 PROCEDURE — 2580000003 HC RX 258

## 2025-03-29 PROCEDURE — 6370000000 HC RX 637 (ALT 250 FOR IP): Performed by: ORTHOPAEDIC SURGERY

## 2025-03-29 PROCEDURE — 85018 HEMOGLOBIN: CPT

## 2025-03-29 PROCEDURE — 82947 ASSAY GLUCOSE BLOOD QUANT: CPT

## 2025-03-29 RX ADMIN — PANTOPRAZOLE SODIUM 40 MG: 40 INJECTION, POWDER, LYOPHILIZED, FOR SOLUTION INTRAVENOUS at 09:15

## 2025-03-29 RX ADMIN — INSULIN LISPRO 2 UNITS: 100 INJECTION, SOLUTION INTRAVENOUS; SUBCUTANEOUS at 09:15

## 2025-03-29 RX ADMIN — INSULIN GLARGINE 10 UNITS: 100 INJECTION, SOLUTION SUBCUTANEOUS at 09:14

## 2025-03-29 RX ADMIN — SODIUM CHLORIDE: 9 INJECTION, SOLUTION INTRAVENOUS at 14:12

## 2025-03-29 RX ADMIN — CEFTRIAXONE SODIUM 2000 MG: 2 INJECTION, POWDER, FOR SOLUTION INTRAMUSCULAR; INTRAVENOUS at 22:55

## 2025-03-29 RX ADMIN — SODIUM CHLORIDE: 9 INJECTION, SOLUTION INTRAVENOUS at 17:51

## 2025-03-29 RX ADMIN — SODIUM CHLORIDE 200 MG: 9 INJECTION, SOLUTION INTRAVENOUS at 12:10

## 2025-03-29 RX ADMIN — INSULIN LISPRO 2 UNITS: 100 INJECTION, SOLUTION INTRAVENOUS; SUBCUTANEOUS at 17:01

## 2025-03-29 RX ADMIN — MICAFUNGIN SODIUM 100 MG: 100 INJECTION, POWDER, LYOPHILIZED, FOR SOLUTION INTRAVENOUS at 09:49

## 2025-03-29 RX ADMIN — INSULIN LISPRO 1 UNITS: 100 INJECTION, SOLUTION INTRAVENOUS; SUBCUTANEOUS at 11:36

## 2025-03-29 ASSESSMENT — PAIN SCALES - WONG BAKER: WONGBAKER_NUMERICALRESPONSE: NO HURT

## 2025-03-29 ASSESSMENT — PAIN SCALES - PAIN ASSESSMENT IN ADVANCED DEMENTIA (PAINAD)
TOTALSCORE: 0
FACIALEXPRESSION: SMILING OR INEXPRESSIVE
BREATHING: NORMAL
CONSOLABILITY: NO NEED TO CONSOLE
BODYLANGUAGE: RELAXED

## 2025-03-29 NOTE — PROGRESS NOTES
[facility]  Initial Consult Note  Date: 3/29/2025    Hospital day :3     Chief complaint/reason for consultation:   UTI    Assessment/Impression:   UTI/Enterobacter cloacae pansensitive, Klebsiella oxytoca resistant to ampicillin, otherwise sensitive to other tested antibiotics and Candida glabrata growth on urine culture  Status post fall   Encephalopathy with baseline dementia  Acute right femur fracture status post surgical stabilization with intramedullary nail 3/26/2025  Diabetes mellitus  Coronary artery disease  A-fib  Hypertension  Hyperlipidemia    Plan/Recommendations     IV ceftriaxone and micafungin for now may change to oral Levaquin on discharge through 4/2/25  QTc 436 on 3/27/2025  Follow CBC and renal function  Infection Control Recommendations   [place] Precautions      Antimicrobial Stewardship Recommendations   Simplification of therapy  Targeted therapy      History of Present Illness:   Francia Ferugson is a 79 y.o.-year-old female who was initially admitted on 3/25/2025.  The patient was brought to the hospital after a fall was trying to get out of the bed, was found to have acute right intertrochanteric femur fracture and subacute S4 fracture.  Status post fracture repair 3/26/2025.  The patient is nonverbal, unable to provide history that was obtained from chart review and nursing staff.  The patient had a fever with a temperature max of 100.3 on 3/27/2025.  Initial urinalysis suggestive of UTI, urine culture grew Enterobacter cloacae, Klebsiella oxytoca and Latesha glabrata .  External urinary catheter in place with small amount of cloudy urine.  She is tachycardic  Initial WBC 4.6, creatinine 1    Interval changes  3/29/2025   She was seen and examined, remains afebrile, no new events  Patient Vitals for the past 8 hrs:   BP Temp Temp src Resp   03/29/25 0443 90/79 98.7 °F (37.1 °C) Oral 18     I have personally reviewed the past medical history, past surgical history,     Smoking status: Never     Passive exposure: Current    Smokeless tobacco: Never   Vaping Use    Vaping status: Never Used   Substance and Sexual Activity    Alcohol use: No     Alcohol/week: 0.0 standard drinks of alcohol    Drug use: No    Sexual activity: Not Currently     Partners: Male   Other Topics Concern    Not on file   Social History Narrative    Not on file     Social Drivers of Health     Financial Resource Strain: Low Risk  (5/22/2024)    Overall Financial Resource Strain (CARDIA)     Difficulty of Paying Living Expenses: Not hard at all   Food Insecurity: No Food Insecurity (3/17/2025)    Hunger Vital Sign     Worried About Running Out of Food in the Last Year: Never true     Ran Out of Food in the Last Year: Never true   Transportation Needs: No Transportation Needs (3/17/2025)    PRAPARE - Transportation     Lack of Transportation (Medical): No     Lack of Transportation (Non-Medical): No   Physical Activity: Insufficiently Active (5/23/2024)    Exercise Vital Sign     Days of Exercise per Week: 3 days     Minutes of Exercise per Session: 30 min   Stress: No Stress Concern Present (2/3/2022)    [employer] [place] [employer] - Occupational Stress Questionnaire     Feeling of Stress : Not at all   Social Connections: Moderately Integrated (2/3/2022)    Social Connection and Isolation Panel [NHANES]     Frequency of Communication with Friends and Family: More than three times a week     Frequency of Social Gatherings with Friends and Family: Twice a week     Attends Restoration[employer] Services: 1 to 4 times per year     Active Member of Clubs or Organizations: No     Attends Club or Organization Meetings: Never     Marital Status: Living with partner   Intimate Partner Violence: Not At Risk (2/3/2022)    Humiliation, Afraid, Rape, and Kick questionnaire     Fear of Current or Ex-Partner: No     Emotionally Abused: No     Physically Abused: No     Sexually Abused: No   Housing Stability: Low Risk

## 2025-03-29 NOTE — CARE COORDINATION
ONGOING DISCHARGE PLAN:    Patient is alert.    Spoke with patient's family at bedside regarding discharge plan and  confirms that plan is still SNF. Family would like choice once all referrals respond for accept/denial.    Ortho  3/26 right femur IM nail    ID consult  IV rocephin/micafungin, change to oral at discharge.     Hgb 8.8 (3/28 1 unit RBCs)    PT/OT    Will continue to follow for additional discharge needs.    If patient is discharged prior to next notation, then this note serves as note for discharge by case management.    Electronically signed by Evi Clement RN on 3/29/2025 at 10:31 AM

## 2025-03-29 NOTE — FLOWSHEET NOTE
Physical Therapy Cancel Note      DATE: 3/29/2025    NAME: Francia Ferguson  MRN: [id number]   : 1945      Patient not seen this date for Physical Therapy due to:    Other: Per DEDRA Holbrook, pt ok to work with. Attempted to see pt at 8:20 am with NANCY See; however, pt confused and not following commands. Pt resistive to movement and not appropriate. RN notified.       Electronically signed by Arin Foster PTA on 3/29/2025 at 10:02 AM

## 2025-03-29 NOTE — PROGRESS NOTES
Resting comfortably this AM    BP 90/79   Pulse 100   Temp 98.7 °F (37.1 °C) (Oral)   Resp 18   SpO2 99%   Right hip incisions are clean, dry, and intact. 1+ pedal pulses. Brisk capillary refill in toes    Impression and plan: Ms. Ferguson is a 80 yo lady sp right hip IT fracture IMN POD 3    - Hgb 8.8 earlier this AM  - Pain control  - Continue abx treatment for UTI per ID service  - Mobilize with PT if possible; WBAT  - Dressing changed today. May change every other day moving forward  - DC planning

## 2025-03-29 NOTE — PROGRESS NOTES
IN-PATIENT SERVICE  [facility] Internal Medicine  [facility] Internal Medicine   Jovan Calvillo MD; New Sloan MD; Kel Navarro MD; Rei Rivas MD,   Jeaneth Henao MD; Kathryn Gonzalez MD; Ashley Yang MD; Kiera Singleton MD    Progress Note            Date:   3/29/2025  Patient name:  Francia Ferguson  Date of admission:  3/25/2025 10:40 PM  MRN:   [id number]  Account:  [id number]  YOB: 1945  PCP:    Jack Boone DO  Room:   2068/2068-01  Code Status:    DNR-CCA    Physician Requesting Consult: Quin Ya MD    Reason for Consult:  Medical management    Chief Complaint:     Chief Complaint   Patient presents with    Fall        History Obtained From:     Patient, EMR, nursing staff     History of Present Illness:     79-year-old female presented to the emergency department after she was found down in her room.  She lives in a memory care unit.  He recently discharged from the facility for hypoglycemia.  Noted to have acute right intertrochanteric fracture and subacute S4 fracture  3/28   Patient, has advanced dementia, had a fall, admitted with fever fracture, underwent surgery  Patient urine culture growing Candida and Klebsiella on antibiotics per ID  Hemoglobin dropped to 6.8, no obvious signs of bleeding  Likely dilutional, postsurgical losses  Very poor oral intake  Past Medical History:     Past Medical History:   Diagnosis Date    Abnormal cardiovascular stress test 06/2020    no ischemia / infarction   there is septal hypokinesis   /  EF 70%    CAD (coronary artery disease)     Chest pressure     Fatigue     History of pneumonia 03/2020    HTN (hypertension)     Hyperlipidemia     Pneumonia     Positive cardiac stress test     Renal insufficiency 8/18/2022    SOB (shortness of breath)     Type 2 diabetes mellitus without complication (HCC)         Past Surgical History:     Past Surgical History:   Procedure Laterality Date    APPENDECTOMY      CARDIAC

## 2025-03-29 NOTE — DISCHARGE INSTR - COC
Continuity of Care Form    Patient Name: Francia Ferguson   :  1945  MRN:  [id number]    Admit date:  3/25/2025  Discharge date:  4/3/2025    Code Status Order: DNR-CCA   Advance Directives:    Date/Time Healthcare Directive Type of Healthcare Directive Copy in Chart Healthcare Agent Appointed Healthcare Agent's Name Healthcare Agent's Phone Number    [phone] Yes, patient has an advance directive for healthcare treatment  Durable power of  for health care;Living will  No, copy requested from family  Adult Children  Ashok Ferguson-son  [phone]             Admitting Physician:  Quin Ya MD  PCP: Jack Boone DO    Discharging Nurse: [phone]    Discharging Hospital Unit/Room#: 8/-  Discharging Unit Phone Number: [phone]    Emergency Contact:   Extended Emergency Contact Information  Primary Emergency Contact: Theo Ferguson  Address: [address] [place] [address]  Home Phone: [phone]  Mobile Phone: [phone]  Relation: Spouse  Hearing or visual needs: None  Other needs: None  Preferred language: English   needed? No  Secondary Emergency Contact: Jameson Triplett  Mobile Phone: [phone]  Relation: Child   needed? 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 s/p successful PCI with DELIA (3.25 X 12 mm)    EYE SURGERY      FEMUR SURGERY Right 3/26/2025    HIP IM NAIL RIGHT performed by Quin Ya MD at [facility] OR    HYSTERECTOMY (CERVIX STATUS UNKNOWN)      HYSTERECTOMY, VAGINAL      left both ovaries in       Immunization History:   Immunization History   Administered Date(s) Administered    COVID-19, MODERNA BLUE border, Primary or Immunocompromised, (age 12y+), IM, 100 mcg/0.5mL

## 2025-03-29 NOTE — PROGRESS NOTES
[facility]   OCCUPATIONAL THERAPY MISSED TREATMENT NOTE   INPATIENT   Date: 3/29/25  Patient Name: Francia Ferguson       Room:   MRN: [id number]   Account #: [id number]    : 1945  (79 y.o.)  Gender: female   Referring Practitioner: Quin Ya MD  Diagnosis: Fall at nursing home             REASON FOR MISSED TREATMENT:     Per RN Sheron, pt ok to work with. Attempted to see pt at 8:20 am with PTA Arin; however, pt confused and not following commands. Pt resistive to movement and not appropriate. RN notified.       Electronically signed by SHIRA Sainz on 3/29/25 at 10:45 AM EDT

## 2025-03-29 NOTE — PLAN OF CARE
Problem: Safety - Adult  Goal: Free from fall injury  3/29/2025 1145 by Sheron Can RN  Outcome: Progressing  3/29/2025 0005 by Gary Yang RN  Outcome: Progressing  Flowsheets (Taken 3/29/2025 0003)  Free From Fall Injury: Instruct family/caregiver on patient safety     Problem: Chronic Conditions and Co-morbidities  Goal: Patient's chronic conditions and co-morbidity symptoms are monitored and maintained or improved  3/29/2025 1145 by Sheron Can RN  Outcome: Progressing  3/29/2025 0005 by Gary Yang RN  Outcome: Progressing  Flowsheets (Taken 3/28/2025 2005)  Care Plan - Patient's Chronic Conditions and Co-Morbidity Symptoms are Monitored and Maintained or Improved: Monitor and assess patient's chronic conditions and comorbid symptoms for stability, deterioration, or improvement     Problem: Discharge Planning  Goal: Discharge to home or other facility with appropriate resources  3/29/2025 1145 by Sheron Can RN  Outcome: Progressing  3/29/2025 0005 by Gary Yang RN  Outcome: Progressing  Flowsheets (Taken 3/28/2025 2005)  Discharge to home or other facility with appropriate resources: Refer to discharge planning if patient needs post-hospital services based on physician order or complex needs related to functional status, cognitive ability or social support system     Problem: Skin/Tissue Integrity  Goal: Skin integrity remains intact  Description: 1.  Monitor for areas of redness and/or skin breakdown  2.  Assess vascular access sites hourly  3.  Every 4-6 hours minimum:  Change oxygen saturation probe site  4.  Every 4-6 hours:  If on nasal continuous positive airway pressure, respiratory therapy assess nares and determine need for appliance change or resting period  3/29/2025 1145 by Sheron Can RN  Outcome: Progressing  3/29/2025 0005 by Gary Yang RN  Outcome: Progressing  Flowsheets  Taken 3/29/2025 0003  Skin Integrity Remains Intact: Monitor for areas of redness and/or

## 2025-03-29 NOTE — DISCHARGE INSTRUCTIONS
Quin Ya MD  [facility]  Specializing in Shoulder and Elbow Surgery      POSTOPERATIVE INSTRUCTIONS    Surgery: Right hip fracture surgical stabilization    DIET  Begin with clear liquids and light foods (jellos, soups, etc.).  Progress to your normal diet if you are not nauseated.    WOUND CARE  Maintain your operative dressing, may loosen bandage if swelling occurs.  It is normal for joints to bleed and swell following surgery - if blood soaks onto the bandage, do not become alarmed - reinforce with additional dressing.  Surgical dressing is changed on the second post-operative day, and daily after that with clean gauze and tape. If it gets wet, it should be changed right away.   To avoid infection, keep surgical incisions clean and dry - you may shower by  placing a large garbage bag over the extremity starting the day after surgery OR if the incision is small enough a large water proof band aids sealed on all sides may be applied before showering - NO immersion of operative site (i.e. bath, pool).    MEDICATIONS  Most patients will require some narcotic pain medication for a short period of time - Start it before numbing medicine wears off, and use as directed on the bottle.  Common side effects of the pain medication are nausea, drowsiness, and  constipation - to decrease the side effects, take medication with food - if  constipation occurs, consider taking an over-the-counter laxative.  If you are having problems with nausea and vomiting, take your anti-emetic if prescribed, otherwise, contact the office to possibly have your medication changed ([phone]).  Do not drive a car or operate machinery while taking the narcotic medication.  You've been placed on a blood thinner for 3 weeks after  surgery to decrease the chance of developing blood clots.  If allowed by your doctor, Ibuprofen 200-600mg (i.e. Advil) may be taken in between the narcotic pain medication

## 2025-03-29 NOTE — PROGRESS NOTES
Patient has started grabbing/squeezing her own arms and has 3 small skin tears to the right forearm as a result. Dressing applied.

## 2025-03-29 NOTE — PLAN OF CARE
Problem: Safety - Adult  Goal: Free from fall injury  3/29/2025 0005 by Gary Yang RN  Outcome: Progressing  Flowsheets (Taken 3/29/2025 0003)  Free From Fall Injury: Instruct family/caregiver on patient safety  3/28/2025 1626 by Jodie Pearson RN  Outcome: Progressing     Problem: Chronic Conditions and Co-morbidities  Goal: Patient's chronic conditions and co-morbidity symptoms are monitored and maintained or improved  3/29/2025 0005 by Gary Yang RN  Outcome: Progressing  Flowsheets (Taken 3/28/2025 2005)  Care Plan - Patient's Chronic Conditions and Co-Morbidity Symptoms are Monitored and Maintained or Improved: Monitor and assess patient's chronic conditions and comorbid symptoms for stability, deterioration, or improvement  3/28/2025 1626 by Jodie Pearson RN  Outcome: Progressing     Problem: Discharge Planning  Goal: Discharge to home or other facility with appropriate resources  3/29/2025 0005 by Gary Yang RN  Outcome: Progressing  Flowsheets (Taken 3/28/2025 2005)  Discharge to home or other facility with appropriate resources: Refer to discharge planning if patient needs post-hospital services based on physician order or complex needs related to functional status, cognitive ability or social support system  3/28/2025 1626 by Jodie Pearson RN  Outcome: Progressing     Problem: Skin/Tissue Integrity  Goal: Skin integrity remains intact  Description: 1.  Monitor for areas of redness and/or skin breakdown  2.  Assess vascular access sites hourly  3.  Every 4-6 hours minimum:  Change oxygen saturation probe site  4.  Every 4-6 hours:  If on nasal continuous positive airway pressure, respiratory therapy assess nares and determine need for appliance change or resting period  3/29/2025 0005 by Gary Yang RN  Outcome: Progressing  Flowsheets  Taken 3/29/2025 0003  Skin Integrity Remains Intact: Monitor for areas of redness and/or skin breakdown  Taken 3/28/2025 2005  Skin

## 2025-03-30 LAB
ANION GAP SERPL CALCULATED.3IONS-SCNC: 14 MMOL/L (ref 9–16)
BASOPHILS # BLD: 0 K/UL (ref 0–0.2)
BASOPHILS NFR BLD: 1 % (ref 0–2)
BUN SERPL-MCNC: 23 MG/DL (ref 8–23)
CALCIUM SERPL-MCNC: 8.9 MG/DL (ref 8.6–10.4)
CHLORIDE SERPL-SCNC: 118 MMOL/L (ref 98–107)
CO2 SERPL-SCNC: 22 MMOL/L (ref 20–31)
CREAT SERPL-MCNC: 0.9 MG/DL (ref 0.7–1.2)
EOSINOPHIL # BLD: 0 K/UL (ref 0–0.4)
EOSINOPHILS RELATIVE PERCENT: 0 % (ref 0–4)
ERYTHROCYTE [DISTWIDTH] IN BLOOD BY AUTOMATED COUNT: 16.7 % (ref 11.5–14.9)
GFR, ESTIMATED: 65 ML/MIN/1.73M2
GLUCOSE BLD-MCNC: 146 MG/DL (ref 65–105)
GLUCOSE BLD-MCNC: 156 MG/DL (ref 65–105)
GLUCOSE BLD-MCNC: 253 MG/DL (ref 65–105)
GLUCOSE BLD-MCNC: 269 MG/DL (ref 65–105)
GLUCOSE SERPL-MCNC: 165 MG/DL (ref 74–99)
HCT VFR BLD AUTO: 24.4 % (ref 36–46)
HCT VFR BLD AUTO: 25.4 % (ref 36–46)
HGB BLD-MCNC: 8.1 G/DL (ref 12–16)
HGB BLD-MCNC: 8.1 G/DL (ref 12–16)
LYMPHOCYTES NFR BLD: 0.7 K/UL (ref 1–4.8)
LYMPHOCYTES RELATIVE PERCENT: 11 % (ref 24–44)
MAGNESIUM SERPL-MCNC: 1.9 MG/DL (ref 1.6–2.4)
MCH RBC QN AUTO: 31 PG (ref 26–34)
MCHC RBC AUTO-ENTMCNC: 33.1 G/DL (ref 31–37)
MCV RBC AUTO: 93.6 FL (ref 80–100)
MONOCYTES NFR BLD: 0.4 K/UL (ref 0.1–1.3)
MONOCYTES NFR BLD: 7 % (ref 1–7)
NEUTROPHILS NFR BLD: 81 % (ref 36–66)
NEUTS SEG NFR BLD: 5.4 K/UL (ref 1.3–9.1)
PLATELET # BLD AUTO: 263 K/UL (ref 150–450)
PMV BLD AUTO: 7.2 FL (ref 6–12)
POTASSIUM SERPL-SCNC: 3.1 MMOL/L (ref 3.7–5.3)
RBC # BLD AUTO: 2.61 M/UL (ref 4–5.2)
SODIUM SERPL-SCNC: 154 MMOL/L (ref 136–145)
WBC OTHER # BLD: 6.6 K/UL (ref 3.5–11)

## 2025-03-30 PROCEDURE — 97530 THERAPEUTIC ACTIVITIES: CPT

## 2025-03-30 PROCEDURE — 2580000003 HC RX 258

## 2025-03-30 PROCEDURE — 2580000003 HC RX 258: Performed by: INTERNAL MEDICINE

## 2025-03-30 PROCEDURE — 82947 ASSAY GLUCOSE BLOOD QUANT: CPT

## 2025-03-30 PROCEDURE — 6370000000 HC RX 637 (ALT 250 FOR IP): Performed by: INTERNAL MEDICINE

## 2025-03-30 PROCEDURE — 1200000000 HC SEMI PRIVATE

## 2025-03-30 PROCEDURE — 6360000002 HC RX W HCPCS

## 2025-03-30 PROCEDURE — 6370000000 HC RX 637 (ALT 250 FOR IP)

## 2025-03-30 PROCEDURE — 97535 SELF CARE MNGMENT TRAINING: CPT

## 2025-03-30 PROCEDURE — 85018 HEMOGLOBIN: CPT

## 2025-03-30 PROCEDURE — 99233 SBSQ HOSP IP/OBS HIGH 50: CPT

## 2025-03-30 PROCEDURE — 36415 COLL VENOUS BLD VENIPUNCTURE: CPT

## 2025-03-30 PROCEDURE — 92610 EVALUATE SWALLOWING FUNCTION: CPT

## 2025-03-30 PROCEDURE — 6360000002 HC RX W HCPCS: Performed by: INTERNAL MEDICINE

## 2025-03-30 PROCEDURE — 85014 HEMATOCRIT: CPT

## 2025-03-30 PROCEDURE — 83735 ASSAY OF MAGNESIUM: CPT

## 2025-03-30 PROCEDURE — 6370000000 HC RX 637 (ALT 250 FOR IP): Performed by: ORTHOPAEDIC SURGERY

## 2025-03-30 PROCEDURE — 97110 THERAPEUTIC EXERCISES: CPT

## 2025-03-30 PROCEDURE — 80048 BASIC METABOLIC PNL TOTAL CA: CPT

## 2025-03-30 PROCEDURE — 85025 COMPLETE CBC W/AUTO DIFF WBC: CPT

## 2025-03-30 RX ORDER — HYDROCODONE BITARTRATE AND ACETAMINOPHEN 5; 325 MG/1; MG/1
1 TABLET ORAL EVERY 6 HOURS PRN
Qty: 30 TABLET | Refills: 0 | Status: SHIPPED | OUTPATIENT
Start: 2025-03-30 | End: 2025-04-04

## 2025-03-30 RX ORDER — POTASSIUM CHLORIDE 1500 MG/1
40 TABLET, EXTENDED RELEASE ORAL PRN
Status: DISCONTINUED | OUTPATIENT
Start: 2025-03-30 | End: 2025-04-03 | Stop reason: CLARIF

## 2025-03-30 RX ORDER — POTASSIUM CHLORIDE 7.45 MG/ML
10 INJECTION INTRAVENOUS PRN
Status: DISCONTINUED | OUTPATIENT
Start: 2025-03-30 | End: 2025-04-03 | Stop reason: CLARIF

## 2025-03-30 RX ORDER — CLOPIDOGREL BISULFATE 75 MG/1
75 TABLET ORAL DAILY
Status: DISCONTINUED | OUTPATIENT
Start: 2025-03-30 | End: 2025-04-03 | Stop reason: HOSPADM

## 2025-03-30 RX ADMIN — POTASSIUM CHLORIDE 40 MEQ: 1500 TABLET, EXTENDED RELEASE ORAL at 16:44

## 2025-03-30 RX ADMIN — MICAFUNGIN SODIUM 100 MG: 100 INJECTION, POWDER, LYOPHILIZED, FOR SOLUTION INTRAVENOUS at 08:37

## 2025-03-30 RX ADMIN — INSULIN GLARGINE 10 UNITS: 100 INJECTION, SOLUTION SUBCUTANEOUS at 08:22

## 2025-03-30 RX ADMIN — ATORVASTATIN CALCIUM 40 MG: 40 TABLET, FILM COATED ORAL at 08:22

## 2025-03-30 RX ADMIN — METOPROLOL TARTRATE 50 MG: 50 TABLET, FILM COATED ORAL at 08:22

## 2025-03-30 RX ADMIN — SODIUM CHLORIDE: 9 INJECTION, SOLUTION INTRAVENOUS at 08:35

## 2025-03-30 RX ADMIN — INSULIN LISPRO 2 UNITS: 100 INJECTION, SOLUTION INTRAVENOUS; SUBCUTANEOUS at 08:22

## 2025-03-30 RX ADMIN — INSULIN LISPRO 2 UNITS: 100 INJECTION, SOLUTION INTRAVENOUS; SUBCUTANEOUS at 16:44

## 2025-03-30 RX ADMIN — PANTOPRAZOLE SODIUM 40 MG: 40 INJECTION, POWDER, LYOPHILIZED, FOR SOLUTION INTRAVENOUS at 08:21

## 2025-03-30 RX ADMIN — CLOPIDOGREL BISULFATE 75 MG: 75 TABLET, FILM COATED ORAL at 11:43

## 2025-03-30 RX ADMIN — SODIUM CHLORIDE 200 MG: 9 INJECTION, SOLUTION INTRAVENOUS at 11:46

## 2025-03-30 RX ADMIN — QUETIAPINE FUMARATE 25 MG: 50 TABLET ORAL at 22:49

## 2025-03-30 RX ADMIN — CEFTRIAXONE SODIUM 2000 MG: 2 INJECTION, POWDER, FOR SOLUTION INTRAMUSCULAR; INTRAVENOUS at 22:18

## 2025-03-30 RX ADMIN — ISOSORBIDE MONONITRATE 120 MG: 60 TABLET, EXTENDED RELEASE ORAL at 08:22

## 2025-03-30 ASSESSMENT — PAIN SCALES - WONG BAKER: WONGBAKER_NUMERICALRESPONSE: NO HURT

## 2025-03-30 NOTE — CARE COORDINATION
ONGOING DISCHARGE PLAN:    Family would like choice once all referrals respond for accept/denial.    [facility]-accepts  [facility]-pending  [place] [facility]-pending    Ortho  3/26 right femur IM nail    ID consult  IV rocephin/micafungin, change to oral at discharge.     Hgb 8.1 (3/28 1 unit RBCs)    PT/OT    Will continue to follow for additional discharge needs.    If patient is discharged prior to next notation, then this note serves as note for discharge by case management.    Electronically signed by Evi Clement RN on 3/30/2025 at 8:01 AM

## 2025-03-30 NOTE — PLAN OF CARE
Problem: Safety - Adult  Goal: Free from fall injury  3/30/2025 1041 by Sheron Can RN  Outcome: Progressing  3/30/2025 0039 by Brittney Collins RN  Outcome: Progressing     Problem: Chronic Conditions and Co-morbidities  Goal: Patient's chronic conditions and co-morbidity symptoms are monitored and maintained or improved  3/30/2025 1041 by Sheron Can RN  Outcome: Progressing  3/30/2025 0039 by Brittney Collins RN  Outcome: Progressing  Flowsheets (Taken 3/29/2025 2300)  Care Plan - Patient's Chronic Conditions and Co-Morbidity Symptoms are Monitored and Maintained or Improved:   Monitor and assess patient's chronic conditions and comorbid symptoms for stability, deterioration, or improvement   Collaborate with multidisciplinary team to address chronic and comorbid conditions and prevent exacerbation or deterioration   Update acute care plan with appropriate goals if chronic or comorbid symptoms are exacerbated and prevent overall improvement and discharge     Problem: Discharge Planning  Goal: Discharge to home or other facility with appropriate resources  3/30/2025 1041 by Sheron Can RN  Outcome: Progressing  3/30/2025 0039 by Brittney Collins RN  Outcome: Progressing  Flowsheets (Taken 3/29/2025 2300)  Discharge to home or other facility with appropriate resources:   Identify barriers to discharge with patient and caregiver   Arrange for needed discharge resources and transportation as appropriate   Identify discharge learning needs (meds, wound care, etc)     Problem: Skin/Tissue Integrity  Goal: Skin integrity remains intact  Description: 1.  Monitor for areas of redness and/or skin breakdown  2.  Assess vascular access sites hourly  3.  Every 4-6 hours minimum:  Change oxygen saturation probe site  4.  Every 4-6 hours:  If on nasal continuous positive airway pressure, respiratory therapy assess nares and determine need for appliance change or resting period  3/30/2025 1041 by Sheron Can RN  Outcome:

## 2025-03-30 NOTE — PROGRESS NOTES
Physical Therapy  [facility]   Physical Therapy Treatment  Date: 3/30/25  Patient Name: Francia Ferguson       Room:   MRN: [id number]  Account: [id number]   : 1945  (79 y.o.) Gender: female     Discharge Recommendations:  Discharge Recommendations: Long Term Care with PT     PT Equipment Recommendations  Equipment Needed: No         Past Medical History:  has a past medical history of Abnormal cardiovascular stress test, CAD (coronary artery disease), Chest pressure, Fatigue, History of pneumonia, HTN (hypertension), Hyperlipidemia, Pneumonia, Positive cardiac stress test, Renal insufficiency, SOB (shortness of breath), and Type 2 diabetes mellitus without complication (HCC).  Past Surgical History:   has a past surgical history that includes Cholecystectomy; Colonoscopy; Hysterectomy, vaginal; Coronary angioplasty with stent (2020); Hysterectomy; Appendectomy; Cardiac catheterization (2021); eye surgery; and Femur Surgery (Right, 3/26/2025).    Restrictions  Restrictions/Precautions  Restrictions/Precautions: Weight Bearing, Fall Risk, Bed Alarm  Implants Present? : Metal implants  Lower Extremity Weight Bearing Restrictions  Right Lower Extremity Weight Bearing: Weight Bearing As Tolerated     Subjective    Pt encountered in bed, laying on operative hip, able to state her name and answer some questions during session today. She was able to participate more during co-treatment with OT/PT today. After sit to stand activity pt returned to supine with R hip offloaded in partial L sidelying.  Bed alarm on, call light in reach. RN aware.              Objective  Orientation  Overall Orientation Status: Impaired  Orientation Level: Oriented to person  Cognition  Overall Cognitive Status: Exceptions  Arousal/Alertness: Inconsistent responses to stimuli  Following Commands: Inconsistently follows commands  Attention Span: Difficulty attending to directions, Difficulty

## 2025-03-30 NOTE — PROGRESS NOTES
Resting comfortably    BP (!) 154/51   Pulse 99   Temp 98.1 °F (36.7 °C) (Oral)   Resp 18   SpO2 94%   Right hip dressing is  clean, dry, and intact. 1+ pedal pulses. Brisk capillary refill in toes    Impression and plan: Ms. Ferguson is a 80 yo lady sp right hip IT fracture IMN POD 4    - Pain control  - Continue abx treatment for UTI per ID service  - Mobilize with PT if possible; WBAT  - Dressing changed tomorrow  - DC planning

## 2025-03-30 NOTE — PROGRESS NOTES
Exercises  Dynamic Sitting Balance Exercises: OTR/PT facilitated pt's engagement in dynamic sitting at EOB with SBA/CGA to promote improved balance and activity tolerance needed to engage in self care tasks. Pt able to sit EOB for 10-15 minutes with no gross LOB noted to engage in oral care, washing face and combing hair. Fair tolerance noted, however pt tends to perseverate on oral care task  Static Standing Balance Exercises: OTR/PT facilitated pt's engagement in static standing to promote improved overall balance and activity tolerance needed for self care and functional mobility. Pt with no LOB noted, BUE supported on PT with pt leaning full body weight into PT. Only tolerated standing ~30 seconds before returning to sitting at EOB    Patient Education  Patient Education  Education Given To: Patient  Education Provided: Role of Therapy, Plan of Care, Precautions, Transfer Training, ADL Adaptive Strategies  Education Method: Verbal, Demonstration  Barriers to Learning: Cognition  Education Outcome: Unable to demonstrate understanding, Unable to verbalize, Continued education needed    Goals  Short Term Goals  Time Frame for Short Term Goals: By discharge  Short Term Goal 1: Pt will complete bed mobility with Mod A to sit EOB unsupported for 15+ minutes with SBA during self care activity and Fair attention to task  Short Term Goal 2: Pt will complete upper body dressing/bathing with Mod A and Fair attention to task  Short Term Goal 3: Pt will follow simple 1 step commands 60% of the time to increase safety and participation in daily activities  Short Term Goal 4: Pt will participate in 15+ minutes of therapeutic exercises/functional activities to increase safety and independence with self care and mobility  Short Term Goal 5: OT to further assess transfers/mobility when appropriate  Occupational Therapy Plan  Times Per Week: 5-7  Current Treatment Recommendations: Self-Care / ADL, Strengthening, Balance training,  Minutes   Timed Code Treatment Minutes: 30 Minutes    Co-treatment with PT warranted secondary to decreased safety and independence requiring 2 skilled therapy professionals to address individual discipline's goals. OT addressing preparation for ADL transfer, sitting balance for increased ADL performance, sitting/activity tolerance, functional reaching, environmental safety/scanning, fall prevention, functional mobility for ADL transfers, ability to sequence and follow directions, bed mobility tech, and functional UE strength.    Electronically signed by ADRIAN Breen on 3/30/25 at 2:42 PM EDT

## 2025-03-30 NOTE — PROGRESS NOTES
SPEECH LANGUAGE PATHOLOGY  Facility/Department: [facility] MED SURG   CLINICAL BEDSIDE SWALLOW EVALUATION    NAME: Francia Ferguson  : 1945  MRN: [id number]    ADMISSION DATE: 3/25/2025  ADMITTING DIAGNOSIS: has Diabetic retinopathy (HCC); Hyperlipidemia; Osteoporosis; Type 2 diabetes mellitus without complication, with long-term current use of insulin (HCC); Allergic rhinitis; Arteriosclerosis of coronary artery; Vitamin D deficiency; Polyneuropathy; Elevated liver enzymes; Primary hypertension; Type 2 diabetes mellitus with diabetic neuropathy; Chronic kidney disease; Diabetic ketoacidosis with coma associated with type 2 diabetes mellitus (HCC); Coffee ground emesis; Anemia; Altered mental status; Acute respiratory failure (HCC); Atrial fibrillation (HCC); Diabetic ketoacidosis without coma associated with type 2 diabetes mellitus (HCC); Atrial fibrillation with RVR (HCC); High anion gap metabolic acidosis; Myocardial injury; Uncontrolled type 2 diabetes mellitus with hyperglycemia (HCC); Hypotension; Metabolic encephalopathy; Hypoglycemia; and Fall on their problem list.    Recent CT of Chest: 2025  IMPRESSION:  1. Comminuted, mildly displaced intertrochanteric right femur fracture.  2. Subacute minimally displaced sacral fracture at the S4 level.  3. Otherwise, no acute soft tissue injury identified in the chest or abdomen.  4. Previously seen localized opacity in the posterior right upper lobe along  the major fissure has improved. Scattered bilateral ground-glass and nodular  opacities also appear improved.  No significant residual pleural effusion.    Date of Eval: 3/30/2025  Evaluating Therapist: BAYLEE Garcia    Current Diet level:  Current Diet : Soft and Bite-Sized  Current Liquid Diet : Thin    Primary Complaint  Francia Ferguson is a 79 y.o. old female who presented to the emergency department on 3/25/2025 after she was found down in her room.  She resides in the memory care unit at [facility].  Due  recommendation  Patient Education Response: No evidence of learning             Therapy Time  SLP Individual Minutes  Time In: 1446  Time Out: 1457  Minutes: 11          Forrest Urrutia M.S., CCC-SLP   3/30/2025 3:40 PM

## 2025-03-30 NOTE — PLAN OF CARE
Problem: Safety - Adult  Goal: Free from fall injury  3/30/2025 0039 by Brittney Collins, RN  Outcome: Progressing, Patient remains free of incidence/ injury. Bed remains in low position. Side rails up x2.        Problem: Chronic Conditions and Co-morbidities  Goal: Patient's chronic conditions and co-morbidity symptoms are monitored and maintained or improved  3/30/2025 0039 by Brittney Collins, RN  Outcome: Progressing  Flowsheets (Taken 3/29/2025 2300)  Care Plan - Patient's Chronic Conditions and Co-Morbidity Symptoms are Monitored and Maintained or Improved:   Monitor and assess patient's chronic conditions and comorbid symptoms for stability, deterioration, or improvement   Collaborate with multidisciplinary team to address chronic and comorbid conditions and prevent exacerbation or deterioration   Update acute care plan with appropriate goals if chronic or comorbid symptoms are exacerbated and prevent overall improvement and discharge        Problem: Discharge Planning  Goal: Discharge to home or other facility with appropriate resources  3/30/2025 0039 by Brittney Collins RN  Outcome: Progressing  Flowsheets (Taken 3/29/2025 2300)  Discharge to home or other facility with appropriate resources:   Identify barriers to discharge with patient and caregiver   Arrange for needed discharge resources and transportation as appropriate   Identify discharge learning needs (meds, wound care, etc)       Problem: Skin/Tissue Integrity  Goal: Skin integrity remains intact  Description: 1.  Monitor for areas of redness and/or skin breakdown  2.  Assess vascular access sites hourly  3.  Every 4-6 hours minimum:  Change oxygen saturation probe site  4.  Every 4-6 hours:  If on nasal continuous positive airway pressure, respiratory therapy assess nares and determine need for appliance change or resting period  3/30/2025 0039 by Brittney Collins, RN  Outcome: Progressing  Flowsheets (Taken 3/30/2025 0038)  Skin Integrity Remains Intact:

## 2025-03-30 NOTE — PROGRESS NOTES
IN-PATIENT SERVICE  [facility] Internal Medicine  [facility] Internal Medicine   Jovan Calvillo MD; [place] Sloan MD; Kel Navarro MD; Rei Rivas MD,   Jeaneth Henao MD; Kathryn Gonzalez MD; Ashley Yang MD; Kiera Singleton MD    Progress Note            Date:   3/30/2025  Patient name:  Francia Ferguson  Date of admission:  3/25/2025 10:40 PM  MRN:   [id number]  Account:  [id number]  YOB: 1945  PCP:    Jack Boone DO  Room:   2068/2068-01  Code Status:    DNR-CCA    Physician Requesting Consult: Quin Ya MD    Reason for Consult:  Medical management    Chief Complaint:     Chief Complaint   Patient presents with    Fall        History Obtained From:     Patient, EMR, nursing staff     History of Present Illness:     79-year-old female presented to the emergency department after she was found down in her room.  She lives in a memory care unit.  He recently discharged from the facility for hypoglycemia.  Noted to have acute right intertrochanteric fracture and subacute S4 fracture  3/28   Patient, has advanced dementia, had a fall, admitted with fever fracture, underwent surgery  Patient urine culture growing Candida and Klebsiella on antibiotics per ID  Hemoglobin dropped to 6.8, no obvious signs of bleeding  Likely dilutional, postsurgical losses  Very poor oral intake  Past Medical History:     Past Medical History:   Diagnosis Date    Abnormal cardiovascular stress test 06/2020    no ischemia / infarction   there is septal hypokinesis   /  EF 70%    CAD (coronary artery disease)     Chest pressure     Fatigue     History of pneumonia 03/2020    HTN (hypertension)     Hyperlipidemia     Pneumonia     Positive cardiac stress test     Renal insufficiency 8/18/2022    SOB (shortness of breath)     Type 2 diabetes mellitus without complication (HCC)         Past Surgical History:     Past Surgical History:   Procedure Laterality Date    APPENDECTOMY      CARDIAC

## 2025-03-31 ENCOUNTER — APPOINTMENT (OUTPATIENT)
Dept: GENERAL RADIOLOGY | Age: 80
DRG: 956 | End: 2025-03-31
Payer: MEDICARE

## 2025-03-31 LAB
ABO/RH: NORMAL
ABSOLUTE BANDS: 0.09 K/UL (ref 0–1)
ANION GAP SERPL CALCULATED.3IONS-SCNC: 12 MMOL/L (ref 9–16)
ANION GAP SERPL CALCULATED.3IONS-SCNC: 22 MMOL/L (ref 9–16)
ANTIBODY SCREEN: NEGATIVE
ARM BAND NUMBER: NORMAL
B-OH-BUTYR SERPL-MCNC: 6.73 MMOL/L (ref 0.02–0.27)
BANDS: 2 % (ref 0–10)
BASOPHILS # BLD: 0 K/UL (ref 0–0.2)
BASOPHILS NFR BLD: 0 % (ref 0–2)
BLOOD BANK BLOOD PRODUCT EXPIRATION DATE: NORMAL
BLOOD BANK DISPENSE STATUS: NORMAL
BLOOD BANK ISBT PRODUCT BLOOD TYPE: 5100
BLOOD BANK PRODUCT CODE: NORMAL
BLOOD BANK SAMPLE EXPIRATION: NORMAL
BLOOD BANK UNIT TYPE AND RH: NORMAL
BPU ID: NORMAL
BUN SERPL-MCNC: 29 MG/DL (ref 8–23)
BUN SERPL-MCNC: 32 MG/DL (ref 8–23)
CALCIUM SERPL-MCNC: 8.2 MG/DL (ref 8.6–10.4)
CALCIUM SERPL-MCNC: 8.2 MG/DL (ref 8.6–10.4)
CHLORIDE SERPL-SCNC: 109 MMOL/L (ref 98–107)
CHLORIDE SERPL-SCNC: 122 MMOL/L (ref 98–107)
CO2 SERPL-SCNC: 14 MMOL/L (ref 20–31)
CO2 SERPL-SCNC: 20 MMOL/L (ref 20–31)
COHGB MFR BLD: 3.3 % (ref 0–5)
COMPONENT: NORMAL
CREAT SERPL-MCNC: 1.2 MG/DL (ref 0.7–1.2)
CREAT SERPL-MCNC: 1.2 MG/DL (ref 0.7–1.2)
CROSSMATCH RESULT: NORMAL
EOSINOPHIL # BLD: 0 K/UL (ref 0–0.4)
EOSINOPHILS RELATIVE PERCENT: 0 % (ref 0–4)
ERYTHROCYTE [DISTWIDTH] IN BLOOD BY AUTOMATED COUNT: 17.4 % (ref 11.5–14.9)
EST. AVERAGE GLUCOSE BLD GHB EST-MCNC: 137 MG/DL
GFR, ESTIMATED: 46 ML/MIN/1.73M2
GFR, ESTIMATED: 46 ML/MIN/1.73M2
GLUCOSE BLD-MCNC: 146 MG/DL (ref 65–105)
GLUCOSE BLD-MCNC: 362 MG/DL (ref 65–105)
GLUCOSE BLD-MCNC: 390 MG/DL (ref 65–105)
GLUCOSE BLD-MCNC: 459 MG/DL (ref 65–105)
GLUCOSE BLD-MCNC: 529 MG/DL (ref 65–105)
GLUCOSE BLD-MCNC: 66 MG/DL (ref 65–105)
GLUCOSE BLD-MCNC: 70 MG/DL (ref 65–105)
GLUCOSE BLD-MCNC: 83 MG/DL (ref 65–105)
GLUCOSE BLD-MCNC: 96 MG/DL (ref 65–105)
GLUCOSE SERPL-MCNC: 59 MG/DL (ref 74–99)
GLUCOSE SERPL-MCNC: 746 MG/DL (ref 74–99)
HBA1C MFR BLD: 6.4 % (ref 4–6)
HCO3 VENOUS: 14.9 MMOL/L (ref 24–30)
HCT VFR BLD AUTO: 27.7 % (ref 36–46)
HGB BLD-MCNC: 8.6 G/DL (ref 12–16)
LYMPHOCYTES NFR BLD: 0.26 K/UL (ref 1–4.8)
LYMPHOCYTES RELATIVE PERCENT: 6 % (ref 24–44)
MAGNESIUM SERPL-MCNC: 1.9 MG/DL (ref 1.6–2.4)
MCH RBC QN AUTO: 30.8 PG (ref 26–34)
MCHC RBC AUTO-ENTMCNC: 31.1 G/DL (ref 31–37)
MCV RBC AUTO: 99.1 FL (ref 80–100)
METHEMOGLOBIN: 0.2 % (ref 0–1.9)
MONOCYTES NFR BLD: 0.18 K/UL (ref 0.1–1.3)
MONOCYTES NFR BLD: 4 % (ref 1–7)
MORPHOLOGY: ABNORMAL
MORPHOLOGY: ABNORMAL
MYELOCYTES ABSOLUTE COUNT: 0.09 K/UL
MYELOCYTES: 2 %
NEGATIVE BASE EXCESS, VEN: 10.9 MMOL/L (ref 0–2)
NEUTROPHILS NFR BLD: 86 % (ref 36–66)
NEUTS SEG NFR BLD: 3.78 K/UL (ref 1.3–9.1)
O2 SAT, VEN: 58 % (ref 60–85)
PCO2 VENOUS: 32.7 MM HG (ref 39–55)
PH VENOUS: 7.28 (ref 7.32–7.42)
PHOSPHATE SERPL-MCNC: 2.3 MG/DL (ref 2.5–4.5)
PLATELET # BLD AUTO: 253 K/UL (ref 150–450)
PMV BLD AUTO: 7.2 FL (ref 6–12)
PO2 VENOUS: 35.5 MM HG (ref 30–50)
POTASSIUM SERPL-SCNC: 3 MMOL/L (ref 3.7–5.3)
POTASSIUM SERPL-SCNC: 4 MMOL/L (ref 3.7–5.3)
RBC # BLD AUTO: 2.8 M/UL (ref 4–5.2)
SODIUM SERPL-SCNC: 145 MMOL/L (ref 136–145)
SODIUM SERPL-SCNC: 154 MMOL/L (ref 136–145)
TEXT FOR RESPIRATORY: ABNORMAL
TRANSFUSION STATUS: NORMAL
UNIT DIVISION: 0
UNIT ISSUE DATE/TIME: NORMAL
WBC OTHER # BLD: 4.4 K/UL (ref 3.5–11)

## 2025-03-31 PROCEDURE — 6360000002 HC RX W HCPCS: Performed by: INTERNAL MEDICINE

## 2025-03-31 PROCEDURE — 82010 KETONE BODYS QUAN: CPT

## 2025-03-31 PROCEDURE — 6370000000 HC RX 637 (ALT 250 FOR IP): Performed by: INTERNAL MEDICINE

## 2025-03-31 PROCEDURE — 82947 ASSAY GLUCOSE BLOOD QUANT: CPT

## 2025-03-31 PROCEDURE — 6370000000 HC RX 637 (ALT 250 FOR IP): Performed by: ORTHOPAEDIC SURGERY

## 2025-03-31 PROCEDURE — 6360000002 HC RX W HCPCS: Performed by: ORTHOPAEDIC SURGERY

## 2025-03-31 PROCEDURE — G0545 PR INHERENT VISIT TO INPT: HCPCS | Performed by: INTERNAL MEDICINE

## 2025-03-31 PROCEDURE — 97530 THERAPEUTIC ACTIVITIES: CPT

## 2025-03-31 PROCEDURE — 99232 SBSQ HOSP IP/OBS MODERATE 35: CPT | Performed by: INTERNAL MEDICINE

## 2025-03-31 PROCEDURE — 36415 COLL VENOUS BLD VENIPUNCTURE: CPT

## 2025-03-31 PROCEDURE — 80048 BASIC METABOLIC PNL TOTAL CA: CPT

## 2025-03-31 PROCEDURE — 97535 SELF CARE MNGMENT TRAINING: CPT

## 2025-03-31 PROCEDURE — 84100 ASSAY OF PHOSPHORUS: CPT

## 2025-03-31 PROCEDURE — 71045 X-RAY EXAM CHEST 1 VIEW: CPT

## 2025-03-31 PROCEDURE — 6370000000 HC RX 637 (ALT 250 FOR IP)

## 2025-03-31 PROCEDURE — 2500000003 HC RX 250 WO HCPCS: Performed by: INTERNAL MEDICINE

## 2025-03-31 PROCEDURE — 2580000003 HC RX 258: Performed by: INTERNAL MEDICINE

## 2025-03-31 PROCEDURE — 82805 BLOOD GASES W/O2 SATURATION: CPT

## 2025-03-31 PROCEDURE — 83036 HEMOGLOBIN GLYCOSYLATED A1C: CPT

## 2025-03-31 PROCEDURE — 85025 COMPLETE CBC W/AUTO DIFF WBC: CPT

## 2025-03-31 PROCEDURE — 93005 ELECTROCARDIOGRAM TRACING: CPT

## 2025-03-31 PROCEDURE — 2000000000 HC ICU R&B

## 2025-03-31 PROCEDURE — 83735 ASSAY OF MAGNESIUM: CPT

## 2025-03-31 RX ORDER — SODIUM CHLORIDE 9 MG/ML
INJECTION, SOLUTION INTRAVENOUS CONTINUOUS
Status: DISCONTINUED | OUTPATIENT
Start: 2025-03-31 | End: 2025-03-31

## 2025-03-31 RX ORDER — POTASSIUM CHLORIDE 7.45 MG/ML
10 INJECTION INTRAVENOUS PRN
Status: DISCONTINUED | OUTPATIENT
Start: 2025-03-31 | End: 2025-04-03 | Stop reason: CLARIF

## 2025-03-31 RX ORDER — CEPHALEXIN 500 MG/1
500 CAPSULE ORAL EVERY 8 HOURS SCHEDULED
Status: DISCONTINUED | OUTPATIENT
Start: 2025-03-31 | End: 2025-04-03

## 2025-03-31 RX ORDER — INSULIN LISPRO 100 [IU]/ML
10 INJECTION, SOLUTION INTRAVENOUS; SUBCUTANEOUS ONCE
Status: COMPLETED | OUTPATIENT
Start: 2025-03-31 | End: 2025-03-31

## 2025-03-31 RX ORDER — HEPARIN SODIUM 5000 [USP'U]/ML
5000 INJECTION, SOLUTION INTRAVENOUS; SUBCUTANEOUS 2 TIMES DAILY
Status: DISCONTINUED | OUTPATIENT
Start: 2025-03-31 | End: 2025-04-03

## 2025-03-31 RX ORDER — MAGNESIUM SULFATE HEPTAHYDRATE 40 MG/ML
2000 INJECTION, SOLUTION INTRAVENOUS PRN
Status: DISCONTINUED | OUTPATIENT
Start: 2025-03-31 | End: 2025-04-03 | Stop reason: CLARIF

## 2025-03-31 RX ORDER — 0.9 % SODIUM CHLORIDE 0.9 %
250 INTRAVENOUS SOLUTION INTRAVENOUS ONCE
Status: COMPLETED | OUTPATIENT
Start: 2025-03-31 | End: 2025-03-31

## 2025-03-31 RX ORDER — DEXTROSE MONOHYDRATE AND SODIUM CHLORIDE 5; .45 G/100ML; G/100ML
INJECTION, SOLUTION INTRAVENOUS CONTINUOUS
Status: DISCONTINUED | OUTPATIENT
Start: 2025-03-31 | End: 2025-04-01

## 2025-03-31 RX ORDER — DEXTROSE MONOHYDRATE AND SODIUM CHLORIDE 5; .45 G/100ML; G/100ML
INJECTION, SOLUTION INTRAVENOUS CONTINUOUS PRN
Status: DISCONTINUED | OUTPATIENT
Start: 2025-03-31 | End: 2025-03-31

## 2025-03-31 RX ADMIN — SODIUM CHLORIDE 250 ML: 0.9 INJECTION, SOLUTION INTRAVENOUS at 15:52

## 2025-03-31 RX ADMIN — SODIUM CHLORIDE: 0.9 INJECTION, SOLUTION INTRAVENOUS at 16:59

## 2025-03-31 RX ADMIN — CLOPIDOGREL BISULFATE 75 MG: 75 TABLET, FILM COATED ORAL at 10:07

## 2025-03-31 RX ADMIN — MICAFUNGIN SODIUM 100 MG: 100 INJECTION, POWDER, LYOPHILIZED, FOR SOLUTION INTRAVENOUS at 17:25

## 2025-03-31 RX ADMIN — INSULIN LISPRO 4 UNITS: 100 INJECTION, SOLUTION INTRAVENOUS; SUBCUTANEOUS at 12:16

## 2025-03-31 RX ADMIN — ASPIRIN 81 MG: 81 TABLET, CHEWABLE ORAL at 10:07

## 2025-03-31 RX ADMIN — INSULIN LISPRO 10 UNITS: 100 INJECTION, SOLUTION INTRAVENOUS; SUBCUTANEOUS at 12:22

## 2025-03-31 RX ADMIN — SODIUM CHLORIDE 8 UNITS/HR: 9 INJECTION, SOLUTION INTRAVENOUS at 15:51

## 2025-03-31 RX ADMIN — DEXTROSE AND SODIUM CHLORIDE: 5; .45 INJECTION, SOLUTION INTRAVENOUS at 19:50

## 2025-03-31 RX ADMIN — METOPROLOL TARTRATE 50 MG: 50 TABLET, FILM COATED ORAL at 10:07

## 2025-03-31 RX ADMIN — POTASSIUM CHLORIDE 10 MEQ: 7.46 INJECTION, SOLUTION INTRAVENOUS at 21:27

## 2025-03-31 RX ADMIN — QUETIAPINE FUMARATE 25 MG: 50 TABLET ORAL at 20:01

## 2025-03-31 RX ADMIN — SODIUM PHOSPHATE, MONOBASIC, MONOHYDRATE AND SODIUM PHOSPHATE, DIBASIC, ANHYDROUS 15 MMOL: 142; 276 INJECTION, SOLUTION INTRAVENOUS at 23:22

## 2025-03-31 RX ADMIN — HEPARIN SODIUM 5000 UNITS: 5000 INJECTION INTRAVENOUS; SUBCUTANEOUS at 21:31

## 2025-03-31 RX ADMIN — METOPROLOL TARTRATE 50 MG: 50 TABLET, FILM COATED ORAL at 20:01

## 2025-03-31 RX ADMIN — ISOSORBIDE MONONITRATE 120 MG: 60 TABLET, EXTENDED RELEASE ORAL at 10:07

## 2025-03-31 RX ADMIN — POTASSIUM CHLORIDE 10 MEQ: 7.46 INJECTION, SOLUTION INTRAVENOUS at 22:37

## 2025-03-31 RX ADMIN — ATORVASTATIN CALCIUM 40 MG: 40 TABLET, FILM COATED ORAL at 10:07

## 2025-03-31 RX ADMIN — MORPHINE SULFATE 2 MG: 2 INJECTION, SOLUTION INTRAMUSCULAR; INTRAVENOUS at 18:21

## 2025-03-31 RX ADMIN — INSULIN GLARGINE 10 UNITS: 100 INJECTION, SOLUTION SUBCUTANEOUS at 10:07

## 2025-03-31 ASSESSMENT — PAIN SCALES - WONG BAKER
WONGBAKER_NUMERICALRESPONSE: NO HURT
WONGBAKER_NUMERICALRESPONSE: HURTS A LITTLE BIT

## 2025-03-31 ASSESSMENT — PAIN SCALES - PAIN ASSESSMENT IN ADVANCED DEMENTIA (PAINAD)
TOTALSCORE: 2
FACIALEXPRESSION: SMILING OR INEXPRESSIVE
FACIALEXPRESSION: SMILING OR INEXPRESSIVE
CONSOLABILITY: NO NEED TO CONSOLE
TOTALSCORE: 0
CONSOLABILITY: DISTRACTED OR REASSURED BY VOICE/TOUCH
BREATHING: NORMAL
BODYLANGUAGE: TENSE, DISTRESSED PACING, FIDGETING
BODYLANGUAGE: RELAXED
BREATHING: NORMAL

## 2025-03-31 NOTE — CONSULTS
[facility]   CONSULT NOTE:      DATE OF CONSULT 3/31/2025    REASON FOR CONSULTATION:   DKA and ICU admission       PCP Jack Boone DO     CHIEF COMPLAINT: fall at nursing home     HISTORY OF PRESENT ILLNESS:     Francia Ferguson is a 79 year old female who presented to the emergency department on 3/25/25 after being found down in her memory care unit. She has past medical history significant for dementia, T2DM, HTN, CAD, and CKD not on hemodialysis. Imaging in the ED showed evidence of a right femur fracture and sacral (S4) fracture. The patient was also diagnosed with a UTI and was started on antibiotics.     Patient underwent surgery on 3/26/25 for stabilization with intramedullary nail of the right intertrochanteric fracture with orthopedic surgery. There were no post-operative complications.     She was seen by Dr. Hernandez in February of this year for DKA as well. At that time she presented to the emergency department and was extremely acidotic and was in atrial fibrillation with rapid ventricular response.     There are no documented PFTs or sleep study in her chart.     ALLERGIES:  No Known Allergies    HOME MEDICATIONS:  Medications Prior to Admission: atorvastatin (LIPITOR) 40 MG tablet, TAKE 1 TABLET BY MOUTH DAILY  divalproex (DEPAKOTE SPRINKLE) 125 MG DR capsule, Take 2 capsules by mouth 2 times daily  metoprolol tartrate (LOPRESSOR) 50 MG tablet, Take 1 tablet by mouth 2 times daily  midodrine (PROAMATINE) 2.5 MG tablet, Take 1 tablet by mouth 3 times daily as needed (sbp < 100)  famotidine (PEPCID) 20 MG tablet, Take 1 tablet by mouth 2 times daily  isosorbide mononitrate (IMDUR) 120 MG extended release tablet, TAKE 1 TABLET BY MOUTH DAILY  clopidogrel (PLAVIX) 75 MG tablet, Take 1 tablet by mouth daily  Magnesium 300 MG CAPS, Take 2 capsules by mouth at bedtime  Multiple Vitamins TABS, Take 1 tablet by mouth daily  insulin glargine (LANTUS) 100 UNIT/ML  questionnaire     Fear of Current or Ex-Partner: No     Emotionally Abused: No     Physically Abused: No     Sexually Abused: No   Housing Stability: Low Risk  (3/17/2025)    Housing Stability Vital Sign     Unable to Pay for Housing in the Last Year: No     Number of Times Moved in the Last Year: 1     Homeless in the Last Year: No       FAMILY HISTORY:  Family History   Problem Relation Age of Onset    Heart Disease Mother     Diabetes Mother     Diabetes Father     Heart Disease Brother     Esophageal Cancer Brother     Diabetes Maternal Grandmother     Diabetes Maternal Aunt     Diabetes Maternal Uncle     Breast Cancer Neg Hx        REVIEW OF SYSTEMS:  Could not be obtained secondary to the patient's current mental status.     PHYSICAL EXAM:  Vital Signs Blood pressure (!) 149/64, pulse (!) 110, temperature 98.1 °F (36.7 °C), temperature source Axillary, resp. rate 16, SpO2 98%, not currently breastfeeding.  Oxygen Amount and Delivery: O2 Flow Rate (L/min): 4 L/min    Admission Weight      General Appearance Frail appearing, in no acute distress   Head  Normocephalic, without obvious abnormality, atraumatic  Eyes  conjunctivae/corneas clear. PERRL, EOM's intact.   ENT  normal tongue and posterior oropharynx   Neck  no adenopathy, no carotid bruit, no JVD, supple, symmetrical, trachea midline and thyroid not enlarged, symmetric, no tenderness/mass/nodules  Lungs  clear to auscultation bilaterally without wheezes or rhonchi  Heart: regular rate and rhythm, S1, S2 normal, no murmur, click, rub or gallop  Abdomen  soft, non-tender; bowel sounds normal; no masses,  no organomegaly  Extremities without erythema or edema, surgical incision on the right hip, no signs of infection   Skin  Skin color, texture, turgor normal. No rashes or lesions  Neurologic: Demented, normal strength and tone.         Imaging      Lab Review  CBC     Lab Results   Component Value Date/Time    WBC 4.4 03/31/2025 11:15 AM    RBC 2.80

## 2025-03-31 NOTE — PROGRESS NOTES
Report given to DEDRA Arellaon from ICU. Questions answered. Transfer orders in place.     Pts  Theo, notified.

## 2025-03-31 NOTE — PROGRESS NOTES
Pt rec'd to ICU 2001, confused with minimal verbal interaction, one word responses, alert to self only, no IV access present. Pt continually pulling at clothes, monitor leads, and staff. Dana care performed multiple times due to incontinence, pt picking at brief and dressing to R hip. Restraints ordered/applied, PIV x2 placed with 3 staff members to assist with restraining pt. Insulin gtt initiated, titrated per protocol, IVF infusing. Son Ashok and  to room to visit, updated with plan of care. PRN IV Morphine sulfate 2 mg given due to yelling out during brief change, pt more settled at this time.

## 2025-03-31 NOTE — PROGRESS NOTES
[facility]   Physical Therapy Treatment  Date: 3/31/25  Patient Name: Francia Ferguson       Room:   MRN: [id number]  Account: [id number]   : 1945  (79 y.o.) Gender: female     Discharge Recommendations:  Long Term Care with PT     PT Equipment Recommendations  Equipment Needed: No         Past Medical History:  has a past medical history of Abnormal cardiovascular stress test, CAD (coronary artery disease), Chest pressure, Fatigue, History of pneumonia, HTN (hypertension), Hyperlipidemia, Pneumonia, Positive cardiac stress test, Renal insufficiency, SOB (shortness of breath), and Type 2 diabetes mellitus without complication (HCC).  Past Surgical History:   has a past surgical history that includes Cholecystectomy; Colonoscopy; Hysterectomy, vaginal; Coronary angioplasty with stent (2020); Hysterectomy; Appendectomy; Cardiac catheterization (2021); eye surgery; and Femur Surgery (Right, 3/26/2025).    Restrictions  Restrictions/Precautions  Restrictions/Precautions: Weight Bearing, Fall Risk, Bed Alarm  Implants Present? : Metal implants  Lower Extremity Weight Bearing Restrictions  Right Lower Extremity Weight Bearing: Weight Bearing As Tolerated     Subjective  Patient able to respond to questions, requires increased time for patient to engage c questions.     General Comments: Co treat NANCY Miller     Pain  Pre-Pain: 0  Post-Pain: 0     Objective  Orientation  Orientation Level: Oriented to person  Cognition  Overall Cognitive Status: Exceptions  Arousal/Alertness: Inconsistent responses to stimuli  Following Commands: Inconsistently follows commands  Attention Span: Difficulty attending to directions, Difficulty dividing attention  Memory: Impaired  Safety Judgement: Decreased awareness of need for safety, Decreased awareness of need for assistance  Problem Solving: Assistance required to correct errors made, Assistance required to identify errors made,  Assistance required to implement solutions, Assistance required to generate solutions, Decreased awareness of errors  Insights: Not aware of deficits  Initiation: Requires cues for all  Sequencing: Requires cues for all               Transfers  Comment: not safe to attempt at this time     Mobility      Ambulation  More Ambulation?: No (not appropriate)       Bed Mobility  Rolling to Right: Substantial/Maximal assistance, 2 Person assistance (performs reaching)  Supine to Sit: 2 Person assistance, Substantial/Maximal assistance (performs reaching)  Sit to Supine: Dependent/Total, 2 Person assistance  Scooting: Dependent/Total  Bed Mobility Comments: patient in fetal position upon entering/exiting room, supine.       PT Exercises  Functional Mobility Circuit Training: donning gown and slippers dependent.  Static Sitting Balance Exercises: MIN A for static balance of 1  Dynamic Sitting Balance Exercises: Pt sat EOB ~ 35 minutes to work on dymaic balance c feeding reaching MOD A for dynamic balance leaning to R , MAX to assist c feeding       Decision Making: Low Complexity  History: R Femur Fx  Exam: decreased balance, cognition, mobility  Clinical Presentation: stable  Barriers to Learning: cognition    Activity Tolerance: Treatment limited secondary to decreased cognition     Patient Education  Education Given To: Patient  Education Provided: Mobility Training, Role of Therapy  Education Method: Verbal, Demonstration  Barriers to Learning: Cognition  Education Outcome: Continued education needed     Functional Outcome Measures  AM-PAC Basic Mobility - Inpatient   How much help is needed turning from your back to your side while in a flat bed without using bedrails?: Total  How much help is needed moving from lying on your back to sitting on the side of a flat bed without using bedrails?: Total  How much help is needed moving to and from a bed to a chair?: Total  How much help is needed standing up from a chair using

## 2025-03-31 NOTE — CARE COORDINATION
DISCHARGE PLANNING NOTE:    Patient is from [place] [facility] [place], will need skilled stay prior to returning to assisted living unit.  Patient has been accepted at [facility] in [place] and [place]. Writer spoke to son Ashok, who is agreeable to [place] location.     Insurance authorization submitted via [employer] online portal. Auth ID [id number]    Patient elects to proceed with YAG capsulotomy.

## 2025-03-31 NOTE — PLAN OF CARE
Problem: Safety - Adult  Goal: Free from fall injury  Outcome: Progressing, Patient remains free of incidence/ injury. Bed remains in low position. Side rails up x2.        Problem: Chronic Conditions and Co-morbidities  Goal: Patient's chronic conditions and co-morbidity symptoms are monitored and maintained or improved  Outcome: Progressing  Flowsheets (Taken 3/30/2025 2230)  Care Plan - Patient's Chronic Conditions and Co-Morbidity Symptoms are Monitored and Maintained or Improved:   Monitor and assess patient's chronic conditions and comorbid symptoms for stability, deterioration, or improvement   Collaborate with multidisciplinary team to address chronic and comorbid conditions and prevent exacerbation or deterioration   Update acute care plan with appropriate goals if chronic or comorbid symptoms are exacerbated and prevent overall improvement and discharge        Problem: Discharge Planning  Goal: Discharge to home or other facility with appropriate resources  Outcome: Progressing  Flowsheets (Taken 3/30/2025 2230)  Discharge to home or other facility with appropriate resources:   Identify barriers to discharge with patient and caregiver   Arrange for needed discharge resources and transportation as appropriate   Identify discharge learning needs (meds, wound care, etc)   Refer to discharge planning if patient needs post-hospital services based on physician order or complex needs related to functional status, cognitive ability or social support system      Problem: Skin/Tissue Integrity  Goal: Skin integrity remains intact  Description: 1.  Monitor for areas of redness and/or skin breakdown  2.  Assess vascular access sites hourly  3.  Every 4-6 hours minimum:  Change oxygen saturation probe site  4.  Every 4-6 hours:  If on nasal continuous positive airway pressure, respiratory therapy assess nares and determine need for appliance change or resting period  Outcome: Progressing  Flowsheets (Taken 3/31/2025  0041)  Skin Integrity Remains Intact: Monitor for areas of redness and/or skin breakdown

## 2025-03-31 NOTE — PROGRESS NOTES
Patient, had hyperglycemia, BMP suggestive of high anion gap metabolic acidosis  Beta hydroxybutyric acid is high  Clinical picture Concerning for DKA  Transferring patient to ICU  For DKA protocol, we need to be very judicious with fluids

## 2025-03-31 NOTE — PROGRESS NOTES
Patient removed surgical hip dressing overnight 3 times. Writer reached out to Dr. Ya to see if he would like to change dressing orders. Per Dr. Ya try tegaderm over gauze if patient continues to removed dressing.

## 2025-03-31 NOTE — PROGRESS NOTES
IN-PATIENT SERVICE  [facility] Internal Medicine  [facility] Internal Medicine   Jovan Calvillo MD; New Sloan MD; Kel Navarro MD; Rei Rivas MD,   Jeaneth Henao MD; Kathryn Gonzalez MD; Ashley Yang MD; Kiera Singleton MD    Progress Note            Date:   3/31/2025  Patient name:  Francia Ferguson  Date of admission:  3/25/2025 10:40 PM  MRN:   [id number]  Account:  [id number]  YOB: 1945  PCP:    Jack Boone DO  Room:   2068/2068-01  Code Status:    DNR-CCA    Physician Requesting Consult: Quin Ya MD    Reason for Consult:  Medical management    Chief Complaint:     Chief Complaint   Patient presents with    Fall        History Obtained From:     Patient, EMR, nursing staff     History of Present Illness:     79-year-old female presented to the emergency department after she was found down in her room.  She lives in a memory care unit.  He recently discharged from the facility for hypoglycemia.  Noted to have acute right intertrochanteric fracture and subacute S4 fracture  3/28   Patient, has advanced dementia, had a fall, admitted with fever fracture, underwent surgery  Patient urine culture growing Candida and Klebsiella on antibiotics per ID  Hemoglobin dropped to 6.8, no obvious signs of bleeding  Likely dilutional, postsurgical losses  Very poor oral intake  Past Medical History:     Past Medical History:   Diagnosis Date    Abnormal cardiovascular stress test 06/2020    no ischemia / infarction   there is septal hypokinesis   /  EF 70%    CAD (coronary artery disease)     Chest pressure     Fatigue     History of pneumonia 03/2020    HTN (hypertension)     Hyperlipidemia     Pneumonia     Positive cardiac stress test     Renal insufficiency 8/18/2022    SOB (shortness of breath)     Type 2 diabetes mellitus without complication (HCC)         Past Surgical History:     Past Surgical History:   Procedure Laterality Date    APPENDECTOMY      CARDIAC  Fingerstick    Collection Time: 03/30/25  8:44 PM   Result Value Ref Range    POC Glucose 156 (H) 65 - 105 mg/dL       Imaging/Diagonstics:  Recent data reviewed    Assessment :      Primary Problem  Fall    Active Hospital Problems    Diagnosis Date Noted    Fall [W19.XXXA] 03/26/2025       Plan:     79-year-old female found down in her room at a memory care unit.    Admitted for acute comminuted intertrochanteric right femur fracture.  Ortho planning for surgery.  Nondisplaced distal sacral fracture.  Going for surgery later this afternoon.  Patient is a moderate risk for the upcoming surgery.  UA concerning for UTI.  Will start patient on Rocephin and follow-up on urine culture.  Check bladder scan.  Severe dementia.  DM type II.  Insulin-dependent.  Carb restricted diet, Lantus, sliding scale, ACHS.  CAD s/p RCA stent placement 2020.  Hold off on aspirin and Plavix in anticipation of surgery.  Atrial fibrillation.  Rate controlled.  Outpatient Watchman procedure. Not on AC.   Hypertension.  Controlled, continue with current medication.  Hyperlipidemia.  Continue statin.  DVT prophylaxis as per primary.    3/27.  S/pRight femur intertrochanteric fracture surgical stabilization with intramedullary nail 3/26/2025  Patient seen and examined at bedside.  On hold Lantus.  Tachycardia, get EKG  Last EKG showed sinus tach   Has H/o afib   Drop in hemoglobin from 11.4-7.7, most likely postop.  No obvious source of bleeding.  Could be the cause of tachycardia.  Monitor H&H  every 6 hours.   Hold off on resuming aspirin and Plavix.  EPC cuffs.  Check stool occult.  ANKUR, likely prerenal and in the setting of UTI.  Urine culture growing Enterobacter, Klebsiella, Candida.  Zosyn.  Add Diflucan.  Check bladder scan and straight cath if more than 300 mL.  Start on gentle IV fluid hydration.  ID consult.  3/28   Patient, had drop in hemoglobin to 6.8, no obvious signs of bleeding, will give 1 unit of blood transfusion  Discussed

## 2025-03-31 NOTE — PROGRESS NOTES
Pt not cooperating for IV insertion, therfore pt unable to receive IV antibiotics. Dr Rivas notified and has changed Rocehin to oral Keflex. I have reached out to Dr Miguel regarding th IV MYCAMINE.

## 2025-03-31 NOTE — PROGRESS NOTES
[facility]   INPATIENT OCCUPATIONAL THERAPY  PROGRESS NOTE  Date: 3/31/2025  Patient Name: Francia Ferguson       Room:   MRN: [id number]    : 1945  (79 y.o.)  Gender: female   Referring Practitioner: Quin Ya MD  Diagnosis: Fall at nursing home      Discharge Recommendations:  Further Occupational Therapy is recommended upon facility discharge.    OT Equipment Recommendations  Other: TBD    Restrictions/Precautions  Restrictions/Precautions  Restrictions/Precautions: Weight Bearing;Fall Risk;Bed Alarm  Implants Present? : Metal implants  Lower Extremity Weight Bearing Restrictions  Right Lower Extremity Weight Bearing: Weight Bearing As Tolerated    O2 Device: None (Room air)    Subjective  Subjective  Subjective: \"Apple juice.\" Pt able to point and ID just as writer pours into cup       Objective  Orientation  Overall Orientation Status: Impaired  Orientation Level: Oriented to person  Cognition  Overall Cognitive Status: Exceptions  Arousal/Alertness: Inconsistent responses to stimuli  Following Commands: Inconsistently follows commands  Attention Span: Difficulty attending to directions;Difficulty dividing attention  Memory: Impaired  Safety Judgement: Decreased awareness of need for safety;Decreased awareness of need for assistance  Problem Solving: Assistance required to correct errors made;Assistance required to identify errors made;Assistance required to implement solutions;Assistance required to generate solutions;Decreased awareness of errors  Insights: Not aware of deficits  Initiation: Requires cues for all  Sequencing: Requires cues for all    Activities of Daily Living  Feeding: Maximum assistance  Feeding Skilled Clinical Factors: Writer facilitated pt engagement in feeding task while seated EOB. PTA providing MOD/MIN A for sitting balance. Pt initally req TA for feeding and bringing drink to mouth however at end of feeding task pt able to occasionally

## 2025-03-31 NOTE — PLAN OF CARE
Problem: Pain  Goal: Verbalizes/displays adequate comfort level or baseline comfort level  Outcome: Not Progressing     Problem: Safety - Medical Restraint  Goal: Remains free of injury from restraints (Restraint for Interference with Medical Device)  Description: INTERVENTIONS:  1. Determine that other, less restrictive measures have been tried or would not be effective before applying the restraint  2. Evaluate the patient's condition at the time of restraint application  3. Inform patient/family regarding the reason for restraint  4. Q2H: Monitor safety, psychosocial status, comfort, nutrition and hydration  Outcome: Not Progressing     Problem: Pain  Goal: Verbalizes/displays adequate comfort level or baseline comfort level  Outcome: Not Progressing     Problem: Safety - Medical Restraint  Goal: Remains free of injury from restraints (Restraint for Interference with Medical Device)  Description: INTERVENTIONS:  1. Determine that other, less restrictive measures have been tried or would not be effective before applying the restraint  2. Evaluate the patient's condition at the time of restraint application  3. Inform patient/family regarding the reason for restraint  4. Q2H: Monitor safety, psychosocial status, comfort, nutrition and hydration  Outcome: Not Progressing

## 2025-03-31 NOTE — PROGRESS NOTES
Critical lab : Blood sugar of 746 4 units given and Dr Rivas notified per order. Dr Rivas ordered  10 units of humalog Recheck in 2 hours Orders beta hydroxy.

## 2025-03-31 NOTE — PROGRESS NOTES
[facility]  Initial Consult Note  Date: 3/31/2025    Hospital day :5     Chief complaint/reason for consultation:   UTI    Assessment/Impression:   UTI/Enterobacter cloacae pansensitive, Klebsiella oxytoca resistant to ampicillin, otherwise sensitive to other tested antibiotics and Candida glabrata growth on urine culture  Status post fall   Encephalopathy with baseline dementia  Acute right femur fracture status post surgical stabilization with intramedullary nail 3/26/2025  Diabetes mellitus  Coronary artery disease  A-fib  Hypertension  Hyperlipidemia    Plan/Recommendations     IV ceftriaxone and micafungin for now   On discharge oral Levaquin 500 mg daily  through 4/2/25  QTc 436 on 3/27/2025  Follow CBC and renal function  Infection Control Recommendations   [place] Precautions      Antimicrobial Stewardship Recommendations   Simplification of therapy  Targeted therapy      History of Present Illness:   Francia Ferguson is a 79 y.o.-year-old female who was initially admitted on 3/25/2025.  The patient was brought to the hospital after a fall was trying to get out of the bed, was found to have acute right intertrochanteric femur fracture and subacute S4 fracture.  Status post fracture repair 3/26/2025.  The patient is nonverbal, unable to provide history that was obtained from chart review and nursing staff.  The patient had a fever with a temperature max of 100.3 on 3/27/2025.  Initial urinalysis suggestive of UTI, urine culture grew Enterobacter cloacae, Klebsiella oxytoca and Latesha glabrata .  External urinary catheter in place with small amount of cloudy urine.  She is tachycardic  Initial WBC 4.6, creatinine 1    Interval changes  3/31/2025   She was seen and examined, tachycardic, afebrile, no new events  Creatinine 0.9 yesterday, WBC 6.6  Patient Vitals for the past 8 hrs:   BP Temp Temp src Pulse Resp SpO2   03/31/25 0614 (!) 149/64 98.1 °F (36.7 °C) Axillary (!) 110 16 98 %

## 2025-04-01 LAB
ANION GAP SERPL CALCULATED.3IONS-SCNC: 10 MMOL/L (ref 9–16)
ANION GAP SERPL CALCULATED.3IONS-SCNC: 11 MMOL/L (ref 9–16)
ANION GAP SERPL CALCULATED.3IONS-SCNC: 11 MMOL/L (ref 9–16)
ANION GAP SERPL CALCULATED.3IONS-SCNC: 6 MMOL/L (ref 9–16)
ANION GAP SERPL CALCULATED.3IONS-SCNC: 8 MMOL/L (ref 9–16)
ANION GAP SERPL CALCULATED.3IONS-SCNC: 9 MMOL/L (ref 9–16)
BASOPHILS # BLD: 0 K/UL (ref 0–0.2)
BASOPHILS NFR BLD: 0 % (ref 0–2)
BUN SERPL-MCNC: 24 MG/DL (ref 8–23)
BUN SERPL-MCNC: 24 MG/DL (ref 8–23)
BUN SERPL-MCNC: 26 MG/DL (ref 8–23)
BUN SERPL-MCNC: 28 MG/DL (ref 8–23)
BUN SERPL-MCNC: 29 MG/DL (ref 8–23)
BUN SERPL-MCNC: 31 MG/DL (ref 8–23)
CALCIUM SERPL-MCNC: 7.8 MG/DL (ref 8.6–10.4)
CALCIUM SERPL-MCNC: 7.9 MG/DL (ref 8.6–10.4)
CALCIUM SERPL-MCNC: 8 MG/DL (ref 8.6–10.4)
CALCIUM SERPL-MCNC: 8 MG/DL (ref 8.6–10.4)
CALCIUM SERPL-MCNC: 8.2 MG/DL (ref 8.6–10.4)
CALCIUM SERPL-MCNC: 8.3 MG/DL (ref 8.6–10.4)
CHLORIDE SERPL-SCNC: 118 MMOL/L (ref 98–107)
CHLORIDE SERPL-SCNC: 120 MMOL/L (ref 98–107)
CHLORIDE SERPL-SCNC: 120 MMOL/L (ref 98–107)
CHLORIDE SERPL-SCNC: 122 MMOL/L (ref 98–107)
CHLORIDE SERPL-SCNC: 122 MMOL/L (ref 98–107)
CHLORIDE SERPL-SCNC: 123 MMOL/L (ref 98–107)
CO2 SERPL-SCNC: 18 MMOL/L (ref 20–31)
CO2 SERPL-SCNC: 18 MMOL/L (ref 20–31)
CO2 SERPL-SCNC: 20 MMOL/L (ref 20–31)
CO2 SERPL-SCNC: 21 MMOL/L (ref 20–31)
CREAT SERPL-MCNC: 0.9 MG/DL (ref 0.7–1.2)
CREAT SERPL-MCNC: 1 MG/DL (ref 0.7–1.2)
CREAT SERPL-MCNC: 1 MG/DL (ref 0.7–1.2)
CREAT SERPL-MCNC: 1.1 MG/DL (ref 0.7–1.2)
EOSINOPHIL # BLD: 0.3 K/UL (ref 0–0.4)
EOSINOPHILS RELATIVE PERCENT: 5 % (ref 0–4)
ERYTHROCYTE [DISTWIDTH] IN BLOOD BY AUTOMATED COUNT: 16.7 % (ref 11.5–14.9)
GFR, ESTIMATED: 51 ML/MIN/1.73M2
GFR, ESTIMATED: 57 ML/MIN/1.73M2
GFR, ESTIMATED: 57 ML/MIN/1.73M2
GFR, ESTIMATED: 65 ML/MIN/1.73M2
GLUCOSE BLD-MCNC: 108 MG/DL (ref 65–105)
GLUCOSE BLD-MCNC: 129 MG/DL (ref 65–105)
GLUCOSE BLD-MCNC: 139 MG/DL (ref 65–105)
GLUCOSE BLD-MCNC: 145 MG/DL (ref 65–105)
GLUCOSE BLD-MCNC: 152 MG/DL (ref 65–105)
GLUCOSE BLD-MCNC: 202 MG/DL (ref 65–105)
GLUCOSE SERPL-MCNC: 108 MG/DL (ref 74–99)
GLUCOSE SERPL-MCNC: 120 MG/DL (ref 74–99)
GLUCOSE SERPL-MCNC: 136 MG/DL (ref 74–99)
GLUCOSE SERPL-MCNC: 180 MG/DL (ref 74–99)
GLUCOSE SERPL-MCNC: 212 MG/DL (ref 74–99)
GLUCOSE SERPL-MCNC: 229 MG/DL (ref 74–99)
HCT VFR BLD AUTO: 26.8 % (ref 36–46)
HGB BLD-MCNC: 8.5 G/DL (ref 12–16)
LYMPHOCYTES NFR BLD: 1.01 K/UL (ref 1–4.8)
LYMPHOCYTES RELATIVE PERCENT: 17 % (ref 24–44)
MAGNESIUM SERPL-MCNC: 1.7 MG/DL (ref 1.6–2.4)
MAGNESIUM SERPL-MCNC: 1.8 MG/DL (ref 1.6–2.4)
MAGNESIUM SERPL-MCNC: 1.9 MG/DL (ref 1.6–2.4)
MCH RBC QN AUTO: 30.3 PG (ref 26–34)
MCHC RBC AUTO-ENTMCNC: 31.6 G/DL (ref 31–37)
MCV RBC AUTO: 95.9 FL (ref 80–100)
MONOCYTES NFR BLD: 0.24 K/UL (ref 0.1–1.3)
MONOCYTES NFR BLD: 4 % (ref 1–7)
MORPHOLOGY: ABNORMAL
NEUTROPHILS NFR BLD: 74 % (ref 36–66)
NEUTS SEG NFR BLD: 4.39 K/UL (ref 1.3–9.1)
NUCLEATED RED BLOOD CELLS: 1 PER 100 WBC
PHOSPHATE SERPL-MCNC: 2.8 MG/DL (ref 2.5–4.5)
PHOSPHATE SERPL-MCNC: 3.5 MG/DL (ref 2.5–4.5)
PHOSPHATE SERPL-MCNC: 3.5 MG/DL (ref 2.5–4.5)
PHOSPHATE SERPL-MCNC: 3.6 MG/DL (ref 2.5–4.5)
PHOSPHATE SERPL-MCNC: 3.6 MG/DL (ref 2.5–4.5)
PHOSPHATE SERPL-MCNC: 4 MG/DL (ref 2.5–4.5)
PLATELET # BLD AUTO: 260 K/UL (ref 150–450)
PMV BLD AUTO: 7.1 FL (ref 6–12)
POTASSIUM SERPL-SCNC: 3.5 MMOL/L (ref 3.7–5.3)
POTASSIUM SERPL-SCNC: 3.6 MMOL/L (ref 3.7–5.3)
POTASSIUM SERPL-SCNC: 3.8 MMOL/L (ref 3.7–5.3)
POTASSIUM SERPL-SCNC: 4.2 MMOL/L (ref 3.7–5.3)
POTASSIUM SERPL-SCNC: 4.6 MMOL/L (ref 3.7–5.3)
POTASSIUM SERPL-SCNC: 4.7 MMOL/L (ref 3.7–5.3)
RBC # BLD AUTO: 2.79 M/UL (ref 4–5.2)
SODIUM SERPL-SCNC: 146 MMOL/L (ref 136–145)
SODIUM SERPL-SCNC: 147 MMOL/L (ref 136–145)
SODIUM SERPL-SCNC: 148 MMOL/L (ref 136–145)
SODIUM SERPL-SCNC: 151 MMOL/L (ref 136–145)
SODIUM SERPL-SCNC: 152 MMOL/L (ref 136–145)
SODIUM SERPL-SCNC: 153 MMOL/L (ref 136–145)
WBC OTHER # BLD: 5.9 K/UL (ref 3.5–11)

## 2025-04-01 PROCEDURE — 6370000000 HC RX 637 (ALT 250 FOR IP): Performed by: NURSE PRACTITIONER

## 2025-04-01 PROCEDURE — 99232 SBSQ HOSP IP/OBS MODERATE 35: CPT | Performed by: INTERNAL MEDICINE

## 2025-04-01 PROCEDURE — 83735 ASSAY OF MAGNESIUM: CPT

## 2025-04-01 PROCEDURE — 6370000000 HC RX 637 (ALT 250 FOR IP): Performed by: ORTHOPAEDIC SURGERY

## 2025-04-01 PROCEDURE — 99233 SBSQ HOSP IP/OBS HIGH 50: CPT

## 2025-04-01 PROCEDURE — 36415 COLL VENOUS BLD VENIPUNCTURE: CPT

## 2025-04-01 PROCEDURE — 6370000000 HC RX 637 (ALT 250 FOR IP)

## 2025-04-01 PROCEDURE — 2580000003 HC RX 258: Performed by: INTERNAL MEDICINE

## 2025-04-01 PROCEDURE — 6360000002 HC RX W HCPCS: Performed by: INTERNAL MEDICINE

## 2025-04-01 PROCEDURE — 2000000000 HC ICU R&B

## 2025-04-01 PROCEDURE — 80048 BASIC METABOLIC PNL TOTAL CA: CPT

## 2025-04-01 PROCEDURE — G0545 PR INHERENT VISIT TO INPT: HCPCS | Performed by: INTERNAL MEDICINE

## 2025-04-01 PROCEDURE — 6370000000 HC RX 637 (ALT 250 FOR IP): Performed by: INTERNAL MEDICINE

## 2025-04-01 PROCEDURE — 84100 ASSAY OF PHOSPHORUS: CPT

## 2025-04-01 PROCEDURE — 82947 ASSAY GLUCOSE BLOOD QUANT: CPT

## 2025-04-01 PROCEDURE — 85025 COMPLETE CBC W/AUTO DIFF WBC: CPT

## 2025-04-01 RX ORDER — QUETIAPINE FUMARATE 50 MG/1
25 TABLET, FILM COATED ORAL 2 TIMES DAILY
Status: DISCONTINUED | OUTPATIENT
Start: 2025-04-01 | End: 2025-04-03 | Stop reason: HOSPADM

## 2025-04-01 RX ORDER — DEXTROSE MONOHYDRATE 50 MG/ML
INJECTION, SOLUTION INTRAVENOUS CONTINUOUS
Status: DISCONTINUED | OUTPATIENT
Start: 2025-04-01 | End: 2025-04-01

## 2025-04-01 RX ORDER — INSULIN GLARGINE 100 [IU]/ML
5 INJECTION, SOLUTION SUBCUTANEOUS DAILY
Status: DISCONTINUED | OUTPATIENT
Start: 2025-04-01 | End: 2025-04-03

## 2025-04-01 RX ORDER — INSULIN LISPRO 100 [IU]/ML
0-4 INJECTION, SOLUTION INTRAVENOUS; SUBCUTANEOUS
Status: DISCONTINUED | OUTPATIENT
Start: 2025-04-01 | End: 2025-04-03

## 2025-04-01 RX ORDER — DEXTROSE MONOHYDRATE 50 MG/ML
INJECTION, SOLUTION INTRAVENOUS CONTINUOUS
Status: DISCONTINUED | OUTPATIENT
Start: 2025-04-01 | End: 2025-04-02

## 2025-04-01 RX ADMIN — POTASSIUM CHLORIDE 10 MEQ: 7.46 INJECTION, SOLUTION INTRAVENOUS at 04:21

## 2025-04-01 RX ADMIN — METOPROLOL TARTRATE 50 MG: 50 TABLET, FILM COATED ORAL at 20:46

## 2025-04-01 RX ADMIN — INSULIN LISPRO 1 UNITS: 100 INJECTION, SOLUTION INTRAVENOUS; SUBCUTANEOUS at 20:46

## 2025-04-01 RX ADMIN — CEPHALEXIN 500 MG: 500 CAPSULE ORAL at 05:47

## 2025-04-01 RX ADMIN — PANTOPRAZOLE SODIUM 40 MG: 40 INJECTION, POWDER, LYOPHILIZED, FOR SOLUTION INTRAVENOUS at 09:36

## 2025-04-01 RX ADMIN — ATORVASTATIN CALCIUM 40 MG: 40 TABLET, FILM COATED ORAL at 09:32

## 2025-04-01 RX ADMIN — QUETIAPINE FUMARATE 25 MG: 50 TABLET ORAL at 20:46

## 2025-04-01 RX ADMIN — DEXTROSE: 5 SOLUTION INTRAVENOUS at 09:36

## 2025-04-01 RX ADMIN — HEPARIN SODIUM 5000 UNITS: 5000 INJECTION INTRAVENOUS; SUBCUTANEOUS at 20:46

## 2025-04-01 RX ADMIN — HEPARIN SODIUM 5000 UNITS: 5000 INJECTION INTRAVENOUS; SUBCUTANEOUS at 09:32

## 2025-04-01 RX ADMIN — POTASSIUM CHLORIDE 10 MEQ: 7.46 INJECTION, SOLUTION INTRAVENOUS at 00:09

## 2025-04-01 RX ADMIN — METOPROLOL TARTRATE 50 MG: 50 TABLET, FILM COATED ORAL at 09:32

## 2025-04-01 RX ADMIN — QUETIAPINE FUMARATE 25 MG: 50 TABLET ORAL at 09:32

## 2025-04-01 RX ADMIN — CEPHALEXIN 500 MG: 500 CAPSULE ORAL at 21:48

## 2025-04-01 RX ADMIN — MICAFUNGIN SODIUM 100 MG: 100 INJECTION, POWDER, LYOPHILIZED, FOR SOLUTION INTRAVENOUS at 09:40

## 2025-04-01 RX ADMIN — ASPIRIN 81 MG: 81 TABLET, CHEWABLE ORAL at 09:32

## 2025-04-01 RX ADMIN — ISOSORBIDE MONONITRATE 120 MG: 60 TABLET, EXTENDED RELEASE ORAL at 09:32

## 2025-04-01 RX ADMIN — POTASSIUM CHLORIDE 10 MEQ: 7.46 INJECTION, SOLUTION INTRAVENOUS at 02:39

## 2025-04-01 RX ADMIN — POTASSIUM CHLORIDE 10 MEQ: 7.46 INJECTION, SOLUTION INTRAVENOUS at 05:23

## 2025-04-01 RX ADMIN — HYDROCODONE BITARTRATE AND ACETAMINOPHEN 1 TABLET: 5; 325 TABLET ORAL at 01:34

## 2025-04-01 RX ADMIN — INSULIN GLARGINE 5 UNITS: 100 INJECTION, SOLUTION SUBCUTANEOUS at 02:44

## 2025-04-01 RX ADMIN — DEXTROSE: 5 SOLUTION INTRAVENOUS at 21:40

## 2025-04-01 RX ADMIN — CLOPIDOGREL BISULFATE 75 MG: 75 TABLET, FILM COATED ORAL at 09:32

## 2025-04-01 RX ADMIN — INSULIN LISPRO 1 UNITS: 100 INJECTION, SOLUTION INTRAVENOUS; SUBCUTANEOUS at 11:46

## 2025-04-01 ASSESSMENT — PAIN DESCRIPTION - ORIENTATION: ORIENTATION: RIGHT

## 2025-04-01 ASSESSMENT — PAIN DESCRIPTION - DESCRIPTORS: DESCRIPTORS: PATIENT UNABLE TO DESCRIBE

## 2025-04-01 ASSESSMENT — PAIN DESCRIPTION - LOCATION: LOCATION: HIP

## 2025-04-01 ASSESSMENT — PAIN SCALES - WONG BAKER
WONGBAKER_NUMERICALRESPONSE: NO HURT
WONGBAKER_NUMERICALRESPONSE: HURTS LITTLE MORE

## 2025-04-01 NOTE — PROGRESS NOTES
Evi MTZ, notified of pts BG of 66. Insulin gtt paused at this time, D5 in 1/2 NS infusing currently. Awaiting new orders.    2145- Writer spoke to Evi MTZ, in person. Order received to administer glucagon IM if pts BG drops any lower.

## 2025-04-01 NOTE — CONSULTS
[facility]    Shaukat Rashid MD Zafar Magsi MD Danial Rashid MD John O. Ranker MD Sembria Ligibel, ROSEMARIE         Reason for Consult: Hypernatremia.    Requesting Physician:  Quin Ya MD    Assessment:  Hypernatremia.  CKD stage 3A.  S/p fall.  UTI.  Dementia.    Plan:  Continue D5W at 125 ml/hr.  Recheck BMP every 4 hours.  Avoid hypotension, nephrotoxic drugs and Fleets enema.  Follow up labs ordered for AM.     HISTORY OF PRESENT ILLNESS:    The patient is a 79 y.o. female who presents with fall at nursing home. Here she was diagnosed with UTI. Her blood glucose adilene requiring insulin drip. Presently she is off Insulin drip. She had R femur fracture surgery on 3/26. Her serum sodium began to rise on 3/27. Today her corrected serum sodium is 154. She has dementia and is unable to provide any history.     Review Of Systems:  Unable to obtain because she has dementia and is not communicative.    Past Medical History:   Diagnosis Date    Abnormal cardiovascular stress test 06/2020    no ischemia / infarction   there is septal hypokinesis   /  EF 70%    CAD (coronary artery disease)     Chest pressure     Fatigue     History of pneumonia 03/2020    HTN (hypertension)     Hyperlipidemia     Pneumonia     Positive cardiac stress test     Renal insufficiency 8/18/2022    SOB (shortness of breath)     Type 2 diabetes mellitus without complication (HCC)        Past Surgical History:   Procedure Laterality Date    APPENDECTOMY      CARDIAC CATHETERIZATION  03/09/2021    CHOLECYSTECTOMY      COLONOSCOPY      CORONARY ANGIOPLASTY WITH STENT PLACEMENT  08/14/2020    High grade stenosis in Proximal RCA s/p successful PCI with DELIA (3.25 X 12 mm)    EYE SURGERY      FEMUR SURGERY Right 3/26/2025    HIP IM NAIL RIGHT performed by Quin Ya MD at [facility] OR    HYSTERECTOMY (CERVIX STATUS UNKNOWN)      HYSTERECTOMY, VAGINAL      left both ovaries in       Prior to Admission

## 2025-04-01 NOTE — PLAN OF CARE
Problem: Safety - Adult  Goal: Free from fall injury  Outcome: Progressing  Flowsheets (Taken 4/1/2025 1516)  Free From Fall Injury:   Instruct family/caregiver on patient safety   Based on caregiver fall risk screen, instruct family/caregiver to ask for assistance with transferring infant if caregiver noted to have fall risk factors     Problem: Chronic Conditions and Co-morbidities  Goal: Patient's chronic conditions and co-morbidity symptoms are monitored and maintained or improved  Outcome: Progressing  Flowsheets (Taken 4/1/2025 1516)  Care Plan - Patient's Chronic Conditions and Co-Morbidity Symptoms are Monitored and Maintained or Improved:   Monitor and assess patient's chronic conditions and comorbid symptoms for stability, deterioration, or improvement   Collaborate with multidisciplinary team to address chronic and comorbid conditions and prevent exacerbation or deterioration   Update acute care plan with appropriate goals if chronic or comorbid symptoms are exacerbated and prevent overall improvement and discharge     Problem: Discharge Planning  Goal: Discharge to home or other facility with appropriate resources  Outcome: Progressing     Problem: Skin/Tissue Integrity  Goal: Skin integrity remains intact  Outcome: Progressing  Flowsheets (Taken 4/1/2025 1516)  Skin Integrity Remains Intact: Monitor for areas of redness and/or skin breakdown     Problem: ABCDS Injury Assessment  Goal: Absence of physical injury  Outcome: Progressing     Problem: Pain  Goal: Verbalizes/displays adequate comfort level or baseline comfort level  Outcome: Progressing  Flowsheets (Taken 4/1/2025 1516)  Verbalizes/displays adequate comfort level or baseline comfort level:   Encourage patient to monitor pain and request assistance   Assess pain using appropriate pain scale   Implement non-pharmacological measures as appropriate and evaluate response     Problem: Safety - Medical Restraint  Goal: Remains free of injury from

## 2025-04-01 NOTE — PROGRESS NOTES
SMITH Steve notified of pts rhythm change appearing to be a-fib on tele strip. Orders received to complete EKG once pt is calm.

## 2025-04-01 NOTE — PROGRESS NOTES
Spiritual Health History and Assessment/Progress Note  [facility]    (P) Spiritual/Emotional Needs,  ,  ,      Name: Francia Ferguson MRN: [id number]    Age: 79 y.o.     Sex: female   Language: English   Confucianism: Pentecostal   Fall     Date: 4/1/2025            Total Time Calculated: (P) 10 min          Patient was unavailable but  met with son, Ashok in [facility]. Son stated that the patient was hoping to go to rehab soon but her levels need to change. Son was emotional and seemed worried for his mother.         Spiritual Assessment began in [facility] ICU        Referral/Consult From: (P) Rounding   Encounter Overview/Reason: (P) Spiritual/Emotional Needs  Service Provided For: (P) Patient not available, Family    [place], Belief, Meaning:   Patient unable to assess at this time  Family/Friends identify as spiritual      Importance and Influence:  Patient unable to assess at this time  Family/Friends have no beliefs influential to healthcare decision-making identified during this visit    Community:  Patient Other: unable to communicate  Family/Friends feel well-supported. Support system includes: Parent/s    Assessment and Plan of Care:     Patient Interventions include: Other: unable to communicate  Family/Friends Interventions include: Facilitated expression of thoughts and feelings and Explored spiritual coping/struggle/distress    Patient Plan of Care: Spiritual Care available upon further referral  Family/Friends Plan of Care: Spiritual Care available upon further referral    Electronically signed by Chaplain Lul on 4/1/2025 at 12:39 PM

## 2025-04-01 NOTE — PROGRESS NOTES
IN-PATIENT SERVICE  [facility] Internal Medicine  [facility] Internal Medicine   Jovan Calvillo MD; New Sloan MD; Kel Navarro MD; Rei Rivas MD,   Jeaneth Henao MD; Kathryn Gonzalez MD; Ashley Yang MD; Kiera Singleton MD    Progress Note            Date:   4/1/2025  Patient name:  Francia Ferguson  Date of admission:  3/25/2025 10:40 PM  MRN:   [id number]  Account:  [id number]  YOB: 1945  PCP:    Jack Boone DO  Room:   2001/2001-01  Code Status:    DNR-CCA    Physician Requesting Consult: Quin Ya MD    Reason for Consult:  Medical management    Chief Complaint:     Chief Complaint   Patient presents with    Fall        History Obtained From:     Patient, EMR, nursing staff     History of Present Illness:     79-year-old female presented to the emergency department after she was found down in her room.  She lives in a memory care unit.  He recently discharged from the facility for hypoglycemia.  Noted to have acute right intertrochanteric fracture and subacute S4 fracture  3/28   Patient, has advanced dementia, had a fall, admitted with fever fracture, underwent surgery  Patient urine culture growing Candida and Klebsiella on antibiotics per ID  Hemoglobin dropped to 6.8, no obvious signs of bleeding  Likely dilutional, postsurgical losses  Very poor oral intake  Past Medical History:     Past Medical History:   Diagnosis Date    Abnormal cardiovascular stress test 06/2020    no ischemia / infarction   there is septal hypokinesis   /  EF 70%    CAD (coronary artery disease)     Chest pressure     Fatigue     History of pneumonia 03/2020    HTN (hypertension)     Hyperlipidemia     Pneumonia     Positive cardiac stress test     Renal insufficiency 8/18/2022    SOB (shortness of breath)     Type 2 diabetes mellitus without complication (HCC)         Past Surgical History:     Past Surgical History:   Procedure Laterality Date    APPENDECTOMY      CARDIAC    CBC with Auto Differential    Collection Time: 04/01/25  6:54 AM   Result Value Ref Range    WBC 5.9 3.5 - 11.0 k/uL    RBC 2.79 (L) 4.0 - 5.2 m/uL    Hemoglobin 8.5 (L) 12.0 - 16.0 g/dL    Hematocrit 26.8 (L) 36 - 46 %    MCV 95.9 80 - 100 fL    MCH 30.3 26 - 34 pg    MCHC 31.6 31 - 37 g/dL    RDW 16.7 (H) 11.5 - 14.9 %    Platelets 260 150 - 450 k/uL    MPV 7.1 6.0 - 12.0 fL    Neutrophils % 74 (H) 36 - 66 %    Lymphocytes % 17 (L) 24 - 44 %    Monocytes % 4 1 - 7 %    Eosinophils % 5 (H) 0 - 4 %    Basophils % 0 0 - 2 %    nRBC 1 (H) 0 per 100 WBC    Neutrophils Absolute 4.39 1.3 - 9.1 k/uL    Lymphocytes Absolute 1.01 1.0 - 4.8 k/uL    Monocytes Absolute 0.24 0.1 - 1.3 k/uL    Eosinophils Absolute 0.30 0.0 - 0.4 k/uL    Basophils Absolute 0.00 0.0 - 0.2 k/uL    Morphology ANISOCYTOSIS PRESENT     Morphology 1+ ELLIPTOCYTES     Morphology 1+ POLYCHROMASIA        Imaging/Diagonstics:  Recent data reviewed    Assessment :      Primary Problem  Fall    Active Hospital Problems    Diagnosis Date Noted    Fall [W19.XXXA] 03/26/2025       Plan:     79-year-old female found down in her room at a memory care unit.    Admitted for acute comminuted intertrochanteric right femur fracture.  Ortho planning for surgery.  Nondisplaced distal sacral fracture.  Going for surgery later this afternoon.  Patient is a moderate risk for the upcoming surgery.  UA concerning for UTI.  Will start patient on Rocephin and follow-up on urine culture.  Check bladder scan.  Severe dementia.  DM type II.  Insulin-dependent.  Carb restricted diet, Lantus, sliding scale, ACHS.  CAD s/p RCA stent placement 2020.  Hold off on aspirin and Plavix in anticipation of surgery.  Atrial fibrillation.  Rate controlled.  Outpatient Watchman procedure. Not on AC.   Hypertension.  Controlled, continue with current medication.  Hyperlipidemia.  Continue statin.  DVT prophylaxis as per primary.    3/27.  S/pRight femur intertrochanteric fracture surgical

## 2025-04-01 NOTE — PROGRESS NOTES
ICU Progress Note (Non-Vent)  [facility]    Patient - Francia Ferguson,  Age - 79 y.o.    - 1945      Room Number -    N -  [id number]   [facility] # - [id number]  Date of Admission -  3/25/2025 10:40 PM    Events of Past 24 Hours   Had no acute events overnight. DKA protocol discontinued overnight and palliative care visited the patient yesterday. Goals of care to be discussed with family today.     Vitals    weight is 52 kg (114 lb 10.2 oz). Her axillary temperature is 98 °F (36.7 °C). Her blood pressure is 138/75 and her pulse is 80. Her respiration is 17 and oxygen saturation is 100%.       Temperature Range: Temp: 98 °F (36.7 °C) Temp  Av.5 °F (36.9 °C)  Min: 97.7 °F (36.5 °C)  Max: 99.8 °F (37.7 °C)  BP Range:  Systolic (24hrs), Av , Min:101 , Max:167     Diastolic (24hrs), Av, Min:29, Max:111    Pulse Range: Pulse  Av.2  Min: 61  Max: 99  Respiration Range: Resp  Av.8  Min: 8  Max: 22  Current Pulse Ox::  SpO2: 100 %  24HR Pulse Ox Range:  SpO2  Av.8 %  Min: 97 %  Max: 100 %  Oxygen Amount and Delivery: O2 Flow Rate (L/min): 4 L/min    Wt Readings from Last 3 Encounters:   25 52 kg (114 lb 10.2 oz)   25 59 kg (130 lb)   25 61 kg (134 lb 7.7 oz)     I/O     Intake/Output Summary (Last 24 hours) at 2025 0915  Last data filed at 3/31/2025 1833  Gross per 24 hour   Intake 825.36 ml   Output --   Net 825.36 ml     DRAIN/TUBE OUTPUT       Invasive Lines   ICP PRESSURE RANGE  No data recorded  CVP PRESSURE RANGE  No data recorded      Medications      insulin glargine  5 Units SubCUTAneous Daily    QUEtiapine  25 mg Oral BID    cephALEXin  500 mg Oral 3 times per day    heparin (porcine)  5,000 Units SubCUTAneous BID    clopidogrel  75 mg Oral Daily    pantoprazole (PROTONIX) 40 mg in sodium chloride (PF) 0.9 % 10 mL injection  40 mg IntraVENous  LYMPHSABS 1.01 04/01/2025 06:54 AM    EOSABS 0.30 04/01/2025 06:54 AM    BASOSABS 0.00 04/01/2025 06:54 AM    DIFFTYPE NOT REPORTED 02/03/2022 04:55 PM       BMP   Lab Results   Component Value Date/Time     04/01/2025 06:54 AM    K 4.6 04/01/2025 06:54 AM     04/01/2025 06:54 AM    CO2 21 04/01/2025 06:54 AM    BUN 28 04/01/2025 06:54 AM    CREATININE 1.1 04/01/2025 06:54 AM    GLUCOSE 120 04/01/2025 06:54 AM    MG 1.8 04/01/2025 06:54 AM    PHOS 3.6 04/01/2025 06:54 AM       LFTS  Lab Results   Component Value Date/Time    ALKPHOS 72 03/25/2025 11:10 PM    ALT 12 03/25/2025 11:10 PM    AST 29 03/25/2025 11:10 PM    BILITOT 0.2 03/25/2025 11:10 PM    BILIDIR 0.1 03/25/2025 11:10 PM    IBILI 0.1 03/25/2025 11:10 PM       ABG ABGs:   Lab Results   Component Value Date/Time    PHART 7.448 02/10/2025 04:44 AM    PO2ART 144.6 02/10/2025 04:44 AM    UMF9SHY 26.3 02/10/2025 04:44 AM       Lab Results   Component Value Date/Time    MODE PRVC 02/10/2025 04:44 AM         INR  No results for input(s): \"PROTIME\", \"INR\" in the last 72 hours.    APTT  No results for input(s): \"APTT\" in the last 72 hours.    Lactic Acid  Lab Results   Component Value Date/Time    LACTA 1.9 02/16/2025 05:23 AM    LACTA 2.5 02/16/2025 03:27 AM        BNP   No results for input(s): \"BNP\" in the last 72 hours.     Cultures       Radiology     CXR      CT Scans    (See actual reports for details)      SYSTEMS ASSESSMENT    DKA - anion gap of 22 with elevated beta-hydroxybutyric acid; recently admitted in February for similar episode required intubation because she could not protect her airway and tachypnea.  Was extubated the following day  Hypernatremia-most likely due to IV fluids that she was getting into DKA; same thing happened and febrile  Dementia   S/p surgical stabilization of an intertrochanteric fracture of the right femur with intramedullary nail - 3/26/25  Sacral fracture - at S4   UTI - finishing micafungin, rocephin extended

## 2025-04-01 NOTE — PROGRESS NOTES
Dr. Luna notified of new consult and current labs. Orders received for D5W @ 125ml/h. Repeat BMP q4h and notify of next result.

## 2025-04-01 NOTE — PROGRESS NOTES
Writer micheline Vega NP to update with most recent BMP results & current infusing medications.    (2579)- Orders placed to give 5u of insulin glargine & have pt eat.

## 2025-04-01 NOTE — CARE COORDINATION
DISCHARGE PLANNING NOTE:    Writer checked authorization status in [facility]. Pt has been approved 3/31-4/2.    Electronically signed by CESARIO Orozco on 4/1/2025 at 2:57 PM

## 2025-04-01 NOTE — CONSULTS
..  Palliative Care Inpatient Consult    NAME:  Francia Ferguson  MEDICAL RECORD NUMBER:  [id number]  AGE: 79 y.o.   GENDER: female  : 1945  TODAY'S DATE:  2025    Reasons for Consultation:    Provision of information regarding PC and/or hospice philosophies  Complex, time-intensive communication and interdisciplinary psychosocial support  Clarification of goals of care and/or assistance with difficult decision-making  Guidance in regards to resources and transition(s)    Code Status: DNRCC-A no intubation    Members of PC team contributing to this consultation are :  Virgie Mak R.N     History of Present Illness     The patient is a 79 y.o.  Non- / non  female who presents with Fall    Referred to Palliative Care by   [x] Physician   [] Nursing  [] Family Request   [] Other:       She was admitted to the primary service for Acute cystitis without hematuria [N30.00]  Fall, initial encounter [W19.XXXA]  Fall at nursing home, initial encounter [W19.XXXA, Y92.129]  Closed fracture of right femur, unspecified fracture morphology, unspecified portion of femur, initial encounter [S72.91XA]  Closed fracture of sacrum, unspecified portion of sacrum, initial encounter ([facility]) [S32.10XA]. Her hospital course has been associated with Fall. The patient has a complicated medical history and has been hospitalized since 3/25/2025 10:40 PM.    Active Hospital Problems    Diagnosis Date Noted    Fall [W19.XXXA] 2025       Data        Code Status: DNR-CCA   PLAN:   - [facility] with a history of alzheimer's   - she has diabetes and very poor oral intake and her diabetes is hard to mangage   - her son Ashok is her HCPOA   - the plan is to send her to [facility] for rehab post her right hip repair   - Ashok asks if we can meet with him, his Dad and brother tomorrow at 12 pm and I will meet them for a goals of care discussion     ADVANCED CARE PLANNING:  Patient has capacity

## 2025-04-01 NOTE — PROGRESS NOTES
[facility]  Initial Consult Note  Date: 4/1/2025    Hospital day :6     Chief complaint/reason for consultation:   UTI    Assessment/Impression:   UTI/Enterobacter cloacae pansensitive, Klebsiella oxytoca resistant to ampicillin, otherwise sensitive to other tested antibiotics and Candida glabrata growth on urine culture  DKA  Metabolic acidosis  Status post fall   Encephalopathy with baseline dementia  Acute right femur fracture status post surgical stabilization with intramedullary nail 3/26/2025  Diabetes mellitus  Coronary artery disease  A-fib  Hypertension  Hyperlipidemia    Plan/Recommendations     IV  micafungin and po keflex through 4/2/25  Follow CBC and renal function  Infection Control Recommendations   Kevin Precautions      Antimicrobial Stewardship Recommendations   Simplification of therapy  Targeted therapy      History of Present Illness:   Francia Ferguson is a 79 y.o.-year-old female who was initially admitted on 3/25/2025.  The patient was brought to the hospital after a fall was trying to get out of the bed, was found to have acute right intertrochanteric femur fracture and subacute S4 fracture.  Status post fracture repair 3/26/2025.  The patient is nonverbal, unable to provide history that was obtained from chart review and nursing staff.  The patient had a fever with a temperature max of 100.3 on 3/27/2025, was tachycardic.  Initial urinalysis suggestive of UTI, urine culture grew Enterobacter cloacae, Klebsiella oxytoca and Latesha glabrata .  Initial WBC 4.6, creatinine 1    Interval changes  4/1/2025   She was seen and examined, afebrile, temperature max 99.8 yesterday, was transferred to ICU for DKA with high anion gap metabolic acidosis.  Afebrile, DKA protocol discontinued, palliative care consulted  WBC 5.9  Patient Vitals for the past 8 hrs:   BP Temp Temp src Pulse Resp SpO2   04/01/25 1200 (!) 105/39 97.7 °F (36.5 °C) Axillary 57 14 100 %   04/01/25 1100 --

## 2025-04-01 NOTE — CARE COORDINATION
DISCHARGE PLANNING NOTE:    Writer is following for potential discharge to [facility]. CESARIO Bianca received call from [facility] requesting pt prior level of function, confirmed pt was moderate assist at facility.    Electronically signed by CESARIO Orozco on 4/1/2025 at 9:25 AM

## 2025-04-01 NOTE — PROGRESS NOTES
Physical Therapy  DATE: 2025    NAME: Francia Ferguson  MRN: [id number]   : 1945    Patient not seen this date for Physical Therapy due to:      [x] Cancel by RN or physician due to: checked with RN @ 0865. RN stating pt is in restraints, not following commands, and is not cooperative when staff is trying to assist her. Will continue to follow.     [] Hemodialysis    [] Critical Lab Value Level     [] Blood transfusion in progress    [] Acute or unstable cardiovascular status   _MAP < 55 or more than >115  _HR < 40 or > 130    [] Acute or unstable pulmonary status   -FiO2 > 60%   _RR < 5 or >40    _O2 sats < 85%    [] Strict Bedrest    [] Off Unit for surgery or procedure    [] Off Unit for testing       [] Pending imaging to R/O fracture    [] Refusal by Patient      [] Other      [] PT being discontinued at this time. Patient independent. No further needs.     [] PT being discontinued at this time as the patient has been transferred to hospice care. No further needs.      Electronically signed by Nani Reynolds PTA on 25 at 11:12 AM EDT

## 2025-04-01 NOTE — PROGRESS NOTES
[facility]   OCCUPATIONAL THERAPY MISSED TREATMENT NOTE   INPATIENT   Date: 25  Patient Name: Francia Ferguson       Room:   MRN: [id number]   Account #: [id number]    : 1945  (79 y.o.)  Gender: female   Referring Practitioner: Quin Ya MD  Diagnosis: Fall at nursing home             REASON FOR MISSED TREATMENT:  Hold treatment per nursing request   -    DEDRA Weinstein stating pt is in restraints, not following commands, and is not cooperative when staff is trying to assist her. Occupational therapy will continue to follow.   1057      Electronically signed by ADRIAN Webb on 25 at 12:17 PM EDT

## 2025-04-01 NOTE — PROGRESS NOTES
Resting comfortably. In ICU for DKA, but stable now.     BP (!) 133/37   Pulse 63   Temp 97.7 °F (36.5 °C) (Axillary)   Resp 11   Wt 52 kg (114 lb 10.2 oz)   SpO2 100%   BMI 21.66 kg/m²   Right hip dressing is  clean, dry, and intact. 1+ pedal pulses. Brisk capillary refill in toes    Impression and plan: Ms. Ferguson is a 78 yo lady sp right hip IT fracture IMN POD 6  - Palliative care following  - Pain control  - Continue abx treatment for UTI per ID service  - DC planning

## 2025-04-01 NOTE — PROGRESS NOTES
Dr. Luna notified of repeat BMP. No new orders at this time. Continue q4h BMP and notify with results.

## 2025-04-01 NOTE — CARE COORDINATION
DISCHARGE PLANNING NOTE:    Call placed to pt bailey Pulido for update on authorization approval. No further questions at this time.  Electronically signed by CESARIO Orozco on 4/1/2025 at 2:59 PM

## 2025-04-01 NOTE — PROGRESS NOTES
Dr. Singleton at bedside. Updated on resolved DKA and elevated sodium. Orders received for D51/2NS@ 75ml/h and consult nephrology for further recommendations.

## 2025-04-01 NOTE — PROGRESS NOTES
Dr. Luna notified of repeat BMP. Orders received to stop D5W. Repeat BMP in 4 hours and notify of results.

## 2025-04-02 LAB
ANION GAP SERPL CALCULATED.3IONS-SCNC: 10 MMOL/L (ref 9–16)
ANION GAP SERPL CALCULATED.3IONS-SCNC: 10 MMOL/L (ref 9–16)
ANION GAP SERPL CALCULATED.3IONS-SCNC: 11 MMOL/L (ref 9–16)
ANION GAP SERPL CALCULATED.3IONS-SCNC: 15 MMOL/L (ref 9–16)
BUN SERPL-MCNC: 23 MG/DL (ref 8–23)
BUN SERPL-MCNC: 24 MG/DL (ref 8–23)
BUN SERPL-MCNC: 26 MG/DL (ref 8–23)
BUN SERPL-MCNC: 27 MG/DL (ref 8–23)
CALCIUM SERPL-MCNC: 7.5 MG/DL (ref 8.6–10.4)
CALCIUM SERPL-MCNC: 7.6 MG/DL (ref 8.6–10.4)
CALCIUM SERPL-MCNC: 7.9 MG/DL (ref 8.6–10.4)
CALCIUM SERPL-MCNC: 8 MG/DL (ref 8.6–10.4)
CHLORIDE SERPL-SCNC: 112 MMOL/L (ref 98–107)
CHLORIDE SERPL-SCNC: 113 MMOL/L (ref 98–107)
CHLORIDE SERPL-SCNC: 114 MMOL/L (ref 98–107)
CHLORIDE SERPL-SCNC: 118 MMOL/L (ref 98–107)
CO2 SERPL-SCNC: 16 MMOL/L (ref 20–31)
CO2 SERPL-SCNC: 18 MMOL/L (ref 20–31)
CO2 SERPL-SCNC: 18 MMOL/L (ref 20–31)
CO2 SERPL-SCNC: 19 MMOL/L (ref 20–31)
CREAT SERPL-MCNC: 0.9 MG/DL (ref 0.7–1.2)
CREAT SERPL-MCNC: 1 MG/DL (ref 0.7–1.2)
CREAT SERPL-MCNC: 1.2 MG/DL (ref 0.7–1.2)
CREAT SERPL-MCNC: 1.2 MG/DL (ref 0.7–1.2)
GFR, ESTIMATED: 46 ML/MIN/1.73M2
GFR, ESTIMATED: 46 ML/MIN/1.73M2
GFR, ESTIMATED: 57 ML/MIN/1.73M2
GFR, ESTIMATED: 65 ML/MIN/1.73M2
GLUCOSE BLD-MCNC: 244 MG/DL (ref 65–105)
GLUCOSE BLD-MCNC: 286 MG/DL (ref 65–105)
GLUCOSE BLD-MCNC: 322 MG/DL (ref 65–105)
GLUCOSE BLD-MCNC: 352 MG/DL (ref 65–105)
GLUCOSE SERPL-MCNC: 294 MG/DL (ref 74–99)
GLUCOSE SERPL-MCNC: 300 MG/DL (ref 74–99)
GLUCOSE SERPL-MCNC: 364 MG/DL (ref 74–99)
GLUCOSE SERPL-MCNC: 408 MG/DL (ref 74–99)
MAGNESIUM SERPL-MCNC: 1.7 MG/DL (ref 1.6–2.4)
MAGNESIUM SERPL-MCNC: 1.7 MG/DL (ref 1.6–2.4)
MAGNESIUM SERPL-MCNC: 1.8 MG/DL (ref 1.6–2.4)
PHOSPHATE SERPL-MCNC: 2.8 MG/DL (ref 2.5–4.5)
PHOSPHATE SERPL-MCNC: 3.2 MG/DL (ref 2.5–4.5)
PHOSPHATE SERPL-MCNC: 3.3 MG/DL (ref 2.5–4.5)
PHOSPHATE SERPL-MCNC: 3.4 MG/DL (ref 2.5–4.5)
PHOSPHATE SERPL-MCNC: 3.4 MG/DL (ref 2.5–4.5)
POTASSIUM SERPL-SCNC: 3.8 MMOL/L (ref 3.7–5.3)
POTASSIUM SERPL-SCNC: 4.3 MMOL/L (ref 3.7–5.3)
POTASSIUM SERPL-SCNC: 4.3 MMOL/L (ref 3.7–5.3)
POTASSIUM SERPL-SCNC: 4.4 MMOL/L (ref 3.7–5.3)
SODIUM SERPL-SCNC: 141 MMOL/L (ref 136–145)
SODIUM SERPL-SCNC: 143 MMOL/L (ref 136–145)
SODIUM SERPL-SCNC: 144 MMOL/L (ref 136–145)
SODIUM SERPL-SCNC: 146 MMOL/L (ref 136–145)

## 2025-04-02 PROCEDURE — G0545 PR INHERENT VISIT TO INPT: HCPCS | Performed by: INTERNAL MEDICINE

## 2025-04-02 PROCEDURE — 6370000000 HC RX 637 (ALT 250 FOR IP): Performed by: INTERNAL MEDICINE

## 2025-04-02 PROCEDURE — 6360000002 HC RX W HCPCS: Performed by: INTERNAL MEDICINE

## 2025-04-02 PROCEDURE — 83735 ASSAY OF MAGNESIUM: CPT

## 2025-04-02 PROCEDURE — 80048 BASIC METABOLIC PNL TOTAL CA: CPT

## 2025-04-02 PROCEDURE — 6370000000 HC RX 637 (ALT 250 FOR IP)

## 2025-04-02 PROCEDURE — 6370000000 HC RX 637 (ALT 250 FOR IP): Performed by: NURSE PRACTITIONER

## 2025-04-02 PROCEDURE — 97535 SELF CARE MNGMENT TRAINING: CPT

## 2025-04-02 PROCEDURE — 36415 COLL VENOUS BLD VENIPUNCTURE: CPT

## 2025-04-02 PROCEDURE — 6370000000 HC RX 637 (ALT 250 FOR IP): Performed by: ORTHOPAEDIC SURGERY

## 2025-04-02 PROCEDURE — 99232 SBSQ HOSP IP/OBS MODERATE 35: CPT | Performed by: INTERNAL MEDICINE

## 2025-04-02 PROCEDURE — 97530 THERAPEUTIC ACTIVITIES: CPT

## 2025-04-02 PROCEDURE — 2500000003 HC RX 250 WO HCPCS: Performed by: NURSE PRACTITIONER

## 2025-04-02 PROCEDURE — 99233 SBSQ HOSP IP/OBS HIGH 50: CPT

## 2025-04-02 PROCEDURE — 2580000003 HC RX 258: Performed by: INTERNAL MEDICINE

## 2025-04-02 PROCEDURE — 82947 ASSAY GLUCOSE BLOOD QUANT: CPT

## 2025-04-02 PROCEDURE — 84100 ASSAY OF PHOSPHORUS: CPT

## 2025-04-02 PROCEDURE — 6360000002 HC RX W HCPCS: Performed by: NURSE PRACTITIONER

## 2025-04-02 PROCEDURE — 2060000000 HC ICU INTERMEDIATE R&B

## 2025-04-02 RX ORDER — INSULIN LISPRO 100 [IU]/ML
6 INJECTION, SOLUTION INTRAVENOUS; SUBCUTANEOUS ONCE
Status: COMPLETED | OUTPATIENT
Start: 2025-04-02 | End: 2025-04-02

## 2025-04-02 RX ADMIN — ISOSORBIDE MONONITRATE 120 MG: 60 TABLET, EXTENDED RELEASE ORAL at 08:26

## 2025-04-02 RX ADMIN — METOPROLOL TARTRATE 50 MG: 50 TABLET, FILM COATED ORAL at 08:26

## 2025-04-02 RX ADMIN — QUETIAPINE FUMARATE 25 MG: 50 TABLET ORAL at 21:42

## 2025-04-02 RX ADMIN — CEPHALEXIN 500 MG: 500 CAPSULE ORAL at 05:47

## 2025-04-02 RX ADMIN — INSULIN LISPRO 6 UNITS: 100 INJECTION, SOLUTION INTRAVENOUS; SUBCUTANEOUS at 06:11

## 2025-04-02 RX ADMIN — CEPHALEXIN 500 MG: 500 CAPSULE ORAL at 13:28

## 2025-04-02 RX ADMIN — INSULIN LISPRO 3 UNITS: 100 INJECTION, SOLUTION INTRAVENOUS; SUBCUTANEOUS at 18:15

## 2025-04-02 RX ADMIN — HYDROCODONE BITARTRATE AND ACETAMINOPHEN 1 TABLET: 5; 325 TABLET ORAL at 03:29

## 2025-04-02 RX ADMIN — QUETIAPINE FUMARATE 25 MG: 50 TABLET ORAL at 08:27

## 2025-04-02 RX ADMIN — MICAFUNGIN SODIUM 100 MG: 100 INJECTION, POWDER, LYOPHILIZED, FOR SOLUTION INTRAVENOUS at 10:55

## 2025-04-02 RX ADMIN — WATER 7.5 MG: 1 INJECTION INTRAMUSCULAR; INTRAVENOUS; SUBCUTANEOUS at 01:38

## 2025-04-02 RX ADMIN — CLOPIDOGREL BISULFATE 75 MG: 75 TABLET, FILM COATED ORAL at 08:27

## 2025-04-02 RX ADMIN — ASPIRIN 81 MG: 81 TABLET, CHEWABLE ORAL at 08:26

## 2025-04-02 RX ADMIN — INSULIN LISPRO 4 UNITS: 100 INJECTION, SOLUTION INTRAVENOUS; SUBCUTANEOUS at 05:48

## 2025-04-02 RX ADMIN — INSULIN GLARGINE 5 UNITS: 100 INJECTION, SOLUTION SUBCUTANEOUS at 08:27

## 2025-04-02 RX ADMIN — HEPARIN SODIUM 5000 UNITS: 5000 INJECTION INTRAVENOUS; SUBCUTANEOUS at 08:26

## 2025-04-02 RX ADMIN — INSULIN LISPRO 2 UNITS: 100 INJECTION, SOLUTION INTRAVENOUS; SUBCUTANEOUS at 21:45

## 2025-04-02 RX ADMIN — PANTOPRAZOLE SODIUM 40 MG: 40 INJECTION, POWDER, LYOPHILIZED, FOR SOLUTION INTRAVENOUS at 08:26

## 2025-04-02 RX ADMIN — CEPHALEXIN 500 MG: 500 CAPSULE ORAL at 21:42

## 2025-04-02 RX ADMIN — HEPARIN SODIUM 5000 UNITS: 5000 INJECTION INTRAVENOUS; SUBCUTANEOUS at 21:44

## 2025-04-02 RX ADMIN — ATORVASTATIN CALCIUM 40 MG: 40 TABLET, FILM COATED ORAL at 08:26

## 2025-04-02 RX ADMIN — INSULIN LISPRO 1 UNITS: 100 INJECTION, SOLUTION INTRAVENOUS; SUBCUTANEOUS at 13:26

## 2025-04-02 ASSESSMENT — PAIN SCALES - WONG BAKER: WONGBAKER_NUMERICALRESPONSE: NO HURT

## 2025-04-02 ASSESSMENT — PAIN SCALES - PAIN ASSESSMENT IN ADVANCED DEMENTIA (PAINAD)
CONSOLABILITY: NO NEED TO CONSOLE
BREATHING: NORMAL
BODYLANGUAGE: RELAXED
FACIALEXPRESSION: SMILING OR INEXPRESSIVE
TOTALSCORE: 0

## 2025-04-02 NOTE — PROGRESS NOTES
Dr Luna called. Updated him about pt's sodium level. New orders received. He request to be called if sodium less than 146 and greater than 150. TH

## 2025-04-02 NOTE — CARE COORDINATION
ONGOING DISCHARGE PLAN:    [facility]. Authorization approved 3/31-4/2.    3/26 Right Femur IM Nailing.    ID Consult. PO Keflex.     No longer in DKA.     Palliative meeting w/family at noon.     Will continue to follow for additional discharge needs.    If patient is discharged prior to next notation, then this note serves as note for discharge by case management.    Electronically signed by Annie Abad RN on 4/2/2025 at 9:37 AM

## 2025-04-02 NOTE — PLAN OF CARE
Problem: Safety - Adult  Goal: Free from fall injury  4/2/2025 1431 by Evette Rogers RN  Outcome: Progressing  Flowsheets (Taken 4/2/2025 0723)  Free From Fall Injury: Instruct family/caregiver on patient safety   Safety maintained, Pt continues to be monitored.   Problem: Chronic Conditions and Co-morbidities  Goal: Patient's chronic conditions and co-morbidity symptoms are monitored and maintained or improved  4/2/2025 1431 by Evette Rogers RN  Outcome: Progressing   Monitoring.   Problem: Discharge Planning  Goal: Discharge to home or other facility with appropriate resources  4/2/2025 1431 by Evette Rogers RN  Outcome: Progressing   Pt to discharge back to facility once medically cleared.   Problem: Skin/Tissue Integrity  Goal: Skin integrity remains intact  Description: 1.  Monitor for areas of redness and/or skin breakdown  2.  Assess vascular access sites hourly  3.  Every 4-6 hours minimum:  Change oxygen saturation probe site  4.  Every 4-6 hours:  If on nasal continuous positive airway pressure, respiratory therapy assess nares and determine need for appliance change or resting period  4/2/2025 1431 by Evette Rogers RN  Outcome: Progressing  Flowsheets  Taken 4/2/2025 0724  Skin Integrity Remains Intact: Monitor for areas of redness and/or skin breakdown  Taken 4/2/2025 0723  Skin Integrity Remains Intact: Monitor for areas of redness and/or skin breakdown   No new altered skin noted this shift.   Problem: ABCDS Injury Assessment  Goal: Absence of physical injury  4/2/2025 1431 by Evette Rogers RN  Outcome: Progressing  Flowsheets (Taken 4/2/2025 0723)  Absence of Physical Injury: Implement safety measures based on patient assessment   No injury noted this shift.   Problem: Pain  Goal: Verbalizes/displays adequate comfort level or baseline comfort level  4/2/2025 1431 by Evette Rogers RN  Outcome: Progressing   Pt had pain overnight. Currently resting and showing no s/s of pain.

## 2025-04-02 NOTE — PROGRESS NOTES
IN-PATIENT SERVICE  [facility] Internal Medicine  [facility] Internal Medicine   Jovan Calvillo MD; New Sloan MD; Kel Navarro MD; Rei Rivas MD,   Jeaneth Henao MD; Kathryn Gonzalez MD; Ashley Yang MD; Kiera Singleton MD    Progress Note            Date:   4/2/2025  Patient name:  Francia Ferguson  Date of admission:  3/25/2025 10:40 PM  MRN:   [id number]  Account:  [id number]  YOB: 1945  PCP:    Jack Boone DO  Room:   2001/2001-01  Code Status:    DNR-CCA    Physician Requesting Consult: Quin Ya MD    Reason for Consult:  Medical management    Chief Complaint:     Chief Complaint   Patient presents with    Fall        History Obtained From:     Patient, EMR, nursing staff     History of Present Illness:     79-year-old female presented to the emergency department after she was found down in her room.  She lives in a memory care unit.  He recently discharged from the facility for hypoglycemia.  Noted to have acute right intertrochanteric fracture and subacute S4 fracture  3/28   Patient, has advanced dementia, had a fall, admitted with fever fracture, underwent surgery  Patient urine culture growing Candida and Klebsiella on antibiotics per ID  Hemoglobin dropped to 6.8, no obvious signs of bleeding  Likely dilutional, postsurgical losses  Very poor oral intake  Past Medical History:     Past Medical History:   Diagnosis Date    Abnormal cardiovascular stress test 06/2020    no ischemia / infarction   there is septal hypokinesis   /  EF 70%    CAD (coronary artery disease)     Chest pressure     Fatigue     History of pneumonia 03/2020    HTN (hypertension)     Hyperlipidemia     Pneumonia     Positive cardiac stress test     Renal insufficiency 8/18/2022    SOB (shortness of breath)     Type 2 diabetes mellitus without complication (HCC)         Past Surgical History:     Past Surgical History:   Procedure Laterality Date    APPENDECTOMY      CARDIAC  Plavix.  DVT prophylaxis as per primary.  Will continue to follow.  3/31  Patient, finishing micafungin  Extended Rocephin till 4/2 per ID  Hemoglobin stable  Repeating CBC, BMP had hypokalemia  Blood sugars, controlled  Awaiting placement      4/1  Patient seen and examined at bedside.  Was transferred from [facility] yesterday for DKA.  DKA protocol was stopped overnight, anion gap closed, acidosis improved.  Hypernatremia.  Continue BMP checks and continue with the hypotonic fluids.  Palliative care consulted yesterday.  To discuss goals of care today.  Nephrology consult  Appreciate ID input regarding antibiotics     4/2  Patient seen and examined at bedside.   Overnight patient required Zyprexa  Afebrile, vital signs are stable.  Glucose levels running on the higher side, hypernatremia, slowly resolving.  Nephrology on board, defer fluid management to them.  Palliative care was consulted yesterday, they will have a meeting today with the family at 12 PM.  Discussed with RN, will reach out to nephrology.      Consultations:   IP CONSULT TO ORTHOPEDIC SURGERY  IP CONSULT TO INTERNAL MEDICINE  IP CONSULT TO INFECTIOUS DISEASES  IP CONSULT TO VASCULAR ACCESS TEAM  IP CONSULT TO DIABETES EDUCATOR  IP CONSULT TO PULMONOLOGY  IP CONSULT TO PALLIATIVE CARE  IP CONSULT TO NEPHROLOGY      Kiera Singleton MD  4/2/2025  7:25 AM      Please note that this chart was generated using voice recognition Dragon dictation software.  Although every effort was made to ensure the accuracy of this automated transcription, some errors in transcription may have occurred.

## 2025-04-02 NOTE — PROGRESS NOTES
[facility]  Initial Consult Note  Date: 4/2/2025    Hospital day :7     Chief complaint/reason for consultation:   UTI    Assessment/Impression:   UTI/Enterobacter cloacae pansensitive, Klebsiella oxytoca resistant to ampicillin, otherwise sensitive to other tested antibiotics and Candida glabrata growth on urine culture  DKA  Metabolic acidosis  Status post fall   Encephalopathy with baseline dementia  Acute right femur fracture status post surgical stabilization with intramedullary nail 3/26/2025  Diabetes mellitus  Coronary artery disease  A-fib  Hypertension  Hyperlipidemia    Plan/Recommendations     IV  micafungin and po keflex to be completed today 4/2/25  Follow CBC and renal function    Infection Control Recommendations   [place] Precautions      Antimicrobial Stewardship Recommendations   Simplification of therapy  Targeted therapy      History of Present Illness:   Francia Ferguson is a 79 y.o.-year-old female who was initially admitted on 3/25/2025.  The patient was brought to the hospital after a fall was trying to get out of the bed, was found to have acute right intertrochanteric femur fracture and subacute S4 fracture.  Status post fracture repair 3/26/2025.  The patient had a fever with a temperature max of 100.3 on 3/27/2025, was tachycardic.  Initial urinalysis suggestive of UTI, urine culture grew Enterobacter cloacae, Klebsiella oxytoca and Latesha glabrata .  Initial WBC 4.6, creatinine 1  On 3/31/2025 was transferred to ICU for DKA with high anion gap metabolic acidosis  Interval changes  4/2/2025   She was seen and examined, awake, oriented to person,no new events.  Patient Vitals for the past 8 hrs:   BP Temp Temp src Pulse Resp SpO2   04/02/25 1130 (!) 106/35 97.7 °F (36.5 °C) Oral 59 16 97 %   04/02/25 0715 (!) 157/47 97.9 °F (36.6 °C) Axillary 81 16 99 %     I have personally reviewed the past medical history, past surgical history, medications, social history, and  Urine, straight catheter Updated: 03/28/25 0953     Specimen Description .URINE,STRAIGHT CATHETER     Culture ENTEROBACTER CLOACAE COMPLEX >100,000 CFU/ML Identification by MALDI-TOF      KLEBSIELLA OXYTOCA 50 ,000 CFU/ML Identification by MALDI-TOF      CANDIDA GLABRATA >100,000 CFU/ML Identification by MALDI-TOF    Susceptibility        Enterobacter cloacae complex      BACTERIAL SUSCEPTIBILITY PANEL CHELA BACTERIAL SUSCEPTIBILITY PANEL QUINTERO FARLEY      cefTRIAXone   Sensitive      gentamicin <=1  Sensitive       levofloxacin <=0.12  Sensitive       nitrofurantoin 32  Sensitive       piperacillin-tazobactam <=4  Sensitive       tobramycin <=1  Sensitive       trimethoprim-sulfamethoxazole <=20  Sensitive                          Susceptibility        Klebsiella oxytoca      BACTERIAL SUSCEPTIBILITY PANEL CHELA      ampicillin >=32  Resistant      cefTRIAXone <=0.25  Sensitive      Confirmatory Extended Spectrum Beta-Lactamase NEGATIVE  Sensitive      gentamicin <=1  Sensitive      levofloxacin <=0.12  Sensitive      nitrofurantoin <=16  Sensitive      piperacillin-tazobactam <=4  Sensitive      tobramycin <=1  Sensitive      trimethoprim-sulfamethoxazole <=20  Sensitive                                   Electronically signed by Erik Miguel MD on 4/2/2025 at 2:14 PM      [facility]  Erik Miguel MD  [employer] messaging  OFFICE: [phone]  FAX: [phone]    Thank you for allowing us to participate in the care of this patient. Please contact is with any questions.     This note is created with the assistance of a speech recognition program.  While intending to generate a document that actually reflects the content of the visit, the document can still have some errors including those of syntax and sound a like substitutions which may escape proof reading.  In such instances, actual meaning can be extrapolated by contextual diversion.

## 2025-04-02 NOTE — PROGRESS NOTES
Dr Christensen notified of pt's sodium level. Writer unable to call Dr Luna. Writer didn't have Dr Yaz. TH

## 2025-04-02 NOTE — CONSULTS
[facility]    Shaukat Rashid MD Zafar Magsi MD Danial Rashid MD John O. Ranker MD Sembria Ligibel, ROSEMARIE     Reason for Consult: Hypernatremia.    Requesting Physician:  Quin Ya MD    Assessment:    Hypernatremia.  CKD stage 3A.  S/p fall.  UTI.  Dementia.    Plan:    Patient serum sodium corrected by 5 mmol/L over 21 hours at an appropriate rate.  D5 water IV fluids stopped due to severe hyperglycemia.  Patient has started oral intake.  Will continue to monitor serum sodium every 6 hours  Will address IV fluids if need be  Avoid hypotension, nephrotoxic drugs and Fleets enema.  Follow up labs.    Subjective:    Patient seen and examined at the bedside.  Patient is resting comfortably in her bed.  She is easily arousable and denies any complaints.  Denies any shortness of breath or nausea or abdominal pain.  Oral intake described to be low.  D5 water IV fluids stopped earlier today.    Patient's corrected serum sodium earlier today at 4 AM was 149 mmol/L    HISTORY OF PRESENT ILLNESS:      The patient is a 79 y.o. female who presents with fall at nursing home. Here she was diagnosed with UTI. Her blood glucose adilene requiring insulin drip. Presently she is off Insulin drip. She had R femur fracture surgery on 3/26. Her serum sodium began to rise on 3/27. Today her corrected serum sodium is 154. She has dementia and is unable to provide any history.     Past Medical History:   Diagnosis Date    Abnormal cardiovascular stress test 06/2020    no ischemia / infarction   there is septal hypokinesis   /  EF 70%    CAD (coronary artery disease)     Chest pressure     Fatigue     History of pneumonia 03/2020    HTN (hypertension)     Hyperlipidemia     Pneumonia     Positive cardiac stress test     Renal insufficiency 8/18/2022    SOB (shortness of breath)     Type 2 diabetes mellitus without complication (HCC)        Past Surgical History:   Procedure Laterality Date

## 2025-04-02 NOTE — PROGRESS NOTES
Pt extremely agitated. Pulling at wires, iv and foam dressings. Writer and 3 other nurses cleaned pt. She was kicking, screaming and trying to bite. Swinging at nurses. Paged on call. Silvia DOWD notified and new orders placed. TH

## 2025-04-02 NOTE — PLAN OF CARE
Problem: Safety - Adult  Goal: Free from fall injury  4/2/2025 0217 by Shey Kaufman RN  Outcome: Progressing  4/1/2025 1516 by Edita Pierre RN  Outcome: Progressing  Flowsheets (Taken 4/1/2025 1516)  Free From Fall Injury:   Instruct family/caregiver on patient safety   Based on caregiver fall risk screen, instruct family/caregiver to ask for assistance with transferring infant if caregiver noted to have fall risk factors     Problem: Chronic Conditions and Co-morbidities  Goal: Patient's chronic conditions and co-morbidity symptoms are monitored and maintained or improved  4/2/2025 0217 by Shey Kaufman RN  Outcome: Progressing  4/1/2025 1516 by Edita Pierre RN  Outcome: Progressing  Flowsheets (Taken 4/1/2025 1516)  Care Plan - Patient's Chronic Conditions and Co-Morbidity Symptoms are Monitored and Maintained or Improved:   Monitor and assess patient's chronic conditions and comorbid symptoms for stability, deterioration, or improvement   Collaborate with multidisciplinary team to address chronic and comorbid conditions and prevent exacerbation or deterioration   Update acute care plan with appropriate goals if chronic or comorbid symptoms are exacerbated and prevent overall improvement and discharge     Problem: Discharge Planning  Goal: Discharge to home or other facility with appropriate resources  4/2/2025 0217 by Shey Kaufman RN  Outcome: Progressing  4/1/2025 1516 by Edita Pierre RN  Outcome: Progressing     Problem: Skin/Tissue Integrity  Goal: Skin integrity remains intact  Description: 1.  Monitor for areas of redness and/or skin breakdown  2.  Assess vascular access sites hourly  3.  Every 4-6 hours minimum:  Change oxygen saturation probe site  4.  Every 4-6 hours:  If on nasal continuous positive airway pressure, respiratory therapy assess nares and determine need for appliance change or resting period  4/2/2025 0217 by Shey Kaufman, RN  Outcome:

## 2025-04-02 NOTE — PROGRESS NOTES
Physical Therapy  [facility]   Physical Therapy Treatment  Date: 25  Patient Name: Francia Ferguson       Room:   MRN: [id number]  Account: [id number]   : 1945  (79 y.o.) Gender: female     Discharge Recommendations:  Discharge Recommendations: Long Term Care with PT     PT Equipment Recommendations  Equipment Needed: No    General  Chart Reviewed: Yes  Response To Previous Treatment: Patient with no complaints from previous session.  Family/Caregiver Present: No    Past Medical History:  has a past medical history of Abnormal cardiovascular stress test, CAD (coronary artery disease), Chest pressure, Fatigue, History of pneumonia, HTN (hypertension), Hyperlipidemia, Pneumonia, Positive cardiac stress test, Renal insufficiency, SOB (shortness of breath), and Type 2 diabetes mellitus without complication (HCC).  Past Surgical History:   has a past surgical history that includes Cholecystectomy; Colonoscopy; Hysterectomy, vaginal; Coronary angioplasty with stent (2020); Hysterectomy; Appendectomy; Cardiac catheterization (2021); eye surgery; and Femur Surgery (Right, 3/26/2025).    Restrictions  Restrictions/Precautions  Restrictions/Precautions: Weight Bearing, Fall Risk, Bed Alarm  Implants Present? : Metal implants  Lower Extremity Weight Bearing Restrictions  Right Lower Extremity Weight Bearing: Weight Bearing As Tolerated     Subjective  Subjective  Subjective: Pt lying in bed upon arrival in fetal position. Agreeable to therapy   General  General Comments: DEDRA Alas approved therapy; co-treat with TANIKA Booth     Pain  Pre-Pain:  (pt grimacing with movements, however does not rate)     Objective  Orientation  Overall Orientation Status: Impaired      Bed Mobility  Bed mobility  Supine to Sit: Substantial/Maximal assistance, 2 Person assistance  Sit to Supine: Substantial/Maximal assistance, 2 Person assistance  Scooting: Substantial/Maximal assistance, 2

## 2025-04-02 NOTE — PROGRESS NOTES
Per Dr. Singleton, reach out to on call about sodium level.    Attempted to call Dr. Christensen, on call for Dr. Luna, but no response. Message left.     0846: No response.       0847: Paged again. Per Dr. Christensen, on call, stop d5 and continue to encourage oral intake. 143 is ok from 146 sodium level.

## 2025-04-02 NOTE — PROGRESS NOTES
..PALLIATIVE CARE TEAM    Patient: Francia Ferguson  Room: 2080/2080-01    Reason For Consult   Goals of care evaluation  Distress management  Symptom Management  Guidance and support  Facilitate communications  Assistance in coordinating care  Recommendations for the above    Impression: Francia Ferguson is a 79 y.o. year old female with  has a past medical history of Abnormal cardiovascular stress test, CAD (coronary artery disease), Chest pressure, Fatigue, History of pneumonia, HTN (hypertension), Hyperlipidemia, Pneumonia, Positive cardiac stress test, Renal insufficiency, SOB (shortness of breath), and Type 2 diabetes mellitus without complication (HCC)..  Currently hospitalized for the management of fall and right hip fracture.  The Palliative Care Team is following to assist with goals of care and support.     Code Status  DNR-CCA  PLAN:   - the patient has been at [facility] in the [facility] for just a short time  - she will go to [facility] for rehab post Right hip repair   - I talk to the patient's  dinora and sons Ashok and Jameson  - we talk about her care and how assertive they want to be, and I mention palliative care and even hospice care to follow   - we talk about conservative treatment and even a DNRCC possibly according to their goals of care   - I provide a hospice list to the family and offer a meeting after they have chosen a company and that I can set them up for an informational meeting as well  - will follow for goals of care and support.   Vital Signs:  BP (!) 106/35 Comment: RN Notified  Pulse 59   Temp 97.7 °F (36.5 °C) (Oral)   Resp 16   Wt 52 kg (114 lb 10.2 oz)   SpO2 97%   BMI 21.66 kg/m²     Patient Active Problem List   Diagnosis    Diabetic retinopathy (HCC)    Hyperlipidemia    Osteoporosis    Type 2 diabetes mellitus without complication, with long-term current use of insulin    Allergic rhinitis    Arteriosclerosis of coronary artery    Vitamin D deficiency     talking to a hospice company about their care.     I ask about any hospice experience or thoughts about a company. I talk about where hospice care is provided as well, as it is a company of choice, and that I can offer a hospice list for them to research and that I can call any company that they would chose.     I provide a hospice list, and my contact information as well.     I again talk about her code status and explain that is her wishes for resuscitation and at this time she is a DNRCC-A no intubation and I explain that again in detail. I as well update that if they decide to pursue hospice care that a DNRCC would be appropriate as it would allow Francia to live her life out, and the focus would be conservative medical treatment and hospice care.     I offer much emotional support to the family and they are appreciative.     Will follow for goals of care and support.           Electronically signed by   Virgie Mak RN  Palliative Care Team  on 4/2/2025 at 1:48 PM

## 2025-04-02 NOTE — PROGRESS NOTES
[facility]   INPATIENT OCCUPATIONAL THERAPY  PROGRESS NOTE  Date: 2025  Patient Name: Francia Ferguson       Room:   MRN: [id number]    : 1945  (79 y.o.)  Gender: female   Referring Practitioner: Quin Ya MD  Diagnosis: Fall at nursing home      Discharge Recommendations:  Further Occupational Therapy is recommended upon facility discharge.    OT Equipment Recommendations  Other: TBD    Restrictions/Precautions  Restrictions/Precautions  Restrictions/Precautions: Weight Bearing;Fall Risk;Bed Alarm  Implants Present? : Metal implants  Lower Extremity Weight Bearing Restrictions  Right Lower Extremity Weight Bearing: Weight Bearing As Tolerated    O2 Device: None (Room air)  BP Location: Right upper arm    Subjective  Subjective  Subjective: \"I hope I am doing alright.\"  Pain  Pre-Pain:  (pt grimacing with movements, however does not rate)  Comments: Ok per DEDRA Alas for OT/PT treatment with PTA Nani    Objective  Orientation  Overall Orientation Status: Impaired  Orientation Level: Oriented to person  Cognition  Overall Cognitive Status: Exceptions  Arousal/Alertness: Inconsistent responses to stimuli  Following Commands: Inconsistently follows commands  Attention Span: Difficulty attending to directions;Difficulty dividing attention  Memory: Impaired  Safety Judgement: Decreased awareness of need for safety;Decreased awareness of need for assistance  Problem Solving: Assistance required to correct errors made;Assistance required to identify errors made;Assistance required to implement solutions;Assistance required to generate solutions;Decreased awareness of errors  Insights: Not aware of deficits  Initiation: Requires cues for all  Sequencing: Requires cues for all    Activities of Daily Living  Feeding: Maximum assistance  Feeding Skilled Clinical Factors: OT facilitated pts engagement in beverage management task while sitting EOB. Pt required max verbal/tactile

## 2025-04-03 VITALS
HEART RATE: 74 BPM | RESPIRATION RATE: 16 BRPM | BODY MASS INDEX: 20.1 KG/M2 | WEIGHT: 106.48 LBS | HEIGHT: 61 IN | DIASTOLIC BLOOD PRESSURE: 53 MMHG | SYSTOLIC BLOOD PRESSURE: 127 MMHG | TEMPERATURE: 97.6 F | OXYGEN SATURATION: 100 %

## 2025-04-03 PROBLEM — E44.0 MODERATE MALNUTRITION: Status: ACTIVE | Noted: 2025-04-03

## 2025-04-03 LAB
ANION GAP SERPL CALCULATED.3IONS-SCNC: 10 MMOL/L (ref 9–16)
ANION GAP SERPL CALCULATED.3IONS-SCNC: 9 MMOL/L (ref 9–16)
BASOPHILS # BLD: 0 K/UL (ref 0–0.2)
BASOPHILS NFR BLD: 0 % (ref 0–2)
BUN SERPL-MCNC: 25 MG/DL (ref 8–23)
BUN SERPL-MCNC: 26 MG/DL (ref 8–23)
CALCIUM SERPL-MCNC: 8.1 MG/DL (ref 8.6–10.4)
CALCIUM SERPL-MCNC: 8.3 MG/DL (ref 8.6–10.4)
CHLORIDE SERPL-SCNC: 112 MMOL/L (ref 98–107)
CHLORIDE SERPL-SCNC: 114 MMOL/L (ref 98–107)
CO2 SERPL-SCNC: 21 MMOL/L (ref 20–31)
CO2 SERPL-SCNC: 21 MMOL/L (ref 20–31)
CREAT SERPL-MCNC: 1.1 MG/DL (ref 0.7–1.2)
CREAT SERPL-MCNC: 1.2 MG/DL (ref 0.7–1.2)
EOSINOPHIL # BLD: 0.1 K/UL (ref 0–0.4)
EOSINOPHILS RELATIVE PERCENT: 2 % (ref 0–4)
ERYTHROCYTE [DISTWIDTH] IN BLOOD BY AUTOMATED COUNT: 16.5 % (ref 11.5–14.9)
GFR, ESTIMATED: 46 ML/MIN/1.73M2
GFR, ESTIMATED: 51 ML/MIN/1.73M2
GLUCOSE BLD-MCNC: 327 MG/DL (ref 65–105)
GLUCOSE BLD-MCNC: 408 MG/DL (ref 65–105)
GLUCOSE BLD-MCNC: 423 MG/DL (ref 65–105)
GLUCOSE SERPL-MCNC: 325 MG/DL (ref 74–99)
GLUCOSE SERPL-MCNC: 404 MG/DL (ref 74–99)
HCT VFR BLD AUTO: 25.4 % (ref 36–46)
HGB BLD-MCNC: 8.1 G/DL (ref 12–16)
LYMPHOCYTES NFR BLD: 0.5 K/UL (ref 1–4.8)
LYMPHOCYTES RELATIVE PERCENT: 9 % (ref 24–44)
MAGNESIUM SERPL-MCNC: 1.9 MG/DL (ref 1.6–2.4)
MAGNESIUM SERPL-MCNC: 1.9 MG/DL (ref 1.6–2.4)
MCH RBC QN AUTO: 31.1 PG (ref 26–34)
MCHC RBC AUTO-ENTMCNC: 31.9 G/DL (ref 31–37)
MCV RBC AUTO: 97.4 FL (ref 80–100)
MONOCYTES NFR BLD: 0.3 K/UL (ref 0.1–1.3)
MONOCYTES NFR BLD: 6 % (ref 1–7)
NEUTROPHILS NFR BLD: 83 % (ref 36–66)
NEUTS SEG NFR BLD: 4.3 K/UL (ref 1.3–9.1)
PHOSPHATE SERPL-MCNC: 2.9 MG/DL (ref 2.5–4.5)
PHOSPHATE SERPL-MCNC: 3.2 MG/DL (ref 2.5–4.5)
PLATELET # BLD AUTO: 222 K/UL (ref 150–450)
PMV BLD AUTO: 7.4 FL (ref 6–12)
POTASSIUM SERPL-SCNC: 4.3 MMOL/L (ref 3.7–5.3)
POTASSIUM SERPL-SCNC: 4.9 MMOL/L (ref 3.7–5.3)
RBC # BLD AUTO: 2.61 M/UL (ref 4–5.2)
SODIUM SERPL-SCNC: 143 MMOL/L (ref 136–145)
SODIUM SERPL-SCNC: 144 MMOL/L (ref 136–145)
WBC OTHER # BLD: 5.2 K/UL (ref 3.5–11)

## 2025-04-03 PROCEDURE — 36415 COLL VENOUS BLD VENIPUNCTURE: CPT

## 2025-04-03 PROCEDURE — 6360000002 HC RX W HCPCS: Performed by: INTERNAL MEDICINE

## 2025-04-03 PROCEDURE — 99239 HOSP IP/OBS DSCHRG MGMT >30: CPT | Performed by: INTERNAL MEDICINE

## 2025-04-03 PROCEDURE — 2580000003 HC RX 258: Performed by: INTERNAL MEDICINE

## 2025-04-03 PROCEDURE — 6370000000 HC RX 637 (ALT 250 FOR IP): Performed by: ORTHOPAEDIC SURGERY

## 2025-04-03 PROCEDURE — 6370000000 HC RX 637 (ALT 250 FOR IP): Performed by: INTERNAL MEDICINE

## 2025-04-03 PROCEDURE — G0545 PR INHERENT VISIT TO INPT: HCPCS | Performed by: INTERNAL MEDICINE

## 2025-04-03 PROCEDURE — 97530 THERAPEUTIC ACTIVITIES: CPT

## 2025-04-03 PROCEDURE — 80048 BASIC METABOLIC PNL TOTAL CA: CPT

## 2025-04-03 PROCEDURE — 6370000000 HC RX 637 (ALT 250 FOR IP): Performed by: NURSE PRACTITIONER

## 2025-04-03 PROCEDURE — 82947 ASSAY GLUCOSE BLOOD QUANT: CPT

## 2025-04-03 PROCEDURE — 84100 ASSAY OF PHOSPHORUS: CPT

## 2025-04-03 PROCEDURE — 85025 COMPLETE CBC W/AUTO DIFF WBC: CPT

## 2025-04-03 PROCEDURE — 99231 SBSQ HOSP IP/OBS SF/LOW 25: CPT | Performed by: INTERNAL MEDICINE

## 2025-04-03 PROCEDURE — 97535 SELF CARE MNGMENT TRAINING: CPT

## 2025-04-03 PROCEDURE — 6370000000 HC RX 637 (ALT 250 FOR IP)

## 2025-04-03 PROCEDURE — 83735 ASSAY OF MAGNESIUM: CPT

## 2025-04-03 RX ORDER — INSULIN LISPRO 100 [IU]/ML
0-8 INJECTION, SOLUTION INTRAVENOUS; SUBCUTANEOUS
Status: DISCONTINUED | OUTPATIENT
Start: 2025-04-03 | End: 2025-04-03 | Stop reason: HOSPADM

## 2025-04-03 RX ORDER — HEPARIN SODIUM 5000 [USP'U]/ML
5000 INJECTION, SOLUTION INTRAVENOUS; SUBCUTANEOUS 2 TIMES DAILY
Status: DISCONTINUED | OUTPATIENT
Start: 2025-04-03 | End: 2025-04-03 | Stop reason: HOSPADM

## 2025-04-03 RX ORDER — PANTOPRAZOLE SODIUM 40 MG/1
40 TABLET, DELAYED RELEASE ORAL
Status: DISCONTINUED | OUTPATIENT
Start: 2025-04-04 | End: 2025-04-03 | Stop reason: HOSPADM

## 2025-04-03 RX ORDER — CEPHALEXIN 500 MG/1
500 CAPSULE ORAL EVERY 8 HOURS SCHEDULED
Status: DISCONTINUED | OUTPATIENT
Start: 2025-04-03 | End: 2025-04-03 | Stop reason: HOSPADM

## 2025-04-03 RX ORDER — ENOXAPARIN SODIUM 100 MG/ML
30 INJECTION SUBCUTANEOUS DAILY
Status: DISCONTINUED | OUTPATIENT
Start: 2025-04-04 | End: 2025-04-03 | Stop reason: SDUPTHER

## 2025-04-03 RX ADMIN — INSULIN LISPRO 8 UNITS: 100 INJECTION, SOLUTION INTRAVENOUS; SUBCUTANEOUS at 16:12

## 2025-04-03 RX ADMIN — INSULIN LISPRO 8 UNITS: 100 INJECTION, SOLUTION INTRAVENOUS; SUBCUTANEOUS at 11:21

## 2025-04-03 RX ADMIN — HEPARIN SODIUM 5000 UNITS: 5000 INJECTION INTRAVENOUS; SUBCUTANEOUS at 07:38

## 2025-04-03 RX ADMIN — INSULIN LISPRO 3 UNITS: 100 INJECTION, SOLUTION INTRAVENOUS; SUBCUTANEOUS at 07:39

## 2025-04-03 RX ADMIN — HYDROCODONE BITARTRATE AND ACETAMINOPHEN 1 TABLET: 5; 325 TABLET ORAL at 12:27

## 2025-04-03 RX ADMIN — METOPROLOL TARTRATE 50 MG: 50 TABLET, FILM COATED ORAL at 07:39

## 2025-04-03 RX ADMIN — CEPHALEXIN 500 MG: 500 CAPSULE ORAL at 05:15

## 2025-04-03 RX ADMIN — ASPIRIN 81 MG: 81 TABLET, CHEWABLE ORAL at 07:39

## 2025-04-03 RX ADMIN — PANTOPRAZOLE SODIUM 40 MG: 40 INJECTION, POWDER, LYOPHILIZED, FOR SOLUTION INTRAVENOUS at 07:38

## 2025-04-03 RX ADMIN — CEPHALEXIN 500 MG: 500 CAPSULE ORAL at 13:35

## 2025-04-03 RX ADMIN — INSULIN GLARGINE 5 UNITS: 100 INJECTION, SOLUTION SUBCUTANEOUS at 07:39

## 2025-04-03 RX ADMIN — ISOSORBIDE MONONITRATE 120 MG: 60 TABLET, EXTENDED RELEASE ORAL at 07:38

## 2025-04-03 RX ADMIN — QUETIAPINE FUMARATE 25 MG: 50 TABLET ORAL at 07:38

## 2025-04-03 RX ADMIN — ATORVASTATIN CALCIUM 40 MG: 40 TABLET, FILM COATED ORAL at 07:39

## 2025-04-03 RX ADMIN — CLOPIDOGREL BISULFATE 75 MG: 75 TABLET, FILM COATED ORAL at 07:38

## 2025-04-03 ASSESSMENT — PAIN DESCRIPTION - ORIENTATION: ORIENTATION: RIGHT

## 2025-04-03 ASSESSMENT — PAIN DESCRIPTION - LOCATION: LOCATION: HIP

## 2025-04-03 ASSESSMENT — PAIN SCALES - GENERAL: PAINLEVEL_OUTOF10: 5

## 2025-04-03 ASSESSMENT — PAIN - FUNCTIONAL ASSESSMENT: PAIN_FUNCTIONAL_ASSESSMENT: PREVENTS OR INTERFERES WITH MANY ACTIVE NOT PASSIVE ACTIVITIES

## 2025-04-03 ASSESSMENT — PAIN DESCRIPTION - DESCRIPTORS: DESCRIPTORS: ACHING;DISCOMFORT

## 2025-04-03 NOTE — PROGRESS NOTES
Physician Progress Note      PATIENT:               JOSE LAYNE  CSN #:                  [id number]  :                       1945  ADMIT DATE:       3/25/2025 10:40 PM  DISCH DATE:  RESPONDING  PROVIDER #:        Kiera Singleton MD          QUERY TEXT:    Based on your medical judgment, please clarify these findings and document if   any of the following are being evaluated and/or treated:    - Pt has hx of DM2, stage 3a CKD. Currently admitted with acute cystitis   without hematuria, femur fracture, ANKUR  - Patient's Hgb 3/25 11.4, 3/27 7.7>7.1, 3/28 7.0>6.8>8.4>8.5, 3/29   8.8>7.2>8.1>7.9, 3/30 8.1>8.1, 3/31 8.6,  8.5  - Per op note 3/26: pt had rt femur fracture stabilization with intramedullary   nail, no complications noted  - Per IM progress note 3/29: Patient, had drop in hemoglobin to 6.8, no   obvious signs of bleeding, will give 1 unit of blood transfusion  - IV fluids given, 1 unit prbc given, serial Hgb, labs, iron sucrose    The clinical indicators include:  Options provided:  -- Acute blood loss anemia  -- Postoperative acute blood loss anemia  -- Iron deficiency anemia  -- Acute on chronic blood loss  -- Other - I will add my own diagnosis  -- Disagree - Not applicable / Not valid  -- Disagree - Clinically unable to determine / Unknown  -- Refer to Clinical Documentation Reviewer    PROVIDER RESPONSE TEXT:    This patient has postoperative acute blood loss anemia.    Query created by: Yo Fajardo on 2025 4:04 PM      Electronically signed by:  Kiera Singleton MD 4/3/2025 11:12 AM

## 2025-04-03 NOTE — PROGRESS NOTES
Dr luciano notified  pt pulled out another iv. she has hospice meeting tomorrow at noon. are we ok to leave out iv? she has no iv meds on     Ok to leave out iv.     Dr luciano notified per social work Eun; hospice meeting can be conducted at facility instead if she dc's today.     Pt to be dc'd today
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
## 2025-04-03 NOTE — PROGRESS NOTES
Comprehensive Nutrition Assessment    Type and Reason for Visit:  MIGUEL ANGEL    Nutrition Recommendations/Plan:   Will continue to provide 4 carbohydrate choices with Glucerna all trays     Malnutrition Assessment:  Malnutrition Status:  Moderate malnutrition (04/03/25 1151)    Context:  Chronic Illness     Findings of the 6 clinical characteristics of malnutrition:  Energy Intake:  Mild decrease in energy intake  Weight Loss:   (11% wt loss over 5 months)     Body Fat Loss:  Mild body fat loss Triceps   Muscle Mass Loss:  Mild muscle mass loss Temples (temporalis), Hand (interosseous)  Fluid Accumulation:  No fluid accumulation     Strength:  Not Performed    Nutrition Assessment:    Pt was admitted from [facility] after fall. She is now s/p 3/26 repair of femur fx. She was also found to have Sacrum Fx and Cystitis. Intake has been around 50%. She had oatmeal and eggs for breakfast this morning. Glu is 404. Usual body wt appears to be 120-125# (large wt variations over the past 2 months).    Nutrition Related Findings:    no edema, Labs/Meds: Reviewed, BM 4/2, PMH: Dementia, DM, CKD Wound Type: Surgical Incision, Skin Tears, Deep Tissue Injury       Current Nutrition Intake & Therapies:    Average Meal Intake: 51-75%, 26-50%  Average Supplements Intake: Unable to assess  ADULT DIET; Regular; 4 carb choices (60 gm/meal)  ADULT ORAL NUTRITION SUPPLEMENT; Lunch, Dinner, Breakfast; Diabetic Oral Supplement    Anthropometric Measures:  Height: 154.9 cm (5' 1\")  Ideal Body Weight (IBW): 105 lbs (48 kg)    Admission Body Weight: 51.7 kg (114 lb)  Current Body Weight: 48.1 kg (106 lb), 101 % IBW. Weight Source: Bed scale  Current BMI (kg/m2): 20  Usual Body Weight: 54.4 kg (120 lb)     % Weight Change (Calculated): -11.7                    BMI Categories: Underweight (BMI less than 22) age over 65    Estimated Daily Nutrient Needs:  Energy Requirements Based On: Formula  Weight Used for Energy Requirements: Current  Energy  (kcal/day): Lane x 1.3= 1200 kcal (3273-1381 kcal for wt gain)  Weight Used for Protein Requirements: Current  Protein (g/day): 1.5-1.7g/kg= 70- 80 g  Method Used for Fluid Requirements: 1 ml/kcal  Fluid (ml/day): 1564-0494 ml    Nutrition Diagnosis:   Moderate malnutrition related to inadequate protein-energy intake as evidenced by criteria as identified in malnutrition assessment    Nutrition Interventions:   Food and/or Nutrient Delivery: Continue Current Diet, Continue Oral Nutrition Supplement  Nutrition Education/Counseling: No recommendation at this time  Coordination of Nutrition Care: Continue to monitor while inpatient       Goals:  Goals: PO intake 50% or greater  Type of Goal: New goal  Previous Goal Met: New Goal    Nutrition Monitoring and Evaluation:      Food/Nutrient Intake Outcomes: Food and Nutrient Intake, Supplement Intake  Physical Signs/Symptoms Outcomes: Biochemical Data, Chewing or Swallowing, GI Status, Fluid Status or Edema, Skin, Weight    Discharge Planning:    Continue current diet, Continue Oral Nutrition Supplement     Maricarmen Mclaughlin RD, LD  Contact: [phone]

## 2025-04-03 NOTE — DISCHARGE SUMMARY
[facility] Internal Medicine    Kel Navaror MD; Rei Rivas MD, [place] MD, Ashley Yang MD,Dr. TIAGO Singleton MD. ; Kathryn Gonzalez MD      [facility] Internal Medicine  IN-PATIENT SERVICE   [facility]    Discharge Summary     Patient ID: Francia Ferguson  :  1945   MRN: [id number]     ACCOUNT:  [id number]   Patient's PCP: Jack Boone DO  Admit Date: 3/25/2025   Discharge Date: 4/3/2025     Length of Stay: 8  Code Status:  DNR-CCA  Admitting Physician: Quin Ya MD  Discharge Physician: Kathryn Gonzalez MD     Active Discharge Diagnoses:     Hospital Problem Lists:  Principal Problem:    Fall  Active Problems:    Moderate malnutrition  Resolved Problems:    * No resolved hospital problems. *      Admission Condition:  Serious      Discharged Condition: Stable     Hospital Stay:     Hospital Course:    79-year-old female admitted from memory unit for fall and acute commuted intertrochanteric right femur fracture, orthopedic surgery did the surgery, patient also had nondisplaced distal sacral fracture, underlying UTI, treated with Rocephin, type 2 diabetes, sugars ACHS sliding scale, coronary disease status post right ICA RCA stents in , atrial fibrillation status post Watchman device not on any anticoagulation, hypertension controlled the blood pressure,  Status post right femur intertrochanteric fracture surgical stabilization with intramedullary nail on 2025,  Also had acute kidney injury during this admission, treated with IV fluids,  Infectious disease on board,  Infectious disease treated the UTI with IV Rocephin and micafungin completed the course on 2025,  Palliative care was consulted, patient changed her CODE STATUS to DNR CCA, family wants to consider the hospice, patient has been discharged to the nursing home for hospice follow-up there,  Overall prognosis poor    Significant therapeutic interventions:     Significant

## 2025-04-03 NOTE — CARE COORDINATION
Transportation arranged for patient to go to [facility] at 1715 via [facility]. Facility informed. Bedside nurse informed.     Number for Report: [phone]  Electronically signed by CESARIO Orozco on 4/3/2025 at 3:41 PM

## 2025-04-03 NOTE — PROGRESS NOTES
IN-PATIENT SERVICE  [facility] Internal Medicine  [facility] Internal Medicine   Jovan Calvillo MD; New Sloan MD; Kel Navarro MD; Rei Rivas MD,   Jeaneth Henao MD; Kathryn Gonzalez MD; Ashley Yang MD; Kiera Singleton MD    Progress Note            Date:   4/3/2025  Patient name:  Francia Ferguson  Date of admission:  3/25/2025 10:40 PM  MRN:   [id number]  Account:  [id number]  YOB: 1945  PCP:    Jack Boone DO  Room:   2080/2080-01  Code Status:    DNR-CCA    Physician Requesting Consult: Quin Ya MD    Reason for Consult:  Medical management    Chief Complaint:     Chief Complaint   Patient presents with    Fall        History Obtained From:     Patient, EMR, nursing staff     History of Present Illness:     79-year-old female presented to the emergency department after she was found down in her room.  She lives in a memory care unit.  He recently discharged from the facility for hypoglycemia.  Noted to have acute right intertrochanteric fracture and subacute S4 fracture  3/28   Patient, has advanced dementia, had a fall, admitted with fever fracture, underwent surgery  Patient urine culture growing Candida and Klebsiella on antibiotics per ID  Hemoglobin dropped to 6.8, no obvious signs of bleeding  Likely dilutional, postsurgical losses  Very poor oral intake  Past Medical History:     Past Medical History:   Diagnosis Date    Abnormal cardiovascular stress test 06/2020    no ischemia / infarction   there is septal hypokinesis   /  EF 70%    CAD (coronary artery disease)     Chest pressure     Fatigue     History of pneumonia 03/2020    HTN (hypertension)     Hyperlipidemia     Pneumonia     Positive cardiac stress test     Renal insufficiency 8/18/2022    SOB (shortness of breath)     Type 2 diabetes mellitus without complication (HCC)         Past Surgical History:     Past Surgical History:   Procedure Laterality Date    APPENDECTOMY      CARDIAC  s/p RCA stent placement 2020.  Hold off on aspirin and Plavix in anticipation of surgery.  Atrial fibrillation.  Rate controlled.  Outpatient Watchman procedure. Not on AC.   Hypertension.  Controlled, continue with current medication.  Hyperlipidemia.  Continue statin.  DVT prophylaxis as per primary.    3/27.  S/pRight femur intertrochanteric fracture surgical stabilization with intramedullary nail 3/26/2025  Patient seen and examined at bedside.  On hold Lantus.  Tachycardia, get EKG  Last EKG showed sinus tach   Has H/o afib   Drop in hemoglobin from 11.4-7.7, most likely postop.  No obvious source of bleeding.  Could be the cause of tachycardia.  Monitor H&H  every 6 hours.   Hold off on resuming aspirin and Plavix.  EPC cuffs.  Check stool occult.  ANKUR, likely prerenal and in the setting of UTI.  Urine culture growing Enterobacter, Klebsiella, Candida.  Zosyn.  Add Diflucan.  Check bladder scan and straight cath if more than 300 mL.  Start on gentle IV fluid hydration.  ID consult.  3/28   Patient, had drop in hemoglobin to 6.8, no obvious signs of bleeding, will give 1 unit of blood transfusion  Discussed plan of care with patient  Theo, he has given consent for blood transfusion understand the risk of potential reaction with blood  Patient has very poor oral intake  Increasing fluid to 100 mL/h  On Rocephin and micafungin with UTI  Patient had hyperkalemia, repeat BMP, potassium is improved  She is DNR CCA no intubation  Very poor oral intake with advanced dementia  Overall prognosis very guarded    3/29.  Patient seen and examined at bedside.  Afebrile, vital signs are stable.  IV Venofer.  UTI, treatment as per ID, on antibiotics.  Hemoglobin has been stable.  Around 8.8.  Will repeat CBC tomorrow if it is stable will resume anticoagulation and aspirin, Plavix.  Discussed with family at bedside.  Will continue to follow.    3/30.  Patient seen and examined.  Afebrile, vital signs stable  On

## 2025-04-03 NOTE — PLAN OF CARE
Problem: Safety - Adult  Goal: Free from fall injury  4/3/2025 0407 by Taylor Hutchinson RN  Outcome: Progressing     Problem: Chronic Conditions and Co-morbidities  Goal: Patient's chronic conditions and co-morbidity symptoms are monitored and maintained or improved  4/3/2025 0407 by Taylor Hutchinson RN  Outcome: Progressing     Problem: Discharge Planning  Goal: Discharge to home or other facility with appropriate resources  4/3/2025 0407 by Taylor Hutchinson RN  Outcome: Progressing     Problem: Skin/Tissue Integrity  Goal: Skin integrity remains intact  Description: 1.  Monitor for areas of redness and/or skin breakdown  2.  Assess vascular access sites hourly  3.  Every 4-6 hours minimum:  Change oxygen saturation probe site  4.  Every 4-6 hours:  If on nasal continuous positive airway pressure, respiratory therapy assess nares and determine need for appliance change or resting period  4/3/2025 0407 by Taylor Hutchinson RN  Outcome: Progressing     Problem: ABCDS Injury Assessment  Goal: Absence of physical injury  4/3/2025 0407 by Taylor Hutchinson RN  Outcome: Progressing     Problem: Pain  Goal: Verbalizes/displays adequate comfort level or baseline comfort level  4/3/2025 0407 by Taylor Hutchinson RN  Outcome: Progressing

## 2025-04-03 NOTE — PROGRESS NOTES
I call the patient's son Ashok back as he left a . He states that his family talked and that they decided that that hospice choice is [facility]. We talk about meeting with hospice and seeing if his Mom qualifies to start hospice services at the [facility] that she will reside at.     I call [facility] and they called son Ashok, and they will meet with him tomorrow 4-4 at 12:00pm.       Will follow for patient/family support.         ..[facility]  Virgie Mak RN,[facility]: [phone]  [facility]: [phone]  [facility]: [phone]

## 2025-04-03 NOTE — PROGRESS NOTES
[facility]  Initial Consult Note  Date: 4/3/2025    Hospital day :8     Chief complaint/reason for consultation:   UTI    Assessment/Impression:   UTI/Enterobacter cloacae pansensitive, Klebsiella oxytoca resistant to ampicillin, otherwise sensitive to other tested antibiotics and Candida glabrata growth on urine culture  DKA  Metabolic acidosis  Status post fall   Encephalopathy with baseline dementia  Acute right femur fracture status post surgical stabilization with intramedullary nail 3/26/2025  Diabetes mellitus  Coronary artery disease  A-fib  Hypertension  Hyperlipidemia    Plan/Recommendations     IV  micafungin and po keflex to be completed 4/2/25  Palliative care  No objection for discharge from infectious disease point of view  Infection Control Recommendations   [place] Precautions      Antimicrobial Stewardship Recommendations   Simplification of therapy  Targeted therapy      History of Present Illness:   Francia Ferguson is a 79 y.o.-year-old female who was initially admitted on 3/25/2025.  The patient was brought to the hospital after a fall was trying to get out of the bed, was found to have acute right intertrochanteric femur fracture and subacute S4 fracture.  Status post fracture repair 3/26/2025.  The patient had a fever with a temperature max of 100.3 on 3/27/2025, was tachycardic.  Initial urinalysis suggestive of UTI, urine culture grew Enterobacter cloacae, Klebsiella oxytoca and Latesha glabrata .  Initial WBC 4.6, creatinine 1  On 3/31/2025 was transferred to ICU for DKA with high anion gap metabolic acidosis  Interval changes  4/3/2025   She was seen and examined, afebrile, no new events  Patient Vitals for the past 8 hrs:   BP Temp Temp src Pulse Resp SpO2 Height   04/03/25 1257 -- -- -- -- 15 -- --   04/03/25 1227 -- -- -- -- 16 -- --   04/03/25 1141 -- -- -- -- -- -- 1.549 m (5' 1\")   04/03/25 1126 115/61 97.7 °F (36.5 °C) Oral 63 18 99 % --   04/03/25 0730 (!)

## 2025-04-03 NOTE — CARE COORDINATION
DISCHARGE PLANNING NOTE:    Writer placed call to pt son Ashok to confirm pt discharge to [facility], agreeable to hospice meeting at facility. All questions answered at this time.    Call placed to [facility] to confirm pt is discharging to [facility], hospice will meet family tomorrow at 1200.     IMM letter provided to patient son Ashok.  Patient representative offered four hours to make informed decision regarding appeal process; patient representative agreeable to discharge.     Electronically signed by CESARIO Orozco on 4/3/2025 at 3:34 PM

## 2025-04-03 NOTE — PROGRESS NOTES
Patient sitting in bed with television on, patient stared straight ahead, even when her son enter into the room. Writer assist son with tray height adjustments so he could help feed his Mother.     04/03/25 1158   Encounter Summary   Encounter Overview/Reason Spiritual/Emotional Needs   Service Provided For Patient and family together  (Son)   Referral/Consult From Palliative Care   Support System Spouse;Children;Family members   Last Encounter  04/03/25   Complexity of Encounter Moderate   Begin Time 1158   End Time  1208   Total Time Calculated 10 min   Spiritual/Emotional needs   Type Spiritual Support   Assessment/Intervention/Outcome   Assessment Unable to assess   Intervention Active listening;Prayer (assurance of)/[place];Read/Provided Scripture;Sustaining Presence/Ministry of presence   Outcome Engaged in conversation;Expressed Gratitude;Receptive

## 2025-04-03 NOTE — DISCHARGE INSTR - DIET

## 2025-04-03 NOTE — PROGRESS NOTES
[facility]    Shaukat Rashid MD Zafar Magsi MD Danial Rashid MD John O. Ranker MD Sembria Ligibel, ROSEMARIE           Reason for Follow up: Hypernatremia.    Assessment:  Hypernatremia, corrected sodium of 148 today.  CKD stage 3A.  S/p fall.  UTI.  Dementia.     Plan:  D5 water IV fluids were discontinued due to severe hyperglycemia.  Patient has started oral intake. Encourage oral fluids.   Will address IV fluids if need be.  Avoid hypotension, nephrotoxic drugs and IV contrast exposure.  Follow up labs ordered for AM.     Please do not hesitate to call with questions. We will follow with you.    SUBJECTIVE:    Appear comfortable.    Review Of Systems:   Unable to obtain because she has dementia.     Scheduled Meds:   insulin lispro  0-8 Units SubCUTAneous 4x Daily AC & HS    [START ON 4/4/2025] pantoprazole  40 mg Oral QAM AC    cephALEXin  500 mg Oral 3 times per day    heparin (porcine)  5,000 Units SubCUTAneous BID    QUEtiapine  25 mg Oral BID    clopidogrel  75 mg Oral Daily    aspirin  81 mg Oral Daily    atorvastatin  40 mg Oral Daily    metoprolol tartrate  50 mg Oral BID    isosorbide mononitrate  120 mg Oral Daily   Continuous Infusions:   dextrose       PRN Meds:dextrose bolus **OR** dextrose bolus, sodium phosphate 15 mmol in sodium chloride 0.9 % 250 mL IVPB, HYDROcodone-acetaminophen, midodrine, glucose, glucagon (rDNA), dextrose, metoprolol    No Known Allergies    Physical Exam:  Blood pressure 115/61, pulse 63, temperature 97.7 °F (36.5 °C), temperature source Oral, resp. rate 18, weight 48.3 kg (106 lb 7.7 oz), SpO2 99%, not currently breastfeeding.  In: 860 [P.O.:860]  Out: -   In: 860   Out: -     General:  Awake, not in distress. Appears to be stated age.  HEENT: Atraumatic, normocephalic. Anicteric sclera. Pink and moist oral mucosa. No carotid bruit. No JVD.  Chest: Bilateral air entry, clear to auscultation, no wheezing, rhonchi or  rales.  Cardiovascular: RRR, S1S2, no murmur, rub or gallop. Has lower extremity edema.    Abdomen: Soft, non tender to palpation. Active bowel sounds x 4 quadrants.  Musculoskeletal: No cyanosis or clubbing.  Integumentary: Pink, warm and dry. Free from rash or lesions. Skin turgor normal.  CNS: Face symmetrical. No tremor.     Data:  CBC:   Lab Results   Component Value Date    WBC 5.2 04/03/2025    HGB 8.1 (L) 04/03/2025    HCT 25.4 (L) 04/03/2025    MCV 97.4 04/03/2025     04/03/2025     BMP:    Lab Results   Component Value Date     04/03/2025    K 4.3 04/03/2025     (H) 04/03/2025    CO2 21 04/03/2025    BUN 26 (H) 04/03/2025    CREATININE 1.2 04/03/2025    GLUCOSE 404 (HH) 04/03/2025     CMP:   Lab Results   Component Value Date     04/03/2025    K 4.3 04/03/2025     (H) 04/03/2025    CO2 21 04/03/2025    BUN 26 (H) 04/03/2025    CREATININE 1.2 04/03/2025    GLUCOSE 404 (HH) 04/03/2025    CALCIUM 8.1 (L) 04/03/2025    BILITOT 0.2 03/25/2025    ALKPHOS 72 03/25/2025    AST 29 03/25/2025    ALT 12 03/25/2025      Hepatic:   Lab Results   Component Value Date    AST 29 03/25/2025    ALT 12 03/25/2025    BILITOT 0.2 03/25/2025    ALKPHOS 72 03/25/2025     BNP: No results found for: \"BNP\"  Lipids:   Lab Results   Component Value Date    CHOL 138 05/22/2024    HDL 82 05/22/2024     INR:   Lab Results   Component Value Date    INR 0.9 03/25/2025     PTH: No results found for: \"PTH\"  Phosphorus:    Lab Results   Component Value Date    PHOS 2.9 04/03/2025     Ionized Calcium: No components found for: \"IONCA\"  Magnesium:   Lab Results   Component Value Date    MG 1.9 04/03/2025     Albumin: No results found for: \"LABALBU\"  Last 3 CK, CKMB, Troponin: @LABRCNT(CKTOTAL:3,CKMB:3,TROPONINI:3)       URINE:)No results found for: \"NAUR\", \"PROTUR\"    Radiology:   Reviewed.    Electronically signed by Abhijeet Lnua MD  on 4/3/2025 at 11:36 AM  [facility]

## 2025-04-03 NOTE — CARE COORDINATION
DISCHARGE PLANNING NOTE:    Writer is following for potential discharge to [facility]. Authorization  .     Call placed to pt son Ashok to discuss options for hospice. Per Ashok, family has to discuss hospice companies this afternoon. Ashok confirms that family would like pt to pursue therapy at [facility] and have hospice follow if possible. Ashok agreeable to writer calling back at 1300 for update.    Electronically signed by CESARIO Orozco on 4/3/2025 at 11:41 AM

## 2025-04-03 NOTE — PROGRESS NOTES
Physical Therapy  [facility]   Physical Therapy Treatment  Date: 4/3/25  Patient Name: Francia Ferguson       Room:   MRN: [id number]  Account: [id number]   : 1945  (79 y.o.) Gender: female     Discharge Recommendations:  Discharge Recommendations: Long Term Care with PT     PT Equipment Recommendations  Equipment Needed: No    General  Chart Reviewed: Yes  Response To Previous Treatment: Patient with no complaints from previous session.  Family/Caregiver Present: No    Past Medical History:  has a past medical history of Abnormal cardiovascular stress test, CAD (coronary artery disease), Chest pressure, Fatigue, History of pneumonia, HTN (hypertension), Hyperlipidemia, Pneumonia, Positive cardiac stress test, Renal insufficiency, SOB (shortness of breath), and Type 2 diabetes mellitus without complication (HCC).  Past Surgical History:   has a past surgical history that includes Cholecystectomy; Colonoscopy; Hysterectomy, vaginal; Coronary angioplasty with stent (2020); Hysterectomy; Appendectomy; Cardiac catheterization (2021); eye surgery; and Femur Surgery (Right, 3/26/2025).    Restrictions  Restrictions/Precautions  Restrictions/Precautions: Weight Bearing, Fall Risk, Bed Alarm  Implants Present? : Metal implants  Lower Extremity Weight Bearing Restrictions  Right Lower Extremity Weight Bearing: Weight Bearing As Tolerated     Subjective  Subjective  Subjective: Pt lying in bed upon arrival, agreeable to therapy   General  General Comments: DEDRA MOSLEY approved therapy; co-treat with TANIKA Hernandez     Pain  Pre-Pain:  (pt grimacing with movements, however does not rate)     Objective  Orientation  Overall Orientation Status: Impaired      Transfers  Transfers  Sit to Stand: Substantial/Maximal assistance;2 Person Assistance  Stand to Sit: Substantial/Maximal assistance;2 Person Assistance     Bed Mobility  Bed mobility  Supine to Sit: 2 Person assistance,

## 2025-04-03 NOTE — PROGRESS NOTES
The potassium/magnesium sliding scale has been automatically discontinued per Pharmacy and Therapeutic Committee approved policy because the patient has decreased renal function (CrCl<30 ml/min).  The patient's current K/Mag levels are currently:    Recent Labs     04/02/25  2257 04/03/25  0408 04/03/25  1019   K 3.8 4.9 4.3   MG 1.7 1.9 1.9       Estimated Creatinine Clearance: 29 mL/min (based on SCr of 1.2 mg/dL).  For patients with decreased renal function (below 30ml/min) needing potassium/magnesium supplementation, please order individual bolus doses with appropriate monitoring.      Please contact the inpatient pharmacy at [phone] with any concerns.  Thank you.    Mary Shetty RPH  4/3/2025  11:10 AM

## 2025-04-03 NOTE — PLAN OF CARE
Problem: Safety - Adult  Goal: Free from fall injury  4/3/2025 1547 by Rosy Jernigan RN  Outcome: Adequate for Discharge     Problem: Chronic Conditions and Co-morbidities  Goal: Patient's chronic conditions and co-morbidity symptoms are monitored and maintained or improved  4/3/2025 1547 by Rosy Jernigan RN  Outcome: Adequate for Discharge     Problem: Discharge Planning  Goal: Discharge to home or other facility with appropriate resources  4/3/2025 1547 by Rosy Jernigan RN  Outcome: Adequate for Discharge

## 2025-04-03 NOTE — PROGRESS NOTES
[facility]   INPATIENT OCCUPATIONAL THERAPY  PROGRESS NOTE  Date: 4/3/2025  Patient Name: Francia Ferguson       Room:   MRN: [id number]    : 1945  (79 y.o.)  Gender: female   Referring Practitioner: Quin Ya MD  Diagnosis: Fall at nursing home      Discharge Recommendations:  Further Occupational Therapy is recommended upon facility discharge.    OT Equipment Recommendations  Other: TBD    Restrictions/Precautions  Restrictions/Precautions  Restrictions/Precautions: Weight Bearing;Fall Risk;Bed Alarm  Implants Present? : Metal implants  Lower Extremity Weight Bearing Restrictions  Right Lower Extremity Weight Bearing: Weight Bearing As Tolerated    O2 Device: None (Room air)  BP Location: Right upper arm    Subjective  Subjective  Subjective: \"I didn't really know\" pt states when asked if she knew she was having a bowel movement  Comments: Okay for cotx with PTA Nani per DEDRA MOSLEY    Objective  Orientation  Overall Orientation Status: Impaired  Orientation Level: Oriented to person  Cognition  Overall Cognitive Status: Exceptions  Arousal/Alertness: Inconsistent responses to stimuli  Following Commands: Inconsistently follows commands  Attention Span: Difficulty attending to directions;Difficulty dividing attention  Memory: Impaired  Safety Judgement: Decreased awareness of need for safety;Decreased awareness of need for assistance  Problem Solving: Assistance required to correct errors made;Assistance required to identify errors made;Assistance required to implement solutions;Assistance required to generate solutions;Decreased awareness of errors  Insights: Not aware of deficits  Initiation: Requires cues for all  Sequencing: Requires cues for all  Cognition Comment: minimal eye contact, perseverates during ADL tasks ( after brushing teeth, unable to spit out rinse, pt also then brush her teeth with hair brush despite initating hair brushing prior )  Patient

## 2025-04-03 NOTE — CARE COORDINATION
DISCHARGE PLANNING NOTE:    Call placed to bedside DEDRA Gallegos to request SUZY to be signed and completed.    Electronically signed by CESARIO Orozco on 4/3/2025 at 4:34 PM

## 2025-04-04 ENCOUNTER — HOSPITAL ENCOUNTER (OUTPATIENT)
Age: 80
Setting detail: SPECIMEN
Discharge: HOME | End: 2025-04-04

## 2025-04-04 LAB
25(OH)D3 SERPL-MCNC: 37.8 NG/ML (ref 30–100)
ANION GAP SERPL CALCULATED.3IONS-SCNC: 14 MMOL/L (ref 9–16)
BUN SERPL-MCNC: 22 MG/DL (ref 8–23)
CALCIUM SERPL-MCNC: 8 MG/DL (ref 8.8–10.2)
CHLORIDE SERPL-SCNC: 109 MMOL/L (ref 98–107)
CHOLEST SERPL-MCNC: 106 MG/DL (ref 0–199)
CHOLESTEROL/HDL RATIO: 2.5
CO2 SERPL-SCNC: 18 MMOL/L (ref 20–31)
CREAT SERPL-MCNC: 1 MG/DL (ref 0.5–0.9)
EKG ATRIAL RATE: 74 BPM
EKG P AXIS: 78 DEGREES
EKG P-R INTERVAL: 160 MS
EKG Q-T INTERVAL: 424 MS
EKG QRS DURATION: 70 MS
EKG QTC CALCULATION (BAZETT): 470 MS
EKG R AXIS: 68 DEGREES
EKG T AXIS: -143 DEGREES
EKG VENTRICULAR RATE: 74 BPM
ERYTHROCYTE [DISTWIDTH] IN BLOOD BY AUTOMATED COUNT: 17.7 % (ref 11.8–14.4)
GFR, ESTIMATED: 55 ML/MIN/1.73M2
GLUCOSE SERPL-MCNC: 456 MG/DL (ref 82–115)
HCT VFR BLD AUTO: 27.8 % (ref 36.3–47.1)
HDLC SERPL-MCNC: 43 MG/DL
HGB BLD-MCNC: 8.3 G/DL (ref 11.9–15.1)
LDLC SERPL CALC-MCNC: 30 MG/DL (ref 0–100)
MAGNESIUM SERPL-MCNC: 2 MG/DL (ref 1.6–2.4)
MCH RBC QN AUTO: 31.2 PG (ref 25.2–33.5)
MCHC RBC AUTO-ENTMCNC: 29.9 G/DL (ref 28.4–34.8)
MCV RBC AUTO: 104.5 FL (ref 82.6–102.9)
NRBC BLD-RTO: 0 PER 100 WBC
PLATELET # BLD AUTO: 190 K/UL (ref 138–453)
PMV BLD AUTO: 10.1 FL (ref 8.1–13.5)
POTASSIUM SERPL-SCNC: 4.6 MMOL/L (ref 3.7–5.3)
RBC # BLD AUTO: 2.66 M/UL (ref 3.95–5.11)
SODIUM SERPL-SCNC: 141 MMOL/L (ref 136–145)
TRIGL SERPL-MCNC: 164 MG/DL (ref 0–149)
VLDLC SERPL CALC-MCNC: 33 MG/DL (ref 1–30)
WBC OTHER # BLD: 4.6 K/UL (ref 3.5–11.3)

## 2025-04-04 PROCEDURE — 85027 COMPLETE CBC AUTOMATED: CPT

## 2025-04-04 PROCEDURE — 80048 BASIC METABOLIC PNL TOTAL CA: CPT

## 2025-04-04 PROCEDURE — 80061 LIPID PANEL: CPT

## 2025-04-04 PROCEDURE — 82306 VITAMIN D 25 HYDROXY: CPT

## 2025-04-04 PROCEDURE — 36415 COLL VENOUS BLD VENIPUNCTURE: CPT

## 2025-04-04 PROCEDURE — 93010 ELECTROCARDIOGRAM REPORT: CPT | Performed by: INTERNAL MEDICINE

## 2025-04-04 PROCEDURE — 83735 ASSAY OF MAGNESIUM: CPT

## 2025-04-09 NOTE — PROGRESS NOTES
Physician Progress Note      PATIENT:               JOSE LAYNE  CSN #:                  [id number]  :                       1945  ADMIT DATE:       3/25/2025 10:40 PM  DISCH DATE:        4/3/2025 5:32 PM  RESPONDING  PROVIDER #:        Kathryn Gonzalez MD          QUERY TEXT:    Please clarify the patient?s nutritional status:    The clinical indicators include:  -age 79, dementia, DM2, CKD  - Per Registered Dietician progress note on 4/3/25: Energy Intake:  Mild   decrease in energy intake. Weight Loss:   (11% wt loss over 5 months) . Body   Fat Loss:  Mild body fat loss Triceps. Muscle Mass Loss:  Mild muscle mass   loss Temples (temporalis), Hand (interosseous). Fluid Accumulation:  No fluid   accumulation. BMI 20. Current body weight 48.1kg. Usual body weight 54.4kg.   Nutrition Diagnosis: -  Moderate malnutrition related to inadequate   protein-energy intake as evidenced by criteria as identified in malnutrition   assessment.  -Nutritional interventions include: 4 carb choices with Glucerna all trays, IV   fluids.  Options provided:  -- Protein calorie malnutrition moderate  -- Other - I will add my own diagnosis  -- Disagree - Not applicable / Not valid  -- Disagree - Clinically unable to determine / Unknown  -- Refer to Clinical Documentation Reviewer    PROVIDER RESPONSE TEXT:    This patient has moderate protein calorie malnutrition.    Query created by: Yo Fajardo on 2025 8:46 AM      QUERY TEXT:    Please clarify whether the intertrochanteric right femur fracture  documented   3/26/25 H&P by Dr. Ya is related to a traumatic or non-traumatic cause:    The clinical indicators include:  - Per H&P 3/26: mostly wheelchair bound. Fall from wheelchair. \"acute right   intertrochanteric femur fracture and subacute S4 fracture.\"  - Xray Pelvis: The bones are osteopenic.  - age 79, Dementia, Diabetes, osteoporosis  - femur fracture surgical stabilization with intramedullary nail

## 2025-04-10 NOTE — PROGRESS NOTES
Physician Progress Note      PATIENT:               JOSE LAYNE  CSN #:                  [id number]  :                       1945  ADMIT DATE:       3/25/2025 10:40 PM  DISCH DATE:        4/3/2025 5:32 PM  RESPONDING  PROVIDER #:        Kathryn Gonzalez MD          QUERY TEXT:    Please clarify whether the S4 fracture documented in H&P on 3/26/25 is related   to a traumatic or non-traumatic cause:    The clinical indicators include:  -Per H&P 3/26: mostly wheelchair bound. Fall from wheelchair. \"acute right   intertrochanteric femur fracture and subacute S4 fracture.\"  -Xray Pelvis: The bones are osteopenic.  - age 79, Dementia, Diabetes, osteoporosis  - imaging, surgery for femur fracture, IV fluids  Options provided:  -- Pathological fracture of S4 related to osteoporosis  -- Traumatic fracture  -- Other - I will add my own diagnosis  -- Disagree - Not applicable / Not valid  -- Disagree - Clinically unable to determine / Unknown  -- Refer to Clinical Documentation Reviewer    PROVIDER RESPONSE TEXT:    The patient has a pathological fracture of S4 related to osteoporosis.    Query created by: Yo Fajardo on 2025 9:59 AM      Electronically signed by:  Kathryn Gonzalez MD 4/10/2025 11:46 AM

## 2025-04-13 ENCOUNTER — APPOINTMENT (OUTPATIENT)
Dept: GENERAL RADIOLOGY | Age: 80
DRG: 056 | End: 2025-04-13
Payer: MEDICARE

## 2025-04-13 ENCOUNTER — APPOINTMENT (OUTPATIENT)
Dept: CT IMAGING | Age: 80
DRG: 056 | End: 2025-04-13
Payer: MEDICARE

## 2025-04-13 ENCOUNTER — HOSPITAL ENCOUNTER (INPATIENT)
Age: 80
LOS: 4 days | Discharge: NURSING, REHAB OR LONG-TERM CARE | DRG: 056 | End: 2025-04-17
Attending: EMERGENCY MEDICINE | Admitting: INTERNAL MEDICINE
Payer: MEDICARE

## 2025-04-13 DIAGNOSIS — R41.82 ALTERED MENTAL STATUS, UNSPECIFIED ALTERED MENTAL STATUS TYPE: Primary | ICD-10-CM

## 2025-04-13 LAB
ALBUMIN SERPL-MCNC: 3.1 G/DL (ref 3.5–5.2)
ALP SERPL-CCNC: 198 U/L (ref 35–104)
ALT SERPL-CCNC: 26 U/L (ref 10–35)
AMPHET UR QL SCN: NEGATIVE
ANION GAP SERPL CALCULATED.3IONS-SCNC: 10 MMOL/L (ref 9–16)
AST SERPL-CCNC: 48 U/L (ref 10–35)
B-OH-BUTYR SERPL-MCNC: 0.21 MMOL/L (ref 0.02–0.27)
BACTERIA URNS QL MICRO: ABNORMAL
BARBITURATES UR QL SCN: NEGATIVE
BASOPHILS # BLD: 0 K/UL (ref 0–0.2)
BASOPHILS NFR BLD: 0 % (ref 0–2)
BENZODIAZ UR QL: NEGATIVE
BILIRUB SERPL-MCNC: 0.5 MG/DL (ref 0–1.2)
BILIRUB UR QL STRIP: NEGATIVE
BNP SERPL-MCNC: 2911 PG/ML (ref 0–300)
BODY TEMPERATURE: 37
BUN SERPL-MCNC: 16 MG/DL (ref 8–23)
CALCIUM SERPL-MCNC: 8.5 MG/DL (ref 8.6–10.4)
CANNABINOIDS UR QL SCN: NEGATIVE
CASTS #/AREA URNS LPF: ABNORMAL /LPF
CHLORIDE SERPL-SCNC: 98 MMOL/L (ref 98–107)
CLARITY UR: ABNORMAL
CO2 SERPL-SCNC: 25 MMOL/L (ref 20–31)
COCAINE UR QL SCN: NEGATIVE
COHGB MFR BLD: 1.5 % (ref 0–5)
COLOR UR: YELLOW
CREAT SERPL-MCNC: 1.3 MG/DL (ref 0.7–1.2)
D DIMER PPP FEU-MCNC: 3.48 UG/ML FEU (ref 0–0.59)
EOSINOPHIL # BLD: 0 K/UL (ref 0–0.4)
EOSINOPHILS RELATIVE PERCENT: 0 % (ref 0–4)
EPI CELLS #/AREA URNS HPF: ABNORMAL /HPF
ERYTHROCYTE [DISTWIDTH] IN BLOOD BY AUTOMATED COUNT: 19.4 % (ref 11.5–14.9)
FENTANYL UR QL: NEGATIVE
GFR, ESTIMATED: 42 ML/MIN/1.73M2
GLUCOSE BLD-MCNC: 128 MG/DL (ref 65–105)
GLUCOSE BLD-MCNC: 63 MG/DL (ref 65–105)
GLUCOSE SERPL-MCNC: 308 MG/DL (ref 74–99)
GLUCOSE UR STRIP-MCNC: NEGATIVE MG/DL
HCO3 VENOUS: 21.5 MMOL/L (ref 24–30)
HCT VFR BLD AUTO: 34.4 % (ref 36–46)
HGB BLD-MCNC: 11.2 G/DL (ref 12–16)
HGB UR QL STRIP.AUTO: ABNORMAL
KETONES UR STRIP-MCNC: ABNORMAL MG/DL
LACTATE BLDV-SCNC: 2.7 MMOL/L (ref 0.5–1.9)
LEUKOCYTE ESTERASE UR QL STRIP: NEGATIVE
LYMPHOCYTES NFR BLD: 0.46 K/UL (ref 1–4.8)
LYMPHOCYTES RELATIVE PERCENT: 16 % (ref 24–44)
MCH RBC QN AUTO: 32.1 PG (ref 26–34)
MCHC RBC AUTO-ENTMCNC: 32.6 G/DL (ref 31–37)
MCV RBC AUTO: 98.4 FL (ref 80–100)
METHADONE UR QL: NEGATIVE
METHEMOGLOBIN: 0.3 % (ref 0–1.9)
MONOCYTES NFR BLD: 0.32 K/UL (ref 0.1–1.3)
MONOCYTES NFR BLD: 11 % (ref 1–7)
MORPHOLOGY: ABNORMAL
NEGATIVE BASE EXCESS, VEN: 3.3 MMOL/L (ref 0–2)
NEUTROPHILS NFR BLD: 73 % (ref 36–66)
NEUTS SEG NFR BLD: 2.12 K/UL (ref 1.3–9.1)
NITRITE UR QL STRIP: NEGATIVE
O2 SAT, VEN: 38.3 % (ref 60–85)
OPIATES UR QL SCN: NEGATIVE
OXYCODONE UR QL SCN: NEGATIVE
PCO2 VENOUS: 38.1 MM HG (ref 39–55)
PCP UR QL SCN: NEGATIVE
PH UR STRIP: 6 [PH] (ref 5–8)
PH VENOUS: 7.37 (ref 7.32–7.42)
PLATELET # BLD AUTO: 304 K/UL (ref 150–450)
PMV BLD AUTO: 7.3 FL (ref 6–12)
PO2 VENOUS: 24.8 MM HG (ref 30–50)
POTASSIUM SERPL-SCNC: 4.5 MMOL/L (ref 3.7–5.3)
PROT SERPL-MCNC: 6.3 G/DL (ref 6.6–8.7)
PROT UR STRIP-MCNC: ABNORMAL MG/DL
RBC # BLD AUTO: 3.5 M/UL (ref 4–5.2)
RBC #/AREA URNS HPF: ABNORMAL /HPF
SODIUM SERPL-SCNC: 133 MMOL/L (ref 136–145)
SP GR UR STRIP: 1.05 (ref 1–1.03)
TEST INFORMATION: NORMAL
TEXT FOR RESPIRATORY: ABNORMAL
TROPONIN I SERPL HS-MCNC: 79 NG/L (ref 0–14)
TROPONIN I SERPL HS-MCNC: 93 NG/L (ref 0–14)
UROBILINOGEN UR STRIP-ACNC: NORMAL EU/DL (ref 0–1)
WBC #/AREA URNS HPF: ABNORMAL /HPF
WBC OTHER # BLD: 2.9 K/UL (ref 3.5–11)
YEAST URNS QL MICRO: ABNORMAL

## 2025-04-13 PROCEDURE — 2060000000 HC ICU INTERMEDIATE R&B

## 2025-04-13 PROCEDURE — 70450 CT HEAD/BRAIN W/O DYE: CPT

## 2025-04-13 PROCEDURE — 2580000003 HC RX 258: Performed by: NURSE PRACTITIONER

## 2025-04-13 PROCEDURE — 85379 FIBRIN DEGRADATION QUANT: CPT

## 2025-04-13 PROCEDURE — 80053 COMPREHEN METABOLIC PANEL: CPT

## 2025-04-13 PROCEDURE — 99285 EMERGENCY DEPT VISIT HI MDM: CPT

## 2025-04-13 PROCEDURE — 84484 ASSAY OF TROPONIN QUANT: CPT

## 2025-04-13 PROCEDURE — 96361 HYDRATE IV INFUSION ADD-ON: CPT

## 2025-04-13 PROCEDURE — 96360 HYDRATION IV INFUSION INIT: CPT

## 2025-04-13 PROCEDURE — 83880 ASSAY OF NATRIURETIC PEPTIDE: CPT

## 2025-04-13 PROCEDURE — 6360000004 HC RX CONTRAST MEDICATION

## 2025-04-13 PROCEDURE — 82800 BLOOD PH: CPT

## 2025-04-13 PROCEDURE — 36415 COLL VENOUS BLD VENIPUNCTURE: CPT

## 2025-04-13 PROCEDURE — 71045 X-RAY EXAM CHEST 1 VIEW: CPT

## 2025-04-13 PROCEDURE — 85025 COMPLETE CBC W/AUTO DIFF WBC: CPT

## 2025-04-13 PROCEDURE — 6360000002 HC RX W HCPCS

## 2025-04-13 PROCEDURE — 80307 DRUG TEST PRSMV CHEM ANLYZR: CPT

## 2025-04-13 PROCEDURE — 2580000003 HC RX 258

## 2025-04-13 PROCEDURE — 82010 KETONE BODYS QUAN: CPT

## 2025-04-13 PROCEDURE — 83605 ASSAY OF LACTIC ACID: CPT

## 2025-04-13 PROCEDURE — 82805 BLOOD GASES W/O2 SATURATION: CPT

## 2025-04-13 PROCEDURE — 6360000002 HC RX W HCPCS: Performed by: NURSE PRACTITIONER

## 2025-04-13 PROCEDURE — 93005 ELECTROCARDIOGRAM TRACING: CPT

## 2025-04-13 PROCEDURE — 81001 URINALYSIS AUTO W/SCOPE: CPT

## 2025-04-13 PROCEDURE — 2500000003 HC RX 250 WO HCPCS

## 2025-04-13 PROCEDURE — 82947 ASSAY GLUCOSE BLOOD QUANT: CPT

## 2025-04-13 PROCEDURE — 71260 CT THORAX DX C+: CPT

## 2025-04-13 RX ORDER — SODIUM CHLORIDE 9 MG/ML
INJECTION, SOLUTION INTRAVENOUS PRN
Status: DISCONTINUED | OUTPATIENT
Start: 2025-04-13 | End: 2025-04-17 | Stop reason: HOSPADM

## 2025-04-13 RX ORDER — 0.9 % SODIUM CHLORIDE 0.9 %
1000 INTRAVENOUS SOLUTION INTRAVENOUS ONCE
Status: COMPLETED | OUTPATIENT
Start: 2025-04-13 | End: 2025-04-13

## 2025-04-13 RX ORDER — SODIUM CHLORIDE 9 MG/ML
INJECTION, SOLUTION INTRAVENOUS CONTINUOUS
Status: DISCONTINUED | OUTPATIENT
Start: 2025-04-13 | End: 2025-04-14

## 2025-04-13 RX ORDER — QUETIAPINE FUMARATE 50 MG/1
25 TABLET, FILM COATED ORAL NIGHTLY PRN
Status: DISCONTINUED | OUTPATIENT
Start: 2025-04-13 | End: 2025-04-17 | Stop reason: HOSPADM

## 2025-04-13 RX ORDER — IOPAMIDOL 755 MG/ML
75 INJECTION, SOLUTION INTRAVASCULAR
Status: COMPLETED | OUTPATIENT
Start: 2025-04-13 | End: 2025-04-13

## 2025-04-13 RX ORDER — MIDODRINE HYDROCHLORIDE 2.5 MG/1
2.5 TABLET ORAL 3 TIMES DAILY PRN
Status: DISCONTINUED | OUTPATIENT
Start: 2025-04-13 | End: 2025-04-17 | Stop reason: HOSPADM

## 2025-04-13 RX ORDER — M-VIT,TX,IRON,MINS/CALC/FOLIC 27MG-0.4MG
1 TABLET ORAL DAILY
Status: DISCONTINUED | OUTPATIENT
Start: 2025-04-14 | End: 2025-04-17 | Stop reason: HOSPADM

## 2025-04-13 RX ORDER — CLOPIDOGREL BISULFATE 75 MG/1
75 TABLET ORAL DAILY
Status: DISCONTINUED | OUTPATIENT
Start: 2025-04-14 | End: 2025-04-17 | Stop reason: HOSPADM

## 2025-04-13 RX ORDER — SODIUM CHLORIDE 0.9 % (FLUSH) 0.9 %
10 SYRINGE (ML) INJECTION PRN
Status: DISCONTINUED | OUTPATIENT
Start: 2025-04-13 | End: 2025-04-17 | Stop reason: HOSPADM

## 2025-04-13 RX ORDER — ISOSORBIDE MONONITRATE 60 MG/1
120 TABLET, EXTENDED RELEASE ORAL DAILY
Status: DISCONTINUED | OUTPATIENT
Start: 2025-04-14 | End: 2025-04-17 | Stop reason: HOSPADM

## 2025-04-13 RX ORDER — DIVALPROEX SODIUM 125 MG/1
250 CAPSULE, COATED PELLETS ORAL 2 TIMES DAILY
Status: DISCONTINUED | OUTPATIENT
Start: 2025-04-13 | End: 2025-04-17 | Stop reason: HOSPADM

## 2025-04-13 RX ORDER — SODIUM CHLORIDE 0.9 % (FLUSH) 0.9 %
5-40 SYRINGE (ML) INJECTION EVERY 12 HOURS SCHEDULED
Status: DISCONTINUED | OUTPATIENT
Start: 2025-04-13 | End: 2025-04-17 | Stop reason: HOSPADM

## 2025-04-13 RX ORDER — 0.9 % SODIUM CHLORIDE 0.9 %
500 INTRAVENOUS SOLUTION INTRAVENOUS ONCE
Status: COMPLETED | OUTPATIENT
Start: 2025-04-13 | End: 2025-04-14

## 2025-04-13 RX ORDER — 0.9 % SODIUM CHLORIDE 0.9 %
100 INTRAVENOUS SOLUTION INTRAVENOUS ONCE
Status: COMPLETED | OUTPATIENT
Start: 2025-04-13 | End: 2025-04-13

## 2025-04-13 RX ORDER — HYDROCODONE BITARTRATE AND ACETAMINOPHEN 5; 325 MG/1; MG/1
1 TABLET ORAL EVERY 6 HOURS PRN
Status: ON HOLD | COMMUNITY
End: 2025-04-16

## 2025-04-13 RX ORDER — POTASSIUM CHLORIDE 1500 MG/1
20 TABLET, EXTENDED RELEASE ORAL DAILY
Status: DISCONTINUED | OUTPATIENT
Start: 2025-04-14 | End: 2025-04-14

## 2025-04-13 RX ORDER — POLYETHYLENE GLYCOL 3350 17 G/17G
17 POWDER, FOR SOLUTION ORAL DAILY PRN
Status: DISCONTINUED | OUTPATIENT
Start: 2025-04-13 | End: 2025-04-17 | Stop reason: HOSPADM

## 2025-04-13 RX ORDER — HEPARIN SODIUM 5000 [USP'U]/ML
5000 INJECTION, SOLUTION INTRAVENOUS; SUBCUTANEOUS 2 TIMES DAILY
Status: DISCONTINUED | OUTPATIENT
Start: 2025-04-13 | End: 2025-04-17 | Stop reason: HOSPADM

## 2025-04-13 RX ORDER — METOPROLOL TARTRATE 50 MG
50 TABLET ORAL 2 TIMES DAILY
Status: DISCONTINUED | OUTPATIENT
Start: 2025-04-13 | End: 2025-04-17 | Stop reason: HOSPADM

## 2025-04-13 RX ORDER — ACETAMINOPHEN 325 MG/1
650 TABLET ORAL EVERY 6 HOURS PRN
Status: DISCONTINUED | OUTPATIENT
Start: 2025-04-13 | End: 2025-04-17 | Stop reason: HOSPADM

## 2025-04-13 RX ORDER — BISACODYL 10 MG
10 SUPPOSITORY, RECTAL RECTAL DAILY PRN
Status: DISCONTINUED | OUTPATIENT
Start: 2025-04-13 | End: 2025-04-17 | Stop reason: HOSPADM

## 2025-04-13 RX ORDER — ASPIRIN 81 MG/1
81 TABLET, CHEWABLE ORAL DAILY
Status: DISCONTINUED | OUTPATIENT
Start: 2025-04-14 | End: 2025-04-17 | Stop reason: HOSPADM

## 2025-04-13 RX ORDER — METOPROLOL TARTRATE 1 MG/ML
5 INJECTION, SOLUTION INTRAVENOUS EVERY 6 HOURS PRN
Status: DISCONTINUED | OUTPATIENT
Start: 2025-04-13 | End: 2025-04-17 | Stop reason: HOSPADM

## 2025-04-13 RX ORDER — ONDANSETRON 4 MG/1
4 TABLET, ORALLY DISINTEGRATING ORAL EVERY 8 HOURS PRN
Status: DISCONTINUED | OUTPATIENT
Start: 2025-04-13 | End: 2025-04-17 | Stop reason: HOSPADM

## 2025-04-13 RX ORDER — ATORVASTATIN CALCIUM 40 MG/1
40 TABLET, FILM COATED ORAL DAILY
Status: DISCONTINUED | OUTPATIENT
Start: 2025-04-14 | End: 2025-04-17 | Stop reason: HOSPADM

## 2025-04-13 RX ORDER — DEXTROSE MONOHYDRATE 100 MG/ML
INJECTION, SOLUTION INTRAVENOUS CONTINUOUS PRN
Status: DISCONTINUED | OUTPATIENT
Start: 2025-04-13 | End: 2025-04-17 | Stop reason: HOSPADM

## 2025-04-13 RX ORDER — ACETAMINOPHEN 650 MG/1
650 SUPPOSITORY RECTAL EVERY 6 HOURS PRN
Status: DISCONTINUED | OUTPATIENT
Start: 2025-04-13 | End: 2025-04-17 | Stop reason: HOSPADM

## 2025-04-13 RX ORDER — INSULIN LISPRO 100 [IU]/ML
0-4 INJECTION, SOLUTION INTRAVENOUS; SUBCUTANEOUS
Status: DISCONTINUED | OUTPATIENT
Start: 2025-04-13 | End: 2025-04-14

## 2025-04-13 RX ORDER — LORAZEPAM 1 MG/1
1 TABLET ORAL EVERY 8 HOURS PRN
Status: ON HOLD | COMMUNITY
End: 2025-04-16

## 2025-04-13 RX ORDER — ONDANSETRON 2 MG/ML
4 INJECTION INTRAMUSCULAR; INTRAVENOUS EVERY 6 HOURS PRN
Status: DISCONTINUED | OUTPATIENT
Start: 2025-04-13 | End: 2025-04-17 | Stop reason: HOSPADM

## 2025-04-13 RX ORDER — INSULIN GLARGINE 100 [IU]/ML
5 INJECTION, SOLUTION SUBCUTANEOUS DAILY
Status: DISCONTINUED | OUTPATIENT
Start: 2025-04-14 | End: 2025-04-15

## 2025-04-13 RX ADMIN — DEXTROSE 125 ML: 10 SOLUTION INTRAVENOUS at 21:55

## 2025-04-13 RX ADMIN — VANCOMYCIN HYDROCHLORIDE 1250 MG: 1.25 INJECTION, POWDER, LYOPHILIZED, FOR SOLUTION INTRAVENOUS at 21:26

## 2025-04-13 RX ADMIN — SODIUM CHLORIDE 100 ML: 9 INJECTION, SOLUTION INTRAVENOUS at 18:47

## 2025-04-13 RX ADMIN — PIPERACILLIN AND TAZOBACTAM 3375 MG: 3; .375 INJECTION, POWDER, LYOPHILIZED, FOR SOLUTION INTRAVENOUS at 20:47

## 2025-04-13 RX ADMIN — SODIUM CHLORIDE: 0.9 INJECTION, SOLUTION INTRAVENOUS at 22:20

## 2025-04-13 RX ADMIN — IOPAMIDOL 75 ML: 755 INJECTION, SOLUTION INTRAVENOUS at 18:47

## 2025-04-13 RX ADMIN — HEPARIN SODIUM 5000 UNITS: 5000 INJECTION INTRAVENOUS; SUBCUTANEOUS at 23:06

## 2025-04-13 RX ADMIN — SODIUM CHLORIDE, PRESERVATIVE FREE 10 ML: 5 INJECTION INTRAVENOUS at 18:47

## 2025-04-13 RX ADMIN — SODIUM CHLORIDE 1000 ML: 9 INJECTION, SOLUTION INTRAVENOUS at 17:36

## 2025-04-13 RX ADMIN — SODIUM CHLORIDE 500 ML: 0.9 INJECTION, SOLUTION INTRAVENOUS at 23:22

## 2025-04-13 ASSESSMENT — LIFESTYLE VARIABLES
HOW MANY STANDARD DRINKS CONTAINING ALCOHOL DO YOU HAVE ON A TYPICAL DAY: PATIENT UNABLE TO ANSWER
HOW OFTEN DO YOU HAVE A DRINK CONTAINING ALCOHOL: PATIENT UNABLE TO ANSWER

## 2025-04-13 NOTE — ED PROVIDER NOTES
[facility]  Emergency Department Encounter  Emergency Medicine Resident     Pt Name:Francia Ferguson  MRN: [id number]  Birthdate 1945  Date of evaluation: 4/13/25  PCP:  Jack Boone DO  Note Started: 5:36 PM EDT      CHIEF COMPLAINT       Chief Complaint   Patient presents with    Altered Mental Status     unresponsive       HISTORY OF PRESENT ILLNESS  (Location/Symptom, Timing/Onset, Context/Setting, Quality, Duration, Modifying Factors, Severity.)      Francia Ferguson is a 80 y.o. female brought in by EMS from [facility] facility for evaluation of altered mental status.  Patient was found by staff barely responsive, only responding to painful stimuli but even then minimally.  Blood pressure upon EMS arrival in the 80s.  EMS gave a total of 6 of Narcan with slight improvement in responsiveness and blood pressure.    Son at bedside reports that patient has had multiple falls recently, several weeks ago she had a right hip fracture repaired.  He does not think she has had any falls since then but is not with her all day so he cannot be sure.    Patient DNR CCA no intubation, son with appropriate paperwork.    PAST MEDICAL / SURGICAL / SOCIAL / FAMILY HISTORY      has a past medical history of Abnormal cardiovascular stress test, CAD (coronary artery disease), Chest pressure, Fatigue, History of pneumonia, HTN (hypertension), Hyperlipidemia, Pneumonia, Positive cardiac stress test, Renal insufficiency, SOB (shortness of breath), and Type 2 diabetes mellitus without complication (HCC).     has a past surgical history that includes Cholecystectomy; Colonoscopy; Hysterectomy, vaginal; Coronary angioplasty with stent (08/14/2020); Hysterectomy; Appendectomy; Cardiac catheterization (03/09/2021); eye surgery; and Femur Surgery (Right, 3/26/2025).    Social History     Socioeconomic History    Marital status:      Spouse name: Theo Ferguson     Number of children: 2    Years of education: 12    Highest

## 2025-04-13 NOTE — ED PROVIDER NOTES
EMERGENCY DEPARTMENT ENCOUNTER   ATTENDING ATTESTATION     Pt Name: Francia Ferguson  MRN: [id number]  Birthdate 1945  Date of evaluation: 4/13/25       Francia Ferguson is a 80 y.o. female who presents with Altered Mental Status (unresponsive)      MDM: 80-year-old female presents for altered mental status.  Patient presented from nursing home for decreased mental status    On exam patient minimally arousable vital stable    Patient is a DNR CC per family, no intubation    Will check labs      Normal sinus rhythm rate of 62, normal axis, normal intervals, no ST segment elevation or depression nonspecific T wave changes    Labs reviewed, initial troponin was elevated repeat was downtrending, remaining labs showing no significant abnormalities imaging showing no acute abnormalities    Will admit for concerns for altered mental status    Spoke with Kaltyn MTZ for Dr. Navarro who accepts admission. Patient demonstrates understanding and agreement with the plan, was given the opportunity to ask questions, and these questions were answered to the best of the provided information at this time. VS stable for transfer to floor.       This dictation was prepared using Dragon Medical voice recognition software.     Vitals:   Vitals:    04/13/25 1955 04/13/25 2000 04/13/25 2003 04/13/25 2120   BP: 95/72 95/72 95/72 (!) 124/57   Pulse: 69  61 68   Resp:   15 16   Temp:  97 °F (36.1 °C) 98 °F (36.7 °C) 97.5 °F (36.4 °C)   TempSrc:  Axillary Axillary Axillary   SpO2:  99% 99% 100%   Weight:       Height:             I personally saw and examined the patient. I have reviewed and agree with the resident's findings, including all diagnostic interpretations and treatment plan as written. I was present for the key portions of any procedures performed and the inclusive time noted for any critical care statement.    Obie Kate DO  Attending Emergency Physician           Obie Kate DO  04/13/25 1988

## 2025-04-14 PROBLEM — E43 SEVERE MALNUTRITION: Status: ACTIVE | Noted: 2025-04-14

## 2025-04-14 LAB
ANION GAP SERPL CALCULATED.3IONS-SCNC: 8 MMOL/L (ref 9–16)
BASOPHILS # BLD: 0 K/UL (ref 0–0.2)
BASOPHILS NFR BLD: 0 % (ref 0–2)
BUN SERPL-MCNC: 13 MG/DL (ref 8–23)
CALCIUM SERPL-MCNC: 7.4 MG/DL (ref 8.6–10.4)
CHLORIDE SERPL-SCNC: 106 MMOL/L (ref 98–107)
CO2 SERPL-SCNC: 22 MMOL/L (ref 20–31)
CREAT SERPL-MCNC: 0.9 MG/DL (ref 0.7–1.2)
EKG ATRIAL RATE: 62 BPM
EKG P AXIS: 91 DEGREES
EKG P-R INTERVAL: 182 MS
EKG Q-T INTERVAL: 416 MS
EKG QRS DURATION: 68 MS
EKG QTC CALCULATION (BAZETT): 422 MS
EKG R AXIS: 57 DEGREES
EKG T AXIS: 89 DEGREES
EKG VENTRICULAR RATE: 62 BPM
EOSINOPHIL # BLD: 0.06 K/UL (ref 0–0.4)
EOSINOPHILS RELATIVE PERCENT: 2 % (ref 0–4)
ERYTHROCYTE [DISTWIDTH] IN BLOOD BY AUTOMATED COUNT: 19.2 % (ref 11.5–14.9)
GFR, ESTIMATED: 65 ML/MIN/1.73M2
GLUCOSE BLD-MCNC: 125 MG/DL (ref 65–105)
GLUCOSE BLD-MCNC: 173 MG/DL (ref 65–105)
GLUCOSE BLD-MCNC: 302 MG/DL (ref 65–105)
GLUCOSE BLD-MCNC: 333 MG/DL (ref 65–105)
GLUCOSE BLD-MCNC: 360 MG/DL (ref 65–105)
GLUCOSE BLD-MCNC: 61 MG/DL (ref 65–105)
GLUCOSE BLD-MCNC: 68 MG/DL (ref 65–105)
GLUCOSE BLD-MCNC: 73 MG/DL (ref 65–105)
GLUCOSE BLD-MCNC: 84 MG/DL (ref 65–105)
GLUCOSE BLD-MCNC: 89 MG/DL (ref 65–105)
GLUCOSE BLD-MCNC: 96 MG/DL (ref 65–105)
GLUCOSE SERPL-MCNC: 98 MG/DL (ref 74–99)
HCT VFR BLD AUTO: 28.5 % (ref 36–46)
HGB BLD-MCNC: 9 G/DL (ref 12–16)
LACTATE BLDV-SCNC: 1.2 MMOL/L (ref 0.5–1.9)
LYMPHOCYTES NFR BLD: 0.96 K/UL (ref 1–4.8)
LYMPHOCYTES RELATIVE PERCENT: 32 % (ref 24–44)
MCH RBC QN AUTO: 31.8 PG (ref 26–34)
MCHC RBC AUTO-ENTMCNC: 31.6 G/DL (ref 31–37)
MCV RBC AUTO: 100.5 FL (ref 80–100)
MONOCYTES NFR BLD: 0.21 K/UL (ref 0.1–1.3)
MONOCYTES NFR BLD: 7 % (ref 1–7)
MORPHOLOGY: ABNORMAL
MORPHOLOGY: ABNORMAL
NEUTROPHILS NFR BLD: 59 % (ref 36–66)
NEUTS SEG NFR BLD: 1.77 K/UL (ref 1.3–9.1)
PLATELET # BLD AUTO: 244 K/UL (ref 150–450)
PMV BLD AUTO: 7.1 FL (ref 6–12)
POTASSIUM SERPL-SCNC: 4.3 MMOL/L (ref 3.7–5.3)
RBC # BLD AUTO: 2.83 M/UL (ref 4–5.2)
SODIUM SERPL-SCNC: 136 MMOL/L (ref 136–145)
WBC OTHER # BLD: 3 K/UL (ref 3.5–11)

## 2025-04-14 PROCEDURE — 97530 THERAPEUTIC ACTIVITIES: CPT

## 2025-04-14 PROCEDURE — 2060000000 HC ICU INTERMEDIATE R&B

## 2025-04-14 PROCEDURE — 2580000003 HC RX 258: Performed by: NURSE PRACTITIONER

## 2025-04-14 PROCEDURE — 99222 1ST HOSP IP/OBS MODERATE 55: CPT | Performed by: PSYCHIATRY & NEUROLOGY

## 2025-04-14 PROCEDURE — 82947 ASSAY GLUCOSE BLOOD QUANT: CPT

## 2025-04-14 PROCEDURE — 99223 1ST HOSP IP/OBS HIGH 75: CPT | Performed by: INTERNAL MEDICINE

## 2025-04-14 PROCEDURE — 83605 ASSAY OF LACTIC ACID: CPT

## 2025-04-14 PROCEDURE — 6360000002 HC RX W HCPCS: Performed by: NURSE PRACTITIONER

## 2025-04-14 PROCEDURE — 97162 PT EVAL MOD COMPLEX 30 MIN: CPT

## 2025-04-14 PROCEDURE — 97166 OT EVAL MOD COMPLEX 45 MIN: CPT

## 2025-04-14 PROCEDURE — 2580000003 HC RX 258: Performed by: INTERNAL MEDICINE

## 2025-04-14 PROCEDURE — 80048 BASIC METABOLIC PNL TOTAL CA: CPT

## 2025-04-14 PROCEDURE — 85025 COMPLETE CBC W/AUTO DIFF WBC: CPT

## 2025-04-14 PROCEDURE — 2500000003 HC RX 250 WO HCPCS: Performed by: NURSE PRACTITIONER

## 2025-04-14 PROCEDURE — 36415 COLL VENOUS BLD VENIPUNCTURE: CPT

## 2025-04-14 PROCEDURE — 51798 US URINE CAPACITY MEASURE: CPT

## 2025-04-14 RX ORDER — DEXTROSE MONOHYDRATE 100 MG/ML
INJECTION, SOLUTION INTRAVENOUS CONTINUOUS
Status: DISCONTINUED | OUTPATIENT
Start: 2025-04-14 | End: 2025-04-14

## 2025-04-14 RX ORDER — DEXTROSE MONOHYDRATE 100 MG/ML
INJECTION, SOLUTION INTRAVENOUS CONTINUOUS
Status: ACTIVE | OUTPATIENT
Start: 2025-04-14 | End: 2025-04-15

## 2025-04-14 RX ORDER — DEXTROSE MONOHYDRATE AND SODIUM CHLORIDE 5; .45 G/100ML; G/100ML
INJECTION, SOLUTION INTRAVENOUS CONTINUOUS
Status: DISCONTINUED | OUTPATIENT
Start: 2025-04-15 | End: 2025-04-15

## 2025-04-14 RX ADMIN — DEXTROSE 125 ML: 10 SOLUTION INTRAVENOUS at 02:15

## 2025-04-14 RX ADMIN — DEXTROSE: 10 SOLUTION INTRAVENOUS at 20:15

## 2025-04-14 RX ADMIN — SODIUM CHLORIDE: 0.9 INJECTION, SOLUTION INTRAVENOUS at 03:17

## 2025-04-14 RX ADMIN — HEPARIN SODIUM 5000 UNITS: 5000 INJECTION INTRAVENOUS; SUBCUTANEOUS at 21:39

## 2025-04-14 RX ADMIN — PIPERACILLIN AND TAZOBACTAM 3375 MG: 3; .375 INJECTION, POWDER, LYOPHILIZED, FOR SOLUTION INTRAVENOUS at 20:21

## 2025-04-14 RX ADMIN — SODIUM CHLORIDE, PRESERVATIVE FREE 10 ML: 5 INJECTION INTRAVENOUS at 21:39

## 2025-04-14 RX ADMIN — PIPERACILLIN AND TAZOBACTAM 3375 MG: 3; .375 INJECTION, POWDER, LYOPHILIZED, FOR SOLUTION INTRAVENOUS at 09:34

## 2025-04-14 RX ADMIN — SODIUM CHLORIDE: 0.9 INJECTION, SOLUTION INTRAVENOUS at 09:26

## 2025-04-14 RX ADMIN — PIPERACILLIN AND TAZOBACTAM 3375 MG: 3; .375 INJECTION, POWDER, LYOPHILIZED, FOR SOLUTION INTRAVENOUS at 03:19

## 2025-04-14 RX ADMIN — HEPARIN SODIUM 5000 UNITS: 5000 INJECTION INTRAVENOUS; SUBCUTANEOUS at 09:34

## 2025-04-14 RX ADMIN — DEXTROSE AND SODIUM CHLORIDE: 5; 450 INJECTION, SOLUTION INTRAVENOUS at 23:39

## 2025-04-14 RX ADMIN — DEXTROSE: 10 SOLUTION INTRAVENOUS at 13:52

## 2025-04-14 ASSESSMENT — PAIN SCALES - WONG BAKER: WONGBAKER_NUMERICALRESPONSE: NO HURT

## 2025-04-14 ASSESSMENT — PAIN SCALES - GENERAL: PAINLEVEL_OUTOF10: 0

## 2025-04-14 NOTE — PLAN OF CARE
Problem: Safety - Adult  Goal: Free from fall injury  Outcome: Progressing     Problem: Chronic Conditions and Co-morbidities  Goal: Patient's chronic conditions and co-morbidity symptoms are monitored and maintained or improved  Outcome: Progressing     Problem: Discharge Planning  Goal: Discharge to home or other facility with appropriate resources  Outcome: Progressing     Problem: Skin/Tissue Integrity  Goal: Skin integrity remains intact  Description: 1.  Monitor for areas of redness and/or skin breakdown2.  Assess vascular access sites hourly3.  Every 4-6 hours minimum:  Change oxygen saturation probe site4.  Every 4-6 hours:  If on nasal continuous positive airway pressure, respiratory therapy assess nares and determine need for appliance change or resting period  Outcome: Progressing     Problem: ABCDS Injury Assessment  Goal: Absence of physical injury  Outcome: Progressing     Problem: Pain  Goal: Verbalizes/displays adequate comfort level or baseline comfort level  Outcome: Progressing     Problem: Nutrition Deficit:  Goal: Optimize nutritional status  Outcome: Progressing

## 2025-04-14 NOTE — CONSULTS
[facility] Neurology   IN-PATIENT SERVICE      NEUROLOGY CONSULT  NOTE            Date:   4/14/2025  Patient name:  Francia Ferguson  Date of admission:  4/13/2025  YOB: 1945      Chief Complaint:     Chief Complaint   Patient presents with    Altered Mental Status     unresponsive       Reason for Consult:      AMS    History of Present Illness:     The patient is a 80 y.o. female who presents with Altered Mental Status (unresponsive)  . The patient was seen and examined and the chart was reviewed.  Patient has had significant decline in her overall functional abilities and cognitive decline since what is reported as approximately [place] by son at bedside.  She has been in and out of the hospital for issues such as fluctuating blood sugars, ventilatory dependent respiratory failure and encephalopathy.  Most recently was in the hospital here at [facility] for a fall and subsequent hip fracture undergoing surgical repair eventually being discharged on 4/3 to [facility] rehab.  At the rehab she was occasionally participating with therapies.  Son states at times she would be okay and other times she would not.  She would have good days as far as her mentation and bad days as well.  Reportedly over the last 1 to 2 days she was less responsive, sugars were very elevated, then she was sent to [facility] ED again.  Noted to have blood pressure with systolic in the 80s on arrival.  She was given Narcan without much improvement in her mentation.  She was noted to have elevated lactic acidosis.  CT of the head without acute abnormalities.  Sugars have been as high as 500s and as low as 61 this morning.  She has received dextrose infusion.    Last seen by neurology team in February, 2025.  At that time she was admitted with ventilatory dependent respiratory failure secondary to aspiration.  She had known diagnosis of Alzheimer's dementia at that time.  There was concern for possible ventriculomegaly and NPH

## 2025-04-14 NOTE — PLAN OF CARE
Problem: Safety - Adult  Goal: Free from fall injury  Outcome: Progressing     Problem: Chronic Conditions and Co-morbidities  Goal: Patient's chronic conditions and co-morbidity symptoms are monitored and maintained or improved  Outcome: Progressing     Problem: Discharge Planning  Goal: Discharge to home or other facility with appropriate resources  Outcome: Progressing     Problem: Skin/Tissue Integrity  Goal: Skin integrity remains intact  Description: 1.  Monitor for areas of redness and/or skin breakdown2.  Assess vascular access sites hourly3.  Every 4-6 hours minimum:  Change oxygen saturation probe site4.  Every 4-6 hours:  If on nasal continuous positive airway pressure, respiratory therapy assess nares and determine need for appliance change or resting period  Outcome: Progressing     Problem: ABCDS Injury Assessment  Goal: Absence of physical injury  Outcome: Progressing

## 2025-04-14 NOTE — FLOWSHEET NOTE
04/14/25 0128   Treatment Team Notification   Reason for Communication Review case   Name of Team Member Notified Rosy Garcia NP   Treatment Team Role Advanced Practice Nurse   Method of Communication Secure Message   Response See orders   Notification Time 0128       Rosy Garcia NP notified via perfect serve \" patient has not voided since coming up to unit around 2100; would you like us to bladder scan? \"    Per NP \"yes please and ty\"    Radha MTZ updated that bladder scan was 233    New orders placed by NP for prn bladder scan and straight cath >400

## 2025-04-14 NOTE — ED NOTES
Situation  Name: Francia Ferguson  Admitting: Dx AMS (altered mental status) [R41.82]  Isolation Precautions No active isolations  Code Status: DDNR-CCA without intubation  Alerts: N/A  Where is the patient from? SNF  HPI: Patient was found by staff barely responsive, only responding to painful stimuli but even then minimally. Blood pressure upon EMS arrival in the 80s. EMS gave a total of 6 of Narcan with slight improvement in responsiveness and blood pressure.     Background  PMH:   Past Medical History:   Diagnosis Date    Abnormal cardiovascular stress test 06/2020    no ischemia / infarction   there is septal hypokinesis   /  EF 70%    CAD (coronary artery disease)     Chest pressure     Fatigue     History of pneumonia 03/2020    HTN (hypertension)     Hyperlipidemia     Pneumonia     Positive cardiac stress test     Renal insufficiency 8/18/2022    SOB (shortness of breath)     Type 2 diabetes mellitus without complication (HCC)      Allergies: No Known Allergies  Diet: No diet orders on file    Ambulation status: bed bound/ contracted upper extremities  Precautions: fall  Flu & Covid: None  Medications Administered         iopamidol (ISOVUE-370) 76 % injection 75 mL Admin Date  04/13/2025 Action  Given Dose  75 mL Rate   Route  IntraVENous Documented By  Telma Mueller        sodium chloride 0.9 % bolus 1,000 mL Admin Date  04/13/2025 Action  New Bag Dose  1,000 mL Rate  495.9 mL/hr Route  IntraVENous Documented By  Melanie Byrd, RN        sodium chloride 0.9 % bolus 100 mL Admin Date  04/13/2025 Action  New Bag Dose  100 mL Rate  49.6 mL/hr Route  IntraVENous Documented By  Telma Mueller        sodium chloride flush 0.9 % injection 10 mL Admin Date  04/13/2025 Action  Given Dose  10 mL Rate   Route  IntraVENous Documented By  Telma Mueller            LDA codes  Peripherally Inserted Central Catheter:    Central Venous Catheter:    Peripheral Intravenous Line:   Peripheral IV 04/13/25

## 2025-04-14 NOTE — H&P
[facility] Internal Medicine   Kel Navarro MD; MD Ashley Liao MD, MD , Olvin Singleton MD    [facility] Internal Medicine   IN-PATIENT SERVICE   [facility]    HISTORY AND PHYSICAL EXAMINATION            Date:   4/14/2025  Patient name:  Francia Ferguson  Date of admission:  4/13/2025  5:19 PM  MRN:   [id number]  Account:  [id number]  YOB: 1945  PCP:    Jack Boone DO  Room:   2119/2119-01  Code Status:    DNR-CCA    Chief Complaint:     Chief Complaint   Patient presents with    Altered Mental Status     unresponsive       History Obtained From:     Medical record nursing staff patient is a poor historian    History of Present Illness:     Francia Ferguson is a 80 y.o. Non- / non  female who presents with Altered Mental Status (unresponsive)   and is admitted to the hospital for the management of AMS (altered mental status).      Francia Ferguson is a 80 y.o. Non- / non  female who presents with Altered Mental Status (unresponsive) and is admitted to the hospital for the management of AMS (altered mental status). Medical history significant for CAD s/p stent placement in 2020, atrial fibrillation s/p watchman procedure, HTN, HLD, diabetes mellitus type II, advanced dementia.      Recent admission 3/26-4/3 for fall with femur fracture requiring surgery. During admission patient was also treated for ANKUR and UTI. Family changed code status to DNRCC-A no intubation and was discussing palliative care vs hospice.She was discharged to the nursing facility.       Today, patient was brought to ED per EMS from from nursing facility due to decreased responsiveness. Per EMS BP was in the 80s and patient was given 6 mg of Narcan with minimal improvement in responsiveness. On arrival patient is oxygenating well and maintaining airway. CT head negative for acute intracranial abnormality. D-dimer 3.48 but

## 2025-04-15 LAB
ANION GAP SERPL CALCULATED.3IONS-SCNC: 19 MMOL/L (ref 9–16)
BASOPHILS # BLD: 0 K/UL (ref 0–0.2)
BASOPHILS NFR BLD: 0 % (ref 0–2)
BUN SERPL-MCNC: 9 MG/DL (ref 8–23)
CALCIUM SERPL-MCNC: 8 MG/DL (ref 8.6–10.4)
CHLORIDE SERPL-SCNC: 97 MMOL/L (ref 98–107)
CO2 SERPL-SCNC: 17 MMOL/L (ref 20–31)
CREAT SERPL-MCNC: 0.9 MG/DL (ref 0.7–1.2)
EOSINOPHIL # BLD: 0 K/UL (ref 0–0.4)
EOSINOPHILS RELATIVE PERCENT: 0 % (ref 0–4)
ERYTHROCYTE [DISTWIDTH] IN BLOOD BY AUTOMATED COUNT: 18 % (ref 11.5–14.9)
FOLATE SERPL-MCNC: 21.3 NG/ML (ref 4.8–24.2)
GFR, ESTIMATED: 65 ML/MIN/1.73M2
GLUCOSE BLD-MCNC: 307 MG/DL (ref 65–105)
GLUCOSE BLD-MCNC: 326 MG/DL (ref 65–105)
GLUCOSE BLD-MCNC: 333 MG/DL (ref 65–105)
GLUCOSE BLD-MCNC: 343 MG/DL (ref 65–105)
GLUCOSE BLD-MCNC: 346 MG/DL (ref 65–105)
GLUCOSE SERPL-MCNC: 362 MG/DL (ref 74–99)
HCT VFR BLD AUTO: 31.9 % (ref 36–46)
HGB BLD-MCNC: 10.6 G/DL (ref 12–16)
LYMPHOCYTES NFR BLD: 0.35 K/UL (ref 1–4.8)
LYMPHOCYTES RELATIVE PERCENT: 13 % (ref 24–44)
MCH RBC QN AUTO: 32.2 PG (ref 26–34)
MCHC RBC AUTO-ENTMCNC: 33.2 G/DL (ref 31–37)
MCV RBC AUTO: 97 FL (ref 80–100)
MONOCYTES NFR BLD: 0.19 K/UL (ref 0.1–1.3)
MONOCYTES NFR BLD: 7 % (ref 1–7)
MORPHOLOGY: ABNORMAL
NEUTROPHILS NFR BLD: 80 % (ref 36–66)
NEUTS SEG NFR BLD: 2.16 K/UL (ref 1.3–9.1)
PLATELET # BLD AUTO: 238 K/UL (ref 150–450)
PMV BLD AUTO: 6.9 FL (ref 6–12)
POTASSIUM SERPL-SCNC: 4.5 MMOL/L (ref 3.7–5.3)
RBC # BLD AUTO: 3.29 M/UL (ref 4–5.2)
SODIUM SERPL-SCNC: 133 MMOL/L (ref 136–145)
VIT B12 SERPL-MCNC: 1185 PG/ML (ref 232–1245)
WBC OTHER # BLD: 2.7 K/UL (ref 3.5–11)

## 2025-04-15 PROCEDURE — 6360000002 HC RX W HCPCS: Performed by: INTERNAL MEDICINE

## 2025-04-15 PROCEDURE — 97530 THERAPEUTIC ACTIVITIES: CPT

## 2025-04-15 PROCEDURE — 2500000003 HC RX 250 WO HCPCS: Performed by: INTERNAL MEDICINE

## 2025-04-15 PROCEDURE — 2500000003 HC RX 250 WO HCPCS: Performed by: NURSE PRACTITIONER

## 2025-04-15 PROCEDURE — 82746 ASSAY OF FOLIC ACID SERUM: CPT

## 2025-04-15 PROCEDURE — 85025 COMPLETE CBC W/AUTO DIFF WBC: CPT

## 2025-04-15 PROCEDURE — 36415 COLL VENOUS BLD VENIPUNCTURE: CPT

## 2025-04-15 PROCEDURE — 99233 SBSQ HOSP IP/OBS HIGH 50: CPT | Performed by: INTERNAL MEDICINE

## 2025-04-15 PROCEDURE — 82947 ASSAY GLUCOSE BLOOD QUANT: CPT

## 2025-04-15 PROCEDURE — 6370000000 HC RX 637 (ALT 250 FOR IP): Performed by: INTERNAL MEDICINE

## 2025-04-15 PROCEDURE — 82607 VITAMIN B-12: CPT

## 2025-04-15 PROCEDURE — 6360000002 HC RX W HCPCS: Performed by: NURSE PRACTITIONER

## 2025-04-15 PROCEDURE — 99222 1ST HOSP IP/OBS MODERATE 55: CPT | Performed by: NURSE PRACTITIONER

## 2025-04-15 PROCEDURE — 80048 BASIC METABOLIC PNL TOTAL CA: CPT

## 2025-04-15 PROCEDURE — 99232 SBSQ HOSP IP/OBS MODERATE 35: CPT | Performed by: PSYCHIATRY & NEUROLOGY

## 2025-04-15 PROCEDURE — 2060000000 HC ICU INTERMEDIATE R&B

## 2025-04-15 PROCEDURE — 2580000003 HC RX 258: Performed by: NURSE PRACTITIONER

## 2025-04-15 RX ORDER — INSULIN GLARGINE 100 [IU]/ML
10 INJECTION, SOLUTION SUBCUTANEOUS DAILY
Status: DISCONTINUED | OUTPATIENT
Start: 2025-04-15 | End: 2025-04-17 | Stop reason: HOSPADM

## 2025-04-15 RX ADMIN — HEPARIN SODIUM 5000 UNITS: 5000 INJECTION INTRAVENOUS; SUBCUTANEOUS at 21:46

## 2025-04-15 RX ADMIN — METOPROLOL TARTRATE 5 MG: 5 INJECTION INTRAVENOUS at 14:46

## 2025-04-15 RX ADMIN — DIVALPROEX SODIUM 250 MG: 125 CAPSULE, COATED PELLETS ORAL at 21:46

## 2025-04-15 RX ADMIN — SODIUM CHLORIDE, PRESERVATIVE FREE 10 ML: 5 INJECTION INTRAVENOUS at 21:47

## 2025-04-15 RX ADMIN — HEPARIN SODIUM 5000 UNITS: 5000 INJECTION INTRAVENOUS; SUBCUTANEOUS at 08:37

## 2025-04-15 RX ADMIN — SODIUM CHLORIDE, PRESERVATIVE FREE 10 ML: 5 INJECTION INTRAVENOUS at 08:39

## 2025-04-15 RX ADMIN — METOPROLOL TARTRATE 50 MG: 50 TABLET, FILM COATED ORAL at 21:46

## 2025-04-15 RX ADMIN — WATER 1000 MG: 1 INJECTION INTRAMUSCULAR; INTRAVENOUS; SUBCUTANEOUS at 15:53

## 2025-04-15 RX ADMIN — PIPERACILLIN AND TAZOBACTAM 3375 MG: 3; .375 INJECTION, POWDER, LYOPHILIZED, FOR SOLUTION INTRAVENOUS at 03:21

## 2025-04-15 RX ADMIN — INSULIN GLARGINE 10 UNITS: 100 INJECTION, SOLUTION SUBCUTANEOUS at 15:54

## 2025-04-15 RX ADMIN — METOPROLOL TARTRATE 2.5 MG: 5 INJECTION INTRAVENOUS at 06:10

## 2025-04-15 RX ADMIN — PIPERACILLIN AND TAZOBACTAM 3375 MG: 3; .375 INJECTION, POWDER, LYOPHILIZED, FOR SOLUTION INTRAVENOUS at 11:10

## 2025-04-15 NOTE — CONSULTS
[facility]    Consult                        Today's Date: 4/15/2025  Patient Name: Francia Ferguson  Date of admission: 4/13/2025  5:19 PM  Patient's age: 80 y.o., 1945  Admission Dx: Altered mental status, unspecified altered mental status type [R41.82]  AMS (altered mental status) [R41.82]    Reason for Consult:  Cardiac evaluation    Requesting Physician: Kel Navarro MD    CHIEF COMPLAINT:  AMS    History Obtained From:  electronic medical record    HISTORY OF PRESENT ILLNESS:    Per prior documentation  Francia Ferguson is a 80 y.o. Non- / non  female who presents with Altered Mental Status (unresponsive) and is admitted to the hospital for the management of AMS (altered mental status). Medical history significant for CAD s/p stent placement in 2020, atrial fibrillation s/p watchman procedure, HTN, HLD, diabetes mellitus type II, advanced dementia.      Recent admission 3/26-4/3 for fall with femur fracture requiring surgery. During admission patient was also treated for ANKUR and UTI. Family changed code status to DNRCC-A no intubation and was discussing palliative care vs hospice.She was discharged to the nursing facility.       Today, patient was brought to ED per EMS from from nursing facility due to decreased responsiveness. Per EMS BP was in the 80s and patient was given 6 mg of Narcan with minimal improvement in responsiveness. On arrival patient is oxygenating well and maintaining airway. CT head negative for acute intracranial abnormality. D-dimer 3.48 but patient did have recent surgery. CTA chest negative for PE or acute abnormality. CXR no acute pulmonary disease. Urine tox screen negative. EKG normal sinus rhythm no ST changes, nonspecific T wave changes. Troponin 93 repeat 79. Previous was 27 but in February troponin was 130. BNP 2900 significantly less than prior level of 8600. Leukopenia WBC 2.9. Lactic 2.7 repeat 1.2. Started on IV zosyn and Vancomycin to

## 2025-04-15 NOTE — PLAN OF CARE
Problem: Safety - Adult  Goal: Free from fall injury  4/15/2025 0125 by Kalyani Gunn RN  Outcome: Progressing  Note: Patient confused and disoriented. Aggressive with care.  Bed alarm on..    4/14/2025 1544 by Anamaria Wen RN  Outcome: Progressing     Problem: Chronic Conditions and Co-morbidities  Goal: Patient's chronic conditions and co-morbidity symptoms are monitored and maintained or improved  4/15/2025 0125 by Kalyani Gunn RN  Outcome: Progressing  Note: Blood sugars monitored and called to CNP on call.  IV solution adjusted.  Remains NPO.  4/14/2025 1544 by Anamaria Wen RN  Outcome: Progressing     Problem: Discharge Planning  Goal: Discharge to home or other facility with appropriate resources  4/15/2025 0125 by Kalyani Gunn RN  Outcome: Progressing  4/14/2025 1544 by Anamaria Wen RN  Outcome: Progressing     Problem: Skin/Tissue Integrity  Goal: Skin integrity remains intact  Description: 1.  Monitor for areas of redness and/or skin breakdown2.  Assess vascular access sites hourly3.  Every 4-6 hours minimum:  Change oxygen saturation probe site4.  Every 4-6 hours:  If on nasal continuous positive airway pressure, respiratory therapy assess nares and determine need for appliance change or resting period  4/15/2025 0125 by Kalyani Gunn RN  Outcome: Progressing  Note: Buttocks reddened.  Assisted with turning and repositioning.  Waffle mattress in place.  4/14/2025 1544 by Anamaria Wen RN  Outcome: Progressing     Problem: ABCDS Injury Assessment  Goal: Absence of physical injury  4/15/2025 0125 by Kalyani Gunn RN  Outcome: Progressing  4/14/2025 1544 by Anamaria Wen RN  Outcome: Progressing     Problem: Pain  Goal: Verbalizes/displays adequate comfort level or baseline comfort level  4/15/2025 0125 by Kalyani Gunn RN  Outcome: Progressing  Note: Denies pain.  4/14/2025 1544 by Anamaria Wen RN  Outcome: Progressing     Problem: Nutrition

## 2025-04-16 ENCOUNTER — TELEPHONE (OUTPATIENT)
Dept: ORTHOPEDIC SURGERY | Age: 80
End: 2025-04-16

## 2025-04-16 LAB
ANION GAP SERPL CALCULATED.3IONS-SCNC: 10 MMOL/L (ref 9–16)
BASOPHILS # BLD: 0.03 K/UL (ref 0–0.2)
BASOPHILS NFR BLD: 1 % (ref 0–2)
BUN SERPL-MCNC: 8 MG/DL (ref 8–23)
CALCIUM SERPL-MCNC: 8 MG/DL (ref 8.6–10.4)
CHLORIDE SERPL-SCNC: 101 MMOL/L (ref 98–107)
CO2 SERPL-SCNC: 23 MMOL/L (ref 20–31)
CREAT SERPL-MCNC: 0.7 MG/DL (ref 0.7–1.2)
EOSINOPHIL # BLD: 0.03 K/UL (ref 0–0.4)
EOSINOPHILS RELATIVE PERCENT: 1 % (ref 0–4)
ERYTHROCYTE [DISTWIDTH] IN BLOOD BY AUTOMATED COUNT: 17.9 % (ref 11.5–14.9)
GFR, ESTIMATED: 87 ML/MIN/1.73M2
GLUCOSE BLD-MCNC: 100 MG/DL (ref 65–105)
GLUCOSE BLD-MCNC: 174 MG/DL (ref 65–105)
GLUCOSE BLD-MCNC: 189 MG/DL (ref 65–105)
GLUCOSE BLD-MCNC: 229 MG/DL (ref 65–105)
GLUCOSE BLD-MCNC: 69 MG/DL (ref 65–105)
GLUCOSE SERPL-MCNC: 200 MG/DL (ref 74–99)
HCT VFR BLD AUTO: 29.4 % (ref 36–46)
HGB BLD-MCNC: 9.7 G/DL (ref 12–16)
LYMPHOCYTES NFR BLD: 0.74 K/UL (ref 1–4.8)
LYMPHOCYTES RELATIVE PERCENT: 24 % (ref 24–44)
MCH RBC QN AUTO: 32.3 PG (ref 26–34)
MCHC RBC AUTO-ENTMCNC: 32.9 G/DL (ref 31–37)
MCV RBC AUTO: 98.2 FL (ref 80–100)
MONOCYTES NFR BLD: 0.43 K/UL (ref 0.1–1.3)
MONOCYTES NFR BLD: 14 % (ref 1–7)
MORPHOLOGY: ABNORMAL
NEUTROPHILS NFR BLD: 60 % (ref 36–66)
NEUTS SEG NFR BLD: 1.87 K/UL (ref 1.3–9.1)
PLATELET # BLD AUTO: 239 K/UL (ref 150–450)
PMV BLD AUTO: 6.9 FL (ref 6–12)
POTASSIUM SERPL-SCNC: 3.8 MMOL/L (ref 3.7–5.3)
RBC # BLD AUTO: 2.99 M/UL (ref 4–5.2)
SODIUM SERPL-SCNC: 134 MMOL/L (ref 136–145)
WBC OTHER # BLD: 3.1 K/UL (ref 3.5–11)

## 2025-04-16 PROCEDURE — 97530 THERAPEUTIC ACTIVITIES: CPT

## 2025-04-16 PROCEDURE — 6360000002 HC RX W HCPCS: Performed by: NURSE PRACTITIONER

## 2025-04-16 PROCEDURE — 2500000003 HC RX 250 WO HCPCS: Performed by: NURSE PRACTITIONER

## 2025-04-16 PROCEDURE — 2500000003 HC RX 250 WO HCPCS: Performed by: INTERNAL MEDICINE

## 2025-04-16 PROCEDURE — 6370000000 HC RX 637 (ALT 250 FOR IP): Performed by: NURSE PRACTITIONER

## 2025-04-16 PROCEDURE — 85025 COMPLETE CBC W/AUTO DIFF WBC: CPT

## 2025-04-16 PROCEDURE — 80048 BASIC METABOLIC PNL TOTAL CA: CPT

## 2025-04-16 PROCEDURE — 36415 COLL VENOUS BLD VENIPUNCTURE: CPT

## 2025-04-16 PROCEDURE — 6360000002 HC RX W HCPCS: Performed by: INTERNAL MEDICINE

## 2025-04-16 PROCEDURE — 82947 ASSAY GLUCOSE BLOOD QUANT: CPT

## 2025-04-16 PROCEDURE — 99233 SBSQ HOSP IP/OBS HIGH 50: CPT | Performed by: INTERNAL MEDICINE

## 2025-04-16 PROCEDURE — 6370000000 HC RX 637 (ALT 250 FOR IP): Performed by: INTERNAL MEDICINE

## 2025-04-16 PROCEDURE — 2060000000 HC ICU INTERMEDIATE R&B

## 2025-04-16 RX ORDER — LISINOPRIL 20 MG/1
20 TABLET ORAL 2 TIMES DAILY
Status: DISCONTINUED | OUTPATIENT
Start: 2025-04-16 | End: 2025-04-17 | Stop reason: HOSPADM

## 2025-04-16 RX ORDER — LORAZEPAM 1 MG/1
1 TABLET ORAL EVERY 8 HOURS PRN
Qty: 9 TABLET | Refills: 0 | Status: SHIPPED | OUTPATIENT
Start: 2025-04-16 | End: 2025-04-19

## 2025-04-16 RX ORDER — LISINOPRIL 20 MG/1
20 TABLET ORAL 2 TIMES DAILY
Qty: 30 TABLET | Refills: 3 | Status: SHIPPED | OUTPATIENT
Start: 2025-04-16

## 2025-04-16 RX ORDER — HYDROCODONE BITARTRATE AND ACETAMINOPHEN 5; 325 MG/1; MG/1
1 TABLET ORAL EVERY 6 HOURS PRN
Qty: 20 TABLET | Refills: 0 | Status: SHIPPED | OUTPATIENT
Start: 2025-04-16 | End: 2025-04-21

## 2025-04-16 RX ADMIN — ACETAMINOPHEN 650 MG: 325 TABLET, FILM COATED ORAL at 16:56

## 2025-04-16 RX ADMIN — ATORVASTATIN CALCIUM 40 MG: 40 TABLET, FILM COATED ORAL at 10:23

## 2025-04-16 RX ADMIN — Medication 1 TABLET: at 10:22

## 2025-04-16 RX ADMIN — HEPARIN SODIUM 5000 UNITS: 5000 INJECTION INTRAVENOUS; SUBCUTANEOUS at 21:28

## 2025-04-16 RX ADMIN — DIVALPROEX SODIUM 250 MG: 125 CAPSULE, COATED PELLETS ORAL at 21:22

## 2025-04-16 RX ADMIN — METOPROLOL TARTRATE 50 MG: 50 TABLET, FILM COATED ORAL at 21:25

## 2025-04-16 RX ADMIN — INSULIN GLARGINE 10 UNITS: 100 INJECTION, SOLUTION SUBCUTANEOUS at 10:24

## 2025-04-16 RX ADMIN — METOPROLOL TARTRATE 50 MG: 50 TABLET, FILM COATED ORAL at 09:21

## 2025-04-16 RX ADMIN — DIVALPROEX SODIUM 250 MG: 125 CAPSULE, COATED PELLETS ORAL at 10:23

## 2025-04-16 RX ADMIN — LISINOPRIL 20 MG: 20 TABLET ORAL at 10:22

## 2025-04-16 RX ADMIN — HEPARIN SODIUM 5000 UNITS: 5000 INJECTION INTRAVENOUS; SUBCUTANEOUS at 10:25

## 2025-04-16 RX ADMIN — CLOPIDOGREL BISULFATE 75 MG: 75 TABLET, FILM COATED ORAL at 10:22

## 2025-04-16 RX ADMIN — LISINOPRIL 20 MG: 20 TABLET ORAL at 21:25

## 2025-04-16 RX ADMIN — SODIUM CHLORIDE, PRESERVATIVE FREE 10 ML: 5 INJECTION INTRAVENOUS at 10:27

## 2025-04-16 RX ADMIN — ISOSORBIDE MONONITRATE 120 MG: 60 TABLET, EXTENDED RELEASE ORAL at 10:22

## 2025-04-16 RX ADMIN — SODIUM CHLORIDE, PRESERVATIVE FREE 10 ML: 5 INJECTION INTRAVENOUS at 21:26

## 2025-04-16 RX ADMIN — ASPIRIN 81 MG: 81 TABLET, CHEWABLE ORAL at 10:23

## 2025-04-16 RX ADMIN — WATER 1000 MG: 1 INJECTION INTRAMUSCULAR; INTRAVENOUS; SUBCUTANEOUS at 16:01

## 2025-04-16 ASSESSMENT — PAIN SCALES - GENERAL
PAINLEVEL_OUTOF10: 0
PAINLEVEL_OUTOF10: 3

## 2025-04-16 ASSESSMENT — PAIN DESCRIPTION - ORIENTATION: ORIENTATION: RIGHT

## 2025-04-16 ASSESSMENT — PAIN - FUNCTIONAL ASSESSMENT: PAIN_FUNCTIONAL_ASSESSMENT: ACTIVITIES ARE NOT PREVENTED

## 2025-04-16 ASSESSMENT — PAIN DESCRIPTION - LOCATION: LOCATION: HIP

## 2025-04-16 ASSESSMENT — PAIN DESCRIPTION - DESCRIPTORS: DESCRIPTORS: ACHING

## 2025-04-16 NOTE — PLAN OF CARE
Problem: Discharge Planning  Goal: Discharge to home or other facility with appropriate resources  Outcome: Progressing  Note: Hospice meeting tomorrow then DC planning afterwards     Problem: Pain  Goal: Verbalizes/displays adequate comfort level or baseline comfort level  4/16/2025 1704 by Mary Keating, RN  Outcome: Progressing

## 2025-04-16 NOTE — TELEPHONE ENCOUNTER
Preservice called to notify office patient is currently in the hospital and son is worried about patients sutures. Writer informed preservice to call the ortho oncall relating sutures. Follow up appointment with uGstavo for 4/17 cancelled.

## 2025-04-16 NOTE — PLAN OF CARE
Problem: Safety - Adult  Goal: Free from fall injury  Outcome: Progressing  Note: Pt assessed as a fall risk this shift. Remains free from falls and accidental injury at this time. Fall precautions in place. Floor free from obstacles, and bed is locked and in lowest position. Adequate lighting provided.  Pt encouraged to call before getting OOB for any need.  Bed alarm activated. Will continue to monitor needs during hourly rounding, and reinforce education on use of call light.      Problem: Chronic Conditions and Co-morbidities  Goal: Patient's chronic conditions and co-morbidity symptoms are monitored and maintained or improved  Outcome: Progressing     Problem: Skin/Tissue Integrity  Goal: Skin integrity remains intact  Description: 1.  Monitor for areas of redness and/or skin breakdown2.  Assess vascular access sites hourly3.  Every 4-6 hours minimum:  Change oxygen saturation probe site4.  Every 4-6 hours:  If on nasal continuous positive airway pressure, respiratory therapy assess nares and determine need for appliance change or resting period  Outcome: Progressing  Note: Skin assessment complete. Turned and repositioned every two hours.  Area kept free from moisture. Proper nourishment and fluids encouraged, as appropriate. Will continue to monitor for additional needs.      Problem: ABCDS Injury Assessment  Goal: Absence of physical injury  Outcome: Progressing     Problem: Pain  Goal: Verbalizes/displays adequate comfort level or baseline comfort level  Outcome: Progressing

## 2025-04-16 NOTE — TELEPHONE ENCOUNTER
It appears she is on admission at [place]. We will see her on admission and take out sutures as necessary

## 2025-04-16 NOTE — DISCHARGE INSTR - COC
Continuity of Care Form    Patient Name: Francia Layne   :  1945  MRN:  [id number]    Admit date:  2025  Discharge date:  25    Code Status Order: DNR-CCA   Advance Directives:     Admitting Physician:  Kel Navarro MD  PCP: Jack Boone DO    Discharging Nurse:   Discharging Hospital Unit/Room#: 2119/2119-01  Discharging Unit Phone Number: [phone]    Emergency Contact:   Extended Emergency Contact Information  Primary Emergency Contact: Theo Layne  Address: [address] [place] [address]  Home Phone: [phone]  Mobile Phone: [phone]  Relation: Spouse  Hearing or visual needs: None  Other needs: None  Preferred language: English   needed? No  Secondary Emergency Contact: OMRGAN LAYNE  Home Phone: [phone]  Relation: Child  Preferred language: English   needed? 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 s/p successful PCI with DELIA (3.25 X 12 mm)    EYE SURGERY      FEMUR SURGERY Right 2025    HIP IM NAIL RIGHT performed by Quin Ya MD at [facility]    HYSTERECTOMY (CERVIX STATUS UNKNOWN)      HYSTERECTOMY, VAGINAL      left both ovaries in       Immunization History:   Immunization History   Administered Date(s) Administered    COVID-19, MODERNA BLUE border, Primary or Immunocompromised, (age 12y+), IM, 100 mcg/0.5mL 2021, 2021, 10/28/2021    Influenza Whole 10/20/2015    Influenza, FLUAD, (age 65 y+), IM, Quadv, 0.5mL 2020, 2021, 10/11/2023    Influenza, FLUAD, (age 65 y+), IM, Trivalent PF, 0.5mL 10/19/2017, 2018, 10/08/2019, 2024    Influenza, FLUARIX, FLULAVAL, FLUZONE (age 6 mo+) and AFLURIA, (age 3 y+), Quadv PF, 0.5mL 2020

## 2025-04-16 NOTE — TELEPHONE ENCOUNTER
Dr. Ya,    Preservice called in regards to this patient's follow up appointment on 4/17 with tammie. Patient is currently in the hospital and will need to cancel this follow up appointment.  Patients son states he is concerned about her sutures that are still present. Writer informed preservice to tell son to call the oncall ortho.   Please advise

## 2025-04-16 NOTE — FLOWSHEET NOTE
04/15/25 2306   Treatment Team Notification   Reason for Communication Review case  (messaged about blood suagr of 343)   Name of Team Member Notified Hairtha Thrasher   Treatment Team Role Advanced Practice Nurse   Method of Communication Secure Message   Response Waiting for response   Notification Time 2306       NP notified about sugar of 343 and no orders for coverage. Patient is a sensitive diabetic.

## 2025-04-17 VITALS
RESPIRATION RATE: 17 BRPM | BODY MASS INDEX: 21.85 KG/M2 | SYSTOLIC BLOOD PRESSURE: 137 MMHG | OXYGEN SATURATION: 100 % | HEIGHT: 61 IN | TEMPERATURE: 97.5 F | HEART RATE: 71 BPM | DIASTOLIC BLOOD PRESSURE: 66 MMHG | WEIGHT: 115.74 LBS

## 2025-04-17 LAB
GLUCOSE BLD-MCNC: 396 MG/DL (ref 65–105)
GLUCOSE BLD-MCNC: 425 MG/DL (ref 65–105)

## 2025-04-17 PROCEDURE — 97530 THERAPEUTIC ACTIVITIES: CPT

## 2025-04-17 PROCEDURE — 6370000000 HC RX 637 (ALT 250 FOR IP): Performed by: INTERNAL MEDICINE

## 2025-04-17 PROCEDURE — 6360000002 HC RX W HCPCS: Performed by: NURSE PRACTITIONER

## 2025-04-17 PROCEDURE — 82947 ASSAY GLUCOSE BLOOD QUANT: CPT

## 2025-04-17 PROCEDURE — 6370000000 HC RX 637 (ALT 250 FOR IP): Performed by: NURSE PRACTITIONER

## 2025-04-17 PROCEDURE — 99239 HOSP IP/OBS DSCHRG MGMT >30: CPT | Performed by: INTERNAL MEDICINE

## 2025-04-17 PROCEDURE — 97535 SELF CARE MNGMENT TRAINING: CPT

## 2025-04-17 RX ADMIN — POLYETHYLENE GLYCOL 3350 17 G: 17 POWDER, FOR SOLUTION ORAL at 15:52

## 2025-04-17 RX ADMIN — ACETAMINOPHEN 650 MG: 325 TABLET, FILM COATED ORAL at 09:22

## 2025-04-17 RX ADMIN — HEPARIN SODIUM 5000 UNITS: 5000 INJECTION INTRAVENOUS; SUBCUTANEOUS at 09:23

## 2025-04-17 RX ADMIN — ASPIRIN 81 MG: 81 TABLET, CHEWABLE ORAL at 09:23

## 2025-04-17 RX ADMIN — CLOPIDOGREL BISULFATE 75 MG: 75 TABLET, FILM COATED ORAL at 09:23

## 2025-04-17 RX ADMIN — METOPROLOL TARTRATE 50 MG: 50 TABLET, FILM COATED ORAL at 09:23

## 2025-04-17 RX ADMIN — DIVALPROEX SODIUM 250 MG: 125 CAPSULE, COATED PELLETS ORAL at 09:23

## 2025-04-17 RX ADMIN — INSULIN GLARGINE 10 UNITS: 100 INJECTION, SOLUTION SUBCUTANEOUS at 09:23

## 2025-04-17 RX ADMIN — ISOSORBIDE MONONITRATE 120 MG: 60 TABLET, EXTENDED RELEASE ORAL at 09:23

## 2025-04-17 RX ADMIN — ATORVASTATIN CALCIUM 40 MG: 40 TABLET, FILM COATED ORAL at 09:23

## 2025-04-17 RX ADMIN — LISINOPRIL 20 MG: 20 TABLET ORAL at 09:23

## 2025-04-17 RX ADMIN — Medication 1 TABLET: at 09:23

## 2025-04-17 NOTE — DISCHARGE SUMMARY
control, iterative reconstruction, and/or weight based adjustment of the mA/kV was utilized to reduce the radiation dose to as low as reasonably achievable. COMPARISON: 03/26/2025 HISTORY: Altered mental status, recent surgery. FINDINGS: BRAIN/VENTRICLES: No acute intracranial hemorrhage, mass effect, or midline shift.  No abnormal extra-axial fluid collection.  The gray-white differentiation is maintained without evidence of an acute infarct.  Stable mild parenchymal volume loss and chronic small vessel ischemic changes with mild ventriculomegaly.  Atherosclerotic calcification. ORBITS: The visualized portion of the orbits demonstrate no acute abnormality. SINUSES: Unchanged partial opacification of the right sphenoid sinus. SOFT TISSUES/SKULL:  No acute abnormality of the visualized skull or soft tissues.     Stable CT without acute abnormality.     XR CHEST PORTABLE  Result Date: 4/13/2025  EXAMINATION: ONE XRAY VIEW OF THE CHEST 4/13/2025 5:32 pm COMPARISON: Chest 03/31/2025, chest CT 03/26/2025 HISTORY: AMS FINDINGS: Calcifications involving the aorta reflect atherosclerosis. The cardiomediastinal and hilar silhouettes appear otherwise unremarkable.  The lungs appear clear. No pleural fluid evident. No pneumothorax is seen. No acute osseous abnormality is identified.     No acute pulmonary disease. Calcific atherosclerotic disease aorta.     XR CHEST PORTABLE  Result Date: 3/31/2025  EXAMINATION: ONE XRAY VIEW OF THE CHEST 3/31/2025 6:00 pm COMPARISON: March 25, 2025 HISTORY: ORDERING SYSTEM PROVIDED HISTORY: Check for infiltrate/atelectasis TECHNOLOGIST PROVIDED HISTORY: Check for infiltrate/atelectasis Reason for Exam: Chest for infiltrate/ atelectasis FINDINGS: The cardiomediastinal silhouette is normal size. No consolidation or venous congestion. No pleural effusion or pneumothorax identified.     No acute cardiopulmonary disease.     FLUORO FOR SURGICAL PROCEDURES  Result Date: 3/26/2025  Radiology exam is

## 2025-04-17 NOTE — CARE COORDINATION
Case Management Assessment  Initial Evaluation    Date/Time of Evaluation: 4/14/2025 4:08 PM  Assessment Completed by: India Simons    If patient is discharged prior to next notation, then this note serves as note for discharge by case management.    Patient Name: Francia Ferguson                   YOB: 1945  Diagnosis: Altered mental status, unspecified altered mental status type [R41.82]  AMS (altered mental status) [R41.82]                   Date / Time: 4/13/2025  5:19 PM    Patient Admission Status: Inpatient   Readmission Risk (Low < 19, Mod (19-27), High > 27): Readmission Risk Score: 31    Current PCP: Jack Boone, DO  PCP verified by CM? Yes     Chart Reviewed: Yes      History Provided by: Significant Other, Medical Record  Patient Orientation: Other (see comment) (recently extubated)    Patient Cognition: Other (see comment) (recently extubated)     Hospitalization in the last 30 days (Readmission):  No    If yes, Readmission Assessment in  Navigator will be completed.     Advance Directives:       Code Status: Full Code   Patient's Primary Decision Maker is: Legal Next of Kin    Primary Decision Maker: Theo Ferguson - Spouse - [phone]     Discharge Planning:     Patient lives with: Spouse/Significant Other Type of Home: House  Primary Care Giver: Family  Patient Support Systems include: Spouse/Significant Other, Children, Family Members   Current Financial resources: Medicare  Current community resources:    Current services prior to admission: Durable Medical Equipment            Current DME: Wheelchair, Glucometer            Type of Home Care services:  PT, OT, Nursing Services     ADLS  Prior functional level: Assistance with the following:, Mobility, Shopping, Housework, Cooking  Current functional level: Assistance with the following:, Bathing, Dressing, Toileting, Feeding, Cooking, Housework, Shopping, Mobility     PT AM-PAC:   /24  OT AM-PAC:   /24     Family can provide 
Continuity of Care Form    Patient Name: Francia Layne   :  1945  MRN:  [id number]    Admit date:  2025  Discharge date:  25    Code Status Order: DNR-CCA   Advance Directives:     Admitting Physician:  Kel Navarro MD  PCP: Jack Boone DO    Discharging Nurse:   Discharging Hospital Unit/Room#: 2119/2119-01  Discharging Unit Phone Number: [phone]    Emergency Contact:   Extended Emergency Contact Information  Primary Emergency Contact: Theo Layne  Address: [address] [place] [address]  Home Phone: [phone]  Mobile Phone: [phone]  Relation: Spouse  Hearing or visual needs: None  Other needs: None  Preferred language: English   needed? No  Secondary Emergency Contact: MORGAN LAYNE  Home Phone: [phone]  Relation: Child  Preferred language: English   needed? 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 s/p successful PCI with DELIA (3.25 X 12 mm)    EYE SURGERY      FEMUR SURGERY Right 2025    HIP IM NAIL RIGHT performed by Quin Ya MD at [facility] OR    HYSTERECTOMY (CERVIX STATUS UNKNOWN)      HYSTERECTOMY, VAGINAL      left both ovaries in       Immunization History:   Immunization History   Administered Date(s) Administered    COVID-19, MODERNA BLUE border, Primary or Immunocompromised, (age 12y+), IM, 100 mcg/0.5mL 2021, 2021, 10/28/2021    Influenza Whole 10/20/2015    Influenza, FLUAD, (age 65 y+), IM, Quadv, 0.5mL 2020, 2021, 10/11/2023    Influenza, FLUAD, (age 65 y+), IM, Trivalent PF, 0.5mL 10/19/2017, 2018, 10/08/2019, 2024    Influenza, FLUARIX, FLULAVAL, FLUZONE (age 6 mo+) and AFLURIA, (age 3 y+), Quadv PF, 0.5mL 2020    
DISCHARGE PLANNING NOTE:    LSW following for discharge back to [facility]. VM left for admissions to confirm patient can return to facility once medically ready for discharge.     Hospice consult placed. Writer spoke with son Ashok, referral was sent to [facility] during last admission. Meeting was never completed due to patient going to skilled facility. Ashok is agreeable to referral being resubmitted to [facility] to meet with family once back at [facility].     Meeting scheduled for 10AM on 4/17    Writer spoke with Talia in admissions at [facility], who states patient will need to return to skilled facility prior to retuning to AL as patient is a 2 assist. Writer informed Talia that family was planning a hospice meeting upon return. Talia is reaching out to  and family    Call from Theo at [facility]. Facility is \"not prepared to take patient back this evening\", requesting patient has hospice meeting here at hospital tomorrow then disposition plan will be made for either another skilled stay at [facility] or for patient to return to [facility] with Hospice services.   
DISCHARGE PLANNING NOTE:    Writer received call from Ariane with [facility], pt has been accepted for services and will follow pt at discharge to facility.     Electronically signed by CESARIO Orozco on 4/17/2025 at 11:53 AM    
DISCHARGE PLANNING NOTE:    Writer received call from Geraldine with [facility] to confirm pt is still admitted to the hospital. Meeting confirmed for 1000 today.    [employer] message placed to bedside RN Delilah to confirm meeting. Call placed to pt bailey Pulido for update on meeting, all questions answered at this time.    Electronically signed by CESARIO Orozco on 4/17/2025 at 7:54 AM     
DISCHARGE PLANNING NOTE:    Writer spoke to pt son Ashok regarding discharge plans and acceptance by hospice. Ashok states he wishes for pt to return to [facility] at this time versus [facility], pt does not participate in therapy.    Call placed to Theo at [facility], facility agreeable to accept pt back. Request nursing to call a detailed report due to change in pt change in ADL's.    Forms faxed to [facility] for scheduling.    Call placed back to pt son Ashok for update on acceptance back.     IMM letter provided to patient son Ashok.  Patient offered four hours to make informed decision regarding appeal process; patient representative agreeable to discharge.     Electronically signed by CESARIO Orozco on 4/17/2025 at 3:26 PM    
[facility] will reach out to the family to discuss placement.    
ONGOING DISCHARGE PLAN:      Spoke with patient/son regarding discharge plan and patient/son confirms that plan is still to discharge to the [facility]    Son would like patient to discharge to the [facility]    Left message with Admissions at the [facility] regarding discharge plans.      Blood sugar elevated     Possible discharge to home today     Will continue to follow for additional discharge needs.    If patient is discharged prior to next notation, then this note serves as note for discharge by case management.    Electronically signed by India Simons on 4/15/2025 at 12:14 PM    
Transportation arranged for patient to go to [facility] at 1730 via [facility]. Facility informed. Bedside nurse informed. Call placed to [facility] to confirm discharge. Call placed to pt son Ashok to confirm transportation time.     Number for Report: [phone]    Electronically signed by CESARIO Orozco on 4/17/2025 at 4:55 PM      
no

## 2025-04-17 NOTE — PLAN OF CARE
Problem: Safety - Adult  Goal: Free from fall injury  4/17/2025 1225 by Delilah Pace RN  Outcome: Progressing     Problem: Chronic Conditions and Co-morbidities  Goal: Patient's chronic conditions and co-morbidity symptoms are monitored and maintained or improved  4/17/2025 1225 by Delilah Pace RN  Outcome: Progressing     Problem: Discharge Planning  Goal: Discharge to home or other facility with appropriate resources  4/17/2025 1225 by Delilah Pace RN  Outcome: Progressing     Problem: Skin/Tissue Integrity  Goal: Skin integrity remains intact  Description: 1.  Monitor for areas of redness and/or skin breakdown2.  Assess vascular access sites hourly3.  Every 4-6 hours minimum:  Change oxygen saturation probe site4.  Every 4-6 hours:  If on nasal continuous positive airway pressure, respiratory therapy assess nares and determine need for appliance change or resting period  4/17/2025 1225 by Delilah Pace RN  Outcome: Progressing     Problem: ABCDS Injury Assessment  Goal: Absence of physical injury  4/17/2025 1225 by Delilah Pace RN  Outcome: Progressing     Problem: Pain  Goal: Verbalizes/displays adequate comfort level or baseline comfort level  4/17/2025 1225 by Delilah Pace RN  Outcome: Progressing     Problem: Nutrition Deficit:  Goal: Optimize nutritional status  Outcome: Progressing

## 2025-04-17 NOTE — PROGRESS NOTES
Procedure: Right hip intertrochanteric femur fracture surgical stabilization with intramedullary nail  Date of Procedure: 3/26/2025    HPI: Francia Ferguson is a 80 y.o. old female who is approximately 3 weeks sp the aforementioned procedure.  Patient is currently admitted to the hospital for altered mental status and is somewhat unresponsive.  Patient is a poor historian.  She is awake upon presenting to the room lying in the left lateral position. her pain is adequately controlled and no complaints per nursing staff.  They states she is somewhat combative when trying to move her.  She is somewhat unresponsive on her exam today.  Denies any pain to the hip.    Physical Exam:  BP (!) 127/53   Pulse 74   Temp 97.6 °F (36.4 °C) (Oral)   Resp 16   Ht 1.549 m (5' 1\")   Wt 48.3 kg (106 lb 7.7 oz)   SpO2 100%   BMI 20.12 kg/m²   General Appearance: alert, well appearing, and in no distress  Mental Status: alert, oriented to person, place, and time  Evaluation of the right hip and lower extremity demonstrates her incisions to be clean, dry, and intact. No active drainage or dehiscence is appreciated. Sensation is grossly intact to light touch in all dermatomes and she has 2+ pedal pulses distally.  Patient does not allow for knee extension as she states she does have some pain at the knee although somewhat confused.    Imaging Studies: No new imaging obtained today.    Impression and plan: Francia Ferguson is a 80 y.o. old female who is approximately 3 weeks sp a right hip intertrochanteric femur fracture surgical stabilization with intramedullary nail. she is doing relatively well clinically at this time. her sutures were removed and steri-strips applied to the 2 distal incisions.  Running suture to the most proximal incision was not in place and did appear to be removed prior to her evaluation today.  she may now get his incision wet in the shower but should avoid soaking it in a bath or any body of water. she is to

## 2025-04-17 NOTE — PROGRESS NOTES
[facility] Neurology   IN-PATIENT SERVICE      NEUROLOGY PROGRESS  NOTE                Interval History:     No significant changes clinically today.  Continues to be lethargic although does arouse, minimal response.  Not really following commands.    History of Present Illness:     The patient is a 80 y.o. female who presents with Altered Mental Status (unresponsive)  . The patient was seen and examined and the chart was reviewed.  Patient has had significant decline in her overall functional abilities and cognitive decline since what is reported as approximately [place] by son at bedside.  She has been in and out of the hospital for issues such as fluctuating blood sugars, ventilatory dependent respiratory failure and encephalopathy.  Most recently was in the hospital here at [facility] for a fall and subsequent hip fracture undergoing surgical repair eventually being discharged on 4/3 to [place] rehab.  At the rehab she was occasionally participating with therapies.  Son states at times she would be okay and other times she would not.  She would have good days as far as her mentation and bad days as well.  Reportedly over the last 1 to 2 days she was less responsive, sugars were very elevated, then she was sent to [facility] ED again.  Noted to have blood pressure with systolic in the 80s on arrival.  She was given Narcan without much improvement in her mentation.  She was noted to have elevated lactic acidosis.  CT of the head without acute abnormalities.  Sugars have been as high as 500s and as low as 61 this morning.  She has received dextrose infusion.     Last seen by neurology team in February, 2025.  At that time she was admitted with ventilatory dependent respiratory failure secondary to aspiration.  She had known diagnosis of Alzheimer's dementia at that time.  There was concern for possible ventriculomegaly and NPH although her mentation appeared to be altered secondary to multiple metabolic 
     [facility] Internal Medicine   Kel Navarro MD; MD Ashley Liao MD, MD , Olvin Singleton MD    [facility] Internal Medicine   IN-PATIENT SERVICE   [facility]    Progress note              Date:   4/15/2025  Patient name:  Francia Ferguson  Date of admission:  4/13/2025  5:19 PM  MRN:   [id number]  Account:  [id number]  YOB: 1945  PCP:    Jack Boone DO  Room:   2119/2119-01  Code Status:    DNR-CCA    Chief Complaint:     Chief Complaint   Patient presents with    Altered Mental Status     unresponsive       History Obtained From:     Medical record nursing staff patient is a poor historian    History of Present Illness:     Francia Ferguson is a 80 y.o. Non- / non  female who presents with Altered Mental Status (unresponsive)   and is admitted to the hospital for the management of AMS (altered mental status).      Francia Ferguson is a 80 y.o. Non- / non  female who presents with Altered Mental Status (unresponsive) and is admitted to the hospital for the management of AMS (altered mental status). Medical history significant for CAD s/p stent placement in 2020, atrial fibrillation s/p watchman procedure, HTN, HLD, diabetes mellitus type II, advanced dementia.      Recent admission 3/26-4/3 for fall with femur fracture requiring surgery. During admission patient was also treated for ANKUR and UTI. Family changed code status to DNRCC-A no intubation and was discussing palliative care vs hospice.She was discharged to the nursing facility.       Today, patient was brought to ED per EMS from from nursing facility due to decreased responsiveness. Per EMS BP was in the 80s and patient was given 6 mg of Narcan with minimal improvement in responsiveness. On arrival patient is oxygenating well and maintaining airway. CT head negative for acute intracranial abnormality. D-dimer 3.48 but patient did have 
     [facility] Internal Medicine   Kel Navarro MD; MD Ashley Liao MD, MD , Olvin Singleton MD    [facility] Internal Medicine   IN-PATIENT SERVICE   [facility]    Progress note              Date:   4/16/2025  Patient name:  Francia Ferguson  Date of admission:  4/13/2025  5:19 PM  MRN:   [id number]  Account:  [id number]  YOB: 1945  PCP:    Jack Boone DO  Room:   2119/2119-01  Code Status:    DNR-CCA    Chief Complaint:     Chief Complaint   Patient presents with    Altered Mental Status     unresponsive       History Obtained From:     Medical record nursing staff patient is a poor historian    History of Present Illness:     Francia Ferguson is a 80 y.o. Non- / non  female who presents with Altered Mental Status (unresponsive)   and is admitted to the hospital for the management of AMS (altered mental status).      Francia Ferguson is a 80 y.o. Non- / non  female who presents with Altered Mental Status (unresponsive) and is admitted to the hospital for the management of AMS (altered mental status). Medical history significant for CAD s/p stent placement in 2020, atrial fibrillation s/p watchman procedure, HTN, HLD, diabetes mellitus type II, advanced dementia.      Recent admission 3/26-4/3 for fall with femur fracture requiring surgery. During admission patient was also treated for ANKUR and UTI. Family changed code status to DNRCC-A no intubation and was discussing palliative care vs hospice.She was discharged to the nursing facility.       Today, patient was brought to ED per EMS from from nursing facility due to decreased responsiveness. Per EMS BP was in the 80s and patient was given 6 mg of Narcan with minimal improvement in responsiveness. On arrival patient is oxygenating well and maintaining airway. CT head negative for acute intracranial abnormality. D-dimer 3.48 but patient did have 
     [facility] Internal Medicine   Kel Navarro MD; MD Ashley Liao MD, MD , Olvin Singleton MD    [facility] Internal Medicine   IN-PATIENT SERVICE   [facility]    Progress note              Date:   4/17/2025  Patient name:  Francia Ferguson  Date of admission:  4/13/2025  5:19 PM  MRN:   [id number]  Account:  [id number]  YOB: 1945  PCP:    Jack Boone DO  Room:   2119/2119-01  Code Status:    DNR-CCA    Chief Complaint:     Chief Complaint   Patient presents with    Altered Mental Status     unresponsive       History Obtained From:     Medical record nursing staff patient is a poor historian    History of Present Illness:     Francia Ferguson is a 80 y.o. Non- / non  female who presents with Altered Mental Status (unresponsive)   and is admitted to the hospital for the management of AMS (altered mental status).      Francia Ferguson is a 80 y.o. Non- / non  female who presents with Altered Mental Status (unresponsive) and is admitted to the hospital for the management of AMS (altered mental status). Medical history significant for CAD s/p stent placement in 2020, atrial fibrillation s/p watchman procedure, HTN, HLD, diabetes mellitus type II, advanced dementia.      Recent admission 3/26-4/3 for fall with femur fracture requiring surgery. During admission patient was also treated for ANKUR and UTI. Family changed code status to DNRCC-A no intubation and was discussing palliative care vs hospice.She was discharged to the nursing facility.       Today, patient was brought to ED per EMS from from nursing facility due to decreased responsiveness. Per EMS BP was in the 80s and patient was given 6 mg of Narcan with minimal improvement in responsiveness. On arrival patient is oxygenating well and maintaining airway. CT head negative for acute intracranial abnormality. D-dimer 3.48 but patient did have 
  Physician Progress Note      PATIENT:               JOSE LAYNE  CSN #:                  [id number]  :                       1945  ADMIT DATE:       2025 5:19 PM  DISCH DATE:  RESPONDING  PROVIDER #:        Kel GRIER MD          QUERY TEXT:    Based on your medical judgment, please clarify these findings and document if   any of the following are being evaluated and/or treated:    The clinical indicators include:  -HX: AFIB  -MAR: ASA-Plavix  -Age 80, female, DM, HTN  Options provided:  -- Secondary hypercoagulable state in a patient with atrial fibrillation  -- Other - I will add my own diagnosis  -- Disagree - Not applicable / Not valid  -- Disagree - Clinically unable to determine / Unknown  -- Refer to Clinical Documentation Reviewer    PROVIDER RESPONSE TEXT:    This patient has secondary hypercoagulable state in a patient with atrial   fibrillation.    Query created by: Mary Cristobal on 4/15/2025 10:41 AM      Electronically signed by:  Kel GRIER MD 4/15/2025 2:53 PM          
  Physician Progress Note      PATIENT:               JOSE LAYNE  CSN #:                  [id number]  :                       1945  ADMIT DATE:       2025 5:19 PM  DISCH DATE:  RESPONDING  PROVIDER #:        Kel GRIER MD          QUERY TEXT:    Sepsis is documented in the HP. Please provide additional clinical indicators   supportive of your documentation. Or please document if the diagnosis of   sepsis has been ruled out after study.    The clinical indicators include:  -admission VS: 112/60, 63, 17, 97.6, 100% 2L  -Labs: Trop 93, WBC 2.9, ABGS  - IM HP: 'HP-Started on IV zosyn and Vancomycin to cover for sepsis.   Urinalysis only shows few bacteria, no nitrites or leukocytes. Slight ANKUR \"  -HP: \"Delirium likely secondary to infection and dehydration  Toxic metabolic encephalopathy\"  \"CT head negative for acute intracranial abnormality. D-dimer 3.48 but patient   did have recent surgery. CTA chest negative for PE or acute abnormality. CXR   no acute pulmonary disease. Urine tox screen negative. EKG normal sinus rhythm   no ST changes, nonspecific T wave changes. Troponin 93 repeat 79. Previous   was 27 but in February troponin was 130. BNP 2900 significantly less than   prior level of 8600. Leukopenia WBC 2.9. Lactic 2.7 repeat 1.2. \"  -HP:\"Troponin elevation at 60 no chest pain likely type II NSTEMI suspected   due to infection dehydration and ANKUR patient has sepsis cardiologist Dr. Youssef   will consult\"  Options provided:  -- Sepsis present as evidenced by, Please document evidence.  -- Sepsis was ruled out after study  -- Other - I will add my own diagnosis  -- Disagree - Not applicable / Not valid  -- Disagree - Clinically unable to determine / Unknown  -- Refer to Clinical Documentation Reviewer    PROVIDER RESPONSE TEXT:    Sepsis was ruled out after study.    Query created by: Mary Cristobal on 4/15/2025 10:24 AM      QUERY TEXT:    Encephalopathy is documented in the medical 
 talk to son who is agreeable to have hospice consulted at [facility] patient can be discharged to [facility] today SUZY and discharge orders placed printed prescription for controlled meds done  Overall prognosis is poor I agree with hospice comfort care measures  Kel Navarro MD  4/16/2025    
All admission questions answered by POA (both Jameson tatum and Ashok).   
[facility]   INPATIENT OCCUPATIONAL THERAPY  PROGRESS NOTE  Date: 4/15/2025  Patient Name: Francia Ferguson       Room:   MRN: [id number]    : 1945  (80 y.o.)  Gender: female   Referring Practitioner: Rosy Garcia APRN - NP  Diagnosis: AMS      Discharge Recommendations:  Further Occupational Therapy is recommended upon facility discharge.    OT Equipment Recommendations  Other: TBD    Restrictions/Precautions  Restrictions/Precautions  Restrictions/Precautions: Fall Risk;Weight Bearing (surgical sutures right lateral hip, IVs left and right proximal forearms)  Activity Level: Up with Assist (use lift equipment)  Implants Present? : Metal implants (cardiac stent, right femur IM nail)  Lower Extremity Weight Bearing Restrictions  Right Lower Extremity Weight Bearing: Weight Bearing As Tolerated  Position Activity Restriction  Other Position/Activity Restrictions: NPO; Pt had recent surgery on 3- by Dr. BRANDEN Ya for Right femur intertrochanteric fracture surgical stabilization with intramedullary nail.    Pulse: (!) 115  SpO2: 97 % (on room air)  O2 Device: None (Room air) (1 L- currently off pt)  BP Location: Left upper arm  BP Method: Automatic  Patient Position: Lying right side  Blood Pressure Lyin/63  Pulse Lyin PER MINUTE  Comment: RN Anamaria notified of elevated HR at rest    Subjective  Subjective  Subjective: Pt occasionally verbalizing (\"hi,\" \"OK,\"), but full statement (1x) not as intelligible.  Pain  Pre-Pain:  (Did not respond to pain query.)  Post-Pain:  (did not respond to pain question but is resistive to movement with noted facial grimacing with movement of LLE)       Objective  Orientation  Overall Orientation Status: Impaired (hx severe dementia)  Orientation Level:  (responds to first name, states Brown is not her last name, but does not confirm name.)  Cognition  Cognition Comment: Pt has history of advanced dementia; resists UE & LE 
Blood sugar 360.  Tena Holcomb CNP notified and order received.  
[facility]   INPATIENT OCCUPATIONAL THERAPY  PROGRESS NOTE  Date: 2025  Patient Name: Francia Ferguson       Room:   MRN: [id number]    : 1945  (80 y.o.)  Gender: female   Referring Practitioner: Rosy Garcia APRN - NP  Diagnosis: AMS      Discharge Recommendations:  Further Occupational Therapy is recommended upon facility discharge.      OT Equipment Recommendations  Other: TBD    Restrictions/Precautions  Restrictions/Precautions  Restrictions/Precautions: Fall Risk;Weight Bearing (surgical sutures right lateral hip, IVs left and right proximal forearms)  Activity Level: Up with Assist  Implants Present? : Metal implants (cardiac stent, right femur IM nail)  Lower Extremity Weight Bearing Restrictions  Right Lower Extremity Weight Bearing: Weight Bearing As Tolerated  Position Activity Restriction  Other Position/Activity Restrictions: NPO; Pt had recent surgery on 3- by Dr. BRANDEN aY for Right femur intertrochanteric fracture surgical stabilization with intramedullary nail.    O2 Device: None (Room air)      Subjective  Subjective  Subjective: Pt with minimal verbal communication throughout co-treat with PTA Nani. Pt continues to be resistive to movements. Max verbal/tactile cueing throughout session with limited engagement.  Pain  Post-Pain:  (pt did not respond when questioned about pain. Facial grimacing noted with LE movements)  Pain Interventions: Repositioning;Rest  Comments: Ok per DEDRA Ortiz for OT/PT    Objective  Orientation  Overall Orientation Status: Impaired  Cognition  Overall Cognitive Status: Exceptions  Arousal/Alertness: Impaired  Following Commands: Does not follow commands  Attention Span: Unable to maintain attention  Memory: Impaired (hx dementia)  Safety Judgement: Impaired  Problem Solving: Impaired  Insights: Decreased awareness of deficits  Initiation: Requires cues for all  Sequencing: Requires cues for all  Cognition Comment: Pt 
Comprehensive Nutrition Assessment    Type and Reason for Visit:  Initial, Positive nutrition screen    Nutrition Recommendations/Plan:   Recommend Speech Therapy evaluation for possible chewing/swallowing difficulty  Send diabetic oral nutritional supplement TID with meals when diet advances     Malnutrition Assessment:  Malnutrition Status:  Severe malnutrition (04/14/25 1048)    Context:  Chronic Illness     Findings of the 6 clinical characteristics of malnutrition:  Energy Intake:  75% or less estimated energy requirements for 1 month or longer  Weight Loss:  Greater than 5% over 1 month     Body Fat Loss:  Unable to assess     Muscle Mass Loss:  Unable to assess    Fluid Accumulation:  Mild Extremities   Strength:  Not Performed    Nutrition Assessment:    Pt admitted with AMS and DKA following recent femur fx and repair 3/26 as well as recent cystitis and sacrum fx.  Pt with Hx CAD, CABG, cholesystectomy, Dementia, DM, CKD, HTN and HLD.  Pt is unable to answer any questions at this time and is covered with blankets.  This writer did not perform physical exam to avoid agitating patient.  Spoke with son who was able to answer questions.  Son reports Pt has had decreased appetite/intake for the past 4 months or so with subsequent weight loss, though he cannot state the amount.  Son denies any food allergies/intolerances.  This writer did review ECF records which show Pt was on Regular consistency diet with thin liquids, 4 CHO consistent CHO diet with Ensure twice daily.  Son reports Pt has been eating mostly soft foods, especially finger foods at ECF as she has difficulty using silverware in the past several months.  Son reports family has been feeding patient at meals when able, and Pt does appear to eat better when fed.  However, she is still not eating as well as usual for her, with son estimating ~50% of meals consumed, sometimes less.  Weight records confirm weight loss, though current weight is 
Comprehensive Nutrition Assessment    Type and Reason for Visit:  Reassess    Nutrition Recommendations/Plan:   Continue current diet.  Provide Glucerna with meals.     Malnutrition Assessment:  Malnutrition Status:  Severe malnutrition (04/14/25 1048)    Context:  Chronic Illness     Findings of the 6 clinical characteristics of malnutrition:  Energy Intake:  75% or less estimated energy requirements for 1 month or longer  Weight Loss:  Greater than 5% over 1 month     Body Fat Loss:  Unable to assess     Muscle Mass Loss:  Unable to assess    Fluid Accumulation:  Mild Extremities   Strength:  Not Performed    Nutrition Assessment:    Pt in PCU, was sleeping at the time of visit, on Regular diet, presenting poor PO intake; noted 0% intake per nursing documentation; will re-ordered Glucerna with meals.    Nutrition Related Findings:    Labs: Na 134, Gluc 200, WBC 3.1. Edema: +1 pitting RLE. Rocephin. Wound Type: Multiple, Deep Tissue Injury, Surgical Incision, Skin Tears       Current Nutrition Intake & Therapies:    Average Meal Intake: 0%  Average Supplements Intake: Unable to assess  ADULT DIET; Regular  ADULT ORAL NUTRITION SUPPLEMENT; Breakfast, Lunch, Dinner; Diabetic Oral Supplement    Anthropometric Measures:  Height: 154.9 cm (5' 0.98\")  Ideal Body Weight (IBW): 105 lbs (48 kg)       Current Body Weight: 52.9 kg (116 lb 10 oz), 101 % IBW. Weight Source: Bed scale  Current BMI (kg/m2): 22  Usual Body Weight: 59 kg (130 lb 1.1 oz) (3/17/2025)     % Weight Change (Calculated): -18.5  Weight Adjustment For: No Adjustment                 BMI Categories: Normal Weight (BMI 22.0 to 24.9) age over 65    Estimated Daily Nutrient Needs:  Energy Requirements Based On: Kcal/kg  Weight Used for Energy Requirements: Current  Energy (kcal/day): 0845-3585 kcal/day (30-35 kcal/kg)  Weight Used for Protein Requirements: Current  Protein (g/day): 58-72 g/day (1.2-1.5 g/kg)  Method Used for Fluid Requirements: 1 
Dr. Calero (Neurology) notified of consult.  
Dr. Harris (Cardiology) notified of consult.  
Dr. Navarro notified face to face of POC glucose of 425. No new orders at this time.  
Dr. Navarro notified of pt removal of IV access. Per Dr. Navarro, OK to have no PIV.    11:38 AM  Dr. Navarro notified that patient has rocephin due at 1515. IV rocephin discontinued.  
[facility]   INPATIENT OCCUPATIONAL THERAPY  PROGRESS NOTE  Date: 2025  Patient Name: Francia Ferguson       Room:   MRN: [id number]    : 1945  (80 y.o.)  Gender: female   Referring Practitioner: Rosy Garcia APRN - NP  Diagnosis: AMS      Discharge Recommendations:  Further Occupational Therapy is recommended upon facility discharge.    OT Equipment Recommendations  Other: TBD    Restrictions/Precautions  Restrictions/Precautions  Restrictions/Precautions: Fall Risk;Weight Bearing (surgical sutures right lateral hip, IVs left and right proximal forearms)  Activity Level: Up with Assist  Implants Present? : Metal implants (cardiac stent, right femur IM nail)  Lower Extremity Weight Bearing Restrictions  Right Lower Extremity Weight Bearing: Weight Bearing As Tolerated  Position Activity Restriction  Other Position/Activity Restrictions: NPO; Pt had recent surgery on 3- by Dr. BRANDEN Ya for Right femur intertrochanteric fracture surgical stabilization with intramedullary nail.      Subjective  Subjective  Subjective: Pt occasionaly answering to \"yes\" and \"no\" questions       Objective  Orientation  Overall Orientation Status: Impaired         Balance  Balance  Sitting Balance: Maximum assistance (Pt fluctuating between CGA-Max A x2.)    Transfers/Mobility  Bed mobility  Rolling to Left: Dependent/Total;2 Person assistance  Rolling to Right: Dependent/Total;2 Person assistance  Supine to Sit: Dependent/Total;2 Person assistance  Sit to Supine: Dependent/Total;2 Person assistance  Scooting: Dependent/Total;2 Person assistance  Bed Mobility Comments: On entry, Pt hooklying w/Max knee ROM bilaterally. On exit, L sidelying w/pillow between knees, pillow supporting hips.         OT Exercises  Dynamic Sitting Balance Exercises: SHIRA/PTA facilitated pt engagement in static/dynamic sitting EOB during grooming task and light reaching task.  Pt displaying R lateral lean with 
Notified Dr Navarro about elevated sugar.  
Patient blood sugar 302.  Tena Holcomb CNP notified and order received to decrease rate of D10W.  
[facility]   Occupational Therapy Evaluation  Date: 25  Patient Name: Francia Ferguson       Room: -  MRN: [id number]  Account: [id number]   : 1945  (80 y.o.) Gender: female     Discharge Recommendations:  Further Occupational Therapy is recommended upon facility discharge.    OT Equipment Recommendations  Other: TBD    Referring Practitioner: Rosy Garcia APRN - NP  Diagnosis: AMS        Treatment Diagnosis: impaired selfcare status    Past Medical History:  has a past medical history of Abnormal cardiovascular stress test, CAD (coronary artery disease), Chest pressure, Fatigue, History of pneumonia, HTN (hypertension), Hyperlipidemia, Pneumonia, Positive cardiac stress test, Renal insufficiency, SOB (shortness of breath), and Type 2 diabetes mellitus without complication (HCC).    Past Surgical History:   has a past surgical history that includes Cholecystectomy; Colonoscopy; Hysterectomy, vaginal; Coronary angioplasty with stent (2020); Hysterectomy; Appendectomy; Cardiac catheterization (2021); eye surgery; and Femur Surgery (Right, 3/26/2025).    Restrictions  Restrictions/Precautions  Restrictions/Precautions: Fall Risk, Weight Bearing (surgical sutures right lateral hip, IVs left and right proximal forearms)  Activity Level: Up with Assist (use lift equipment)  Implants Present? : Metal implants (cardiac stent, right femur IM nail)  Lower Extremity Weight Bearing Restrictions  Right Lower Extremity Weight Bearing: Weight Bearing As Tolerated  Position Activity Restriction  Other Position/Activity Restrictions: NPO; Pt had recent surgery on 3- by Dr. BRANDEN Ya for Right femur intertrochanteric fracture surgical stabilization with intramedullary nail.      Vitals  Vitals  Pulse: (!) 115  SpO2: 97 % (on room air)  O2 Device: None (Room air) (1 L- currently off pt)  BP Location: Left upper arm  BP Method: Automatic  Patient Position: Lying right 
Per Dr. Navarro, telemetry orders may be discontinued.  
Physical Therapy  [facility]   Physical Therapy Treatment  Date: 25  Patient Name: Francia Ferguson       Room: -  MRN: [id number]  Account: [id number]   : 1945  (80 y.o.) Gender: female     Discharge Recommendations:  Discharge Recommendations: Continue to assess pending progress     PT Equipment Recommendations  Equipment Needed:  (TBD)    General  Chart Reviewed: Yes  Additional Pertinent Hx: Per ER notes:  Francia Ferguson is a 80 y.o. female brought in by EMS from care facility for evaluation of altered mental status.  Patient was found by staff barely responsive, only responding to painful stimuli but even then minimally.  Blood pressure upon EMS arrival in the 80s.  EMS gave a total of 6 of Narcan with slight improvement in responsiveness and blood pressure.     Son at bedside reports that patient has had multiple falls recently, several weeks ago she had a right hip fracture repaired.  He does not think she has had any falls since then but is not with her all day so he cannot be sure.     Patient DNR [facility] no intubation, son with appropriate paperwork.  Response To Previous Treatment: Patient with no complaints from previous session., Patient unable to report, no changes reported from family or staff  Family/Caregiver Present: No  Referring Practitioner: AMY HAINES NP  Diagnosis: Altered mental status    Past Medical History:  has a past medical history of Abnormal cardiovascular stress test, CAD (coronary artery disease), Chest pressure, Fatigue, History of pneumonia, HTN (hypertension), Hyperlipidemia, Pneumonia, Positive cardiac stress test, Renal insufficiency, SOB (shortness of breath), and Type 2 diabetes mellitus without complication.  Past Surgical History:   has a past surgical history that includes Cholecystectomy; Colonoscopy; Hysterectomy, vaginal; Coronary angioplasty with stent (2020); Hysterectomy; Appendectomy; Cardiac catheterization 
Physical Therapy  [facility]   Physical Therapy Evaluation  Date: 25  Patient Name: Francia Ferguson       Room: -  MRN: [id number]  Account: [id number]   : 1945  (80 y.o.) Gender: female     Discharge Recommendations:  Discharge Recommendations: Continue to assess pending progress     PT Equipment Recommendations  Equipment Needed:  (TBD)     Past Medical History:  has a past medical history of Abnormal cardiovascular stress test, CAD (coronary artery disease), Chest pressure, Fatigue, History of pneumonia, HTN (hypertension), Hyperlipidemia, Pneumonia, Positive cardiac stress test, Renal insufficiency, SOB (shortness of breath), and Type 2 diabetes mellitus without complication (HCC).  Past Surgical History:   has a past surgical history that includes Cholecystectomy; Colonoscopy; Hysterectomy, vaginal; Coronary angioplasty with stent (2020); Hysterectomy; Appendectomy; Cardiac catheterization (2021); eye surgery; and Femur Surgery (Right, 3/26/2025).    Subjective  Subjective  Subjective: Pt laying in bed w/ head resting on the right upper bed rail.  Pt clutching right thigh w/ bilateral hands while resting in right side-lying position in bed. Pt voiced the word \"Hi\"  to therapists.     General  Patient assessed for rehabilitation services?: Yes  Additional Pertinent Hx: Per ER notes:  Francia Ferguson is a 80 y.o. female brought in by EMS from care facility for evaluation of altered mental status.  Patient was found by staff barely responsive, only responding to painful stimuli but even then minimally.  Blood pressure upon EMS arrival in the 80s.  EMS gave a total of 6 of Narcan with slight improvement in responsiveness and blood pressure.     Son at bedside reports that patient has had multiple falls recently, several weeks ago she had a right hip fracture repaired.  He does not think she has had any falls since then but is not with her all day so he cannot 
Physical Therapy  [facility]   Physical Therapy Treatment  Date: 4/15/25  Patient Name: Francia Ferguson       Room:   MRN: [id number]  Account: [id number]   : 1945  (80 y.o.) Gender: female     Discharge Recommendations:  Discharge Recommendations: Continue to assess pending progress     PT Equipment Recommendations  Equipment Needed:  (TBD)    General  Response To Previous Treatment: Patient unable to report, no changes reported from family or staff  Family/Caregiver Present: No  Follows Commands: Impaired (hx dementia)    Past Medical History:  has a past medical history of Abnormal cardiovascular stress test, CAD (coronary artery disease), Chest pressure, Fatigue, History of pneumonia, HTN (hypertension), Hyperlipidemia, Pneumonia, Positive cardiac stress test, Renal insufficiency, SOB (shortness of breath), and Type 2 diabetes mellitus without complication (HCC).  Past Surgical History:   has a past surgical history that includes Cholecystectomy; Colonoscopy; Hysterectomy, vaginal; Coronary angioplasty with stent (2020); Hysterectomy; Appendectomy; Cardiac catheterization (2021); eye surgery; and Femur Surgery (Right, 3/26/2025).    Restrictions  Restrictions/Precautions  Restrictions/Precautions: Fall Risk, Weight Bearing (surgical sutures right lateral hip, IVs left and right proximal forearms)  Activity Level: Up with Assist (use lift equipment)  Implants Present? : Metal implants (cardiac stent, right femur IM nail)  Lower Extremity Weight Bearing Restrictions  Right Lower Extremity Weight Bearing: Weight Bearing As Tolerated  Position Activity Restriction  Other Position/Activity Restrictions: NPO; Pt had recent surgery on 3- by Dr. BRANDEN Ya for Right femur intertrochanteric fracture surgical stabilization with intramedullary nail.     Subjective  Subjective  Subjective: Pt occasionally verbalizing (\"hi,\" \"OK,\"), but full statement (1x) not as 
Physical Therapy  [facility]   Physical Therapy Treatment  Date: 25  Patient Name: Francia Ferguson       Room: -  MRN: [id number]  Account: [id number]   : 1945  (80 y.o.) Gender: female     Discharge Recommendations:  Discharge Recommendations: Continue to assess pending progress     PT Equipment Recommendations  Equipment Needed:  (TBD)    General  Chart Reviewed: Yes  Additional Pertinent Hx: Per ER notes:  Francia Ferguson is a 80 y.o. female brought in by EMS from care facility for evaluation of altered mental status.  Patient was found by staff barely responsive, only responding to painful stimuli but even then minimally.  Blood pressure upon EMS arrival in the 80s.  EMS gave a total of 6 of Narcan with slight improvement in responsiveness and blood pressure.     Son at bedside reports that patient has had multiple falls recently, several weeks ago she had a right hip fracture repaired.  He does not think she has had any falls since then but is not with her all day so he cannot be sure.     Patient DNR [facility] no intubation, son with appropriate paperwork.  Response To Previous Treatment: Patient with no complaints from previous session., Patient unable to report, no changes reported from family or staff  Family/Caregiver Present: No  Referring Practitioner: AMY HAINES NP  Diagnosis: Altered mental status    Past Medical History:  has a past medical history of Abnormal cardiovascular stress test, CAD (coronary artery disease), Chest pressure, Fatigue, History of pneumonia, HTN (hypertension), Hyperlipidemia, Pneumonia, Positive cardiac stress test, Renal insufficiency, SOB (shortness of breath), and Type 2 diabetes mellitus without complication.  Past Surgical History:   has a past surgical history that includes Cholecystectomy; Colonoscopy; Hysterectomy, vaginal; Coronary angioplasty with stent (2020); Hysterectomy; Appendectomy; Cardiac catheterization 
Pt arrived to floor via stretcher from ED and was transfered to bed.  Vitals taken, telemetry applied. Call light within reach. Bed in lowest position, and locked. Side rails up x 2.  
Pt discharged with EMS transport with belongings including shirt pants and a blanket. Report called to [facility].  
Rosy HOUSTON notified of Blood sugar 307.    
Tena Holcomb NP notified via perfect serve of lactic 2.7  
4 tablets   Yes Devendra Portillo MD   atorvastatin (LIPITOR) 40 MG tablet TAKE 1 TABLET BY MOUTH DAILY 3/20/25  Yes Abraham Fraire DO   insulin glargine (LANTUS) 100 UNIT/ML injection vial Inject 5 Units into the skin daily 3/18/25  Yes Kiera Singleton MD   divalproex (DEPAKOTE SPRINKLE) 125 MG DR capsule Take 2 capsules by mouth 2 times daily   Yes Devendra Portillo MD   insulin lispro (HUMALOG,ADMELOG) 100 UNIT/ML SOLN injection vial Inject 0-4 Units into the skin 4 times daily (before meals and nightly) 2/18/25  Yes Maik See DO   QUEtiapine (SEROQUEL) 25 MG tablet Take 1 tablet by mouth nightly 2/18/25  Yes Maik See DO   metoprolol tartrate (LOPRESSOR) 50 MG tablet Take 1 tablet by mouth 2 times daily 2/18/25  Yes Maik See DO   midodrine (PROAMATINE) 2.5 MG tablet Take 1 tablet by mouth 3 times daily as needed (sbp < 100) 2/18/25  Yes Maik See DO   potassium chloride (KLOR-CON M) 20 MEQ extended release tablet Take 1 tablet by mouth 1 time for 1 dose  Patient taking differently: Take 1 tablet by mouth daily 2/13/25 4/13/25 Yes Steven Orourke MD   isosorbide mononitrate (IMDUR) 120 MG extended release tablet TAKE 1 TABLET BY MOUTH DAILY 8/1/24  Yes Abraham Fraire DO   clopidogrel (PLAVIX) 75 MG tablet Take 1 tablet by mouth daily 2/20/24  Yes Abraham Fraire DO   Magnesium 300 MG CAPS Take 2 capsules by mouth at bedtime   Yes Devendra Portillo MD   aspirin 81 MG chewable tablet Take 1 tablet by mouth daily 2/5/22  Yes Jeaneth Henao MD   Multiple Vitamins TABS Take 1 tablet by mouth daily   Yes Tonia Ochoa MD   famotidine (PEPCID) 20 MG tablet Take 1 tablet by mouth 2 times daily 2/13/25   Steven Orourke MD   Continuous Glucose Sensor (FREESTYLE OLGA 2 SENSOR) MISC Change every 14 days. 1/2/25   Provider, Historical, MD   B-D UF III MINI PEN NEEDLES 31G X 5 MM MISC Inject 1 each as directed daily 12/30/24   Jack Boone,    Coenzyme Q10 (CO

## 2025-04-18 NOTE — PROGRESS NOTES
[address] successfully removed from [address] Still some complaints of numbness to right hand. Good color warmth and movement. Resting comfortably. [Fever] : fever [Fatigue] : fatigue [Earache] : earache [Shortness Of Breath] : no shortness of breath [Cough] : cough [Abdominal Pain] : no abdominal pain [Diarrhea] : no diarrhea [Vomiting] : no vomiting [Dysuria] : no dysuria [Skin Rash] : no skin rash [Headache] : no headache [FreeTextEntry4] : Nasal congestion
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
## (undated) DEVICE — GLOVE SURG SZ 75 L12IN FNGR THK79MIL GRN LTX FREE

## (undated) DEVICE — GAUZE,SPONGE,FLUFF,6"X6.75",STRL,5/TRAY: Brand: MEDLINE

## (undated) DEVICE — ELECTRODE ES L3IN S STL BLDE INSUL DISP VALLEYLAB EDGE

## (undated) DEVICE — SUTURE PROL SZ 3-0 L18IN NONABSORBABLE BLU L24MM FS-1 3/8 8684G

## (undated) DEVICE — NEPTUNE E-SEP SMOKE EVACUATION PENCIL, COATED, 70MM BLADE, PUSH BUTTON SWITCH: Brand: NEPTUNE E-SEP

## (undated) DEVICE — 6619 2 PTNT ISO SYS INCISE AREA&LT;(&GT;&&LT;)&GT;P: Brand: STERI-DRAPE™ IOBAN™ 2

## (undated) DEVICE — SUTURE VICRYL SZ 0 L36IN ABSRB UD CT-1 L36MM 1/2 CIR TAPR PNT VCP946H

## (undated) DEVICE — BIT DRL L330MM DIA4.2MM CALIB 100MM 3 FLUT QUIK CPL

## (undated) DEVICE — GLOVE ORANGE PI 7   MSG9070

## (undated) DEVICE — GUIDEWIRE ORTH L400MM DIA3.2MM FOR TFN

## (undated) DEVICE — ROD RMR L950MM DIA3MM W/ STR BALL TIP

## (undated) DEVICE — ELECTRODE PT RET AD L9FT HI MOIST COND ADH HYDRGEL CORDED

## (undated) DEVICE — DRESSING,GAUZE,XEROFORM,CURAD,1"X8",ST: Brand: CURAD

## (undated) DEVICE — 1010 S-DRAPE TOWEL DRAPE 10/BX: Brand: STERI-DRAPE™

## (undated) DEVICE — SUTURE VICRYL + SZ 2-0 L27IN ABSRB CLR CT-1 1/2 CIR TAPERCUT VCP259H

## (undated) DEVICE — SOLUTION IRRIG 1000ML STRL H2O USP PLAS POUR BTL

## (undated) DEVICE — YANKAUER,OPEN TIP,W/O VENT,STERILE: Brand: MEDLINE INDUSTRIES, INC.

## (undated) DEVICE — HYPODERMIC SAFETY NEEDLE: Brand: MAGELLAN

## (undated) DEVICE — ST CHARLES HIP FX: Brand: MEDLINE INDUSTRIES, INC.

## (undated) DEVICE — SINGLE PORT MANIFOLD: Brand: NEPTUNE 2

## (undated) DEVICE — DRESSING TRNSPAR W5XL4.5IN FLM SHT SEMIPERMEABLE WIND

## (undated) DEVICE — BLANKET WRM W29.9XL79.1IN UP BODY FORC AIR MISTRAL-AIR

## (undated) DEVICE — SOLUTION IRRIG 1000ML 0.9% SOD CHL USP POUR PLAS BTL

## (undated) DEVICE — STRIP,CLOSURE,WOUND,MEDI-STRIP,1/2X4: Brand: MEDLINE